# Patient Record
Sex: FEMALE | Race: BLACK OR AFRICAN AMERICAN | NOT HISPANIC OR LATINO | Employment: OTHER | URBAN - NONMETROPOLITAN AREA
[De-identification: names, ages, dates, MRNs, and addresses within clinical notes are randomized per-mention and may not be internally consistent; named-entity substitution may affect disease eponyms.]

---

## 2018-10-12 ENCOUNTER — HISTORICAL (OUTPATIENT)
Dept: ADMINISTRATIVE | Facility: HOSPITAL | Age: 53
End: 2018-10-12

## 2018-11-14 ENCOUNTER — HISTORICAL (OUTPATIENT)
Dept: ADMINISTRATIVE | Facility: HOSPITAL | Age: 53
End: 2018-11-14

## 2018-11-15 ENCOUNTER — ANESTHESIA EVENT (OUTPATIENT)
Dept: SURGERY | Facility: HOSPITAL | Age: 53
DRG: 219 | End: 2018-11-15
Payer: MEDICAID

## 2018-11-15 ENCOUNTER — ANESTHESIA EVENT (OUTPATIENT)
Dept: SURGERY | Facility: HOSPITAL | Age: 53
End: 2018-11-15

## 2018-11-15 ENCOUNTER — ANESTHESIA (OUTPATIENT)
Dept: SURGERY | Facility: HOSPITAL | Age: 53
DRG: 219 | End: 2018-11-15
Payer: MEDICAID

## 2018-11-15 ENCOUNTER — HOSPITAL ENCOUNTER (INPATIENT)
Facility: HOSPITAL | Age: 53
LOS: 12 days | Discharge: HOSPICE | DRG: 219 | End: 2018-11-27
Attending: EMERGENCY MEDICINE | Admitting: INTERNAL MEDICINE
Payer: MEDICAID

## 2018-11-15 DIAGNOSIS — I71.019 AORTIC DISSECTION DISTAL TO LEFT SUBCLAVIAN: ICD-10-CM

## 2018-11-15 DIAGNOSIS — T14.8XXA HEMATOMA: ICD-10-CM

## 2018-11-15 DIAGNOSIS — I71.019 AORTIC DISSECTION, THORACIC: ICD-10-CM

## 2018-11-15 DIAGNOSIS — D62 ACUTE BLOOD LOSS ANEMIA: ICD-10-CM

## 2018-11-15 DIAGNOSIS — R00.1 BRADYCARDIA: ICD-10-CM

## 2018-11-15 DIAGNOSIS — I71.00 DISSECTION OF AORTA, UNSPECIFIED PORTION OF AORTA: ICD-10-CM

## 2018-11-15 DIAGNOSIS — I71.10 THORACIC ANEURYSM, RUPTURED: ICD-10-CM

## 2018-11-15 DIAGNOSIS — I71.30 RUPTURED ABDOMINAL AORTIC ANEURYSM (AAA): Primary | ICD-10-CM

## 2018-11-15 DIAGNOSIS — D68.9 COAGULOPATHY: ICD-10-CM

## 2018-11-15 DIAGNOSIS — R07.9 CHEST PAIN: ICD-10-CM

## 2018-11-15 DIAGNOSIS — K74.60 CHRONIC HEPATITIS C WITH CIRRHOSIS: ICD-10-CM

## 2018-11-15 DIAGNOSIS — Z51.5 PALLIATIVE CARE ENCOUNTER: ICD-10-CM

## 2018-11-15 DIAGNOSIS — S30.1XXA GROIN HEMATOMA: ICD-10-CM

## 2018-11-15 DIAGNOSIS — B18.2 CHRONIC HEPATITIS C WITH CIRRHOSIS: ICD-10-CM

## 2018-11-15 DIAGNOSIS — R10.11 RUQ PAIN: ICD-10-CM

## 2018-11-15 DIAGNOSIS — I10 ESSENTIAL HYPERTENSION: ICD-10-CM

## 2018-11-15 DIAGNOSIS — Z71.89 GOALS OF CARE, COUNSELING/DISCUSSION: ICD-10-CM

## 2018-11-15 PROBLEM — R18.8 OTHER ASCITES: Status: ACTIVE | Noted: 2018-11-15

## 2018-11-15 PROBLEM — F14.10 COCAINE ABUSE: Status: ACTIVE | Noted: 2018-11-15

## 2018-11-15 PROBLEM — M54.6 ACUTE MIDLINE THORACIC BACK PAIN: Status: ACTIVE | Noted: 2018-11-15

## 2018-11-15 PROBLEM — F12.10 MARIJUANA ABUSE: Status: ACTIVE | Noted: 2018-11-15

## 2018-11-15 PROBLEM — D69.6 THROMBOCYTOPENIA: Status: ACTIVE | Noted: 2018-11-15

## 2018-11-15 PROBLEM — R18.8 OTHER ASCITES: Status: RESOLVED | Noted: 2018-11-15 | Resolved: 2018-11-15

## 2018-11-15 LAB
ABO + RH BLD: NORMAL
ALBUMIN SERPL BCP-MCNC: 2.4 G/DL
ALP SERPL-CCNC: 128 U/L
ALT SERPL W/O P-5'-P-CCNC: 29 U/L
ANION GAP SERPL CALC-SCNC: 8 MMOL/L
ANION GAP SERPL CALC-SCNC: 8 MMOL/L
ANION GAP SERPL CALC-SCNC: 9 MMOL/L
APTT BLDCRRT: 33.8 SEC
ASCENDING AORTA: 3.69 CM
AST SERPL-CCNC: 67 U/L
AV MEAN GRADIENT: 3.98 MMHG
AV PEAK GRADIENT: 6.45 MMHG
AV VALVE AREA: 2.4 CM2
BASOPHILS # BLD AUTO: 0 K/UL
BASOPHILS # BLD AUTO: 0.01 K/UL
BASOPHILS # BLD AUTO: 0.01 K/UL
BASOPHILS # BLD AUTO: 0.02 K/UL
BASOPHILS NFR BLD: 0 %
BASOPHILS NFR BLD: 0.1 %
BASOPHILS NFR BLD: 0.1 %
BASOPHILS NFR BLD: 0.2 %
BILIRUB SERPL-MCNC: 1.8 MG/DL
BLD GP AB SCN CELLS X3 SERPL QL: NORMAL
BNP SERPL-MCNC: 803 PG/ML
BSA FOR ECHO PROCEDURE: 1.59 M2
BUN SERPL-MCNC: 16 MG/DL
BUN SERPL-MCNC: 17 MG/DL
BUN SERPL-MCNC: 17 MG/DL
CALCIUM SERPL-MCNC: 7.5 MG/DL
CALCIUM SERPL-MCNC: 8.1 MG/DL
CALCIUM SERPL-MCNC: 8.4 MG/DL
CHLORIDE SERPL-SCNC: 108 MMOL/L
CHLORIDE SERPL-SCNC: 110 MMOL/L
CHLORIDE SERPL-SCNC: 110 MMOL/L
CO2 SERPL-SCNC: 17 MMOL/L
CO2 SERPL-SCNC: 19 MMOL/L
CO2 SERPL-SCNC: 21 MMOL/L
CREAT SERPL-MCNC: 1.1 MG/DL
CREAT SERPL-MCNC: 1.2 MG/DL
CREAT SERPL-MCNC: 1.2 MG/DL
CV ECHO LV RWT: 0.52 CM
DIFFERENTIAL METHOD: ABNORMAL
DOP CALC AO PEAK VEL: 1.27 M/S
DOP CALC AO VTI: 31.02 CM
DOP CALC LVOT AREA: 3.05 CM2
DOP CALC LVOT DIAMETER: 1.97 CM
DOP CALC LVOT STROKE VOLUME: 74.52 CM3
DOP CALCLVOT PEAK VEL VTI: 24.46 CM
E WAVE DECELERATION TIME: 261.89 MSEC
E/A RATIO: 0.74
E/E' RATIO: 7.08
ECHO LV POSTERIOR WALL: 1.06 CM (ref 0.6–1.1)
EOSINOPHIL # BLD AUTO: 0 K/UL
EOSINOPHIL NFR BLD: 0 %
ERYTHROCYTE [DISTWIDTH] IN BLOOD BY AUTOMATED COUNT: 16.2 %
ERYTHROCYTE [DISTWIDTH] IN BLOOD BY AUTOMATED COUNT: 16.2 %
ERYTHROCYTE [DISTWIDTH] IN BLOOD BY AUTOMATED COUNT: 16.3 %
ERYTHROCYTE [DISTWIDTH] IN BLOOD BY AUTOMATED COUNT: 16.5 %
EST. GFR  (AFRICAN AMERICAN): 59.6 ML/MIN/1.73 M^2
EST. GFR  (AFRICAN AMERICAN): 59.6 ML/MIN/1.73 M^2
EST. GFR  (AFRICAN AMERICAN): >60 ML/MIN/1.73 M^2
EST. GFR  (NON AFRICAN AMERICAN): 51.7 ML/MIN/1.73 M^2
EST. GFR  (NON AFRICAN AMERICAN): 51.7 ML/MIN/1.73 M^2
EST. GFR  (NON AFRICAN AMERICAN): 57.5 ML/MIN/1.73 M^2
FRACTIONAL SHORTENING: 32 % (ref 28–44)
GLUCOSE SERPL-MCNC: 117 MG/DL
GLUCOSE SERPL-MCNC: 128 MG/DL
GLUCOSE SERPL-MCNC: 148 MG/DL
HAV IGM SERPL QL IA: NEGATIVE
HBV CORE IGM SERPL QL IA: NEGATIVE
HBV SURFACE AG SERPL QL IA: NEGATIVE
HCT VFR BLD AUTO: 28.7 %
HCT VFR BLD AUTO: 30.7 %
HCT VFR BLD AUTO: 33 %
HCT VFR BLD AUTO: 34.8 %
HCV AB SERPL QL IA: POSITIVE
HGB BLD-MCNC: 10 G/DL
HGB BLD-MCNC: 10.6 G/DL
HGB BLD-MCNC: 11.9 G/DL
HGB BLD-MCNC: 9.6 G/DL
HIV1+2 IGG SERPL QL IA.RAPID: NEGATIVE
IMM GRANULOCYTES # BLD AUTO: 0.04 K/UL
IMM GRANULOCYTES # BLD AUTO: 0.05 K/UL
IMM GRANULOCYTES # BLD AUTO: 0.05 K/UL
IMM GRANULOCYTES # BLD AUTO: 0.06 K/UL
IMM GRANULOCYTES NFR BLD AUTO: 0.5 %
IMM GRANULOCYTES NFR BLD AUTO: 0.7 %
INR PPP: 1.7
INTERVENTRICULAR SEPTUM: 1.18 CM (ref 0.6–1.1)
LA MAJOR: 5.75 CM
LA MINOR: 4.68 CM
LA WIDTH: 3.38 CM
LACTATE SERPL-SCNC: 3 MMOL/L
LACTATE SERPL-SCNC: 3.1 MMOL/L
LACTATE SERPL-SCNC: 3.6 MMOL/L
LACTATE SERPL-SCNC: 3.9 MMOL/L
LEFT ATRIUM SIZE: 2.84 CM
LEFT ATRIUM VOLUME INDEX: 26.5 ML/M2
LEFT ATRIUM VOLUME: 42.1 CM3
LEFT INTERNAL DIMENSION IN SYSTOLE: 2.78 CM (ref 2.1–4)
LEFT VENTRICLE DIASTOLIC VOLUME INDEX: 46.36 ML/M2
LEFT VENTRICLE DIASTOLIC VOLUME: 73.72 ML
LEFT VENTRICLE MASS INDEX: 97.3 G/M2
LEFT VENTRICLE SYSTOLIC VOLUME INDEX: 18.3 ML/M2
LEFT VENTRICLE SYSTOLIC VOLUME: 29.09 ML
LEFT VENTRICULAR INTERNAL DIMENSION IN DIASTOLE: 4.09 CM (ref 3.5–6)
LEFT VENTRICULAR MASS: 154.7 G
LV LATERAL E/E' RATIO: 6.57
LV SEPTAL E/E' RATIO: 7.67
LYMPHOCYTES # BLD AUTO: 0.6 K/UL
LYMPHOCYTES # BLD AUTO: 0.7 K/UL
LYMPHOCYTES # BLD AUTO: 0.7 K/UL
LYMPHOCYTES # BLD AUTO: 0.8 K/UL
LYMPHOCYTES NFR BLD: 7.1 %
LYMPHOCYTES NFR BLD: 7.1 %
LYMPHOCYTES NFR BLD: 7.2 %
LYMPHOCYTES NFR BLD: 8.5 %
MCH RBC QN AUTO: 30.8 PG
MCH RBC QN AUTO: 31 PG
MCH RBC QN AUTO: 31.2 PG
MCH RBC QN AUTO: 31.3 PG
MCHC RBC AUTO-ENTMCNC: 32.1 G/DL
MCHC RBC AUTO-ENTMCNC: 32.6 G/DL
MCHC RBC AUTO-ENTMCNC: 33.4 G/DL
MCHC RBC AUTO-ENTMCNC: 34.2 G/DL
MCV RBC AUTO: 91 FL
MCV RBC AUTO: 94 FL
MCV RBC AUTO: 96 FL
MCV RBC AUTO: 96 FL
MONOCYTES # BLD AUTO: 0.6 K/UL
MONOCYTES # BLD AUTO: 0.7 K/UL
MONOCYTES # BLD AUTO: 0.8 K/UL
MONOCYTES # BLD AUTO: 1.4 K/UL
MONOCYTES NFR BLD: 15 %
MONOCYTES NFR BLD: 6.9 %
MONOCYTES NFR BLD: 7.4 %
MONOCYTES NFR BLD: 8.5 %
MV PEAK A VEL: 0.62 M/S
MV PEAK E VEL: 0.46 M/S
NEUTROPHILS # BLD AUTO: 7.2 K/UL
NEUTROPHILS # BLD AUTO: 7.4 K/UL
NEUTROPHILS # BLD AUTO: 7.4 K/UL
NEUTROPHILS # BLD AUTO: 8.4 K/UL
NEUTROPHILS NFR BLD: 77.3 %
NEUTROPHILS NFR BLD: 82.1 %
NEUTROPHILS NFR BLD: 85 %
NEUTROPHILS NFR BLD: 85.3 %
NRBC BLD-RTO: 0 /100 WBC
PISA TR MAX VEL: 2.33 M/S
PLATELET # BLD AUTO: 61 K/UL
PLATELET # BLD AUTO: 64 K/UL
PLATELET # BLD AUTO: 79 K/UL
PLATELET # BLD AUTO: 80 K/UL
PMV BLD AUTO: 11.7 FL
PMV BLD AUTO: 12.8 FL
PMV BLD AUTO: 13.1 FL
PMV BLD AUTO: ABNORMAL FL
POCT GLUCOSE: 109 MG/DL (ref 70–110)
POTASSIUM SERPL-SCNC: 4.3 MMOL/L
POTASSIUM SERPL-SCNC: 4.3 MMOL/L
POTASSIUM SERPL-SCNC: 5.6 MMOL/L
PROT SERPL-MCNC: 8.4 G/DL
PROTHROMBIN TIME: 16.9 SEC
RA MAJOR: 4.48 CM
RA PRESSURE: 3 MMHG
RA WIDTH: 2.97 CM
RBC # BLD AUTO: 3.07 M/UL
RBC # BLD AUTO: 3.21 M/UL
RBC # BLD AUTO: 3.44 M/UL
RBC # BLD AUTO: 3.84 M/UL
RIGHT VENTRICULAR END-DIASTOLIC DIMENSION: 3.14 CM
SINUS: 3.77 CM
SODIUM SERPL-SCNC: 134 MMOL/L
SODIUM SERPL-SCNC: 137 MMOL/L
SODIUM SERPL-SCNC: 139 MMOL/L
STJ: 3.63 CM
TDI LATERAL: 0.07
TDI SEPTAL: 0.06
TDI: 0.07
TR MAX PG: 21.72 MMHG
TRICUSPID ANNULAR PLANE SYSTOLIC EXCURSION: 2.27 CM
TROPONIN I SERPL DL<=0.01 NG/ML-MCNC: 0.07 NG/ML
TV REST PULMONARY ARTERY PRESSURE: 24.72 MMHG
WBC # BLD AUTO: 8.67 K/UL
WBC # BLD AUTO: 8.81 K/UL
WBC # BLD AUTO: 9.56 K/UL
WBC # BLD AUTO: 9.83 K/UL

## 2018-11-15 PROCEDURE — C2628 CATHETER, OCCLUSION: HCPCS | Performed by: SURGERY

## 2018-11-15 PROCEDURE — 75957 PR  ENDOVASC REPAIR THOR AORTA EXCL SUBCLAVIAN: CPT | Mod: 26,,, | Performed by: SURGERY

## 2018-11-15 PROCEDURE — 63600175 PHARM REV CODE 636 W HCPCS: Performed by: PSYCHIATRY & NEUROLOGY

## 2018-11-15 PROCEDURE — 99285 EMERGENCY DEPT VISIT HI MDM: CPT | Mod: 25

## 2018-11-15 PROCEDURE — C1874 STENT, COATED/COV W/DEL SYS: HCPCS | Performed by: SURGERY

## 2018-11-15 PROCEDURE — 36556 INSERT NON-TUNNEL CV CATH: CPT | Mod: 59,,, | Performed by: NURSE PRACTITIONER

## 2018-11-15 PROCEDURE — 25000003 PHARM REV CODE 250

## 2018-11-15 PROCEDURE — C1769 GUIDE WIRE: HCPCS | Performed by: SURGERY

## 2018-11-15 PROCEDURE — 25000003 PHARM REV CODE 250: Performed by: NURSE ANESTHETIST, CERTIFIED REGISTERED

## 2018-11-15 PROCEDURE — 34713 PERQ ACCESS & CLSR FEM ART: CPT | Mod: RT,,, | Performed by: SURGERY

## 2018-11-15 PROCEDURE — 96375 TX/PRO/DX INJ NEW DRUG ADDON: CPT

## 2018-11-15 PROCEDURE — 02VW3DZ RESTRICTION OF THORACIC AORTA, DESCENDING WITH INTRALUMINAL DEVICE, PERCUTANEOUS APPROACH: ICD-10-PCS | Performed by: SURGERY

## 2018-11-15 PROCEDURE — C1753 CATH, INTRAVAS ULTRASOUND: HCPCS | Performed by: SURGERY

## 2018-11-15 PROCEDURE — 80053 COMPREHEN METABOLIC PANEL: CPT

## 2018-11-15 PROCEDURE — B3101ZZ FLUOROSCOPY OF THORACIC AORTA USING LOW OSMOLAR CONTRAST: ICD-10-PCS | Performed by: SURGERY

## 2018-11-15 PROCEDURE — 80074 ACUTE HEPATITIS PANEL: CPT

## 2018-11-15 PROCEDURE — 63600175 PHARM REV CODE 636 W HCPCS

## 2018-11-15 PROCEDURE — 85610 PROTHROMBIN TIME: CPT

## 2018-11-15 PROCEDURE — 37000009 HC ANESTHESIA EA ADD 15 MINS: Performed by: SURGERY

## 2018-11-15 PROCEDURE — 93010 ELECTROCARDIOGRAM REPORT: CPT | Mod: ,,, | Performed by: INTERNAL MEDICINE

## 2018-11-15 PROCEDURE — 96367 TX/PROPH/DG ADDL SEQ IV INF: CPT

## 2018-11-15 PROCEDURE — 99285 EMERGENCY DEPT VISIT HI MDM: CPT | Mod: ,,, | Performed by: EMERGENCY MEDICINE

## 2018-11-15 PROCEDURE — 27100171 HC OXYGEN HIGH FLOW UP TO 24 HOURS

## 2018-11-15 PROCEDURE — 99900035 HC TECH TIME PER 15 MIN (STAT)

## 2018-11-15 PROCEDURE — 83880 ASSAY OF NATRIURETIC PEPTIDE: CPT

## 2018-11-15 PROCEDURE — 86850 RBC ANTIBODY SCREEN: CPT

## 2018-11-15 PROCEDURE — 96376 TX/PRO/DX INJ SAME DRUG ADON: CPT

## 2018-11-15 PROCEDURE — 02HV33Z INSERTION OF INFUSION DEVICE INTO SUPERIOR VENA CAVA, PERCUTANEOUS APPROACH: ICD-10-PCS | Performed by: INTERNAL MEDICINE

## 2018-11-15 PROCEDURE — 36620 INSERTION CATHETER ARTERY: CPT | Mod: ,,, | Performed by: NURSE PRACTITIONER

## 2018-11-15 PROCEDURE — 80048 BASIC METABOLIC PNL TOTAL CA: CPT | Mod: 91

## 2018-11-15 PROCEDURE — 33881 EVASC RPR TA NDGFT XCOV LSA: CPT | Mod: ,,, | Performed by: SURGERY

## 2018-11-15 PROCEDURE — 36200 PLACE CATHETER IN AORTA: CPT | Mod: 50,51,, | Performed by: SURGERY

## 2018-11-15 PROCEDURE — 63600175 PHARM REV CODE 636 W HCPCS: Performed by: NURSE PRACTITIONER

## 2018-11-15 PROCEDURE — 25500020 PHARM REV CODE 255: Performed by: SURGERY

## 2018-11-15 PROCEDURE — 99285 EMERGENCY DEPT VISIT HI MDM: CPT | Mod: 57,,, | Performed by: SURGERY

## 2018-11-15 PROCEDURE — 20000000 HC ICU ROOM

## 2018-11-15 PROCEDURE — 63600175 PHARM REV CODE 636 W HCPCS: Performed by: SURGERY

## 2018-11-15 PROCEDURE — C1887 CATHETER, GUIDING: HCPCS | Performed by: SURGERY

## 2018-11-15 PROCEDURE — D9220A PRA ANESTHESIA: Mod: CRNA,,, | Performed by: NURSE ANESTHETIST, CERTIFIED REGISTERED

## 2018-11-15 PROCEDURE — 63600175 PHARM REV CODE 636 W HCPCS: Performed by: EMERGENCY MEDICINE

## 2018-11-15 PROCEDURE — 37252 INTRVASC US NONCORONARY 1ST: CPT | Mod: ,,, | Performed by: SURGERY

## 2018-11-15 PROCEDURE — 009U30Z DRAINAGE OF SPINAL CANAL WITH DRAINAGE DEVICE, PERCUTANEOUS APPROACH: ICD-10-PCS | Performed by: PSYCHIATRY & NEUROLOGY

## 2018-11-15 PROCEDURE — 25500020 PHARM REV CODE 255: Performed by: EMERGENCY MEDICINE

## 2018-11-15 PROCEDURE — 25000003 PHARM REV CODE 250: Performed by: NURSE PRACTITIONER

## 2018-11-15 PROCEDURE — 86920 COMPATIBILITY TEST SPIN: CPT

## 2018-11-15 PROCEDURE — D9220A PRA ANESTHESIA: Mod: ANES,,, | Performed by: ANESTHESIOLOGY

## 2018-11-15 PROCEDURE — 36000713 HC OR TIME LEV V EA ADD 15 MIN: Performed by: SURGERY

## 2018-11-15 PROCEDURE — 94761 N-INVAS EAR/PLS OXIMETRY MLT: CPT

## 2018-11-15 PROCEDURE — 80048 BASIC METABOLIC PNL TOTAL CA: CPT

## 2018-11-15 PROCEDURE — 36000712 HC OR TIME LEV V 1ST 15 MIN: Performed by: SURGERY

## 2018-11-15 PROCEDURE — 27100092 HC HIGH FLOW DELIVERY CANNULA

## 2018-11-15 PROCEDURE — C1760 CLOSURE DEV, VASC: HCPCS | Performed by: SURGERY

## 2018-11-15 PROCEDURE — 99291 CRITICAL CARE FIRST HOUR: CPT | Mod: ,,, | Performed by: NURSE PRACTITIONER

## 2018-11-15 PROCEDURE — C1894 INTRO/SHEATH, NON-LASER: HCPCS | Performed by: SURGERY

## 2018-11-15 PROCEDURE — C1729 CATH, DRAINAGE: HCPCS | Performed by: SURGERY

## 2018-11-15 PROCEDURE — 63600175 PHARM REV CODE 636 W HCPCS: Performed by: NURSE ANESTHETIST, CERTIFIED REGISTERED

## 2018-11-15 PROCEDURE — 96365 THER/PROPH/DIAG IV INF INIT: CPT | Mod: 59

## 2018-11-15 PROCEDURE — B244ZZ3 ULTRASONOGRAPHY OF RIGHT HEART, INTRAVASCULAR: ICD-10-PCS | Performed by: SURGERY

## 2018-11-15 PROCEDURE — 27201423 OPTIME MED/SURG SUP & DEVICES STERILE SUPPLY: Performed by: SURGERY

## 2018-11-15 PROCEDURE — 83605 ASSAY OF LACTIC ACID: CPT

## 2018-11-15 PROCEDURE — 25000003 PHARM REV CODE 250: Performed by: PHYSICIAN ASSISTANT

## 2018-11-15 PROCEDURE — 93005 ELECTROCARDIOGRAM TRACING: CPT

## 2018-11-15 PROCEDURE — 27000221 HC OXYGEN, UP TO 24 HOURS

## 2018-11-15 PROCEDURE — 85730 THROMBOPLASTIN TIME PARTIAL: CPT

## 2018-11-15 PROCEDURE — 27100025 HC TUBING, SET FLUID WARMER: Performed by: NURSE ANESTHETIST, CERTIFIED REGISTERED

## 2018-11-15 PROCEDURE — 37000008 HC ANESTHESIA 1ST 15 MINUTES: Performed by: SURGERY

## 2018-11-15 PROCEDURE — 82962 GLUCOSE BLOOD TEST: CPT

## 2018-11-15 PROCEDURE — 83605 ASSAY OF LACTIC ACID: CPT | Mod: 91

## 2018-11-15 PROCEDURE — 86703 HIV-1/HIV-2 1 RESULT ANTBDY: CPT

## 2018-11-15 PROCEDURE — 85025 COMPLETE CBC W/AUTO DIFF WBC: CPT

## 2018-11-15 PROCEDURE — 84484 ASSAY OF TROPONIN QUANT: CPT

## 2018-11-15 PROCEDURE — 96366 THER/PROPH/DIAG IV INF ADDON: CPT

## 2018-11-15 RX ORDER — FENTANYL CITRATE 50 UG/ML
50 INJECTION, SOLUTION INTRAMUSCULAR; INTRAVENOUS
Status: DISCONTINUED | OUTPATIENT
Start: 2018-11-15 | End: 2018-11-15

## 2018-11-15 RX ORDER — PANTOPRAZOLE SODIUM 40 MG/1
40 TABLET, DELAYED RELEASE ORAL DAILY
Status: DISCONTINUED | OUTPATIENT
Start: 2018-11-15 | End: 2018-11-16

## 2018-11-15 RX ORDER — MIDAZOLAM HYDROCHLORIDE 1 MG/ML
INJECTION, SOLUTION INTRAMUSCULAR; INTRAVENOUS
Status: DISCONTINUED | OUTPATIENT
Start: 2018-11-15 | End: 2018-11-15

## 2018-11-15 RX ORDER — HEPARIN SODIUM 1000 [USP'U]/ML
INJECTION, SOLUTION INTRAVENOUS; SUBCUTANEOUS
Status: DISCONTINUED | OUTPATIENT
Start: 2018-11-15 | End: 2018-11-15 | Stop reason: HOSPADM

## 2018-11-15 RX ORDER — FUROSEMIDE 40 MG/1
40 TABLET ORAL DAILY
Status: DISCONTINUED | OUTPATIENT
Start: 2018-11-15 | End: 2018-11-15

## 2018-11-15 RX ORDER — PROTAMINE SULFATE 10 MG/ML
INJECTION, SOLUTION INTRAVENOUS
Status: DISCONTINUED | OUTPATIENT
Start: 2018-11-15 | End: 2018-11-15

## 2018-11-15 RX ORDER — FENTANYL CITRATE 50 UG/ML
INJECTION, SOLUTION INTRAMUSCULAR; INTRAVENOUS
Status: DISCONTINUED | OUTPATIENT
Start: 2018-11-15 | End: 2018-11-15

## 2018-11-15 RX ORDER — LIDOCAINE HYDROCHLORIDE 10 MG/ML
1 INJECTION INFILTRATION; PERINEURAL ONCE
Status: COMPLETED | OUTPATIENT
Start: 2018-11-15 | End: 2018-11-15

## 2018-11-15 RX ORDER — FENTANYL CITRATE 50 UG/ML
25 INJECTION, SOLUTION INTRAMUSCULAR; INTRAVENOUS
Status: DISCONTINUED | OUTPATIENT
Start: 2018-11-15 | End: 2018-11-16

## 2018-11-15 RX ORDER — FUROSEMIDE 10 MG/ML
100 INJECTION INTRAMUSCULAR; INTRAVENOUS ONCE
Status: DISCONTINUED | OUTPATIENT
Start: 2018-11-15 | End: 2018-11-15

## 2018-11-15 RX ORDER — NIFEDIPINE 30 MG/1
30 TABLET, EXTENDED RELEASE ORAL DAILY
Status: DISCONTINUED | OUTPATIENT
Start: 2018-11-15 | End: 2018-11-15

## 2018-11-15 RX ORDER — ESMOLOL HYDROCHLORIDE 20 MG/ML
INJECTION, SOLUTION INTRAVENOUS
Status: COMPLETED
Start: 2018-11-15 | End: 2018-11-15

## 2018-11-15 RX ORDER — PHENYLEPHRINE HYDROCHLORIDE 10 MG/ML
INJECTION INTRAVENOUS
Status: DISCONTINUED | OUTPATIENT
Start: 2018-11-15 | End: 2018-11-15

## 2018-11-15 RX ORDER — NICARDIPINE HYDROCHLORIDE 0.2 MG/ML
5 INJECTION INTRAVENOUS CONTINUOUS
Status: DISCONTINUED | OUTPATIENT
Start: 2018-11-15 | End: 2018-11-15

## 2018-11-15 RX ORDER — SPIRONOLACTONE 25 MG/1
25 TABLET ORAL DAILY
Status: ON HOLD | COMMUNITY
End: 2018-11-27 | Stop reason: HOSPADM

## 2018-11-15 RX ORDER — ONDANSETRON 8 MG/1
8 TABLET, ORALLY DISINTEGRATING ORAL EVERY 8 HOURS PRN
Status: DISCONTINUED | OUTPATIENT
Start: 2018-11-15 | End: 2018-11-27

## 2018-11-15 RX ORDER — FENTANYL CITRATE 50 UG/ML
50 INJECTION, SOLUTION INTRAMUSCULAR; INTRAVENOUS
Status: COMPLETED | OUTPATIENT
Start: 2018-11-15 | End: 2018-11-15

## 2018-11-15 RX ORDER — ROCURONIUM BROMIDE 10 MG/ML
INJECTION, SOLUTION INTRAVENOUS
Status: DISCONTINUED | OUTPATIENT
Start: 2018-11-15 | End: 2018-11-15

## 2018-11-15 RX ORDER — NICARDIPINE HYDROCHLORIDE 0.2 MG/ML
INJECTION INTRAVENOUS
Status: DISPENSED
Start: 2018-11-15 | End: 2018-11-15

## 2018-11-15 RX ORDER — HEPARIN SODIUM 1000 [USP'U]/ML
INJECTION, SOLUTION INTRAVENOUS; SUBCUTANEOUS
Status: DISCONTINUED | OUTPATIENT
Start: 2018-11-15 | End: 2018-11-15

## 2018-11-15 RX ORDER — SODIUM CHLORIDE 0.9 % (FLUSH) 0.9 %
3 SYRINGE (ML) INJECTION
Status: DISCONTINUED | OUTPATIENT
Start: 2018-11-15 | End: 2018-11-15

## 2018-11-15 RX ORDER — CLONIDINE HYDROCHLORIDE 0.2 MG/1
0.2 TABLET ORAL 2 TIMES DAILY
Status: ON HOLD | COMMUNITY
End: 2018-11-27 | Stop reason: HOSPADM

## 2018-11-15 RX ORDER — LEVOFLOXACIN 500 MG/1
500 TABLET, FILM COATED ORAL
Status: ON HOLD | COMMUNITY
End: 2018-11-27 | Stop reason: HOSPADM

## 2018-11-15 RX ORDER — PHENYLEPHRINE HYDROCHLORIDE 10 MG/ML
INJECTION INTRAVENOUS
Status: COMPLETED
Start: 2018-11-15 | End: 2018-11-15

## 2018-11-15 RX ORDER — GLYCOPYRROLATE 0.2 MG/ML
INJECTION INTRAMUSCULAR; INTRAVENOUS
Status: DISCONTINUED | OUTPATIENT
Start: 2018-11-15 | End: 2018-11-15

## 2018-11-15 RX ORDER — FENTANYL CITRATE 50 UG/ML
50 INJECTION, SOLUTION INTRAMUSCULAR; INTRAVENOUS ONCE
Status: COMPLETED | OUTPATIENT
Start: 2018-11-15 | End: 2018-11-15

## 2018-11-15 RX ORDER — HEPARIN SODIUM 5000 [USP'U]/ML
5000 INJECTION, SOLUTION INTRAVENOUS; SUBCUTANEOUS EVERY 12 HOURS
Status: DISCONTINUED | OUTPATIENT
Start: 2018-11-15 | End: 2018-11-16

## 2018-11-15 RX ORDER — HYDROCODONE BITARTRATE AND ACETAMINOPHEN 500; 5 MG/1; MG/1
TABLET ORAL
Status: DISCONTINUED | OUTPATIENT
Start: 2018-11-15 | End: 2018-11-15

## 2018-11-15 RX ORDER — HYDRALAZINE HYDROCHLORIDE 50 MG/1
50 TABLET, FILM COATED ORAL 3 TIMES DAILY
Status: ON HOLD | COMMUNITY
End: 2018-11-27 | Stop reason: HOSPADM

## 2018-11-15 RX ORDER — NICARDIPINE HYDROCHLORIDE 0.2 MG/ML
2.5 INJECTION INTRAVENOUS CONTINUOUS
Status: DISCONTINUED | OUTPATIENT
Start: 2018-11-15 | End: 2018-11-15

## 2018-11-15 RX ORDER — LIDOCAINE HYDROCHLORIDE 10 MG/ML
INJECTION, SOLUTION EPIDURAL; INFILTRATION; INTRACAUDAL; PERINEURAL
Status: COMPLETED
Start: 2018-11-15 | End: 2018-11-15

## 2018-11-15 RX ORDER — PANTOPRAZOLE SODIUM 40 MG/1
40 TABLET, DELAYED RELEASE ORAL DAILY
Status: ON HOLD | COMMUNITY
End: 2018-11-27 | Stop reason: HOSPADM

## 2018-11-15 RX ORDER — CARVEDILOL 6.25 MG/1
6.25 TABLET ORAL 2 TIMES DAILY
Status: DISCONTINUED | OUTPATIENT
Start: 2018-11-15 | End: 2018-11-15

## 2018-11-15 RX ORDER — NOREPINEPHRINE BITARTRATE/D5W 4MG/250ML
PLASTIC BAG, INJECTION (ML) INTRAVENOUS
Status: DISPENSED
Start: 2018-11-15 | End: 2018-11-16

## 2018-11-15 RX ORDER — CEFAZOLIN SODIUM 1 G/3ML
1 INJECTION, POWDER, FOR SOLUTION INTRAMUSCULAR; INTRAVENOUS
Status: DISCONTINUED | OUTPATIENT
Start: 2018-11-15 | End: 2018-11-16

## 2018-11-15 RX ORDER — PROPOFOL 10 MG/ML
VIAL (ML) INTRAVENOUS
Status: DISCONTINUED | OUTPATIENT
Start: 2018-11-15 | End: 2018-11-15

## 2018-11-15 RX ORDER — ONDANSETRON 2 MG/ML
INJECTION INTRAMUSCULAR; INTRAVENOUS
Status: DISCONTINUED | OUTPATIENT
Start: 2018-11-15 | End: 2018-11-15

## 2018-11-15 RX ORDER — LISINOPRIL 20 MG/1
20 TABLET ORAL DAILY
Status: ON HOLD | COMMUNITY
End: 2018-11-27 | Stop reason: HOSPADM

## 2018-11-15 RX ORDER — NIFEDIPINE 60 MG/1
30 TABLET, EXTENDED RELEASE ORAL DAILY
Status: ON HOLD | COMMUNITY
End: 2018-11-27 | Stop reason: HOSPADM

## 2018-11-15 RX ORDER — IODIXANOL 320 MG/ML
INJECTION, SOLUTION INTRAVASCULAR
Status: DISCONTINUED | OUTPATIENT
Start: 2018-11-15 | End: 2018-11-15 | Stop reason: HOSPADM

## 2018-11-15 RX ORDER — LIDOCAINE HCL/PF 100 MG/5ML
SYRINGE (ML) INTRAVENOUS
Status: DISCONTINUED | OUTPATIENT
Start: 2018-11-15 | End: 2018-11-15

## 2018-11-15 RX ORDER — NEOSTIGMINE METHYLSULFATE 1 MG/ML
INJECTION, SOLUTION INTRAVENOUS
Status: DISCONTINUED | OUTPATIENT
Start: 2018-11-15 | End: 2018-11-15

## 2018-11-15 RX ORDER — FUROSEMIDE 40 MG/1
40 TABLET ORAL DAILY
Status: ON HOLD | COMMUNITY
End: 2018-11-27 | Stop reason: HOSPADM

## 2018-11-15 RX ADMIN — FENTANYL CITRATE 25 MCG: 50 INJECTION INTRAMUSCULAR; INTRAVENOUS at 05:11

## 2018-11-15 RX ADMIN — PROTAMINE SULFATE 10 MG: 10 INJECTION, SOLUTION INTRAVENOUS at 04:11

## 2018-11-15 RX ADMIN — SODIUM CHLORIDE, SODIUM GLUCONATE, SODIUM ACETATE, POTASSIUM CHLORIDE, MAGNESIUM CHLORIDE, SODIUM PHOSPHATE, DIBASIC, AND POTASSIUM PHOSPHATE: .53; .5; .37; .037; .03; .012; .00082 INJECTION, SOLUTION INTRAVENOUS at 03:11

## 2018-11-15 RX ADMIN — FENTANYL CITRATE 25 MCG: 50 INJECTION INTRAMUSCULAR; INTRAVENOUS at 11:11

## 2018-11-15 RX ADMIN — FENTANYL CITRATE 50 MCG: 50 INJECTION, SOLUTION INTRAMUSCULAR; INTRAVENOUS at 01:11

## 2018-11-15 RX ADMIN — ESMOLOL HYDROCHLORIDE 200 MCG/KG/MIN: 20 INJECTION INTRAVENOUS at 11:11

## 2018-11-15 RX ADMIN — FENTANYL CITRATE 25 MCG: 50 INJECTION, SOLUTION INTRAMUSCULAR; INTRAVENOUS at 04:11

## 2018-11-15 RX ADMIN — FENTANYL CITRATE 50 MCG: 50 INJECTION, SOLUTION INTRAMUSCULAR; INTRAVENOUS at 02:11

## 2018-11-15 RX ADMIN — HEPARIN SODIUM 5000 UNITS: 5000 INJECTION, SOLUTION INTRAVENOUS; SUBCUTANEOUS at 09:11

## 2018-11-15 RX ADMIN — PHENYLEPHRINE HYDROCHLORIDE 100 MCG: 10 INJECTION INTRAVENOUS at 03:11

## 2018-11-15 RX ADMIN — PROPOFOL 125 MG: 10 INJECTION, EMULSION INTRAVENOUS at 02:11

## 2018-11-15 RX ADMIN — MIDAZOLAM HYDROCHLORIDE 1 MG: 1 INJECTION, SOLUTION INTRAMUSCULAR; INTRAVENOUS at 02:11

## 2018-11-15 RX ADMIN — FUROSEMIDE 40 MG: 40 TABLET ORAL at 10:11

## 2018-11-15 RX ADMIN — ESMOLOL HYDROCHLORIDE 50 MCG/KG/MIN: 20 INJECTION INTRAVENOUS at 04:11

## 2018-11-15 RX ADMIN — FENTANYL CITRATE 50 MCG: 50 INJECTION INTRAMUSCULAR; INTRAVENOUS at 12:11

## 2018-11-15 RX ADMIN — CARVEDILOL 6.25 MG: 3.12 TABLET, FILM COATED ORAL at 10:11

## 2018-11-15 RX ADMIN — ROCURONIUM BROMIDE 40 MG: 10 INJECTION, SOLUTION INTRAVENOUS at 02:11

## 2018-11-15 RX ADMIN — PROTAMINE SULFATE 10 MG: 10 INJECTION, SOLUTION INTRAVENOUS at 03:11

## 2018-11-15 RX ADMIN — DEXTROSE 2 G: 50 INJECTION, SOLUTION INTRAVENOUS at 03:11

## 2018-11-15 RX ADMIN — ESMOLOL HYDROCHLORIDE 200 MCG/KG/MIN: 20 INJECTION INTRAVENOUS at 08:11

## 2018-11-15 RX ADMIN — ONDANSETRON 4 MG: 2 INJECTION INTRAMUSCULAR; INTRAVENOUS at 04:11

## 2018-11-15 RX ADMIN — PHYTONADIONE 10 MG: 10 INJECTION, EMULSION INTRAMUSCULAR; INTRAVENOUS; SUBCUTANEOUS at 08:11

## 2018-11-15 RX ADMIN — CEFAZOLIN 1 G: 1 INJECTION, POWDER, FOR SOLUTION INTRAMUSCULAR; INTRAVENOUS; PARENTERAL at 05:11

## 2018-11-15 RX ADMIN — NEOSTIGMINE METHYLSULFATE 5 MG: 1 INJECTION INTRAVENOUS at 04:11

## 2018-11-15 RX ADMIN — LIDOCAINE HYDROCHLORIDE 1 ML: 10 INJECTION INFILTRATION; PERINEURAL at 12:11

## 2018-11-15 RX ADMIN — HEPARIN SODIUM 6000 UNITS: 1000 INJECTION, SOLUTION INTRAVENOUS; SUBCUTANEOUS at 03:11

## 2018-11-15 RX ADMIN — FENTANYL CITRATE 50 MCG: 50 INJECTION INTRAMUSCULAR; INTRAVENOUS at 04:11

## 2018-11-15 RX ADMIN — NIFEDIPINE 30 MG: 30 TABLET, FILM COATED, EXTENDED RELEASE ORAL at 10:11

## 2018-11-15 RX ADMIN — PHENYLEPHRINE HYDROCHLORIDE: 10 INJECTION INTRAVENOUS at 09:11

## 2018-11-15 RX ADMIN — NICARDIPINE HYDROCHLORIDE 2.5 MG/HR: 0.2 INJECTION, SOLUTION INTRAVENOUS at 09:11

## 2018-11-15 RX ADMIN — NICARDIPINE HYDROCHLORIDE 5 MG/HR: 0.2 INJECTION, SOLUTION INTRAVENOUS at 03:11

## 2018-11-15 RX ADMIN — ROCURONIUM BROMIDE 10 MG: 10 INJECTION, SOLUTION INTRAVENOUS at 03:11

## 2018-11-15 RX ADMIN — IOHEXOL 75 ML: 350 INJECTION, SOLUTION INTRAVENOUS at 04:11

## 2018-11-15 RX ADMIN — PHENYLEPHRINE HYDROCHLORIDE 0.5 MCG/KG/MIN: 10 INJECTION INTRAVENOUS at 05:11

## 2018-11-15 RX ADMIN — LIDOCAINE HYDROCHLORIDE 75 MG: 20 INJECTION, SOLUTION INTRAVENOUS at 02:11

## 2018-11-15 RX ADMIN — GLYCOPYRROLATE 0.6 MG: 0.2 INJECTION, SOLUTION INTRAMUSCULAR; INTRAVENOUS at 04:11

## 2018-11-15 RX ADMIN — SODIUM CHLORIDE, SODIUM GLUCONATE, SODIUM ACETATE, POTASSIUM CHLORIDE, MAGNESIUM CHLORIDE, SODIUM PHOSPHATE, DIBASIC, AND POTASSIUM PHOSPHATE: .53; .5; .37; .037; .03; .012; .00082 INJECTION, SOLUTION INTRAVENOUS at 02:11

## 2018-11-15 RX ADMIN — SODIUM CHLORIDE, SODIUM LACTATE, POTASSIUM CHLORIDE, AND CALCIUM CHLORIDE 500 ML: .6; .31; .03; .02 INJECTION, SOLUTION INTRAVENOUS at 10:11

## 2018-11-15 RX ADMIN — FENTANYL CITRATE 25 MCG: 50 INJECTION INTRAMUSCULAR; INTRAVENOUS at 09:11

## 2018-11-15 RX ADMIN — PANTOPRAZOLE SODIUM 40 MG: 40 TABLET, DELAYED RELEASE ORAL at 10:11

## 2018-11-15 RX ADMIN — LIDOCAINE HYDROCHLORIDE 1 ML: 10 INJECTION, SOLUTION EPIDURAL; INFILTRATION; INTRACAUDAL at 12:11

## 2018-11-15 NOTE — CONSULTS
Leah Cleaning  11/15/2018    Vascular Surgery Consult Note    CC: epigastric/back pain, acute type B dissection    HPI:  Patient is a 53 y.o. female with a h/o HTN, Hep C, cirrhosis (MELD 15),Hx of pancreatitis, GERD, and illicit substance abuse (crack cocaine/THC) who presented to [facility] around 0930 got labs and VA'ed home, represented to ed with worsening pain last night around 1830 with acute onset epigastric pain radiating to back, 5/10 at the time. CT abd/pelv was obtained and found to have aortic dissection so patient was immediately transferred to [facility] ED. Initially CT surgery was contacted prior to transfer, CTA chest/abd/pelv was obtained on arrival and Type B dissection was noted. Vascular surgery was consulted.     Patient's HR prior to transfer was in 60's, Patient was transferred with Cardene gtt. Patient endorses immediate improvement of pain with BP control. Currently pain free except when patient is moving around or takes a deep breath. [place] abdominal pain, leg pain, leg numbness/weakness. On arrival, patient transitioned to Esmolol and HR in 50-60s, -120's.     Patient states she last smoked crack cocaine on Sunday. Patient was positive for cocaine/THD on OSH urine tox screen.     Tobacco use: current smoker, 3-5 cigarettes/d    Past Medical History:   Diagnosis Date    Arthritis     Cirrhosis     GERD (gastroesophageal reflux disease)     Hepatitis C     Hypertension     Sciatica      Past Surgical History:   Procedure Laterality Date    CHOLECYSTECTOMY       History reviewed. No pertinent family history.  Social History     Socioeconomic History    Marital status: Not on file     Spouse name: Not on file    Number of children: Not on file    Years of education: Not on file    Highest education level: Not on file   Social Needs    Financial resource strain: Not on file    Food insecurity - worry: Not on file    Food insecurity - inability: Not on file     Transportation needs - medical: Not on file    Transportation needs - non-medical: Not on file   Occupational History    Not on file   Tobacco Use    Smoking status: Current Every Day Smoker    Smokeless tobacco: Never Used   Substance and Sexual Activity    Alcohol use: No     Frequency: Never    Drug use: Yes     Types: Cocaine, Marijuana    Sexual activity: Not on file   Other Topics Concern    Not on file   Social History Narrative    Not on file     Review of patient's allergies indicates:  No Known Allergies    Current Facility-Administered Medications:     0.9%  NaCl infusion (for blood administration), , Intravenous, Q24H PRN, Vahid Duvall MD    esmolol 2000 mg in sodium chloride 0.9% 100 mL (20 mg/mL), 50 mcg/kg/min, Intravenous, Continuous, Vahid Duvall MD, Last Rate: 16.3 mL/hr at 11/15/18 0525, 100 mcg/kg/min at 11/15/18 0525    Current Outpatient Medications:     cloNIDine (CATAPRES) 0.2 MG tablet, Take 0.2 mg by mouth 2 (two) times daily., Disp: , Rfl:     furosemide (LASIX) 40 MG tablet, Take 40 mg by mouth once daily., Disp: , Rfl:     hydrALAZINE (APRESOLINE) 50 MG tablet, Take 50 mg by mouth 3 (three) times daily., Disp: , Rfl:     levoFLOXacin (LEVAQUIN) 500 MG tablet, Take 500 mg by mouth every 24 hours., Disp: , Rfl:     lisinopril (PRINIVIL,ZESTRIL) 20 MG tablet, Take 20 mg by mouth once daily., Disp: , Rfl:     NIFEdipine (ADALAT CC) 60 MG TbSR, Take 30 mg by mouth once daily., Disp: , Rfl:     pantoprazole (PROTONIX) 40 MG tablet, Take 40 mg by mouth once daily., Disp: , Rfl:     spironolactone (ALDACTONE) 25 MG tablet, Take 25 mg by mouth once daily., Disp: , Rfl:     REVIEW OF SYSTEMS:  General: negative;   ENT: negative;   Allergy and Immunology: negative;   Hematological and Lymphatic: Negative;   Endocrine: negative;   Respiratory: no cough, shortness of breath, or wheezing;   Cardiovascular: no chest pain or dyspnea on exertion;   Gastrointestinal: no  abdominal pain/back, change in bowel habits, or bloody stools;   Genito-Urinary: no dysuria, trouble voiding, or hematuria;   Musculoskeletal: negative  Neurological: no TIA or stroke symptoms;   Psychiatric: no nervousness, anxiety or depression.    PHYSICAL EXAM:      Pulse: 61  Temp: 97.5 °F (36.4 °C)      General appearance:  Alert, well-appearing, and in no distress.  Oriented to person, place, and time   Neurological:  Normal speech, no focal findings noted; CN II - XII grossly intact           Musculoskeletal: Digits/nail without cyanosis/clubbing.  Normal muscle strength/tone.                 Neck: Supple, no significant adenopathy; thyroid is not enlarged                Chest:  Clear to auscultation, no wheezes, rales or rhonchi, symmetric air entry      No use of accessory muscles             Cardiac: Normal rate and regular rhythm, S1 and S2 normal; PMI non-displaced          Abdomen: Mild distended, soft, non-tender, +fluid wave      No rebound      Extremities:   2+ femoral pulses bilaterally      2+ DP pulses bilaterally    LAB RESULTS:  No results found for: K, CREATININE  Lab Results   Component Value Date    WBC 8.81 11/15/2018    HCT 34.8 (L) 11/15/2018    PLT 80 (L) 11/15/2018     No results found for: HGBA1C    IMAGING:  CTA chest/Abd/Pelv    Acute type B dissection, appears to be retrograde dissection, entry tear approx 25mm proximal to celiac origin.   Dissection extending up to proximal descending thoracic aorta.   Some reactive pleural effusion around left chest  Free fluid in abd likely ascites    IMP/PLAN:  53 y.o. female with h/o HTN, Hep C, cirrhosis (MELD 15),Hx of pancreatitis, GERD, and illicit substance abuse (crack cocaine/THC), with acute onset epigastric/back pain. Found to have acute retrograde type B dissection from just above the celiac. Currently HD stable and pain free.     - Acute Type B dissection, Cocain +, anti-impulse therapy with esmolol gtt and add cardene if necessary.  But recommend re-starting her home meds. Will monitor with non-operative therapy until she has one of the following: uncontrolled abdominal/back pain, uncontrolled BP with multiple BP agents, or evidence of end-organ malperfusion.   - NPO for now until Sx has been free for at least 24 hrs.   - Cirrohsis (chronic hep C) with ascites, MELD 15. Lactate elevated due to cirrhosis not a good marker to follow for end-organ malperfusion in this patient.  - MICU admission (discussed with MICU fellow) given multiple comorbidities.     Ankit Hemphill MD  Vascular/Endovascular Surgery Fellow

## 2018-11-15 NOTE — ED PROVIDER NOTES
Encounter Date: 11/15/2018       History     Chief Complaint   Patient presents with    Transfer     Dissecting AAA transfer from [facility] for HTN     53-year-old female transferred for surgical evaluation of AAA.   CT scan of the abdomen and pelvis performed at outside facility revealed a dissecting AAA.   Patient was started on nicardipine and transferred here.  Upon arrival patient is on nicardipine at 5 an hour .   Patient has mild abdominal pain.  She denies any fever chills nausea or vomiting. She is has normal mentation.           Review of patient's allergies indicates:  No Known Allergies  Past Medical History:   Diagnosis Date    Arthritis     Cirrhosis     GERD (gastroesophageal reflux disease)     Hepatitis C     Hypertension     Sciatica      Past Surgical History:   Procedure Laterality Date    CHOLECYSTECTOMY       History reviewed. No pertinent family history.  Social History     Tobacco Use    Smoking status: Current Every Day Smoker    Smokeless tobacco: Never Used   Substance Use Topics    Alcohol use: No     Frequency: Never    Drug use: Yes     Types: Cocaine, Marijuana     Review of Systems   Constitutional: Negative for fever.   HENT: Negative for sore throat.    Respiratory: Negative for shortness of breath.    Cardiovascular: Negative for chest pain.   Gastrointestinal: Positive for abdominal pain. Negative for nausea.   Genitourinary: Negative for dysuria.   Musculoskeletal: Negative for back pain.   Skin: Negative for rash.   Neurological: Negative for weakness.   Hematological: Does not bruise/bleed easily.       Physical Exam     Initial Vitals [11/15/18 0348]   BP Pulse Resp Temp SpO2   128/79 98 16 -- 96 %      MAP       --         Physical Exam    Constitutional: Vital signs are normal. She appears well-developed and well-nourished. She is not diaphoretic. No distress.   HENT:   Head: Normocephalic and atraumatic.   Right Ear: External ear normal.   Left  Ear: External ear normal.   Eyes: Conjunctivae are normal.   Cardiovascular: Normal rate and regular rhythm.   Pulmonary/Chest: She exhibits no tenderness.   Abdominal: Soft. Normal appearance and bowel sounds are normal. There is tenderness.   Epigastric pain radiating to the back   Musculoskeletal: Normal range of motion.   Neurological: She is alert and oriented to person, place, and time.   Skin: Skin is warm and intact.   Psychiatric: She has a normal mood and affect. Her speech is normal and behavior is normal. Cognition and memory are normal.         ED Course   Procedures  Labs Reviewed   APTT   CBC W/ AUTO DIFFERENTIAL   COMPREHENSIVE METABOLIC PANEL   PROTIME-INR   APTT   TYPE & SCREEN          Imaging Results          CTA Chest Abdomen Pelvis (In process)    Procedure changed from CTA Chest Non Coronary                  Medical Decision Making:   Differential Diagnosis:   AAA, dissection  ED Management:  53-year-old female with AAA.   CT scan of the chest reveals a type B dissection.   Initially call CT surgery who requested a CTA for further evaluation, after CTA revealed a type B dissection decision made to call vascular surgery    Reassessment at 5:30 a.m. patient resting comfortably and in no acute distress.   Blood typed and crossed, vascular surgery contacted, disposition pending their evaluation.         Attending Note:  Physician Attestation Statement: I have personally seen and examined this patient. As the supervising MD I agree with the above history, physical exam, treatment, course, plan, and disposition.     Vascular surgery down to see patient, they feel patient is stable for ICU rather than immediate surgical intervention.  Vascular surgery fellow discussed with medical ICU, medical on esmolol, vital stable or, patient mental status also remained stable.              Clinical Impression:   The primary encounter diagnosis was Ruptured abdominal aortic aneurysm (AAA). A diagnosis of  Dissection of aorta, unspecified portion of aorta was also pertinent to this visit.      Disposition:   Disposition: Admitted  Condition: Critical                        Vahid Duvall MD  11/15/18 0709

## 2018-11-15 NOTE — ANESTHESIA PREPROCEDURE EVALUATION
11/15/2018  Leah Cleaning is a 53 y.o., female with a pre-operative diagnosis of Dissection of aorta, unspecified portion of aorta [I71.00]  Aortic dissection, thoracic [I71.01] who is scheduled for Procedure(s) (LRB):  REPAIR, ANEURYSM, ENDOVASCULAR GRAFT, AORTA, THORACIC (N/A).     Requested anesthesia type: General  Surgeon: Hermila Storm MD  Allergies: Review of patient's allergies indicates:  No Known Allergies  Vital Sign Range: Temp:  [36.4 °C (97.5 °F)] 36.4 °C (97.5 °F)  Pulse:  [54-98] 59  Resp:  [16-20] 16  SpO2:  [94 %-100 %] 94 %  BP: (101-142)/(52-89) 110/52  Chronic Medications:   Medications Prior to Admission   Medication Sig Dispense Refill Last Dose    cloNIDine (CATAPRES) 0.2 MG tablet Take 0.2 mg by mouth 2 (two) times daily.   11/14/2018 at Unknown time    furosemide (LASIX) 40 MG tablet Take 40 mg by mouth once daily.   11/14/2018 at Unknown time    hydrALAZINE (APRESOLINE) 50 MG tablet Take 50 mg by mouth 3 (three) times daily.       levoFLOXacin (LEVAQUIN) 500 MG tablet Take 500 mg by mouth every 24 hours.   Past Week at Unknown time    lisinopril (PRINIVIL,ZESTRIL) 20 MG tablet Take 20 mg by mouth once daily.   11/14/2018 at Unknown time    NIFEdipine (ADALAT CC) 60 MG TbSR Take 30 mg by mouth once daily.       pantoprazole (PROTONIX) 40 MG tablet Take 40 mg by mouth once daily.   11/14/2018 at Unknown time    spironolactone (ALDACTONE) 25 MG tablet Take 25 mg by mouth once daily.        Current Medications:   Current Facility-Administered Medications   Medication Dose Route Frequency Provider Last Rate Last Dose    carvedilol tablet 6.25 mg  6.25 mg Oral BID Tracy B. Dauterive, NP   6.25 mg at 11/15/18 1006    esmolol 2000 mg in sodium chloride 0.9% 100 mL (20 mg/mL)  50 mcg/kg/min Intravenous Continuous Tracy B. Dauterive, NP   Stopped at 11/15/18 3094     furosemide tablet 40 mg  40 mg Oral Daily Tracy B. Dauterive, NP   40 mg at 11/15/18 1006    NIFEdipine 24 hr tablet 30 mg  30 mg Oral Daily Tracy B. Dauterive, NP   30 mg at 11/15/18 1006    ondansetron disintegrating tablet 8 mg  8 mg Oral Q8H PRN Tracy B. Dauterive, NP        pantoprazole EC tablet 40 mg  40 mg Oral Daily Tracy B. Dauterive, NP   40 mg at 11/15/18 1006     Medical History:   Past Medical History:   Diagnosis Date    Arthritis     Bell's palsy 2012    Cirrhosis     GERD (gastroesophageal reflux disease)     Hepatitis C     Hypertension     Sciatica      .    Anesthesia Evaluation    I have reviewed the Patient Summary Reports.    I have reviewed the Nursing Notes.   I have reviewed the Medications.     Review of Systems  Anesthesia Hx:  No problems with previous Anesthesia  History of prior surgery of interest to airway management or planning: (edward) Denies Family Hx of Anesthesia complications.   Denies Personal Hx of Anesthesia complications.   Social:  Smoker Cocaine / marijuana use   Cardiovascular:   Hypertension     Type B aortic dissection   Pulmonary:  Pulmonary Normal    Hepatic/GI:   GERD, well controlled Liver Disease, Hepatitis, C    Musculoskeletal:   Arthritis     Neurological:   Neuromuscular Disease,    Endocrine:  Endocrine Normal        Physical Exam  General:  Malnutrition    Airway/Jaw/Neck:  Airway Findings: Mouth Opening: Normal Tongue: Normal  General Airway Assessment: Adult, Good  Mallampati: II  Improves to II with phonation.  TM Distance: 4 - 6 cm  Jaw/Neck Findings:     Neck ROM: Normal ROM      Dental:  Dental Findings: Periodontal disease, Severe    Chest/Lungs:  Chest/Lungs Findings: Clear to auscultation, Normal Respiratory Rate, Tachypnea     Heart/Vascular:  Heart Findings: Rate: Normal  Rhythm: Regular Rhythm  Sounds: Normal  Heart Murmur  Systolic  Systolic Heart Murmur Description: L Upper Sternal Border, R Upper Sternal Border  Systolic  Heart Murmur Grade: Grade III  Other Heart Findings: Reports she has had a heart murmur for many years but does not affect her activities.     Abdomen:  Abdomen Findings:  Normal, Soft, Nontender            Anesthesia Plan  Type of Anesthesia, risks & benefits discussed:  Anesthesia Type:  general  Patient's Preference: as indicated  Intra-op Monitoring Plan: standard ASA monitors  Intra-op Monitoring Plan Comments:   Post Op Pain Control Plan:   Post Op Pain Control Plan Comments:   Induction:   IV  Beta Blocker:  Patient is on a Beta-Blocker and has received one dose within the past 24 hours (No further documentation required).       Informed Consent: Patient understands risks and agrees with Anesthesia plan.  Questions answered. Anesthesia consent signed with patient.  ASA Score: 4  emergent   Day of Surgery Review of History & Physical:  There are no significant changes.  H&P update referred to the provider.     Anesthesia Plan Notes: Reassurance given.        Ready For Surgery From Anesthesia Perspective.

## 2018-11-15 NOTE — ED NOTES
Tracy NP with critical care at bedside setting up for A-line and obtaining consent. Consent signed and placed at bedside.

## 2018-11-15 NOTE — H&P
[facility]  Critical Care Medicine  History & Physical    Patient Name: Leah Cleaning  MRN: [id number]  Admission Date: 11/15/2018  Hospital Length of Stay: 0 days  Code Status: Full Code  Attending Physician: Xander Mcgregor MD  Primary Care Provider: Mary Colorado MD   Principal Problem: Aortic dissection distal to left subclavian    Subjective:     HPI:  Mrs. Cleaning is a 52 yo female with a history significant for Hep C cirrhosis, HTN, cocaine and marijuana use who presented to [facility] on the early morning of 11/15 as a transfer from [facility] for Type B aortic dissection. She initially presented to [facility] on the evening of 11/14 for sharp epigastric pain that radiated to her back which started initially on the night of 11/13. CT at OSH noted aortic dissection from left subclavian to just proximal of celiac artery. She was transferred to [facility] for vascular surgery consult on a nicardipine gtt. She was transitioned from nicardipine gtt to esmolol gtt. CTA upon arrival confirmed dissection and she was admitted to MICU for medical management of the dissection with vascular surgery following.     Hospital/ICU Course:  Mrs. Cleaning was admitted to MICU for Type B aortic dissection on esmolol gtt to maintain HR of 60 and SBP <120. Vascular surgery following, no surgical intervention warranted at this time. CTA read concerning for possible aortic rupture in the descending aorta with high-density fluid noted in the posterior mediastinum; additionally, there was a suspected penetrating ulcer in the upper abdominal aorta adjacent to the true lumen.      Past Medical History:   Diagnosis Date    Arthritis     Bell's palsy 2012    Cirrhosis     GERD (gastroesophageal reflux disease)     Hepatitis C     Hypertension     Sciatica        Past Surgical History:   Procedure Laterality Date    CHOLECYSTECTOMY         Review of patient's allergies  indicates:  No Known Allergies    Family History     None        Tobacco Use    Smoking status: Current Every Day Smoker     Packs/day: 0.25     Years: 30.00     Pack years: 7.50    Smokeless tobacco: Never Used   Substance and Sexual Activity    Alcohol use: No     Frequency: Never    Drug use: Yes     Types: Cocaine, Marijuana     Comment: cocaine weekly; marijuana daily    Sexual activity: Not on file      Review of Systems   Constitutional: Negative for chills and fever.   HENT: Negative for sore throat and trouble swallowing.    Respiratory: Negative for cough, chest tightness, shortness of breath and wheezing.    Cardiovascular: Positive for chest pain (intermittent midsternal CP with movement). Negative for palpitations.   Gastrointestinal: Positive for abdominal pain (epigastric, radiating to back with movement ). Negative for abdominal distention, blood in stool, constipation, diarrhea, nausea and vomiting.   Genitourinary: Negative for dysuria, frequency, hematuria and urgency.   Musculoskeletal: Negative for neck pain and neck stiffness.   Neurological: Negative for dizziness, syncope, speech difficulty, weakness, light-headedness, numbness and headaches.     Objective:     Vital Signs (Most Recent):  Temp: 97.5 °F (36.4 °C) (11/15/18 0600)  Pulse: (!) 59 (11/15/18 0753)  Resp: 19 (11/15/18 0426)  BP: 115/70 (11/15/18 0732)  SpO2: 96 % (11/15/18 0753) Vital Signs (24h Range):  Temp:  [97.5 °F (36.4 °C)] 97.5 °F (36.4 °C)  Pulse:  [55-98] 59  Resp:  [16-20] 19  SpO2:  [94 %-100 %] 96 %  BP: (110-142)/(66-89) 115/70   Weight: 54.4 kg (120 lb)  Body mass index is 19.37 kg/m².    No intake or output data in the 24 hours ending 11/15/18 0808    Physical Exam   Constitutional: She is oriented to person, place, and time. She appears well-developed. She has a sickly appearance. She appears ill. No distress.   HENT:   Head: Normocephalic and atraumatic.   Eyes: Conjunctivae and EOM are normal. Pupils are  equal, round, and reactive to light. Right eye exhibits no discharge. Left eye exhibits no discharge. No scleral icterus.   Neck: Normal range of motion. Neck supple. JVD present. No tracheal deviation present. No thyromegaly present.   Cardiovascular: Normal rate, regular rhythm, S1 normal and S2 normal.   No murmur heard.  Pulses:       Radial pulses are 2+ on the right side, and 2+ on the left side.        Dorsalis pedis pulses are 2+ on the right side, and 2+ on the left side.   Warm extremities.    Pulmonary/Chest: No accessory muscle usage. No tachypnea. No respiratory distress. She has decreased breath sounds in the left lower field. She has no wheezes. She has no rhonchi. She has no rales.   Abdominal: Soft. Bowel sounds are normal. She exhibits no distension. There is tenderness (tender to palpation in bilateral upper ABD quadrants). There is no rebound and no guarding.   Lymphadenopathy:     She has no cervical adenopathy.   Neurological: She is alert and oriented to person, place, and time. GCS eye subscore is 4. GCS verbal subscore is 5. GCS motor subscore is 6.   Alert, oriented x 4, conversant. No focal neuro deficits noted. Moving all extremities equally bilaterally.    Skin: Skin is dry. She is not diaphoretic. There is pallor.       Vents:     Lines/Drains/Airways     Peripheral Intravenous Line                 Peripheral IV - Single Lumen 11/15/18 0351 Left Forearm less than 1 day         Peripheral IV - Single Lumen 11/15/18 0400 Right Forearm less than 1 day              Significant Labs:    CBC/Anemia Profile:  Recent Labs   Lab 11/15/18  0350   WBC 8.81   HGB 11.9*   HCT 34.8*   PLT 80*   MCV 91   RDW 16.2*        Chemistries:  Recent Labs   Lab 11/15/18  0620   *   K 5.6*      CO2 17*   BUN 16   CREATININE 1.2   CALCIUM 8.4*   ALBUMIN 2.4*   PROT 8.4   BILITOT 1.8*   ALKPHOS 128   ALT 29   AST 67*         Significant Imaging: I have reviewed all pertinent imaging results/findings  within the past 24 hours.    Assessment/Plan:     Psychiatric   Marijuana abuse    --see above     Cocaine abuse    --toxicology screen positive for cocaine, THC, opiates at OSH.      Cardiac/Vascular   * Aortic dissection distal to left subclavian    --appreciate vascular surgery assistance. CTA as detailed above  --continue medical management with esmolol gtt to maintain HR of 60, SBP <120. Will transition to PO agents   --per vascular surgery, no acute surgical intervention unless she experiences uncontrolled abdominal/back pain, uncontrolled BP with multiple BP agents, or evidence of end-organ malperfusion.          Essential hypertension    --reports being on multiple antihypertensive agents at home, but poor historian and cannot recall if she is taking them regularly   --plan as above.      Hematology   Thrombocytopenia    --2/2 cirrhosis. Trend.   --transfuse if plts <50,000 and bleeding      Coagulopathy    --suspect 2/2 cirrhosis. Give vitamin K x 1  --INR daily      GI   Chronic hepatitis C with cirrhosis    --send acute hepatitis panel.  --per patient, has known about cirrhosis for >10 years. Has never received treatment for Hep C  --continue supportive care for coagulopathy, thrombocytopenia in setting of Type B aortic dissection.      Orthopedic   Acute midline thoracic back pain    --2/2 dissection. Not currently reporting pain          Critical Care Time: 50 minutes  Critical secondary to Patient has a condition that poses threat to life and bodily function: acute aortic dissection     Critical care was time spent personally by me on the following activities: development of treatment plan with patient or surrogate and bedside caregivers, discussions with consultants, evaluation of patient's response to treatment, examination of patient, ordering and performing treatments and interventions, ordering and review of laboratory studies, ordering and review of radiographic studies, pulse oximetry,  re-evaluation of patient's condition. This critical care time did not overlap with that of any other provider or involve time for any procedures.     Tracy Coulter NP  Critical Care Medicine  [facility]

## 2018-11-15 NOTE — ED NOTES
Assumed care of patient. Plan of care being discussed with patient has no questions. Pt is in bed awake and alert. Pt is resting comfortably and is in no acute distress. Respirations are even and unlabored.    Pt has no complaints at this time. The bed is in low, locked position with side rails upx2. Call light is in reach, and patient is oriented to call light use. Pt states they will call nurse if they need assistance.

## 2018-11-15 NOTE — ASSESSMENT & PLAN NOTE
--reports being on multiple antihypertensive agents at home, but poor historian and cannot recall if she is taking them regularly   --plan as above.

## 2018-11-15 NOTE — CONSULTS
Vascular Surgery Consult Note    Please see earlier consult note for full details.     Ankit Hemphill MD  Vascular/Endovascular Surgery Fellow

## 2018-11-15 NOTE — NURSING TRANSFER
Nursing Transfer Note      11/15/2018     Transfer To: OR for TVAR    Transfer via bed    Transfer with nasal cannula to O2, cardiac monitoring/ lumbar drain in place    Transported by RN    Medicines sent: none        Notified: Anesthesia

## 2018-11-15 NOTE — ED PROVIDER NOTES
Encounter Date: 11/15/2018  SCRIBE #1 NOTE: Sanjay ESCUDERO, am scribing for, and in the presence of,  Balwinder Duvall MD.        History     Chief Complaint   Patient presents with    Transfer     Dissecting AAA transfer from [place]. On Cardene for HTN     HPI  Review of patient's allergies indicates:  No Known Allergies  Past Medical History:   Diagnosis Date    Arthritis     Cirrhosis     GERD (gastroesophageal reflux disease)     Hepatitis C     Hypertension     Sciatica      Past Surgical History:   Procedure Laterality Date    CHOLECYSTECTOMY       History reviewed. No pertinent family history.  Social History     Tobacco Use    Smoking status: Current Every Day Smoker    Smokeless tobacco: Never Used   Substance Use Topics    Alcohol use: No     Frequency: Never    Drug use: Yes     Types: Cocaine, Marijuana     Review of Systems    Physical Exam     Initial Vitals [11/15/18 0348]   BP Pulse Resp Temp SpO2   128/79 98 16 -- 96 %      MAP       --         Physical Exam    ED Course   Critical Care  Date/Time: 11/15/2018 4:23 AM  Performed by: Vahid Duvall MD  Authorized by: Vahid Duvall MD   Total critical care time (exclusive of procedural time) : 0 minutes  Critical care time was exclusive of separately billable procedures and treating other patients and teaching time.  Critical care was necessary to treat or prevent imminent or life-threatening deterioration of the following conditions: AAA.  Critical care was time spent personally by me on the following activities: evaluation of patient's response to treatment, ordering and performing treatments and interventions, development of treatment plan with patient or surrogate, examination of patient, ordering and review of laboratory studies, re-evaluation of patient's condition, ordering and review of radiographic studies, obtaining history from patient or surrogate and discussions with consultants.  Comments:   Repeat prelim CT read shows  type B AAA with free fluid in the abdomen        Labs Reviewed   APTT   CBC W/ AUTO DIFFERENTIAL   COMPREHENSIVE METABOLIC PANEL   PROTIME-INR   TYPE & SCREEN          Imaging Results          CTA Chest Abdomen Pelvis (In process)    Procedure changed from CTA Chest Non Coronary                                       Clinical Impression:   There were no encounter diagnoses.

## 2018-11-15 NOTE — TRANSFER OF CARE
"Anesthesia Transfer of Care Note    Patient: Leah Cleaning    Procedure(s) Performed: Procedure(s) (LRB):  REPAIR, ANEURYSM, ENDOVASCULAR GRAFT, AORTA, THORACIC (N/A)    Patient location: ICU    Anesthesia Type: general    Transport from OR: Transported from OR on 6-10 L/min O2 by face mask with adequate spontaneous ventilation. Continuous ECG monitoring in transport. Continuous SpO2 monitoring in transport. Continuos invasive BP monitoring in transport    Post pain: adequate analgesia    Post assessment: no apparent anesthetic complications    Post vital signs: stable    Level of consciousness: awake    Nausea/Vomiting: no nausea/vomiting    Complications: none    Transfer of care protocol was followed      Last vitals:   Visit Vitals  BP (!) 110/52   Pulse (!) 59   Temp 36.4 °C (97.5 °F) (Oral)   Resp 16   Ht 5' 6" (1.676 m)   Wt 54.4 kg (120 lb)   SpO2 (!) 94%   Breastfeeding? No   BMI 19.37 kg/m²     "

## 2018-11-15 NOTE — PROCEDURES
"Leah Cleaning is a 53 y.o. female patient.    Temp: 97.5 °F (36.4 °C) (11/15/18 1239)  Pulse: (!) 59 (11/15/18 1335)  Resp: 16 (11/15/18 1335)  BP: (!) 110/52 (11/15/18 1335)  SpO2: (!) 94 % (11/15/18 1335)  Weight: 54.4 kg (120 lb) (11/15/18 0348)  Height: 5' 6" (167.6 cm) (11/15/18 0348)       Arterial Line  Date/Time: 11/15/2018 12:30 PM  Location procedure was performed: [facility] EMERGENCY DEPARTMENT  Performed by: Tracy Coulter NP  Authorized by: Tracy Coulter NP   Pre-op Diagnosis: aortic dissection  Consent Done: Yes  Consent: Written consent obtained.  Risks and benefits: risks, benefits and alternatives were discussed  Consent given by: patient  Required items: required blood products, implants, devices, and special equipment available  Patient identity confirmed: MRN, name and   Time out: Immediately prior to procedure a "time out" was called to verify the correct patient, procedure, equipment, support staff and site/side marked as required.  Preparation: Patient was prepped and draped in the usual sterile fashion.  Indications: hemodynamic monitoring  Location: right radial  Anesthesia: local infiltration    Anesthesia:  Local Anesthetic: lidocaine 1% without epinephrine  Anesthetic total: 2 mL  Patient sedated: no  Needle gauge: 20  Seldinger technique: Seldinger technique used  Number of attempts: 1  Complications: No  Estimated blood loss (mL): 1  Post-procedure: line sutured and dressing applied  Patient tolerance: Patient tolerated the procedure well with no immediate complications          Tracy Coulter  11/15/2018  "

## 2018-11-15 NOTE — ED NOTES
Dr. Contreras at bedside consenting for IR procedure. Pt updated on POC- surgery in AM and IR procedure today. Pt complaining of abdominal pain that radiates to back, requesting something.

## 2018-11-15 NOTE — ED NOTES
Pt noted to be sleeping in bed, awakens to voiced. No acute distress noted. Respirations even and unlabored. On continuous cardiac monitor, Sinus chiara cardia noted, 59 bpm. Denies any current chest pain or SOB. No new complaints voiced. Updated on POC- waiting to go to IR procedure shortly. Side rails up x2. Call light within reach. Will continue to monitor.

## 2018-11-15 NOTE — CARE UPDATE
Patient's HR and blood pressure controlled with esmolol and low dose cardene throughout day. Conversations held with vascular surgery and planning for TEVAR today s/p lumbar drain. Patient complaining of mild epigastric pain, radiating to back. Otherwise, alert, oriented x4 with no focal deficits and bilateral radial and DP pulses 2+, unchanged.

## 2018-11-15 NOTE — ANESTHESIA PREPROCEDURE EVALUATION
[facility]  Anesthesia Pre-Operative Evaluation         Patient Name: Leah Cleaning  YOB: 1965  MRN: [id number]    SUBJECTIVE:     Pre-operative evaluation for Procedure(s) (LRB):  REPAIR, ANEURYSM, ENDOVASCULAR GRAFT, AORTA, THORACIC (N/A)     11/15/2018    Leah Cleaning is a 53 y.o. female w/ a significant PMHx of Hep C cirrhosis (MELD 15), hx of pancreatitis, HTN, GERD, cocaine and marijuana use who presented as a transfer for Type B aortic dissection. Repeat CT C/A/P here showed Ruptured type-B thoracic aortic dissection with significant volume mediastinal hemorrhage and left hemothorax.  Suspected penetrating ulcers in the mid descending thoracic aorta and suprarenal abdominal aorta just beyond the inferior aspect of the dissection flap.  Upper abdominal periaortic hematoma. Cirrhotic liver morphology and stigmata of portal hypertension including moderate volume ascites and gastroesophageal varices. Patient admitted to MICU, on esmolol and nicardipine gtt, now getting T11 lumbar drain placement with IR (11/15); plan for above procedure on 11/16.         LDA:        Peripheral IV - Single Lumen 11/15/18 0351 Left Forearm (Active)   Site Assessment Clean;Dry;Intact 11/15/2018  3:51 AM   Number of days: 0            Peripheral IV - Single Lumen 11/15/18 0400 Right Forearm (Active)   Site Assessment Clean;Dry;Intact 11/15/2018  4:00 AM   Dressing Intervention New dressing 11/15/2018  4:00 AM   Number of days: 0            Arterial Line 11/15/18 1230 Right Radial (Active)   Site Assessment Clean;Dry;Intact;No redness;No swelling 11/15/2018 12:31 PM   Line Status Pulsatile blood flow 11/15/2018 12:31 PM   Art Line Waveform Appropriate 11/15/2018 12:31 PM   Arterial Line Interventions Zeroed and calibrated 11/15/2018 12:31 PM   Color/Movement/Sensation Capillary refill less than 3 sec 11/15/2018 12:31 PM   Dressing Type Transparent 11/15/2018 12:31 PM   Number of days: 0        Prev airway: None documented.    Drips:    esmolol 200 mcg/kg/min (11/15/18 1138)    niCARdipine 7.5 mg/hr (11/15/18 1011)       Patient Active Problem List   Diagnosis    Aortic dissection distal to left subclavian    Essential hypertension    Cocaine abuse    Marijuana abuse    Chronic hepatitis C with cirrhosis    Acute midline thoracic back pain    Coagulopathy    Thrombocytopenia    Thoracic aneurysm, ruptured    Intramural aortic hematoma       Review of patient's allergies indicates:  No Known Allergies    Current Inpatient Medications:   carvedilol  6.25 mg Oral BID    [COMPLETED] fentaNYL  50 mcg Intravenous Once    furosemide  40 mg Oral Daily    NIFEdipine  30 mg Oral Daily    pantoprazole  40 mg Oral Daily       No current facility-administered medications on file prior to encounter.      Current Outpatient Medications on File Prior to Encounter   Medication Sig Dispense Refill    cloNIDine (CATAPRES) 0.2 MG tablet Take 0.2 mg by mouth 2 (two) times daily.      furosemide (LASIX) 40 MG tablet Take 40 mg by mouth once daily.      hydrALAZINE (APRESOLINE) 50 MG tablet Take 50 mg by mouth 3 (three) times daily.      levoFLOXacin (LEVAQUIN) 500 MG tablet Take 500 mg by mouth every 24 hours.      lisinopril (PRINIVIL,ZESTRIL) 20 MG tablet Take 20 mg by mouth once daily.      NIFEdipine (ADALAT CC) 60 MG TbSR Take 30 mg by mouth once daily.      pantoprazole (PROTONIX) 40 MG tablet Take 40 mg by mouth once daily.      spironolactone (ALDACTONE) 25 MG tablet Take 25 mg by mouth once daily.         Past Surgical History:   Procedure Laterality Date    CHOLECYSTECTOMY         Social History     Socioeconomic History    Marital status: Legally      Spouse name: Not on file    Number of children: Not on file    Years of education: Not on file    Highest education level: Not on file   Social Needs    Financial resource strain: Not on file    Food insecurity - worry: Not  on file    Food insecurity - inability: Not on file    Transportation needs - medical: Not on file    Transportation needs - non-medical: Not on file   Occupational History    Not on file   Tobacco Use    Smoking status: Current Every Day Smoker     Packs/day: 0.25     Years: 30.00     Pack years: 7.50    Smokeless tobacco: Never Used   Substance and Sexual Activity    Alcohol use: No     Frequency: Never    Drug use: Yes     Types: Cocaine, Marijuana     Comment: cocaine weekly; marijuana daily    Sexual activity: Not on file   Other Topics Concern    Not on file   Social History Narrative    Not on file       OBJECTIVE:     Vital Signs Range (Last 24H):  Temp:  [36.4 °C (97.5 °F)]   Pulse:  [54-98]   Resp:  [16-20]   BP: (101-142)/(58-89)   SpO2:  [94 %-100 %]       Significant Labs:  Lab Results   Component Value Date    WBC 9.56 11/15/2018    HGB 10.6 (L) 11/15/2018    HCT 33.0 (L) 11/15/2018    PLT 79 (L) 11/15/2018    ALT 29 11/15/2018    AST 67 (H) 11/15/2018     11/15/2018    K 4.3 11/15/2018     11/15/2018    CREATININE 1.2 11/15/2018    BUN 17 11/15/2018    CO2 19 (L) 11/15/2018    INR 1.7 (H) 11/15/2018       Diagnostic Studies: No relevant studies.  CTA Chest abdomen pelvis 11/15/2018  Narrative     EXAMINATION:  CTA chest abdomen and pelvis    CLINICAL HISTORY:  Dissecting aneurysm.    TECHNIQUE:  Pre contrast images were obtained of the chest.  Axial CT images were obtained through the chest, abdomen and pelvis after the IV administration of 75 cc of Omnipaque 350.  Delayed axial images through the abdomen and pelvis also obtained.  No oral contrast administered.    COMPARISON:  None available.    FINDINGS:  Neck base: Unremarkable.    Chest wall: Unremarkable.    Heart/pericardium/mediastinum: Heart is enlarged.  Small amount of pericardial fluid.  There is a significant volume of soft tissue density fluid in the mediastinum suggestive of hemorrhage.    Lungs/pleura:  Intermediate density small to moderate left pleural effusion with associated compressive atelectasis.  Edema in the dependent portion of the right lung base.  Subsegmental atelectasis in the right lung base.    Vasculature: Pulmonary arteries are unremarkable.  Mildly dilated descending aorta with contrast around it in a curvilinear fashion, after the origin of left subclavian artery tracking all the way down to the abdomen and ending before the origin of celiac artery.  There is associated Lakeisha-aortic high-density fluid in the posterior mediastinum suggestive of ruptured dissection/aneurysm.  The false lumen is centered to the right of true lumen and terminating before the origin of celiac artery.  There is a greater density contrast in the false lumen in the lower descending aorta.  The false lumen surrounds the right and posterior aspect of the true lumen with associated compression of the true lumen.  Suspected area of rupture in the descending thoracic aorta.  A penetrating ulcer is also noted in the mid descending aorta.  Additionally, there is a suspected penetrating ulcer in the upper abdominal aorta adjacent to the true lumen (axial series 8, image 890).  Periaortic hematoma is noted surrounding the upper abdominal aorta.  An additional small focus of contrast within the periaortic hematoma is noted just above the renal arteries (series 8, image 1018).  The renal arteries, superior mesenteric artery, and inferior mesenteric artery appear patent.  Celiac axis is patent.    Multiple perigastric and periesophageal varices, suggestive of portal hypertension.  Portal, splenic and superior mesenteric veins are patent.  IVC is patent.    Body wall: Generalized body wall anasarca.    Liver: Small with nodular contour, suggestive of cirrhosis.  No focal lesion.    Gallbladder/bile ducts: Status post cholecystectomy.  No intra or extrahepatic biliary ductal dilatation.    Spleen, pancreas and adrenal glands are  unremarkable.    Kidneys/ureters: Normal in size and location.  Contrast is noted in the collecting system from prior CT scan at an outside facility.  No hydronephrosis or renal dilatation.    Urinary bladder: Distended with contrast from prior CT scan.    Reproductive: Calcified fibroid in the uterus.    Bowel/mesentery: Wall thickening and enhancement involving the stomach, possibly related to a gastritis.  Wall thickening of the colon suggestive of portal colopathy.  No evidence of bowel obstruction.    Peritoneum/retroperitoneum: There is diffuse mesenteric edema and relatively low density moderate volume abdominal ascites.  No free air.    Bones: Degenerative changes.  Grade 1 anterolisthesis of L5 on S1.  No suspicious lytic or blastic lesion.      Impression       Ruptured type-B thoracic aortic dissection with significant volume mediastinal hemorrhage and left hemothorax.  Suspected penetrating ulcers in the mid descending thoracic aorta and suprarenal abdominal aorta just beyond the inferior aspect of the dissection flap.  Upper abdominal periaortic hematoma.  Emergent vascular surgical consultation is recommended.  Correlation with recent outside imaging could be helpful as well.    Cirrhotic liver morphology and stigmata of portal hypertension including moderate volume ascites and gastroesophageal varices.    Additional findings as above.    COMMUNICATION  This critical result was discovered/received at 04:15.  The critical information above was relayed directly by Dr. Wolfe by telephone to Dr. Duvall on 11/15/2018 at 04:15.    Electronically signed by resident: Tamica Wolfe  Date: 11/15/2018  Time: 04:29    Electronically signed by: Michele Bonilla MD  Date: 11/15/2018  Time: 05:42     CXR 11/15  Known aortic dissection.  Mild cardiomegaly and the prominent aorta.  The lungs are clear.  No pleural effusion.    EK/15/18 EKG pending    2D ECHO:  11/15/2018  · Normal left ventricular systolic  function. The estimated ejection fraction is 70%  · Concentric left ventricular remodeling.  · No wall motion abnormalities.  · Normal LV diastolic function.  · Normal right ventricular systolic function.  · Trace mitral regurgitation.  · Mild aortic regurgitation.  · Trace tricuspid regurgitation.  · Trace pulmonic regurgitation.  · Normal central venous pressure (3 mm Hg).  · The estimated PA systolic pressure is 24.72 mm Hg  · No pericardial effusion.    ASSESSMENT/PLAN:         Pre-op Assessment    I have reviewed the Patient Summary Reports.     I have reviewed the Nursing Notes.   I have reviewed the Medications.     Review of Systems  Anesthesia Hx:  History of prior surgery of interest to airway management or planning: ([place])   Social:  Smoker Cocaine / marijuana use   Cardiovascular:   Hypertension     Type B aortic dissection   Pulmonary:  Pulmonary Normal    Hepatic/GI:   GERD Liver Disease, Hepatitis, C    Neurological:   Neuromuscular Disease,    Endocrine:  Endocrine Normal           Anesthesia Plan  Type of Anesthesia, risks & benefits discussed:  Anesthesia Type:  general  Patient's Preference:   Intra-op Monitoring Plan: arterial line, central line, [place], cardiac output and standard ASA monitors  Intra-op Monitoring Plan Comments:   Post Op Pain Control Plan: multimodal analgesia, IV/PO Opioids PRN and per primary service following discharge from PACU  Post Op Pain Control Plan Comments:   Induction:   IV  Beta Blocker:  Patient is on a Beta-Blocker and has received one dose within the past 24 hours (No further documentation required).       Informed Consent:    ASA Score:      Day of Surgery Review of History & Physical:

## 2018-11-15 NOTE — ED NOTES
Dr. Mcgregor and critical care team at bedside evaluating patient- updated on POC- waiting for ICU bed and to remains NPO, pt verbalizes understanding.

## 2018-11-15 NOTE — ASSESSMENT & PLAN NOTE
--send acute hepatitis panel.  --per patient, has known about cirrhosis for >10 years. Has never received treatment for Hep C  --continue supportive care for coagulopathy, thrombocytopenia in setting of Type B aortic dissection.

## 2018-11-15 NOTE — PROGRESS NOTES
Pt arrived to  for lumbar drain.  Name verified using two identifiers.  Allergies verified. Consent obtained.  Will continue to monitor.

## 2018-11-15 NOTE — ED TRIAGE NOTES
PT transfer from [place] for dissecting AAA. Per pt, she was cooking last night and around 9 pm when she sat down she felt sudden sharp pain in her epigastric area.      LOC: The patient is awake, alert, aware of environment with an appropriate affect. Oriented x4, speaking appropriately  APPEARANCE: Pt resting comfortably, in no acute distress, pt is clean and well groomed, clothing properly fastened  SKIN:The skin is warm and dry, color consistent with ethnicity, patient has normal skin turgor and moist mucus membranes  RESPIRATORY:Airway is open and patent, respirations are spontaneous, patient has a normal effort and rate, no accessory muscle use noted.  CARDIAC: Normal rate and rhythm, no peripheral edema noted, capillary refill < 3 seconds, bilateral radial pulses 2+.  ABDOMEN:  tender, distended.   NEUROLOGIC: PERRLA, facial expression is symmetrical, patient moving all extremities spontaneously, normal sensation in all extremities when touched with a finger.  Follows all commands appropriately  MUSCULOSKELETAL: Patient moving all extremities spontaneously, no obvious swelling or deformities noted.

## 2018-11-15 NOTE — BRIEF OP NOTE
[facility]  Brief Operative Note    SUMMARY     Surgery Date: 11/15/2018     Surgeon(s) and Role:     * Hermila Storm MD - Primary    Assisting Surgeon: Ankit Hemphill MD    Pre-op Diagnosis:   Aortic dissection, thoracic [I71.01]     Ruptured Thoracic Aneurysm    Post-op Diagnosis:  Same    Procedure:  TEVAR without Left Subclavian coverage,  Alpha     Distal Extension,  Alpha     Aortogram     IVUS - Thoracic and Abdominal aorta     Bilateral Catheter in Aorta     Bilateral US CFA access     Large bore closure Right CFA (16Fr)    Anesthesia: General    Description of Procedure: Emergent TEVAR for contained rupture of LASHA with retrograde thoracic dissection. Coverage from LSA to Celiac. Pre-op spinal drain.    Description of the findings of the procedure: small 1b endoleak. Imporoved after PTA with 32 CODA. Palpable pedal pulses    Estimated Blood Loss: 25mL         Specimens:   Specimen (12h ago, onward)    None

## 2018-11-15 NOTE — OP NOTE
11/15/2018      Surgeon(s) and Role:     * Hermila Storm MD - Primary     Assisting Surgeon: Ankit Hemphill MD     Pre-op Diagnosis:     Aortic dissection, thoracic [I71.01]                                      Ruptured Thoracic Aneurysm     Post-op Diagnosis:  Same     Procedure:                 TEVAR without Left Subclavian coverage,  Alpha                                      Distal Extension,  Alpha                                      Aortogram                                      IVUS - Thoracic and Abdominal aorta                                      Bilateral Catheter in Aorta                                      Bilateral US CFA access                                      Large bore closure Right CFA (16Fr)     Anesthesia: General     Description of Procedure: Emergent TEVAR for contained rupture of LASHA with retrograde thoracic dissection. Coverage from LSA to Celiac. Pre-op spinal drain.     Description of the findings of the procedure: small 1b endoleak. Imporoved after PTA with 32 CODA. Palpable pedal pulses     Estimated Blood Loss: 25mL    Implants:   Implant Name Type Inv. Item Serial No.  Lot No. LRB No. Used   Zenith Alpha Thoracic Endovascular Graft     [id number] N/A 1   Zenith Alpha Thoracic Endovascular Graft    [employer] [id number] N/A 1     Complications:  None; patient tolerated the procedure well.           Disposition: ICU.           Condition: stable    Procedure Details:  The patient was seen in the ICU Room. The risks, benefits, complications, treatment options, and expected outcomes were discussed with the patient. The patient concurred with the proposed plan, giving informed consent.  The site of surgery properly noted/marked.   Patient was brought to the operating room and positioned supine on the table. General anesthesia was administered by anesthesia team via ETT. Right IJ CVL and right radial arterial line placed by the anesthesia team. Perry  catheter was placed. All pressure points were properly padded. Patient's lower abdomen and bilateral groins were was prepped and draped in usual sterile fashion using Ioban. A Time Out was held and the above information confirmed.  Under ultrasound guidance, right CFA was access using 21G micropuncture needle. Over the Mandril wire, 3/4 dilator/catheter was placed. Sheathogram was performed to confirm proper cannulation of CFA. Over J wire, 6F sheath was placed. Same steps were taken to cannulate the contralateral CFA but 5F sheath was placed. Patient was systemically heparinized and ACT was used to maintain adequate anticoagulation throughout the surgery.  Through the right access, angled glide wire was advanced into abdominal aorta. 6F sheath was removed and two Preclose device was deployed in 10 and 2 oclock position. Then over the wire, 10F sheath was placed. Through the right access, J wire and angled glide catheter was used to place the wire in the ascending aorta. J wire exchanged for Double J Lunderquist wire, then Bowden catheter was exchanged for a Volcano IVUS catheter. IVUS was used to evaluate the aorta at the level of the arch vessels and slowly down through the descending thoracic aorta, where aortic dissection and mural hematoma was noted, and eventually down to mesenteric segment. Given the retrograde dissection pathology, true lumen maintained oval shape and false lumen had cresent appearance with minimal dynamic flap movement.  Imaging was stored.  Then, for the right access, over the Lunderquist wire, 10F sheath was exchanged for [id number] Cook Alpha device and advanced into descending thoracic aorta.  Using a J wire, Omni flush catheter was advanced from the left access and into the aortic arch and connected to power injector. Arch angiogram was performed with Image Intensifier positioned in appropriate angle to eliminate the parallax and left subclavian artery origin marked.  [id number] Cook Alpha  endograft was positioned approximately 1.5cm distal to the left subclavian artery origin. Omni flushcatheter was pulled back into abdominal aorta over the wire. With constant forward pressure on the Double J lunderquist wire, Alpha device was precisely deployed approximately 1.5cm distal to the left subclavian artery. Excellent positioning noted. Proximal stent was released. Omni flush catheter was re-advanced into ascending aorta and archangiogram was performed, which revealed no endoleak.   Alpha deliver sheath was removed and [id number] Stunable alpha device was then delivered from right access into distal thoracic aorta. Lateral projection angiogram was performed to lian the celiac origin. Omni flush catheter pulled back to abdominal aorta. Second alpha device was deployed with adequate graft overlap and distal end approximately 1cm above the celiac origin.   Omni flush catheter placed in distal portion of the endograft and angiogram was performed. Intact visceral flow noted.     Omni flush catheter advanced to aortic arch and completion angiogram was performed. Intact antegrade flow of the arch vessels were noted. No proximal endoleak noted. Distally, intact antegrade flow to the visceral vessels noted. Mild retrograde flow noted within the false lumen. 32mm CODA balloon was used to gently plasty the distal portion of the endograft in attempts to seal the entry tear. Repeat angiogram did show slow flow into the false lumen. Decision was made to terminate the procedure at this time.      At this time, all catheters were removed, and right groin access two preclose sutures were tightened to achieve complete hemostasis upon removal of the sheath. left femoral access was successfully closed with 5F Mynx closure device. There was maldeployment of Mynx closure device and manual pressure was held for 45 mins given her cirrhosis and mild thrombocytopenia. No evidence of hematoma at completion  Patient tolerated procedure well.  Bilateral groins were soft. Post-procedure patient continued to have bilateral 2+ DP pulses.      MAP was maintained >90 post endograft deployment. Patient was successfully extubated and transferred to ICU for post operative monitoring.      Dr. Storm was scrubbed and present for entire procedure.    Ankit Hemphill MD  Vascular/Endovascular Surgery Fellow

## 2018-11-15 NOTE — ASSESSMENT & PLAN NOTE
--appreciate vascular surgery assistance. CTA as detailed above  --continue medical management with esmolol gtt to maintain HR of 60, SBP <120. Will transition to PO agents   --per vascular surgery, no acute surgical intervention unless she experiences uncontrolled abdominal/back pain, uncontrolled BP with multiple BP agents, or evidence of end-organ malperfusion.

## 2018-11-15 NOTE — SUBJECTIVE & OBJECTIVE
Past Medical History:   Diagnosis Date    Arthritis     Bell's palsy 2012    Cirrhosis     GERD (gastroesophageal reflux disease)     Hepatitis C     Hypertension     Sciatica        Past Surgical History:   Procedure Laterality Date    CHOLECYSTECTOMY         Review of patient's allergies indicates:  No Known Allergies    Family History     None        Tobacco Use    Smoking status: Current Every Day Smoker     Packs/day: 0.25     Years: 30.00     Pack years: 7.50    Smokeless tobacco: Never Used   Substance and Sexual Activity    Alcohol use: No     Frequency: Never    Drug use: Yes     Types: Cocaine, Marijuana     Comment: cocaine weekly; marijuana daily    Sexual activity: Not on file      Review of Systems   Constitutional: Negative for chills and fever.   HENT: Negative for sore throat and trouble swallowing.    Respiratory: Negative for cough, chest tightness, shortness of breath and wheezing.    Cardiovascular: Positive for chest pain (intermittent midsternal CP with movement). Negative for palpitations.   Gastrointestinal: Positive for abdominal pain (epigastric, radiating to back with movement ). Negative for abdominal distention, blood in stool, constipation, diarrhea, nausea and vomiting.   Genitourinary: Negative for dysuria, frequency, hematuria and urgency.   Musculoskeletal: Negative for neck pain and neck stiffness.   Neurological: Negative for dizziness, syncope, speech difficulty, weakness, light-headedness, numbness and headaches.     Objective:     Vital Signs (Most Recent):  Temp: 97.5 °F (36.4 °C) (11/15/18 0600)  Pulse: (!) 59 (11/15/18 0753)  Resp: 19 (11/15/18 0426)  BP: 115/70 (11/15/18 0732)  SpO2: 96 % (11/15/18 0753) Vital Signs (24h Range):  Temp:  [97.5 °F (36.4 °C)] 97.5 °F (36.4 °C)  Pulse:  [55-98] 59  Resp:  [16-20] 19  SpO2:  [94 %-100 %] 96 %  BP: (110-142)/(66-89) 115/70   Weight: 54.4 kg (120 lb)  Body mass index is 19.37 kg/m².    No intake or output data in the  24 hours ending 11/15/18 0808    Physical Exam   Constitutional: She is oriented to person, place, and time. She appears well-developed. She has a sickly appearance. She appears ill. No distress.   HENT:   Head: Normocephalic and atraumatic.   Eyes: Conjunctivae and EOM are normal. Pupils are equal, round, and reactive to light. Right eye exhibits no discharge. Left eye exhibits no discharge. No scleral icterus.   Neck: Normal range of motion. Neck supple. JVD present. No tracheal deviation present. No thyromegaly present.   Cardiovascular: Normal rate, regular rhythm, S1 normal and S2 normal.   No murmur heard.  Pulses:       Radial pulses are 2+ on the right side, and 2+ on the left side.        Dorsalis pedis pulses are 2+ on the right side, and 2+ on the left side.   Warm extremities.    Pulmonary/Chest: No accessory muscle usage. No tachypnea. No respiratory distress. She has decreased breath sounds in the left lower field. She has no wheezes. She has no rhonchi. She has no rales.   Abdominal: Soft. Bowel sounds are normal. She exhibits no distension. There is tenderness (tender to palpation in bilateral upper ABD quadrants). There is no rebound and no guarding.   Lymphadenopathy:     She has no cervical adenopathy.   Neurological: She is alert and oriented to person, place, and time. GCS eye subscore is 4. GCS verbal subscore is 5. GCS motor subscore is 6.   Alert, oriented x 4, conversant. No focal neuro deficits noted. Moving all extremities equally bilaterally.    Skin: Skin is dry. She is not diaphoretic. There is pallor.       Vents:     Lines/Drains/Airways     Peripheral Intravenous Line                 Peripheral IV - Single Lumen 11/15/18 0351 Left Forearm less than 1 day         Peripheral IV - Single Lumen 11/15/18 0400 Right Forearm less than 1 day              Significant Labs:    CBC/Anemia Profile:  Recent Labs   Lab 11/15/18  0350   WBC 8.81   HGB 11.9*   HCT 34.8*   PLT 80*   MCV 91   RDW  16.2*        Chemistries:  Recent Labs   Lab 11/15/18  0620   *   K 5.6*      CO2 17*   BUN 16   CREATININE 1.2   CALCIUM 8.4*   ALBUMIN 2.4*   PROT 8.4   BILITOT 1.8*   ALKPHOS 128   ALT 29   AST 67*         Significant Imaging: I have reviewed all pertinent imaging results/findings within the past 24 hours.

## 2018-11-15 NOTE — PROCEDURES
Radiology Post-Procedure Note    Pre Op Diagnosis: pre-op lumbar drain    Post Op Diagnosis: Same    Procedure: lumbar drain    Procedure performed by: Jayy Contreras MD    Written Informed Consent Obtained: Yes    Specimen Removed: 0 mL CSF    Estimated Blood Loss: Minimal    Findings: Following written informed consent and sterile prep and drape, a 14 gauge spinal needle was inserted at L2 - L3 intralaminar space under fluoroscopic surveillance.    Drain left at T11 level.      There were no complications.    Patient tolerated procedure well.    Jayy Contreras MD  Vascular and Interventional Neurology Staff  Director of [facility]  [facility]  [phone]

## 2018-11-15 NOTE — HPI
Mrs. Cleaning is a 52 yo female with a history significant for Hep C cirrhosis, HTN, cocaine and marijuana use who presented to [facility] on the early morning of 11/15 as a transfer from [facility] for Type B aortic dissection. She initially presented to [facility] on the evening of 11/14 for sharp epigastric pain that radiated to her back which started initially on the night of 11/13. CT at OSH noted aortic dissection from left subclavian to just proximal of celiac artery. She was transferred to [facility] for vascular surgery consult on a nicardipine gtt. She was transitioned from nicardipine gtt to esmolol gtt. CTA upon arrival confirmed dissection and she was admitted to MICU for medical management of the dissection with vascular surgery following.

## 2018-11-16 ENCOUNTER — ANESTHESIA (OUTPATIENT)
Dept: SURGERY | Facility: HOSPITAL | Age: 53
End: 2018-11-16

## 2018-11-16 PROBLEM — R57.8 HEMORRHAGIC SHOCK: Status: ACTIVE | Noted: 2018-11-16

## 2018-11-16 PROBLEM — D62 ACUTE BLOOD LOSS ANEMIA: Status: ACTIVE | Noted: 2018-11-16

## 2018-11-16 LAB
ALBUMIN SERPL BCP-MCNC: 2 G/DL
ALBUMIN SERPL BCP-MCNC: 2.3 G/DL
ALBUMIN SERPL BCP-MCNC: 2.3 G/DL
ALBUMIN SERPL BCP-MCNC: 2.4 G/DL
ALBUMIN SERPL BCP-MCNC: 2.4 G/DL
ALLENS TEST: ABNORMAL
ALP SERPL-CCNC: 66 U/L
ALP SERPL-CCNC: 95 U/L
ALT SERPL W/O P-5'-P-CCNC: 19 U/L
ALT SERPL W/O P-5'-P-CCNC: 78 U/L
AMMONIA PLAS-SCNC: 60 UMOL/L
AMORPH CRY UR QL COMP ASSIST: ABNORMAL
ANION GAP SERPL CALC-SCNC: 14 MMOL/L
ANION GAP SERPL CALC-SCNC: 15 MMOL/L
APTT BLDCRRT: 43.7 SEC
APTT BLDCRRT: 72.5 SEC
AST SERPL-CCNC: 236 U/L
AST SERPL-CCNC: 48 U/L
BASOPHILS # BLD AUTO: 0 K/UL
BASOPHILS # BLD AUTO: 0 K/UL
BASOPHILS # BLD AUTO: 0.01 K/UL
BASOPHILS NFR BLD: 0 %
BASOPHILS NFR BLD: 0 %
BASOPHILS NFR BLD: 0.1 %
BILIRUB SERPL-MCNC: 1.5 MG/DL
BILIRUB SERPL-MCNC: 1.9 MG/DL
BILIRUB UR QL STRIP: NEGATIVE
BLD PROD TYP BPU: NORMAL
BLOOD UNIT EXPIRATION DATE: NORMAL
BLOOD UNIT TYPE CODE: 5100
BLOOD UNIT TYPE CODE: 600
BLOOD UNIT TYPE CODE: 6200
BLOOD UNIT TYPE CODE: 9500
BLOOD UNIT TYPE: NORMAL
BUN SERPL-MCNC: 18 MG/DL
BUN SERPL-MCNC: 18 MG/DL
BUN SERPL-MCNC: 20 MG/DL
BUN SERPL-MCNC: 22 MG/DL
BUN SERPL-MCNC: 23 MG/DL
BUN SERPL-MCNC: 25 MG/DL
CA-I BLDV-SCNC: 0.96 MMOL/L
CA-I BLDV-SCNC: 1.07 MMOL/L
CALCIUM SERPL-MCNC: 7.3 MG/DL
CALCIUM SERPL-MCNC: 7.3 MG/DL
CALCIUM SERPL-MCNC: 8.4 MG/DL
CALCIUM SERPL-MCNC: 8.8 MG/DL
CHLORIDE SERPL-SCNC: 111 MMOL/L
CHLORIDE SERPL-SCNC: 112 MMOL/L
CHLORIDE SERPL-SCNC: 112 MMOL/L
CLARITY UR REFRACT.AUTO: ABNORMAL
CO2 SERPL-SCNC: 14 MMOL/L
CO2 SERPL-SCNC: 16 MMOL/L
CODING SYSTEM: NORMAL
COLOR UR AUTO: YELLOW
CREAT SERPL-MCNC: 1.3 MG/DL
CREAT SERPL-MCNC: 1.4 MG/DL
CREAT SERPL-MCNC: 1.4 MG/DL
CREAT SERPL-MCNC: 1.5 MG/DL
DELSYS: ABNORMAL
DIFFERENTIAL METHOD: ABNORMAL
DISPENSE STATUS: NORMAL
EOSINOPHIL # BLD AUTO: 0 K/UL
EOSINOPHIL NFR BLD: 0 %
EOSINOPHIL NFR BLD: 0.1 %
ERYTHROCYTE [DISTWIDTH] IN BLOOD BY AUTOMATED COUNT: 16.2 %
ERYTHROCYTE [DISTWIDTH] IN BLOOD BY AUTOMATED COUNT: 16.5 %
ERYTHROCYTE [DISTWIDTH] IN BLOOD BY AUTOMATED COUNT: 16.8 %
ERYTHROCYTE [DISTWIDTH] IN BLOOD BY AUTOMATED COUNT: 16.9 %
ERYTHROCYTE [DISTWIDTH] IN BLOOD BY AUTOMATED COUNT: 17 %
EST. GFR  (AFRICAN AMERICAN): 45.5 ML/MIN/1.73 M^2
EST. GFR  (AFRICAN AMERICAN): 49.5 ML/MIN/1.73 M^2
EST. GFR  (AFRICAN AMERICAN): 49.5 ML/MIN/1.73 M^2
EST. GFR  (AFRICAN AMERICAN): 54.1 ML/MIN/1.73 M^2
EST. GFR  (NON AFRICAN AMERICAN): 39.5 ML/MIN/1.73 M^2
EST. GFR  (NON AFRICAN AMERICAN): 42.9 ML/MIN/1.73 M^2
EST. GFR  (NON AFRICAN AMERICAN): 42.9 ML/MIN/1.73 M^2
EST. GFR  (NON AFRICAN AMERICAN): 47 ML/MIN/1.73 M^2
FACT VIII ACT/NOR PPP: 125 %
FIBRINOGEN PPP-MCNC: 102 MG/DL
FIBRINOGEN PPP-MCNC: 123 MG/DL
FIBRINOGEN PPP-MCNC: <70 MG/DL
FLOW: 8
FLOW: 8
GLUCOSE SERPL-MCNC: 100 MG/DL
GLUCOSE SERPL-MCNC: 113 MG/DL
GLUCOSE SERPL-MCNC: 92 MG/DL
GLUCOSE SERPL-MCNC: 98 MG/DL
GLUCOSE UR QL STRIP: NEGATIVE
HCO3 UR-SCNC: 13.9 MMOL/L (ref 24–28)
HCO3 UR-SCNC: 15.1 MMOL/L (ref 24–28)
HCO3 UR-SCNC: 15.1 MMOL/L (ref 24–28)
HCT VFR BLD AUTO: 20.6 %
HCT VFR BLD AUTO: 21 %
HCT VFR BLD AUTO: 21.8 %
HCT VFR BLD AUTO: 21.9 %
HCT VFR BLD AUTO: 22.7 %
HCT VFR BLD CALC: 24 %PCV (ref 36–54)
HGB BLD-MCNC: 6.8 G/DL
HGB BLD-MCNC: 6.9 G/DL
HGB BLD-MCNC: 7.2 G/DL
HGB BLD-MCNC: 7.3 G/DL
HGB BLD-MCNC: 7.5 G/DL
HGB UR QL STRIP: ABNORMAL
HYALINE CASTS UR QL AUTO: 30 /LPF
IMM GRANULOCYTES # BLD AUTO: 0.04 K/UL
IMM GRANULOCYTES # BLD AUTO: 0.05 K/UL
IMM GRANULOCYTES # BLD AUTO: 0.07 K/UL
IMM GRANULOCYTES # BLD AUTO: 0.07 K/UL
IMM GRANULOCYTES # BLD AUTO: 0.1 K/UL
IMM GRANULOCYTES NFR BLD AUTO: 0.5 %
IMM GRANULOCYTES NFR BLD AUTO: 0.7 %
IMM GRANULOCYTES NFR BLD AUTO: 0.7 %
IMM GRANULOCYTES NFR BLD AUTO: 0.8 %
IMM GRANULOCYTES NFR BLD AUTO: 1 %
INR PPP: 1.6
INR PPP: 1.8
INR PPP: 2.4
KETONES UR QL STRIP: NEGATIVE
LACTATE SERPL-SCNC: 10 MMOL/L
LACTATE SERPL-SCNC: 4.5 MMOL/L
LACTATE SERPL-SCNC: 7.1 MMOL/L
LACTATE SERPL-SCNC: 7.8 MMOL/L
LACTATE SERPL-SCNC: 8.6 MMOL/L
LEFT GROIN HEMATOMA AP: 0.9 CM
LEFT GROIN HEMATOMA TRANS: 0.9 CM
LEUKOCYTE ESTERASE UR QL STRIP: NEGATIVE
LYMPHOCYTES # BLD AUTO: 0.5 K/UL
LYMPHOCYTES # BLD AUTO: 0.7 K/UL
LYMPHOCYTES # BLD AUTO: 0.8 K/UL
LYMPHOCYTES NFR BLD: 12.2 %
LYMPHOCYTES NFR BLD: 5.9 %
LYMPHOCYTES NFR BLD: 7.3 %
LYMPHOCYTES NFR BLD: 7.4 %
LYMPHOCYTES NFR BLD: 8.4 %
MAGNESIUM SERPL-MCNC: 1.6 MG/DL
MCH RBC QN AUTO: 29.7 PG
MCH RBC QN AUTO: 30 PG
MCH RBC QN AUTO: 30.1 PG
MCH RBC QN AUTO: 30.5 PG
MCH RBC QN AUTO: 30.7 PG
MCHC RBC AUTO-ENTMCNC: 31.7 G/DL
MCHC RBC AUTO-ENTMCNC: 32.4 G/DL
MCHC RBC AUTO-ENTMCNC: 33.5 G/DL
MCHC RBC AUTO-ENTMCNC: 33.5 G/DL
MCHC RBC AUTO-ENTMCNC: 34.2 G/DL
MCV RBC AUTO: 89 FL
MCV RBC AUTO: 90 FL
MCV RBC AUTO: 92 FL
MCV RBC AUTO: 92 FL
MCV RBC AUTO: 95 FL
MICROSCOPIC COMMENT: ABNORMAL
MODE: ABNORMAL
MODE: ABNORMAL
MONOCYTES # BLD AUTO: 0.5 K/UL
MONOCYTES # BLD AUTO: 0.6 K/UL
MONOCYTES # BLD AUTO: 0.7 K/UL
MONOCYTES # BLD AUTO: 0.7 K/UL
MONOCYTES # BLD AUTO: 0.8 K/UL
MONOCYTES NFR BLD: 6.7 %
MONOCYTES NFR BLD: 7 %
MONOCYTES NFR BLD: 7.3 %
MONOCYTES NFR BLD: 7.7 %
MONOCYTES NFR BLD: 8.4 %
NEUTROPHILS # BLD AUTO: 5.5 K/UL
NEUTROPHILS # BLD AUTO: 7.2 K/UL
NEUTROPHILS # BLD AUTO: 7.3 K/UL
NEUTROPHILS # BLD AUTO: 8.2 K/UL
NEUTROPHILS # BLD AUTO: 8.2 K/UL
NEUTROPHILS NFR BLD: 80.1 %
NEUTROPHILS NFR BLD: 84.1 %
NEUTROPHILS NFR BLD: 84.3 %
NEUTROPHILS NFR BLD: 84.4 %
NEUTROPHILS NFR BLD: 84.7 %
NITRITE UR QL STRIP: NEGATIVE
NRBC BLD-RTO: 0 /100 WBC
NUM UNITS TRANS FFP: NORMAL
PCO2 BLDA: 23.2 MMHG (ref 35–45)
PCO2 BLDA: 24.6 MMHG (ref 35–45)
PCO2 BLDA: 26 MMHG (ref 35–45)
PH SMN: 7.37 [PH] (ref 7.35–7.45)
PH SMN: 7.38 [PH] (ref 7.35–7.45)
PH SMN: 7.4 [PH] (ref 7.35–7.45)
PH UR STRIP: 5 [PH] (ref 5–8)
PHOSPHATE SERPL-MCNC: 4.5 MG/DL
PHOSPHATE SERPL-MCNC: 4.9 MG/DL
PHOSPHATE SERPL-MCNC: 5.1 MG/DL
PHOSPHATE SERPL-MCNC: 5.1 MG/DL
PLATELET # BLD AUTO: 43 K/UL
PLATELET # BLD AUTO: 54 K/UL
PLATELET # BLD AUTO: 54 K/UL
PLATELET # BLD AUTO: 57 K/UL
PLATELET # BLD AUTO: 82 K/UL
PMV BLD AUTO: 10.7 FL
PMV BLD AUTO: 10.8 FL
PMV BLD AUTO: 11.2 FL
PMV BLD AUTO: 11.4 FL
PMV BLD AUTO: 12.9 FL
PO2 BLDA: 55 MMHG (ref 80–100)
PO2 BLDA: 64 MMHG (ref 80–100)
PO2 BLDA: 80 MMHG (ref 80–100)
POC ACTIVATED CLOTTING TIME K: 290 SEC (ref 74–137)
POC BE: -10 MMOL/L
POC BE: -10 MMOL/L
POC BE: -11 MMOL/L
POC IONIZED CALCIUM: 1.03 MMOL/L (ref 1.06–1.42)
POC SATURATED O2: 89 % (ref 95–100)
POC SATURATED O2: 92 % (ref 95–100)
POC SATURATED O2: 96 % (ref 95–100)
POC TCO2: 15 MMOL/L (ref 23–27)
POC TCO2: 16 MMOL/L (ref 23–27)
POC TCO2: 16 MMOL/L (ref 23–27)
POCT GLUCOSE: 102 MG/DL (ref 70–110)
POCT GLUCOSE: 111 MG/DL (ref 70–110)
POCT GLUCOSE: 141 MG/DL (ref 70–110)
POCT GLUCOSE: 87 MG/DL (ref 70–110)
POCT GLUCOSE: 92 MG/DL (ref 70–110)
POCT GLUCOSE: 98 MG/DL (ref 70–110)
POTASSIUM BLD-SCNC: 4.6 MMOL/L (ref 3.5–5.1)
POTASSIUM SERPL-SCNC: 4.2 MMOL/L
POTASSIUM SERPL-SCNC: 4.3 MMOL/L
POTASSIUM SERPL-SCNC: 4.3 MMOL/L
POTASSIUM SERPL-SCNC: 4.4 MMOL/L
POTASSIUM SERPL-SCNC: 4.7 MMOL/L
POTASSIUM SERPL-SCNC: 4.7 MMOL/L
PROT SERPL-MCNC: 4.9 G/DL
PROT SERPL-MCNC: 5.4 G/DL
PROT UR QL STRIP: NEGATIVE
PROTHROMBIN TIME: 16.1 SEC
PROTHROMBIN TIME: 17.6 SEC
PROTHROMBIN TIME: 22.9 SEC
RBC # BLD AUTO: 2.29 M/UL
RBC # BLD AUTO: 2.29 M/UL
RBC # BLD AUTO: 2.38 M/UL
RBC # BLD AUTO: 2.4 M/UL
RBC # BLD AUTO: 2.46 M/UL
RBC #/AREA URNS AUTO: 19 /HPF (ref 0–4)
RIGHT GROIN HEMATOMA AP: 0.5 CM
RIGHT GROIN HEMATOMA TRANS: 1.5 CM
SAMPLE: ABNORMAL
SITE: ABNORMAL
SODIUM BLD-SCNC: 144 MMOL/L (ref 136–145)
SODIUM SERPL-SCNC: 140 MMOL/L
SODIUM SERPL-SCNC: 141 MMOL/L
SP GR UR STRIP: 1.03 (ref 1–1.03)
SP02: 98
SP02: 98
TRANS ERYTHROCYTES VOL PATIENT: NORMAL ML
TRANS PLATPHERESIS VOL PATIENT: NORMAL ML
TRANS PLATPHERESIS VOL PATIENT: NORMAL ML
UNIT NUMBER: NORMAL
URN SPEC COLLECT METH UR: ABNORMAL
WBC # BLD AUTO: 6.88 K/UL
WBC # BLD AUTO: 8.57 K/UL
WBC # BLD AUTO: 8.58 K/UL
WBC # BLD AUTO: 9.68 K/UL
WBC # BLD AUTO: 9.76 K/UL
WBC #/AREA URNS AUTO: 1 /HPF (ref 0–5)

## 2018-11-16 PROCEDURE — 83735 ASSAY OF MAGNESIUM: CPT

## 2018-11-16 PROCEDURE — 82330 ASSAY OF CALCIUM: CPT

## 2018-11-16 PROCEDURE — 63600175 PHARM REV CODE 636 W HCPCS: Performed by: NURSE PRACTITIONER

## 2018-11-16 PROCEDURE — 87040 BLOOD CULTURE FOR BACTERIA: CPT | Mod: 59

## 2018-11-16 PROCEDURE — 63600175 PHARM REV CODE 636 W HCPCS: Performed by: INTERNAL MEDICINE

## 2018-11-16 PROCEDURE — 85730 THROMBOPLASTIN TIME PARTIAL: CPT | Mod: 91

## 2018-11-16 PROCEDURE — 99900035 HC TECH TIME PER 15 MIN (STAT)

## 2018-11-16 PROCEDURE — 85025 COMPLETE CBC W/AUTO DIFF WBC: CPT | Mod: 91

## 2018-11-16 PROCEDURE — 81001 URINALYSIS AUTO W/SCOPE: CPT

## 2018-11-16 PROCEDURE — P9017 PLASMA 1 DONOR FRZ W/IN 8 HR: HCPCS

## 2018-11-16 PROCEDURE — C9113 INJ PANTOPRAZOLE SODIUM, VIA: HCPCS | Performed by: NURSE PRACTITIONER

## 2018-11-16 PROCEDURE — 82803 BLOOD GASES ANY COMBINATION: CPT

## 2018-11-16 PROCEDURE — 25000003 PHARM REV CODE 250

## 2018-11-16 PROCEDURE — 99291 CRITICAL CARE FIRST HOUR: CPT | Mod: ,,, | Performed by: NURSE PRACTITIONER

## 2018-11-16 PROCEDURE — 25000003 PHARM REV CODE 250: Performed by: SURGERY

## 2018-11-16 PROCEDURE — P9012 CRYOPRECIPITATE EACH UNIT: HCPCS

## 2018-11-16 PROCEDURE — 85384 FIBRINOGEN ACTIVITY: CPT | Mod: 91

## 2018-11-16 PROCEDURE — 85610 PROTHROMBIN TIME: CPT | Mod: 91

## 2018-11-16 PROCEDURE — 25000003 PHARM REV CODE 250: Performed by: NURSE PRACTITIONER

## 2018-11-16 PROCEDURE — 82140 ASSAY OF AMMONIA: CPT

## 2018-11-16 PROCEDURE — 37799 UNLISTED PX VASCULAR SURGERY: CPT

## 2018-11-16 PROCEDURE — 85384 FIBRINOGEN ACTIVITY: CPT

## 2018-11-16 PROCEDURE — 84100 ASSAY OF PHOSPHORUS: CPT

## 2018-11-16 PROCEDURE — 85002 BLEEDING TIME TEST: CPT

## 2018-11-16 PROCEDURE — 94761 N-INVAS EAR/PLS OXIMETRY MLT: CPT

## 2018-11-16 PROCEDURE — 83605 ASSAY OF LACTIC ACID: CPT | Mod: 91

## 2018-11-16 PROCEDURE — P9035 PLATELET PHERES LEUKOREDUCED: HCPCS

## 2018-11-16 PROCEDURE — 82330 ASSAY OF CALCIUM: CPT | Mod: 91

## 2018-11-16 PROCEDURE — 83605 ASSAY OF LACTIC ACID: CPT

## 2018-11-16 PROCEDURE — 63600175 PHARM REV CODE 636 W HCPCS: Mod: JG

## 2018-11-16 PROCEDURE — 84295 ASSAY OF SERUM SODIUM: CPT

## 2018-11-16 PROCEDURE — 80069 RENAL FUNCTION PANEL: CPT | Mod: 91

## 2018-11-16 PROCEDURE — 25000003 PHARM REV CODE 250: Performed by: INTERNAL MEDICINE

## 2018-11-16 PROCEDURE — 80053 COMPREHEN METABOLIC PANEL: CPT

## 2018-11-16 PROCEDURE — P9021 RED BLOOD CELLS UNIT: HCPCS

## 2018-11-16 PROCEDURE — 84132 ASSAY OF SERUM POTASSIUM: CPT

## 2018-11-16 PROCEDURE — 85610 PROTHROMBIN TIME: CPT

## 2018-11-16 PROCEDURE — 27100092 HC HIGH FLOW DELIVERY CANNULA

## 2018-11-16 PROCEDURE — 80053 COMPREHEN METABOLIC PANEL: CPT | Mod: 91

## 2018-11-16 PROCEDURE — P9045 ALBUMIN (HUMAN), 5%, 250 ML: HCPCS | Mod: JG

## 2018-11-16 PROCEDURE — 85240 CLOT FACTOR VIII AHG 1 STAGE: CPT

## 2018-11-16 PROCEDURE — 63600175 PHARM REV CODE 636 W HCPCS: Performed by: SURGERY

## 2018-11-16 PROCEDURE — 85730 THROMBOPLASTIN TIME PARTIAL: CPT

## 2018-11-16 PROCEDURE — 85014 HEMATOCRIT: CPT

## 2018-11-16 PROCEDURE — 20000000 HC ICU ROOM

## 2018-11-16 PROCEDURE — 27000221 HC OXYGEN, UP TO 24 HOURS

## 2018-11-16 RX ORDER — HYDROCODONE BITARTRATE AND ACETAMINOPHEN 500; 5 MG/1; MG/1
TABLET ORAL
Status: DISCONTINUED | OUTPATIENT
Start: 2018-11-16 | End: 2018-11-16

## 2018-11-16 RX ORDER — MAGNESIUM SULFATE HEPTAHYDRATE 40 MG/ML
2 INJECTION, SOLUTION INTRAVENOUS ONCE
Status: COMPLETED | OUTPATIENT
Start: 2018-11-16 | End: 2018-11-16

## 2018-11-16 RX ORDER — MAGNESIUM SULFATE/D5W 2 G/50 ML
2 INTRAVENOUS SOLUTION, PIGGYBACK (ML) INTRAVENOUS ONCE
Status: DISCONTINUED | OUTPATIENT
Start: 2018-11-16 | End: 2018-11-16

## 2018-11-16 RX ORDER — NOREPINEPHRINE BITARTRATE/D5W 4MG/250ML
0.02 PLASTIC BAG, INJECTION (ML) INTRAVENOUS CONTINUOUS
Status: DISCONTINUED | OUTPATIENT
Start: 2018-11-16 | End: 2018-11-17

## 2018-11-16 RX ORDER — NOREPINEPHRINE BITARTRATE/D5W 4MG/250ML
PLASTIC BAG, INJECTION (ML) INTRAVENOUS
Status: DISPENSED
Start: 2018-11-16 | End: 2018-11-16

## 2018-11-16 RX ORDER — VASOPRESSIN 20 [USP'U]/ML
INJECTION, SOLUTION INTRAMUSCULAR; SUBCUTANEOUS
Status: COMPLETED
Start: 2018-11-16 | End: 2018-11-16

## 2018-11-16 RX ORDER — ALBUMIN HUMAN 50 G/1000ML
SOLUTION INTRAVENOUS
Status: COMPLETED
Start: 2018-11-16 | End: 2018-11-16

## 2018-11-16 RX ORDER — HYDROCODONE BITARTRATE AND ACETAMINOPHEN 500; 5 MG/1; MG/1
TABLET ORAL
Status: DISCONTINUED | OUTPATIENT
Start: 2018-11-16 | End: 2018-11-16 | Stop reason: ALTCHOICE

## 2018-11-16 RX ORDER — FENTANYL CITRATE 50 UG/ML
50 INJECTION, SOLUTION INTRAMUSCULAR; INTRAVENOUS
Status: DISCONTINUED | OUTPATIENT
Start: 2018-11-16 | End: 2018-11-17

## 2018-11-16 RX ORDER — CEFEPIME HYDROCHLORIDE 2 G/1
2 INJECTION, POWDER, FOR SOLUTION INTRAVENOUS
Status: DISCONTINUED | OUTPATIENT
Start: 2018-11-16 | End: 2018-11-17

## 2018-11-16 RX ORDER — PANTOPRAZOLE SODIUM 40 MG/10ML
40 INJECTION, POWDER, LYOPHILIZED, FOR SOLUTION INTRAVENOUS DAILY
Status: DISCONTINUED | OUTPATIENT
Start: 2018-11-16 | End: 2018-11-18

## 2018-11-16 RX ORDER — NOREPINEPHRINE BITARTRATE/D5W 4MG/250ML
0.02 PLASTIC BAG, INJECTION (ML) INTRAVENOUS CONTINUOUS
Status: DISCONTINUED | OUTPATIENT
Start: 2018-11-16 | End: 2018-11-16

## 2018-11-16 RX ADMIN — FENTANYL CITRATE 50 MCG: 50 INJECTION INTRAMUSCULAR; INTRAVENOUS at 01:11

## 2018-11-16 RX ADMIN — VANCOMYCIN HYDROCHLORIDE 1250 MG: 10 INJECTION, POWDER, LYOPHILIZED, FOR SOLUTION INTRAVENOUS at 10:11

## 2018-11-16 RX ADMIN — Medication 0.03 MCG/KG/MIN: at 03:11

## 2018-11-16 RX ADMIN — MAGNESIUM SULFATE IN WATER 2 G: 40 INJECTION, SOLUTION INTRAVENOUS at 09:11

## 2018-11-16 RX ADMIN — PANTOPRAZOLE SODIUM 40 MG: 40 TABLET, DELAYED RELEASE ORAL at 09:11

## 2018-11-16 RX ADMIN — PHYTONADIONE 10 MG: 10 INJECTION, EMULSION INTRAMUSCULAR; INTRAVENOUS; SUBCUTANEOUS at 09:11

## 2018-11-16 RX ADMIN — CEFEPIME 2 G: 2 INJECTION, POWDER, FOR SOLUTION INTRAVENOUS at 08:11

## 2018-11-16 RX ADMIN — VASOPRESSIN 0.04 UNITS/MIN: 20 INJECTION INTRAVENOUS at 06:11

## 2018-11-16 RX ADMIN — VASOPRESSIN 0.04 UNITS/MIN: 20 INJECTION INTRAVENOUS at 12:11

## 2018-11-16 RX ADMIN — VASOPRESSIN 20 UNITS: 20 INJECTION INTRAVENOUS at 12:11

## 2018-11-16 RX ADMIN — FENTANYL CITRATE 50 MCG: 50 INJECTION INTRAMUSCULAR; INTRAVENOUS at 02:11

## 2018-11-16 RX ADMIN — NOREPINEPHRINE BITARTRATE 0.02 MCG/KG/MIN: 1 INJECTION, SOLUTION, CONCENTRATE INTRAVENOUS at 04:11

## 2018-11-16 RX ADMIN — PHENYLEPHRINE HYDROCHLORIDE 2.5 MCG/KG/MIN: 10 INJECTION INTRAVENOUS at 01:11

## 2018-11-16 RX ADMIN — PANTOPRAZOLE SODIUM 40 MG: 40 INJECTION, POWDER, FOR SOLUTION INTRAVENOUS at 09:11

## 2018-11-16 RX ADMIN — CALCIUM GLUCONATE 1000 MG: 94 INJECTION, SOLUTION INTRAVENOUS at 04:11

## 2018-11-16 RX ADMIN — CEFAZOLIN 1 G: 1 INJECTION, POWDER, FOR SOLUTION INTRAMUSCULAR; INTRAVENOUS; PARENTERAL at 01:11

## 2018-11-16 RX ADMIN — CALCIUM GLUCONATE 1000 MG: 94 INJECTION, SOLUTION INTRAVENOUS at 09:11

## 2018-11-16 RX ADMIN — FENTANYL CITRATE 50 MCG: 50 INJECTION INTRAMUSCULAR; INTRAVENOUS at 08:11

## 2018-11-16 RX ADMIN — CEFEPIME 2 G: 2 INJECTION, POWDER, FOR SOLUTION INTRAVENOUS at 09:11

## 2018-11-16 RX ADMIN — ALBUMIN HUMAN: 0.05 INJECTION, SOLUTION INTRAVENOUS at 03:11

## 2018-11-16 RX ADMIN — CALCIUM GLUCONATE 1000 MG: 98 INJECTION, SOLUTION INTRAVENOUS at 05:11

## 2018-11-16 NOTE — NURSING
Dr. Hemphill at bedside. Assessment of groin sites and neuro exam completed. Will continue to monitor.

## 2018-11-16 NOTE — ASSESSMENT & PLAN NOTE
--required levo, gurwinder, and vaso overnight. Down to just levo this afternoon  --bleeding from bilateral groin sites with significant hematoma formation to left groin. Pressure held for an extended period  --received 4 U PRBC, 3 U FFP, 1 U platelets, 1 U cryo, and vitamin K\  --TEG ordered, factors replaced accordingly   --trend labs closely  --Right and left groin U/S per vascular surgery

## 2018-11-16 NOTE — SIGNIFICANT EVENT
Vascular Surgery Note      Patient s/p emergent TEVAR due to symptomatic aortic dissection.   Post op patient was initially doing well in MICU with Spinal drain protocol.   Transferred to SICU around midnight.   Patient began having increasing pressor requirement. Noted to have left thigh hematoma.   New labs with increasing coagulopathy and ABLA. Left groin access manual pressure held for 2 hours while 2 PRBC, 2 FFP, 1 SPP was being transfused. Still 1 more PRBC and FFP to be transfused.   Left groin with some oozing also. Holding manual pressure.     Patient still neuro intact, bilateral hip flexors intact, R 5/5, L 4/5  Intact DP bilaterally.     Ankit Hemphill MD  Vascular/Endovascular Surgery Fellow

## 2018-11-16 NOTE — PROGRESS NOTES
[facility]  Critical Care Medicine  Progress Note    Patient Name: Leah Cleaning  MRN: [id number]  Admission Date: 11/15/2018  Hospital Length of Stay: 1 days  Code Status: Full Code  Attending Provider: Xander Mcgregor*  Primary Care Provider: Mary Colorado MD   Principal Problem: Aortic dissection distal to left subclavian    Subjective:     HPI:  Mrs. Cleaning is a 52 yo female with a history significant for Hep C cirrhosis, HTN, cocaine and marijuana use who presented to [facility] on the early morning of 11/15 as a transfer from [facility] for Type B aortic dissection. She initially presented to [facility] on the evening of 11/14 for sharp epigastric pain that radiated to her back which started initially on the night of 11/13. CT at OSH noted aortic dissection from left subclavian to just proximal of celiac artery. She was transferred to [facility] for vascular surgery consult on a nicardipine gtt. She was transitioned from nicardipine gtt to esmolol gtt. CTA upon arrival confirmed dissection and she was admitted to MICU for medical management of the dissection with vascular surgery following.     Hospital/ICU Course:  Mrs. Cleaning was admitted to MICU for Type B aortic dissection on esmolol gtt to maintain HR of 60 and SBP <120. Vascular surgery following, no surgical intervention warranted initially. CTA read concerning for possible aortic rupture in the descending aorta with high-density fluid noted in the posterior mediastinum; additionally, there was a suspected penetrating ulcer in the upper abdominal aorta adjacent to the true lumen. Due to high risk/ possible rupture, Lumbar drain was placed by interventional radiology and TEVAR completed by vascular surgery. Early 11/16, pt noted to be in hemorragic shock complicated by liver disease as vasopressor requirements increased significantly, rising lactic acidosis, and patient was found to be  bleeding from bilateral groin sites with significant hematoma formation to left groin. Pressure was held for an extended period and patient received 4 U PRBC, 3 U FFP, 1 U platelets, 1 U cryo, and vitamin K.        Interval History/Significant Events: Early morning, pt noted to be in hemorragic shock complicated by liver disease as vasopressor requirements increased significantly, rising lactic acidosis, and patient was found to be bleeding from bilateral groin sites with significant hematoma formation to left groin. Pressure was held for an extended period and patient received 4 U PRBC, 3 U FFP, 1 U platelets, 1 U cryo, and vitamin K.          Review of Systems   Constitutional: Negative for chills and fever.   HENT: Negative for sore throat and trouble swallowing.    Respiratory: Negative for cough, chest tightness, shortness of breath and wheezing.    Cardiovascular: Negative for chest pain and palpitations.   Gastrointestinal: Positive for abdominal pain (suprapubic, radiating to back with movement). Negative for abdominal distention, blood in stool, nausea and vomiting.   Musculoskeletal: Positive for back pain. Negative for neck pain and neck stiffness.   Neurological: Negative for weakness, numbness and headaches.   Hematological: Bruises/bleeds easily.     Objective:     Vital Signs (Most Recent):  Temp: 97.5 °F (36.4 °C) (11/16/18 1145)  Pulse: 72 (11/16/18 1400)  Resp: 18 (11/16/18 1400)  BP: 135/60 (11/16/18 1400)  SpO2: 95 % (11/16/18 1400) Vital Signs (24h Range):  Temp:  [94 °F (34.4 °C)-97.8 °F (36.6 °C)] 97.5 °F (36.4 °C)  Pulse:  [56-89] 72  Resp:  [11-90] 18  SpO2:  [91 %-100 %] 95 %  BP: ()/(54-73) 135/60  Arterial Line BP: ()/(44-77) 131/54   Weight: 58.1 kg (128 lb 1.4 oz)  Body mass index is 20.67 kg/m².      Intake/Output Summary (Last 24 hours) at 11/16/2018 1418  Last data filed at 11/16/2018 1400  Gross per 24 hour   Intake 4560.49 ml   Output 974 ml   Net 3586.49 ml        Physical Exam   Constitutional: She is oriented to person, place, and time. She appears well-developed. She has a sickly appearance. She appears ill. No distress.   HENT:   Head: Normocephalic and atraumatic.   Eyes: Conjunctivae and EOM are normal. Pupils are equal, round, and reactive to light. No scleral icterus.   Neck: Normal range of motion. No JVD present. No tracheal deviation present.   Right TLC pressure dressing saturated with bloody drainage    Cardiovascular: Normal rate, regular rhythm, S1 normal, S2 normal and normal heart sounds.   No murmur heard.  Pulses:       Radial pulses are 2+ on the right side, and 2+ on the left side.        Dorsalis pedis pulses are 2+ on the right side, and 2+ on the left side.   Warm extremities.    Pulmonary/Chest: Effort normal and breath sounds normal. No accessory muscle usage. Tachypnea (mild) noted. No respiratory distress. She has no wheezes. She has no rhonchi. She has no rales.   Abdominal: Soft. Bowel sounds are normal. She exhibits no distension. There is tenderness (tender to suprapubic palpation). There is no rebound and no guarding.   Genitourinary:   Genitourinary Comments: Perry with concentrated output   Musculoskeletal:   BLE strength 5/5    Neurological: She is alert and oriented to person, place, and time. GCS eye subscore is 4. GCS verbal subscore is 5. GCS motor subscore is 6.   Alert, oriented x 4, conversant. No focal neuro deficits noted. Moving all extremities equally bilaterally.    Skin: Skin is dry. Capillary refill takes 2 to 3 seconds. Bruising noted. She is not diaphoretic. There is pallor.        Large marked hematoma to left groin without active bleeding. Oozing from right groin access site with sandbag in place.    Psychiatric: She has a normal mood and affect. Her behavior is normal. Judgment and thought content normal.       Vents:  Oxygen Concentration (%): 50 (11/15/18 1710)  Lines/Drains/Airways     Central Venous Catheter  Line                 Percutaneous Central Line Insertion/Assessment - triple lumen  11/15/18 right internal jugular 1 day          Drain                 Lumbar Drain 11/15/18 1500 less than 1 day         Urethral Catheter 11/15/18 1518 Non-latex;Straight-tip 16 Fr. less than 1 day          Arterial Line                 Arterial Line 11/15/18 1230 Right Radial 1 day          Peripheral Intravenous Line                 Peripheral IV - Single Lumen 11/15/18 0351 Left Forearm 1 day         Peripheral IV - Single Lumen 11/15/18 0400 Right Forearm 1 day              Significant Labs:    CBC/Anemia Profile:  Recent Labs   Lab 11/16/18  0305 11/16/18  0310 11/16/18  0747 11/16/18  1200   WBC 6.88  --  8.58 9.68   HGB 7.2*  --  6.8* 7.3*   HCT 22.7* 24* 21.0* 21.8*   PLT 54*  --  82* 57*   MCV 95  --  92 92   RDW 16.2*  --  16.5* 16.9*        Chemistries:  Recent Labs   Lab 11/15/18  0620  11/15/18  1726 11/16/18  0309 11/16/18  0800   *   < > 139 140  140 140   K 5.6*   < > 4.3 4.7  4.7 4.3      < > 110 112*  112* 111*   CO2 17*   < > 21* 14*  14* 14*   BUN 16   < > 17 18  18 20   CREATININE 1.2   < > 1.1 1.3  1.3 1.3   CALCIUM 8.4*   < > 7.5* 7.3*  7.3* 8.4*   ALBUMIN 2.4*  --   --  2.0* 2.4*   PROT 8.4  --   --  5.4* 4.9*   BILITOT 1.8*  --   --  1.5* 1.9*   ALKPHOS 128  --   --  95 66   ALT 29  --   --  19 78*   AST 67*  --   --  48* 236*   MG  --   --   --  1.6  --    PHOS  --   --   --  4.5  --     < > = values in this interval not displayed.       All pertinent labs within the past 24 hours have been reviewed.    Significant Imaging:  I have reviewed all pertinent imaging results/findings within the past 24 hours.    Assessment/Plan:     Psychiatric   Marijuana abuse    --see above     Cocaine abuse    --toxicology screen positive for cocaine, THC, opiates at OSH     Cardiac/Vascular   * Aortic dissection distal to left subclavian    --appreciate vascular surgery assistance. CTA as detailed  above  --s/p lumbar drain placement and TEVAR  --MAP goal >80  --lumbar drain with ~10ml/hr  --serial neuro/ neurovascular exams        Essential hypertension    --in shock, holding home antihypertensives     Hematology   Thrombocytopenia    --2/2 cirrhosis  --plts goal 50,000 - 100,000      Coagulopathy    --suspect 2/2 cirrhosis  --Give vitamin K for goal INR <2  --INR q12h     GI   Chronic hepatitis C with cirrhosis    --per patient, has known about cirrhosis for >10 years. Has never received treatment for Hep C  --continue supportive care for coagulopathy, thrombocytopenia in setting of Type B aortic dissection with hemorrhagic shock      Orthopedic   Acute midline thoracic back pain    --resolved      Other   Hemorrhagic shock    --required levo, gurwinder, and vaso overnight. Down to just levo this afternoon  --bleeding from bilateral groin sites with significant hematoma formation to left groin. Pressure held for an extended period  --received 4 U PRBC, 3 U FFP, 1 U platelets, 1 U cryo, and vitamin K\  --TEG ordered, factors replaced accordingly   --trend labs closely  --Right and left groin U/S per vascular surgery                Critical Care Daily Checklist:    A: Awake: RASS Goal/Actual Goal:    Actual: Vergara Agitation Sedation Scale (RASS): Alert and calm   B: Spontaneous Breathing Trial Performed?  N/A   C: SAT & SBT Coordinated?  N/A   D: Delirium: CAM-ICU Overall CAM-ICU: Negative   E: Early Mobility Performed? No   F: Feeding Goal:    Status:     Current Diet Order   Procedures    Diet NPO Except for: Medication     Order Specific Question:   Except for     Answer:   Medication      AS: Analgesia/Sedation Fentanyl PRN   T: Thromboembolic Prophylaxis N/A, actively bleeding    H: HOB > 300 N/A   U: Stress Ulcer Prophylaxis (if needed) Protonix   G: Glucose Control N/A   B: Bowel Function N/A   I: Indwelling Catheter (Lines & Perry) Necessity TLC, Perry needed   D: De-escalation of  Antimicrobials/Pharmacotherapies N/A    Plan for the day/ETD Monitor labs, replace factors    Code Status:  Family/Goals of Care: Full Code       Critical Care Time: 70 minutes  Critical secondary to Patient has a condition that poses threat to life and bodily function: Hemorrhagic shock, Aortic dissection      Critical care was time spent personally by me on the following activities: development of treatment plan with patient or surrogate and bedside caregivers, discussions with consultants, evaluation of patient's response to treatment, examination of patient, ordering and performing treatments and interventions, ordering and review of laboratory studies, ordering and review of radiographic studies, pulse oximetry, re-evaluation of patient's condition. This critical care time did not overlap with that of any other provider or involve time for any procedures.     Jerica Pereira NP  Critical Care Medicine  [facility]

## 2018-11-16 NOTE — PLAN OF CARE
Problem: Patient Care Overview  Goal: Plan of Care Review  Outcome: Ongoing (interventions implemented as appropriate)  Around 0245 patient began having increasing pressor requirement, sharp abdominal pain and tachypneic. Noted to have left thigh hematoma.   Manual pressure was held for 2 hours on L groin site while 3 PRBC, 1 PLT, and 3 FFP were transfused. Left groin also began to ooze, manual pressure held for 30 minutes and then pressure dressing was applied. No leakage noted thus far.     Pt remains A&Ox4.  VSS stable with norepinephrine and vasopressin infusing currently. Phenylephrine drip turned off.   Pulses +2 bilaterally.  High Flow nasal cannula @ 9L.  Temperature still at 94 degrees F despite jassi hugger being applied.   UOP remains low.  Central line dressing changed - still oozing blood.  L groin site now open to air.   MD aware of all findings above.

## 2018-11-16 NOTE — PLAN OF CARE
Mary Colorado MD  [address] [facility]*     No Pharmacies Listed     Payor: MEDICAID / Plan: [facility] COMMUNITY PLAN [facility] ([place] MEDICAID) / Product Type: Managed Medicaid /      No future appointments.    Extended Emergency Contact Information  Primary Emergency Contact: BryannaauroraNancy  Mobile Phone: [phone]  Relation: Sister  Preferred language: English   needed? No        11/16/18 1145   Discharge Assessment   Assessment Type Discharge Planning Assessment   Confirmed/corrected address and phone number on facesheet? Yes   Assessment information obtained from? Patient;Medical Record   Expected Length of Stay (days) 6   Communicated expected length of stay with patient/caregiver no   Prior to hospitilization cognitive status: Alert/Oriented   Prior to hospitalization functional status: Independent   Current cognitive status: Alert/Oriented   Current Functional Status: Partially Dependent;Needs Assistance   Facility Arrived From: [facility]   Lives With alone   Able to Return to Prior Arrangements unable to determine at this time (comments)   Is patient able to care for self after discharge? Unable to determine at this time (comments)   Who are your caregiver(s) and their phone number(s)? Nancy Burton (sister) [phone]   Patient's perception of discharge disposition home or selfcare   Readmission Within The Last 30 Days no previous admission in last 30 days   Patient currently being followed by outpatient case management? No   Patient currently receives any other outside agency services? No   Equipment Currently Used at Home none   Do you have any problems affording any of your prescribed medications? No   Is the patient taking medications as prescribed? yes   Does the patient have transportation home? Yes   Transportation Available family or friend will provide   Does the patient receive services at the [facility]? No    Discharge Plan A Rehab   Discharge Plan B Home with family   Patient/Family In Agreement With Plan yes   Ana Soto RN, BSN, CCM  Case Management  [facility]  Ext. [phone]

## 2018-11-16 NOTE — PROGRESS NOTES
Vascular Cardiology note:    Bilat LE PSA exam performed, full report to follow.    No evidence of RLE PSA, small hematoma noted (1.5x0.5cm) anterior to R CFA.    Large multiloculated L PSA anterior to L CFA with arterial to and fro flow within hematoma.    Findings d/w Dr. Hemphill (Vasc Surg Resident, [phone]) at 1120am 11/16/18.

## 2018-11-16 NOTE — NURSING
Ultrasound tech at bedside for arterial ultrasound of bilateral groin. States he will call Dr. Hemphill with results. Will continue to monitor.

## 2018-11-16 NOTE — ASSESSMENT & PLAN NOTE
--per patient, has known about cirrhosis for >10 years. Has never received treatment for Hep C  --continue supportive care for coagulopathy, thrombocytopenia in setting of Type B aortic dissection with hemorrhagic shock

## 2018-11-16 NOTE — SUBJECTIVE & OBJECTIVE
Interval History/Significant Events: Early morning, pt noted to be in hemorragic shock complicated by liver disease as vasopressor requirements increased significantly, rising lactic acidosis, and patient was found to be bleeding from bilateral groin sites with significant hematoma formation to left groin. Pressure was held for an extended period and patient received 4 U PRBC, 3 U FFP, 1 U platelets, 1 U cryo, and vitamin K.          Review of Systems   Constitutional: Negative for chills and fever.   HENT: Negative for sore throat and trouble swallowing.    Respiratory: Negative for cough, chest tightness, shortness of breath and wheezing.    Cardiovascular: Negative for chest pain and palpitations.   Gastrointestinal: Positive for abdominal pain (suprapubic, radiating to back with movement). Negative for abdominal distention, blood in stool, nausea and vomiting.   Musculoskeletal: Positive for back pain. Negative for neck pain and neck stiffness.   Neurological: Negative for weakness, numbness and headaches.   Hematological: Bruises/bleeds easily.     Objective:     Vital Signs (Most Recent):  Temp: 97.5 °F (36.4 °C) (11/16/18 1145)  Pulse: 72 (11/16/18 1400)  Resp: 18 (11/16/18 1400)  BP: 135/60 (11/16/18 1400)  SpO2: 95 % (11/16/18 1400) Vital Signs (24h Range):  Temp:  [94 °F (34.4 °C)-97.8 °F (36.6 °C)] 97.5 °F (36.4 °C)  Pulse:  [56-89] 72  Resp:  [11-90] 18  SpO2:  [91 %-100 %] 95 %  BP: ()/(54-73) 135/60  Arterial Line BP: ()/(44-77) 131/54   Weight: 58.1 kg (128 lb 1.4 oz)  Body mass index is 20.67 kg/m².      Intake/Output Summary (Last 24 hours) at 11/16/2018 1418  Last data filed at 11/16/2018 1400  Gross per 24 hour   Intake 4560.49 ml   Output 974 ml   Net 3586.49 ml       Physical Exam   Constitutional: She is oriented to person, place, and time. She appears well-developed. She has a sickly appearance. She appears ill. No distress.   HENT:   Head: Normocephalic and atraumatic.   Eyes:  Conjunctivae and EOM are normal. Pupils are equal, round, and reactive to light. No scleral icterus.   Neck: Normal range of motion. No JVD present. No tracheal deviation present.   Right TLC pressure dressing saturated with bloody drainage    Cardiovascular: Normal rate, regular rhythm, S1 normal, S2 normal and normal heart sounds.   No murmur heard.  Pulses:       Radial pulses are 2+ on the right side, and 2+ on the left side.        Dorsalis pedis pulses are 2+ on the right side, and 2+ on the left side.   Warm extremities.    Pulmonary/Chest: Effort normal and breath sounds normal. No accessory muscle usage. Tachypnea (mild) noted. No respiratory distress. She has no wheezes. She has no rhonchi. She has no rales.   Abdominal: Soft. Bowel sounds are normal. She exhibits no distension. There is tenderness (tender to suprapubic palpation). There is no rebound and no guarding.   Genitourinary:   Genitourinary Comments: Perry with concentrated output   Musculoskeletal:   BLE strength 5/5    Neurological: She is alert and oriented to person, place, and time. GCS eye subscore is 4. GCS verbal subscore is 5. GCS motor subscore is 6.   Alert, oriented x 4, conversant. No focal neuro deficits noted. Moving all extremities equally bilaterally.    Skin: Skin is dry. Capillary refill takes 2 to 3 seconds. Bruising noted. She is not diaphoretic. There is pallor.        Large marked hematoma to left groin without active bleeding. Oozing from right groin access site with sandbag in place.    Psychiatric: She has a normal mood and affect. Her behavior is normal. Judgment and thought content normal.       Vents:  Oxygen Concentration (%): 50 (11/15/18 1710)  Lines/Drains/Airways     Central Venous Catheter Line                 Percutaneous Central Line Insertion/Assessment - triple lumen  11/15/18 right internal jugular 1 day          Drain                 Lumbar Drain 11/15/18 1500 less than 1 day         Urethral Catheter  11/15/18 1518 Non-latex;Straight-tip 16 Fr. less than 1 day          Arterial Line                 Arterial Line 11/15/18 1230 Right Radial 1 day          Peripheral Intravenous Line                 Peripheral IV - Single Lumen 11/15/18 0351 Left Forearm 1 day         Peripheral IV - Single Lumen 11/15/18 0400 Right Forearm 1 day              Significant Labs:    CBC/Anemia Profile:  Recent Labs   Lab 11/16/18  0305 11/16/18  0310 11/16/18  0747 11/16/18  1200   WBC 6.88  --  8.58 9.68   HGB 7.2*  --  6.8* 7.3*   HCT 22.7* 24* 21.0* 21.8*   PLT 54*  --  82* 57*   MCV 95  --  92 92   RDW 16.2*  --  16.5* 16.9*        Chemistries:  Recent Labs   Lab 11/15/18  0620  11/15/18  1726 11/16/18  0309 11/16/18  0800   *   < > 139 140  140 140   K 5.6*   < > 4.3 4.7  4.7 4.3      < > 110 112*  112* 111*   CO2 17*   < > 21* 14*  14* 14*   BUN 16   < > 17 18  18 20   CREATININE 1.2   < > 1.1 1.3  1.3 1.3   CALCIUM 8.4*   < > 7.5* 7.3*  7.3* 8.4*   ALBUMIN 2.4*  --   --  2.0* 2.4*   PROT 8.4  --   --  5.4* 4.9*   BILITOT 1.8*  --   --  1.5* 1.9*   ALKPHOS 128  --   --  95 66   ALT 29  --   --  19 78*   AST 67*  --   --  48* 236*   MG  --   --   --  1.6  --    PHOS  --   --   --  4.5  --     < > = values in this interval not displayed.       All pertinent labs within the past 24 hours have been reviewed.    Significant Imaging:  I have reviewed all pertinent imaging results/findings within the past 24 hours.

## 2018-11-16 NOTE — NURSING
Dr. Zuluaga notified of Fibrinogen level. Orders received to transfuse cryo. Will continue to monitor.

## 2018-11-16 NOTE — PLAN OF CARE
Problem: Patient Care Overview  Goal: Plan of Care Review  Outcome: Ongoing (interventions implemented as appropriate)    No acute events throughout day. See vital signs and assessments in flowsheets. See below for updates on today's progress.     Pulmonary: Pt. Remains on High flow nasal cannula 15 L. SPO2 goal > 95%.      Cardiovascular: NSR. MAP goal >80. Oliver 2 mcg/kg/hr.      Neurological: AAOx4. Afebrile. Q.1hr. Neuro checks.     Gastrointestinal: No BM throughout shift.     Genitourinary: Perry catheter in place. UO 45 cc/hr.     Integumentary/Other: No skin breakdown noted. Bilateral groin dressing intact.     Infusions: Oliver 2 mcg/kg/hr.     Patient progressing towards goals as tolerated, plan of care communicated and reviewed with Leah Cleaning and family. All concerns addressed. Will continue to monitor.

## 2018-11-16 NOTE — ASSESSMENT & PLAN NOTE
--appreciate vascular surgery assistance. CTA as detailed above  --s/p lumbar drain placement and TEVAR  --MAP goal >80  --lumbar drain with ~10ml/hr  --serial neuro/ neurovascular exams

## 2018-11-16 NOTE — PROGRESS NOTES
MD Kanchan notified of the difference between arterial line MAP and BP cuff MAP, ordered to titrate vasopressors based on the BP cuff. Also notified of pt's increased abdominal pain and blood oozing from central line dressing. Fentanyl increased to 50 mcg Q2H. Will continue to monitor.

## 2018-11-16 NOTE — NURSING
Dr. Storm and Dr. Hemphill at bedside. Assessed pt and discussed plan of care with pt. Aware of lab results. Will continue to monitor.

## 2018-11-16 NOTE — NURSING
Pt given bed bath with linen changed. Assessed spinal drain dressing site. Serosanguinous drainage noted to gauze but is no leaking through transparent dressing. SONAL Kaur with CCS assessed site also. Dr. Hemphill notified of dressing characteristics and current lab values. Will continue to monitor.

## 2018-11-16 NOTE — PROCEDURES
"Leah Cleaning is a 53 y.o. female patient.    Temp: 96.2 °F (35.7 °C) (11/15/18 1700)  Pulse: 73 (11/15/18 1800)  Resp: (!) 25 (11/15/18 1800)  BP: (!) 110/52 (11/15/18 1335)  SpO2: 98 % (11/15/18 1800)  Weight: 54.4 kg (120 lb) (11/15/18 0348)  Height: 5' 6" (167.6 cm) (11/15/18 0348)       Central Line  Date/Time: 11/15/2018 6:00 PM  Location procedure was performed: [facility] ([facility])  Performed by: Tracy Coulter NP  Pre-operative Diagnosis: aortic dissection  Consent Done: Yes  Time out: Immediately prior to procedure a "time out" was called to verify the correct patient, procedure, equipment, support staff and site/side marked as required.  Indications: med administration and vascular access  Anesthesia: local infiltration    Anesthesia:  Local Anesthetic: lidocaine 1% without epinephrine  Anesthetic total: 4 mL  Preparation: skin prepped with ChloraPrep  Skin prep agent dried: skin prep agent completely dried prior to procedure  Sterile barriers: all five maximum sterile barriers used - cap, mask, sterile gown, sterile gloves, and large sterile sheet  Hand hygiene: hand hygiene performed prior to central venous catheter insertion  Location details: right internal jugular  Catheter type: triple lumen  Catheter size: 7 Fr  Catheter Length: 16cm    Ultrasound guidance: yes  Vessel Caliber: large, patent, compressibility normal  Vascular Doppler: not done  Needle advanced into vessel with real time Ultrasound guidance.  Guidewire confirmed in vessel.  Sterile sheath used.  Manometry: Yes  Number of attempts: 1  Estimated blood loss (mL): 5  Post-procedure: line sutured,  chlorhexidine patch,  sterile dressing applied and blood return through all ports  Comments: Awaiting post procedure CXR. Patient tolerated procedure well.           Tracy Coulter  11/15/2018  "

## 2018-11-16 NOTE — ANESTHESIA POSTPROCEDURE EVALUATION
"Anesthesia Post Evaluation    Patient: Leah Cleaning    Procedure(s) Performed: Procedure(s) (LRB):  REPAIR, ANEURYSM, ENDOVASCULAR GRAFT, AORTA, THORACIC (N/A)    Final Anesthesia Type: general  Patient location during evaluation: ICU  Patient participation: Yes- Able to Participate  Level of consciousness: awake and alert and oriented  Post-procedure vital signs: reviewed and stable  Pain management: adequate  Airway patency: patent  PONV status at discharge: No PONV  Anesthetic complications: no      Cardiovascular status: stable  Respiratory status: unassisted, spontaneous ventilation and room air  Hydration status: euvolemic  Follow-up not needed.        Visit Vitals  BP (!) 110/52   Pulse 62   Temp 36.3 °C (97.3 °F) (Oral)   Resp 17   Ht 5' 6" (1.676 m)   Wt 54.4 kg (120 lb)   SpO2 100%   Breastfeeding? No   BMI 19.37 kg/m²       Pain/Tigre Score: Pain Assessment Performed: Yes (11/15/2018  6:00 PM)  Presence of Pain: complains of pain/discomfort (11/15/2018  6:00 PM)  Pain Rating Prior to Med Admin: 5 (11/15/2018  5:45 PM)        "

## 2018-11-16 NOTE — NURSING
Dr. Hemphill at bedside. Assessment of groin site completed and neuro check done. Will continue to monitor.

## 2018-11-17 PROBLEM — I61.9 NONTRAUMATIC THALAMIC HEMORRHAGE: Status: ACTIVE | Noted: 2018-11-17

## 2018-11-17 PROBLEM — Z71.89 GOALS OF CARE, COUNSELING/DISCUSSION: Status: ACTIVE | Noted: 2018-11-17

## 2018-11-17 LAB
ALBUMIN SERPL BCP-MCNC: 2.4 G/DL
ALBUMIN SERPL BCP-MCNC: 2.4 G/DL
ALBUMIN SERPL BCP-MCNC: 2.5 G/DL
ALBUMIN SERPL BCP-MCNC: 2.5 G/DL
ALBUMIN SERPL BCP-MCNC: 2.7 G/DL
ALBUMIN SERPL BCP-MCNC: 2.8 G/DL
ALBUMIN SERPL BCP-MCNC: 2.9 G/DL
ALP SERPL-CCNC: 80 U/L
ALT SERPL W/O P-5'-P-CCNC: 194 U/L
AMMONIA PLAS-SCNC: 98 UMOL/L
ANION GAP SERPL CALC-SCNC: 10 MMOL/L
ANION GAP SERPL CALC-SCNC: 10 MMOL/L
ANION GAP SERPL CALC-SCNC: 12 MMOL/L
ANION GAP SERPL CALC-SCNC: 8 MMOL/L
ANION GAP SERPL CALC-SCNC: 9 MMOL/L
ANION GAP SERPL CALC-SCNC: 9 MMOL/L
ANISOCYTOSIS BLD QL SMEAR: SLIGHT
AST SERPL-CCNC: 978 U/L
BASOPHILS # BLD AUTO: 0 K/UL
BASOPHILS # BLD AUTO: 0 K/UL
BASOPHILS # BLD AUTO: 0.01 K/UL
BASOPHILS # BLD AUTO: 0.02 K/UL
BASOPHILS NFR BLD: 0 %
BASOPHILS NFR BLD: 0 %
BASOPHILS NFR BLD: 0.1 %
BASOPHILS NFR BLD: 0.1 %
BASOPHILS NFR BLD: 0.2 %
BASOPHILS NFR BLD: 0.3 %
BILIRUB DIRECT SERPL-MCNC: 2.3 MG/DL
BILIRUB SERPL-MCNC: 4.7 MG/DL
BLD PROD TYP BPU: NORMAL
BLOOD UNIT EXPIRATION DATE: NORMAL
BLOOD UNIT TYPE CODE: 5100
BLOOD UNIT TYPE CODE: 6200
BLOOD UNIT TYPE CODE: 9500
BLOOD UNIT TYPE: NORMAL
BUN SERPL-MCNC: 27 MG/DL
BUN SERPL-MCNC: 30 MG/DL
BUN SERPL-MCNC: 34 MG/DL
BUN SERPL-MCNC: 36 MG/DL
BUN SERPL-MCNC: 39 MG/DL
BUN SERPL-MCNC: 40 MG/DL
BURR CELLS BLD QL SMEAR: ABNORMAL
CA-I BLDV-SCNC: 1.11 MMOL/L
CALCIUM SERPL-MCNC: 8.2 MG/DL
CALCIUM SERPL-MCNC: 8.3 MG/DL
CALCIUM SERPL-MCNC: 8.4 MG/DL
CALCIUM SERPL-MCNC: 8.5 MG/DL
CALCIUM SERPL-MCNC: 8.6 MG/DL
CALCIUM SERPL-MCNC: 8.7 MG/DL
CHLORIDE SERPL-SCNC: 111 MMOL/L
CHLORIDE SERPL-SCNC: 112 MMOL/L
CO2 SERPL-SCNC: 18 MMOL/L
CO2 SERPL-SCNC: 20 MMOL/L
CO2 SERPL-SCNC: 21 MMOL/L
CO2 SERPL-SCNC: 22 MMOL/L
CO2 SERPL-SCNC: 22 MMOL/L
CO2 SERPL-SCNC: 23 MMOL/L
CODING SYSTEM: NORMAL
CREAT SERPL-MCNC: 1.5 MG/DL
CREAT SERPL-MCNC: 1.5 MG/DL
CREAT SERPL-MCNC: 1.6 MG/DL
DIFFERENTIAL METHOD: ABNORMAL
DISPENSE STATUS: NORMAL
EOSINOPHIL # BLD AUTO: 0 K/UL
EOSINOPHIL NFR BLD: 0 %
EOSINOPHIL NFR BLD: 0 %
EOSINOPHIL NFR BLD: 0.1 %
EOSINOPHIL NFR BLD: 0.1 %
EOSINOPHIL NFR BLD: 0.2 %
EOSINOPHIL NFR BLD: 0.2 %
ERYTHROCYTE [DISTWIDTH] IN BLOOD BY AUTOMATED COUNT: 16.5 %
ERYTHROCYTE [DISTWIDTH] IN BLOOD BY AUTOMATED COUNT: 16.9 %
EST. GFR  (AFRICAN AMERICAN): 42.1 ML/MIN/1.73 M^2
EST. GFR  (AFRICAN AMERICAN): 45.5 ML/MIN/1.73 M^2
EST. GFR  (AFRICAN AMERICAN): 45.5 ML/MIN/1.73 M^2
EST. GFR  (NON AFRICAN AMERICAN): 36.5 ML/MIN/1.73 M^2
EST. GFR  (NON AFRICAN AMERICAN): 39.5 ML/MIN/1.73 M^2
EST. GFR  (NON AFRICAN AMERICAN): 39.5 ML/MIN/1.73 M^2
FIBRINOGEN PPP-MCNC: 134 MG/DL
FIBRINOGEN PPP-MCNC: 169 MG/DL
FIBRINOGEN PPP-MCNC: 77 MG/DL
GLUCOSE SERPL-MCNC: 101 MG/DL
GLUCOSE SERPL-MCNC: 115 MG/DL
GLUCOSE SERPL-MCNC: 87 MG/DL
GLUCOSE SERPL-MCNC: 89 MG/DL
GLUCOSE SERPL-MCNC: 91 MG/DL
GLUCOSE SERPL-MCNC: 92 MG/DL
HCT VFR BLD AUTO: 22.5 %
HCT VFR BLD AUTO: 22.7 %
HCT VFR BLD AUTO: 23 %
HCT VFR BLD AUTO: 23.1 %
HCT VFR BLD AUTO: 23.2 %
HCT VFR BLD AUTO: 23.7 %
HGB BLD-MCNC: 7.6 G/DL
HGB BLD-MCNC: 7.7 G/DL
HGB BLD-MCNC: 7.7 G/DL
HGB BLD-MCNC: 7.9 G/DL
HGB BLD-MCNC: 8 G/DL
HGB BLD-MCNC: 8 G/DL
HYPOCHROMIA BLD QL SMEAR: ABNORMAL
IMM GRANULOCYTES # BLD AUTO: 0.05 K/UL
IMM GRANULOCYTES # BLD AUTO: 0.06 K/UL
IMM GRANULOCYTES # BLD AUTO: 0.06 K/UL
IMM GRANULOCYTES # BLD AUTO: 0.07 K/UL
IMM GRANULOCYTES # BLD AUTO: 0.09 K/UL
IMM GRANULOCYTES # BLD AUTO: 0.1 K/UL
IMM GRANULOCYTES NFR BLD AUTO: 0.6 %
IMM GRANULOCYTES NFR BLD AUTO: 0.7 %
IMM GRANULOCYTES NFR BLD AUTO: 0.8 %
IMM GRANULOCYTES NFR BLD AUTO: 1.1 %
IMM GRANULOCYTES NFR BLD AUTO: 1.2 %
IMM GRANULOCYTES NFR BLD AUTO: 1.4 %
INR PPP: 1.6
INR PPP: 1.9
LACTATE SERPL-SCNC: 2.1 MMOL/L
LACTATE SERPL-SCNC: 2.2 MMOL/L
LACTATE SERPL-SCNC: 2.3 MMOL/L
LACTATE SERPL-SCNC: 2.6 MMOL/L
LACTATE SERPL-SCNC: 3.8 MMOL/L
LACTATE SERPL-SCNC: 6.1 MMOL/L
LYMPHOCYTES # BLD AUTO: 0.8 K/UL
LYMPHOCYTES # BLD AUTO: 0.9 K/UL
LYMPHOCYTES # BLD AUTO: 1 K/UL
LYMPHOCYTES # BLD AUTO: 1 K/UL
LYMPHOCYTES NFR BLD: 10.8 %
LYMPHOCYTES NFR BLD: 11.8 %
LYMPHOCYTES NFR BLD: 11.9 %
LYMPHOCYTES NFR BLD: 11.9 %
LYMPHOCYTES NFR BLD: 12.3 %
LYMPHOCYTES NFR BLD: 13.2 %
MAGNESIUM SERPL-MCNC: 2 MG/DL
MCH RBC QN AUTO: 29.9 PG
MCH RBC QN AUTO: 30.2 PG
MCH RBC QN AUTO: 30.3 PG
MCH RBC QN AUTO: 30.3 PG
MCH RBC QN AUTO: 30.7 PG
MCH RBC QN AUTO: 31.1 PG
MCHC RBC AUTO-ENTMCNC: 33.5 G/DL
MCHC RBC AUTO-ENTMCNC: 33.5 G/DL
MCHC RBC AUTO-ENTMCNC: 33.8 G/DL
MCHC RBC AUTO-ENTMCNC: 34.2 G/DL
MCHC RBC AUTO-ENTMCNC: 34.2 G/DL
MCHC RBC AUTO-ENTMCNC: 34.5 G/DL
MCV RBC AUTO: 88 FL
MCV RBC AUTO: 89 FL
MCV RBC AUTO: 89 FL
MCV RBC AUTO: 90 FL
MCV RBC AUTO: 91 FL
MCV RBC AUTO: 91 FL
MONOCYTES # BLD AUTO: 0.5 K/UL
MONOCYTES # BLD AUTO: 0.5 K/UL
MONOCYTES # BLD AUTO: 0.6 K/UL
MONOCYTES # BLD AUTO: 0.7 K/UL
MONOCYTES NFR BLD: 10.2 %
MONOCYTES NFR BLD: 5.6 %
MONOCYTES NFR BLD: 6.5 %
MONOCYTES NFR BLD: 8.2 %
MONOCYTES NFR BLD: 8.8 %
MONOCYTES NFR BLD: 8.8 %
NEUTROPHILS # BLD AUTO: 4.9 K/UL
NEUTROPHILS # BLD AUTO: 5 K/UL
NEUTROPHILS # BLD AUTO: 5.6 K/UL
NEUTROPHILS # BLD AUTO: 5.7 K/UL
NEUTROPHILS # BLD AUTO: 6.8 K/UL
NEUTROPHILS # BLD AUTO: 7.8 K/UL
NEUTROPHILS NFR BLD: 75.4 %
NEUTROPHILS NFR BLD: 77.5 %
NEUTROPHILS NFR BLD: 77.7 %
NEUTROPHILS NFR BLD: 78.3 %
NEUTROPHILS NFR BLD: 81 %
NEUTROPHILS NFR BLD: 83 %
NRBC BLD-RTO: 0 /100 WBC
NRBC BLD-RTO: 0 /100 WBC
NRBC BLD-RTO: 1 /100 WBC
NUM UNITS TRANS FFP: NORMAL
OVALOCYTES BLD QL SMEAR: ABNORMAL
PHOSPHATE SERPL-MCNC: 2.6 MG/DL
PHOSPHATE SERPL-MCNC: 2.7 MG/DL
PHOSPHATE SERPL-MCNC: 3 MG/DL
PHOSPHATE SERPL-MCNC: 3.4 MG/DL
PHOSPHATE SERPL-MCNC: 3.9 MG/DL
PHOSPHATE SERPL-MCNC: 3.9 MG/DL
PHOSPHATE SERPL-MCNC: 4.9 MG/DL
PLATELET # BLD AUTO: 108 K/UL
PLATELET # BLD AUTO: 45 K/UL
PLATELET # BLD AUTO: 45 K/UL
PLATELET # BLD AUTO: 58 K/UL
PLATELET # BLD AUTO: 79 K/UL
PLATELET # BLD AUTO: 88 K/UL
PMV BLD AUTO: 10.7 FL
PMV BLD AUTO: 10.8 FL
PMV BLD AUTO: 10.9 FL
PMV BLD AUTO: 11 FL
PMV BLD AUTO: 11.2 FL
PMV BLD AUTO: 11.6 FL
POCT GLUCOSE: 122 MG/DL (ref 70–110)
POCT GLUCOSE: 88 MG/DL (ref 70–110)
POCT GLUCOSE: 89 MG/DL (ref 70–110)
POCT GLUCOSE: 97 MG/DL (ref 70–110)
POCT GLUCOSE: 99 MG/DL (ref 70–110)
POIKILOCYTOSIS BLD QL SMEAR: SLIGHT
POLYCHROMASIA BLD QL SMEAR: ABNORMAL
POTASSIUM SERPL-SCNC: 4 MMOL/L
POTASSIUM SERPL-SCNC: 4 MMOL/L
POTASSIUM SERPL-SCNC: 4.1 MMOL/L
POTASSIUM SERPL-SCNC: 4.1 MMOL/L
POTASSIUM SERPL-SCNC: 4.2 MMOL/L
POTASSIUM SERPL-SCNC: 4.3 MMOL/L
PROT SERPL-MCNC: 5.2 G/DL
PROTHROMBIN TIME: 16.3 SEC
PROTHROMBIN TIME: 18.2 SEC
RBC # BLD AUTO: 2.54 M/UL
RBC # BLD AUTO: 2.57 M/UL
RBC # BLD AUTO: 2.61 M/UL
RBC # BLD AUTO: 2.62 M/UL
SODIUM SERPL-SCNC: 141 MMOL/L
SODIUM SERPL-SCNC: 142 MMOL/L
SODIUM SERPL-SCNC: 143 MMOL/L
SODIUM SERPL-SCNC: 143 MMOL/L
TRANS PLATPHERESIS VOL PATIENT: NORMAL ML
UNIT NUMBER: NORMAL
WBC # BLD AUTO: 6.36 K/UL
WBC # BLD AUTO: 6.57 K/UL
WBC # BLD AUTO: 7.23 K/UL
WBC # BLD AUTO: 7.26 K/UL
WBC # BLD AUTO: 8.34 K/UL
WBC # BLD AUTO: 9.45 K/UL

## 2018-11-17 PROCEDURE — 94761 N-INVAS EAR/PLS OXIMETRY MLT: CPT

## 2018-11-17 PROCEDURE — 99291 CRITICAL CARE FIRST HOUR: CPT | Mod: ,,, | Performed by: NURSE PRACTITIONER

## 2018-11-17 PROCEDURE — P9012 CRYOPRECIPITATE EACH UNIT: HCPCS

## 2018-11-17 PROCEDURE — 25000003 PHARM REV CODE 250: Performed by: NURSE PRACTITIONER

## 2018-11-17 PROCEDURE — 85610 PROTHROMBIN TIME: CPT | Mod: 91

## 2018-11-17 PROCEDURE — 36430 TRANSFUSION BLD/BLD COMPNT: CPT

## 2018-11-17 PROCEDURE — 99292 CRITICAL CARE ADDL 30 MIN: CPT | Mod: ,,, | Performed by: INTERNAL MEDICINE

## 2018-11-17 PROCEDURE — 83605 ASSAY OF LACTIC ACID: CPT | Mod: 91

## 2018-11-17 PROCEDURE — C9113 INJ PANTOPRAZOLE SODIUM, VIA: HCPCS | Performed by: NURSE PRACTITIONER

## 2018-11-17 PROCEDURE — 83735 ASSAY OF MAGNESIUM: CPT

## 2018-11-17 PROCEDURE — 63600175 PHARM REV CODE 636 W HCPCS: Performed by: INTERNAL MEDICINE

## 2018-11-17 PROCEDURE — P9017 PLASMA 1 DONOR FRZ W/IN 8 HR: HCPCS

## 2018-11-17 PROCEDURE — P9035 PLATELET PHERES LEUKOREDUCED: HCPCS

## 2018-11-17 PROCEDURE — 27000221 HC OXYGEN, UP TO 24 HOURS

## 2018-11-17 PROCEDURE — 85610 PROTHROMBIN TIME: CPT

## 2018-11-17 PROCEDURE — 85025 COMPLETE CBC W/AUTO DIFF WBC: CPT | Mod: 91

## 2018-11-17 PROCEDURE — 82330 ASSAY OF CALCIUM: CPT

## 2018-11-17 PROCEDURE — 25500020 PHARM REV CODE 255: Performed by: INTERNAL MEDICINE

## 2018-11-17 PROCEDURE — 80069 RENAL FUNCTION PANEL: CPT

## 2018-11-17 PROCEDURE — 82140 ASSAY OF AMMONIA: CPT

## 2018-11-17 PROCEDURE — 85384 FIBRINOGEN ACTIVITY: CPT | Mod: 91

## 2018-11-17 PROCEDURE — 84075 ASSAY ALKALINE PHOSPHATASE: CPT

## 2018-11-17 PROCEDURE — 63600175 PHARM REV CODE 636 W HCPCS: Performed by: STUDENT IN AN ORGANIZED HEALTH CARE EDUCATION/TRAINING PROGRAM

## 2018-11-17 PROCEDURE — 80069 RENAL FUNCTION PANEL: CPT | Mod: 91

## 2018-11-17 PROCEDURE — 84155 ASSAY OF PROTEIN SERUM: CPT

## 2018-11-17 PROCEDURE — 20000000 HC ICU ROOM

## 2018-11-17 PROCEDURE — 99233 SBSQ HOSP IP/OBS HIGH 50: CPT | Mod: ,,, | Performed by: PSYCHIATRY & NEUROLOGY

## 2018-11-17 PROCEDURE — 63600175 PHARM REV CODE 636 W HCPCS: Performed by: NURSE PRACTITIONER

## 2018-11-17 RX ORDER — FENTANYL CITRATE 50 UG/ML
25 INJECTION, SOLUTION INTRAMUSCULAR; INTRAVENOUS ONCE
Status: COMPLETED | OUTPATIENT
Start: 2018-11-17 | End: 2018-11-17

## 2018-11-17 RX ORDER — MORPHINE SULFATE 4 MG/ML
2 INJECTION, SOLUTION INTRAMUSCULAR; INTRAVENOUS ONCE
Status: COMPLETED | OUTPATIENT
Start: 2018-11-17 | End: 2018-11-17

## 2018-11-17 RX ORDER — FENTANYL CITRATE/PF 250MCG/5ML
25 SYRINGE (ML) INTRAVENOUS ONCE
Status: DISCONTINUED | OUTPATIENT
Start: 2018-11-17 | End: 2018-11-17

## 2018-11-17 RX ORDER — HYDROCODONE BITARTRATE AND ACETAMINOPHEN 500; 5 MG/1; MG/1
TABLET ORAL
Status: DISCONTINUED | OUTPATIENT
Start: 2018-11-17 | End: 2018-11-18

## 2018-11-17 RX ADMIN — FENTANYL CITRATE 25 MCG: 50 INJECTION INTRAMUSCULAR; INTRAVENOUS at 02:11

## 2018-11-17 RX ADMIN — IOHEXOL 100 ML: 350 INJECTION, SOLUTION INTRAVENOUS at 08:11

## 2018-11-17 RX ADMIN — MORPHINE SULFATE 2 MG: 4 INJECTION, SOLUTION INTRAMUSCULAR; INTRAVENOUS at 06:11

## 2018-11-17 RX ADMIN — CEFEPIME 2 G: 2 INJECTION, POWDER, FOR SOLUTION INTRAVENOUS at 09:11

## 2018-11-17 RX ADMIN — PANTOPRAZOLE SODIUM 40 MG: 40 INJECTION, POWDER, FOR SOLUTION INTRAVENOUS at 09:11

## 2018-11-17 RX ADMIN — PHYTONADIONE 10 MG: 10 INJECTION, EMULSION INTRAMUSCULAR; INTRAVENOUS; SUBCUTANEOUS at 09:11

## 2018-11-17 NOTE — NURSING
Patient wished to have some chicken broth and crackers. As instructed by the Critical Care MDs, spinal drain was clamped off to patient, and patient was sat up using reverse trendelenburg while still remaining straight. When patient was finished, position was returned to flat with HOB at zero degrees. Spinal drain position confirmed and drain was reopened.

## 2018-11-17 NOTE — SUBJECTIVE & OBJECTIVE
Medications:  Continuous Infusions:   norepinephrine bitartrate-D5W Stopped (11/16/18 1600)     Scheduled Meds:   ceFEPime (MAXIPIME) IVPB  2 g Intravenous Q12H    pantoprazole  40 mg Intravenous Daily    phytonadione ((AQUA-MEPHYTON) IVPB  10 mg Intravenous Daily    vancomycin (VANCOCIN) IVPB  15 mg/kg Intravenous Q24H     PRN Meds:fentaNYL, ondansetron     Objective:     Vital Signs (Most Recent):  Temp: 99.1 °F (37.3 °C) (11/17/18 0700)  Pulse: 84 (11/17/18 0845)  Resp: (!) 21 (11/17/18 0845)  BP: (!) 148/77 (11/17/18 0845)  SpO2: 97 % (11/17/18 0845) Vital Signs (24h Range):  Temp:  [94.4 °F (34.7 °C)-99.1 °F (37.3 °C)] 99.1 °F (37.3 °C)  Pulse:  [] 84  Resp:  [16-90] 21  SpO2:  [91 %-99 %] 97 %  BP: (107-148)/(54-77) 148/77  Arterial Line BP: ()/(44-60) 147/59     Date 11/17/18 0700 - 11/18/18 0659   Shift 6604-1473 7616-7824 6936-4195 24 Hour Total   INTAKE   Shift Total(mL/kg)       OUTPUT   Urine(mL/kg/hr) 35   35   Drains 11   11   Shift Total(mL/kg) 46(0.8)   46(0.8)   Weight (kg) 58.1 58.1 58.1 58.1       Physical Exam   Constitutional: She appears well-developed and well-nourished.   HENT:   Head: Normocephalic and atraumatic.   Eyes: EOM are normal. Pupils are equal, round, and reactive to light.   Neck: Normal range of motion. Neck supple.   Cardiovascular: Normal rate and regular rhythm.   Pulmonary/Chest: Effort normal and breath sounds normal.   Abdominal: Soft. Bowel sounds are normal.   Mild abdominal distension   Genitourinary:   Genitourinary Comments: Perry in place   Musculoskeletal:   LUE 3/5, poor fine motor movement.   RUE 5/5, intact fine motor movement  LLE4/5, able to flex the knee (slightly weaker than Right)  RLE 5/5, able to flex the knee.    Neurological: She is alert.   LUE weakness   Vitals reviewed.      Significant Labs:  CBC:   Recent Labs   Lab 11/17/18  0400   WBC 8.34   RBC 2.54*   HGB 7.6*   HCT 22.7*   PLT 58*   MCV 89   MCH 29.9   MCHC 33.5     CMP:    Recent Labs   Lab 11/17/18  0400   GLU 87   CALCIUM 8.2*   ALBUMIN 2.4*  2.4*   PROT 5.2*      K 4.2   CO2 20*   *   BUN 30*   CREATININE 1.5*   ALKPHOS 80   *   *   BILITOT 4.7*     Coagulation:   Recent Labs   Lab 11/16/18  0800  11/17/18  0400   LABPROT 16.1*   < > 18.2*   INR 1.6*   < > 1.9*   APTT 43.7*  --   --     < > = values in this interval not displayed.       Significant Diagnostics:  CTA head multiphase stroke    Acute right thalamic hemorrhage [id number]

## 2018-11-17 NOTE — SUBJECTIVE & OBJECTIVE
Interval History/Significant Events: No acute events over night. Neuro intact this am. 1 unit FFP given for INR 1.8.       Review of Systems   Respiratory: Negative for cough and shortness of breath.    Cardiovascular: Negative for chest pain and palpitations.   Gastrointestinal: Positive for abdominal pain (RUQ ). Negative for blood in stool, nausea and vomiting.   Musculoskeletal: Negative for back pain, neck pain and neck stiffness.   Neurological: Negative for weakness, numbness and headaches.   Hematological: Bruises/bleeds easily.     Objective:     Vital Signs (Most Recent):  Temp: 97.7 °F (36.5 °C) (11/18/18 1138)  Pulse: 89 (11/18/18 1130)  Resp: (!) 26 (11/18/18 1130)  BP: 130/63 (11/18/18 1130)  SpO2: 95 % (11/18/18 1130) Vital Signs (24h Range):  Temp:  [96.5 °F (35.8 °C)-98.3 °F (36.8 °C)] 97.7 °F (36.5 °C)  Pulse:  [72-96] 89  Resp:  [15-46] 26  SpO2:  [91 %-99 %] 95 %  BP: (106-171)/(57-90) 130/63  Arterial Line BP: (121-176)/(51-80) 131/66   Weight: 58.1 kg (128 lb 1.4 oz)  Body mass index is 20.67 kg/m².      Intake/Output Summary (Last 24 hours) at 11/18/2018 1209  Last data filed at 11/18/2018 1100  Gross per 24 hour   Intake 479 ml   Output 1410 ml   Net -931 ml       Physical Exam   Constitutional: She is oriented to person, place, and time. She appears well-developed. She is cooperative. She does not have a sickly appearance. She does not appear ill. No distress.   HENT:   Head: Normocephalic and atraumatic.   Eyes: Conjunctivae and EOM are normal. Pupils are equal, round, and reactive to light. No scleral icterus.   Neck: Normal range of motion. No JVD present. No tracheal deviation present.   Cardiovascular: Normal rate, regular rhythm, S1 normal, S2 normal and normal heart sounds.   No murmur heard.  Pulses:       Radial pulses are 2+ on the right side, and 2+ on the left side.        Dorsalis pedis pulses are 2+ on the right side, and 2+ on the left side.   Warm extremities.     Pulmonary/Chest: Effort normal and breath sounds normal. No accessory muscle usage. No tachypnea (mild). No respiratory distress. She has no wheezes. She has no rhonchi. She has no rales.   Abdominal: Soft. Bowel sounds are normal. She exhibits distension (mild). There is tenderness (RUQ). There is no rebound and no guarding.   Genitourinary:   Genitourinary Comments: Perry with concentrated output   Neurological: She is alert and oriented to person, place, and time. GCS eye subscore is 4. GCS verbal subscore is 5. GCS motor subscore is 6.   AAO. Conversant. PERRL 4mm tara. Left facial droop.   Strength much improved- LUE -5 / 5 and LLE - 5 / 5 RLE - 5 / 5 and RUE - 5 / 5    Skin: Skin is dry. Capillary refill takes 2 to 3 seconds. Bruising noted. She is not diaphoretic. There is pallor.        Large marked hematoma to left groin without active bleeding. Decreasing in size   Psychiatric: She has a normal mood and affect. Her behavior is normal. Judgment and thought content normal.       Vents:  Oxygen Concentration (%): 40 (11/17/18 0339)  Lines/Drains/Airways     Central Venous Catheter Line                 Percutaneous Central Line Insertion/Assessment - triple lumen  11/15/18 right internal jugular 2 days          Drain                 Lumbar Drain 11/15/18 1500 1 day         Urethral Catheter 11/15/18 1518 Non-latex;Straight-tip 16 Fr. 1 day          Arterial Line                 Arterial Line 11/15/18 1230 Right Radial 2 days          Peripheral Intravenous Line                 Peripheral IV - Single Lumen 11/15/18 0351 Left Forearm 2 days         Peripheral IV - Single Lumen 11/15/18 0400 Right Forearm 2 days              Significant Labs:    CBC/Anemia Profile:  Recent Labs   Lab 11/17/18  2027 11/18/18  0054 11/18/18  0400   WBC 6.57 6.65 6.08   HGB 8.0* 8.2* 9.8*   HCT 23.7* 23.5* 29.0*   PLT 88* 75* 70*   MCV 91 90 91   RDW 16.9* 16.8* 16.9*        Chemistries:  Recent Labs   Lab 11/17/18  0400   11/17/18 2027 11/18/18  0054 11/18/18  0400      < > 143 142 142   K 4.2   < > 4.0 3.9 3.8   *   < > 111* 111* 111*   CO2 20*   < > 23 22* 21*   BUN 30*   < > 40* 44* 45*   CREATININE 1.5*   < > 1.6* 1.5* 1.5*   CALCIUM 8.2*   < > 8.7 8.9 8.8   ALBUMIN 2.4*  2.4*   < > 2.9* 2.9* 2.8*  2.8*   PROT 5.2*  --   --   --  6.5   BILITOT 4.7*  --   --   --  4.0*   ALKPHOS 80  --   --   --  94   *  --   --   --  165*   *  --   --   --  764*   MG 2.0  --   --   --  2.1   PHOS 3.9  3.9   < > 2.7 2.6* 2.5*  2.5*    < > = values in this interval not displayed.       All pertinent labs within the past 24 hours have been reviewed.    Significant Imaging:  I have reviewed all pertinent imaging results/findings within the past 24 hours.

## 2018-11-17 NOTE — ASSESSMENT & PLAN NOTE
-- on nicardipine gtt to maintian SBP <140, wean off with starting oral meds  -- restarting home nifedipine

## 2018-11-17 NOTE — ASSESSMENT & PLAN NOTE
Patient is a 53 y.o. female with a h/o HTN, Hep C, cirrhosis (MELD 15),Hx of pancreatitis, GERD, and illicit substance abuse (crack cocaine/THC) admitted for thoracic aortic dissection treated with TEVAR and Lumbar drain, had Acute right thalamic hemorrhage on  CTH.       Etiology - Hemorrhagic transformation of Right thalamic stroke v/s Primary right thalamic hemorrhagic stroke v/s End Stage live disease v/s 2/2 Lumbar drain with increased SFP / MAP gradient.     Recommend:  -Now that bleed is somewhat stable, would not be overly aggressive when correcting coagulopathy.  -Would use Vitamin k, rather than FFP, if necessary  -Would begin DVT ppx if not done so  -SBP goal <160  -Get MRI when lumbar csf drain removed    Antithrombotics for secondary stroke prevention:  Hold Antiplatelets    Statins for secondary stroke prevention and hyperlipidemia, if present: rec getting  LDL and start Lipitor if LDL > 130      Aggressive risk factor modification: HTN, Illicit drug use     Rehab efforts: PT/OT/SLP to evaluate and treat    Diagnostics ordered/pending: Repeat CT head showed no significant change in size or appearance of previously described right thalamic hemorrhage.    VTE prophylaxis: dvt ppx and scd's    BP parameters:  SBP <160

## 2018-11-17 NOTE — PROGRESS NOTES
[facility]  Critical Care Medicine  Progress Note    Patient Name: Leah Cleaning  MRN: [id number]  Admission Date: 11/15/2018  Hospital Length of Stay: 2 days  Code Status: Full Code  Attending Provider: Shadia Corcoran MD  Primary Care Provider: Mary Colorado MD   Principal Problem: Essential hypertension    Subjective:     HPI:  Mrs. Cleaning is a 54 yo female with a history significant for Hep C cirrhosis, HTN, cocaine and marijuana use who presented to [facility] on the early morning of 11/15 as a transfer from [facility] for Type B aortic dissection. She initially presented to [facility] on the evening of 11/14 for sharp epigastric pain that radiated to her back which started initially on the night of 11/13. CT at OSH noted aortic dissection from left subclavian to just proximal of celiac artery. She was transferred to [facility] for vascular surgery consult on a nicardipine gtt. She was transitioned from nicardipine gtt to esmolol gtt. CTA upon arrival confirmed dissection and she was admitted to MICU for medical management of the dissection with vascular surgery following.     Hospital/ICU Course:  Mrs. Cleaning was admitted to MICU for Type B aortic dissection on esmolol gtt to maintain HR of 60 and SBP <120. Vascular surgery following, no surgical intervention warranted initially. CTA read concerning for possible aortic rupture in the descending aorta with high-density fluid noted in the posterior mediastinum; additionally, there was a suspected penetrating ulcer in the upper abdominal aorta adjacent to the true lumen. Due to high risk/ possible rupture, Lumbar drain was placed by interventional radiology and TEVAR completed by vascular surgery. Early 11/16, pt noted to be in hemorragic shock complicated by liver disease as vasopressor requirements increased significantly, rising lactic acidosis, and patient was found to be bleeding from bilateral groin  sites with significant hematoma formation to left groin. Pressure was held for an extended period and patient received 4 U PRBC, 3 U FFP, 1 U platelets, 1 U cryo, and vitamin K. Stroke code called around 0800 this morning due to acute left sided weakness; thalamic bleed noted on CT. No intervention per vascular neurology.          Interval History/Significant Events: Stroke code called around 0800 this morning due to acute left sided weakness; thalamic bleed noted on CT. No intervention per vascular neurology.       Review of Systems   Respiratory: Negative for cough and shortness of breath.    Cardiovascular: Negative for chest pain and palpitations.   Gastrointestinal: Positive for abdominal pain (suprapubic). Negative for blood in stool, nausea and vomiting.   Musculoskeletal: Negative for back pain, neck pain and neck stiffness.   Neurological: Negative for weakness, numbness and headaches.   Hematological: Bruises/bleeds easily.     Objective:     Vital Signs (Most Recent):  Temp: 98.4 °F (36.9 °C) (11/17/18 1139)  Pulse: 73 (11/17/18 1415)  Resp: 15 (11/17/18 1415)  BP: 112/60 (11/17/18 1415)  SpO2: 96 % (11/17/18 1415) Vital Signs (24h Range):  Temp:  [94.4 °F (34.7 °C)-99.1 °F (37.3 °C)] 98.4 °F (36.9 °C)  Pulse:  [] 73  Resp:  [15-41] 15  SpO2:  [91 %-99 %] 96 %  BP: (106-148)/(56-78) 112/60  Arterial Line BP: ()/(44-66) 130/63   Weight: 58.1 kg (128 lb 1.4 oz)  Body mass index is 20.67 kg/m².      Intake/Output Summary (Last 24 hours) at 11/17/2018 1438  Last data filed at 11/17/2018 1200  Gross per 24 hour   Intake 2331 ml   Output 1330 ml   Net 1001 ml       Physical Exam   Constitutional: She is oriented to person, place, and time. She appears well-developed. She has a sickly appearance. She appears ill. No distress.   HENT:   Head: Normocephalic and atraumatic.   Eyes: Conjunctivae and EOM are normal. Pupils are equal, round, and reactive to light. No scleral icterus.   Neck: Normal range  of motion. No JVD present. No tracheal deviation present.   Cardiovascular: Normal rate, regular rhythm, S1 normal, S2 normal and normal heart sounds.   No murmur heard.  Pulses:       Radial pulses are 2+ on the right side, and 2+ on the left side.        Dorsalis pedis pulses are 2+ on the right side, and 2+ on the left side.   Warm extremities.    Pulmonary/Chest: Effort normal and breath sounds normal. No accessory muscle usage. No tachypnea (mild). No respiratory distress. She has no wheezes. She has no rhonchi. She has no rales.   Abdominal: Soft. Bowel sounds are normal. She exhibits no distension. There is tenderness (tender to suprapubic palpation). There is no rebound and no guarding.   Genitourinary:   Genitourinary Comments: Perry with concentrated output   Neurological: She is alert and oriented to person, place, and time. GCS eye subscore is 4. GCS verbal subscore is 5. GCS motor subscore is 6.   AAO. Dysarthria. Left facial droop.   Strength - LUE - 3 / 5 and LLE - 1/ 5 RLE - 3 / 5 and RUE - 5 / 5   Sensory - decreased Light touch on LUE,  Sensory ataxia on Left arm      Skin: Skin is dry. Capillary refill takes 2 to 3 seconds. Bruising noted. She is not diaphoretic. There is pallor.        Large marked hematoma to left groin without active bleeding. Decreasing in size   Psychiatric: She has a normal mood and affect. Her behavior is normal. Judgment and thought content normal.       Vents:  Oxygen Concentration (%): 40 (11/17/18 0339)  Lines/Drains/Airways     Central Venous Catheter Line                 Percutaneous Central Line Insertion/Assessment - triple lumen  11/15/18 right internal jugular 2 days          Drain                 Lumbar Drain 11/15/18 1500 1 day         Urethral Catheter 11/15/18 1518 Non-latex;Straight-tip 16 Fr. 1 day          Arterial Line                 Arterial Line 11/15/18 1230 Right Radial 2 days          Peripheral Intravenous Line                 Peripheral IV - Single  Lumen 11/15/18 0351 Left Forearm 2 days         Peripheral IV - Single Lumen 11/15/18 0400 Right Forearm 2 days              Significant Labs:    CBC/Anemia Profile:  Recent Labs   Lab 11/17/18  0400 11/17/18  0920 11/17/18  1300   WBC 8.34 7.23 7.26   HGB 7.6* 7.9* 7.7*   HCT 22.7* 23.1* 22.5*   PLT 58* 45* 45*   MCV 89 88 89   RDW 16.5* 16.9* 16.9*        Chemistries:  Recent Labs   Lab 11/16/18  0309 11/16/18  0800  11/17/18  0400 11/17/18  0920 11/17/18  1300     140 140   < > 141 142 141   K 4.7  4.7 4.3   < > 4.2 4.1 4.1   *  112* 111*   < > 112* 111* 111*   CO2 14*  14* 14*   < > 20* 21* 22*   BUN 18  18 20   < > 30* 34* 36*   CREATININE 1.3  1.3 1.3   < > 1.5* 1.6* 1.6*   CALCIUM 7.3*  7.3* 8.4*   < > 8.2* 8.3* 8.4*   ALBUMIN 2.0* 2.4*   < > 2.4*  2.4* 2.5* 2.7*   PROT 5.4* 4.9*  --  5.2*  --   --    BILITOT 1.5* 1.9*  --  4.7*  --   --    ALKPHOS 95 66  --  80  --   --    ALT 19 78*  --  194*  --   --    AST 48* 236*  --  978*  --   --    MG 1.6  --   --  2.0  --   --    PHOS 4.5  --    < > 3.9  3.9 3.4 3.0    < > = values in this interval not displayed.       All pertinent labs within the past 24 hours have been reviewed.    Significant Imaging:  I have reviewed all pertinent imaging results/findings within the past 24 hours.    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 CTA as detailed above  --s/p lumbar drain placement and TEVAR  --lumbar drain with ~8ml/hr  --serial neuro/ neurovascular exams        Hematology   Thrombocytopenia    --2/2 cirrhosis  --plts goal 50,000 - 100,000      Coagulopathy    --suspect 2/2 cirrhosis  --Give vitamin K for goal INR <1.5  --INR q8h     Oncology   Acute blood loss anemia    -- transfuse for Hgb <7     GI   Chronic hepatitis C with cirrhosis    --per patient, has known about cirrhosis for >10 years. Has never received treatment for Hep C  --continue supportive care for coagulopathy, thrombocytopenia in setting of Type B aortic dissection with hemorrhagic shock and new thalamic bleed      Orthopedic   Acute midline thoracic back pain    --resolved      Other   Goals of care, counseling/discussion    - Goals of care discussion planned for this afternoon with patient and her sister     Hemorrhagic shock    --required levo, gurwinder, and vaso on 11/16. Off pressors now  --bleed from bilateral groin sites with significant hematoma formation to left groin. Oliverio groin US, no intervention warrented per vasc sx  --received 4 U PRBC, 3 U FFP, 1 U platelets, 1 U cryo, and vitamin K on 11/16  --1 U PRBC, 1 U FFP, 1 U platelets, 1 U cryo, and vit K given 11/17  --trend labs closely                 Plan of care discussed with Dr. Corcoran.    Critical Care Time: 65 minutes  Critical secondary to Patient has a condition that poses threat to life and bodily function: Thalamic hemorrhage, Type B dissection, Hepatitis C cirrhosis        Critical care was time spent personally by me on the following activities: development of treatment plan with patient or surrogate and bedside caregivers, discussions with consultants, evaluation of patient's response to treatment, examination of patient, ordering and performing treatments and interventions, ordering and review of laboratory studies, ordering and review of radiographic studies, pulse oximetry, re-evaluation of  patient's condition. This critical care time did not overlap with that of any other provider or involve time for any procedures.     Jerica Pereira NP  Critical Care Medicine  [facility]

## 2018-11-17 NOTE — ASSESSMENT & PLAN NOTE
- acute neuro changes noted around 0800 11/17, last known normal at 0700   - Stroke code called; thalamic bleed noted on CT   - No intervention per vascular neurology  - Systolic BP goal <140, nicardipine gtt until oral BP meds   - repeat Head CT stable  - neuro exam much improved on 11/18

## 2018-11-17 NOTE — ASSESSMENT & PLAN NOTE
--required levo, gurwinder, and vaso on 11/16  --bleed from bilateral groin sites with significant hematoma formation to left groin. Oliverio groin US, no intervention warrented per [facility] sx  --received 4 U PRBC, 3 U FFP, 1 U platelets, 1 U cryo, and vitamin K on 11/16  --1 U PRBC, 1 U FFP, 1 U platelets, 1 U cryo, and vit K given 11/17  --Shock resolved

## 2018-11-17 NOTE — SIGNIFICANT EVENT
Time Stroke Code initiated: 0812  Stroke team Arrival time: 0815  Stroke Code activation triggers: left sided weakness   Vascular Neurologist you spoke with: Dr Fierro    Arrived at CT: 0818  CT completed: 0834    tPA decision: n/a  tPA bolus (mg and time):  tPA infusion (mg and time):  tPA end time:    IR decision: n/a  IR arrival:  IR end time:     Pt transferred to: back to room 66845  Report given to: GIGI Zavaleta was present for entire stroke code

## 2018-11-17 NOTE — CONSULTS
[facility]  Vascular Neurology  Comprehensive Stroke Center  Consult Note    Inpatient consult to Vascular (Stroke) Neurology  Consult performed by: Hector Fierro MD  Consult ordered by: Ankit Hemphill MD        Assessment/Plan:     Patient is a 53 y.o. year old female with:    * Essential hypertension    - rec SBP of ~ 140 and consider keeping MAPs at ~80 mmHg     Nontraumatic thalamic hemorrhage      Patient is a 53 y.o. female with a h/o HTN, Hep C, cirrhosis (MELD 15),Hx of pancreatitis, GERD, and illicit substance abuse (crack cocaine/THC) admitted for thoracic aortic dissection treated with TEVAR and Lumbar drain, had Acute right thalamic hemorrhage on  CTH.       Etiology - Hemorrhagic transformation of Right thalamic stroke v/s Primary right thalamic hemorrhagic stroke v/s End Stage live disease v/s 2/2 Lumbar drain with increased SFP / MAP gradient.     Mgmt - Agree with Vit K administration.               rec to replace FFP and Platelets considering End stage liver disease and platelets of 45.              rec repeating CTH at 6 h from previous scan    Antithrombotics for secondary stroke prevention:  Hold Antiplatelets    Statins for secondary stroke prevention and hyperlipidemia, if present: rec getting  LDL and start Lipitor if LDL > 130      Aggressive risk factor modification: HTN, Illicit drug use     Rehab efforts: PT/OT/SLP to evaluate and treat    Diagnostics ordered/pending: HgbA1C to assess blood glucose levels, Lipid Profile to assess cholesterol levels, MRI head without contrast to assess brain parenchyma, TSH to assess thyroid function    VTE prophylaxis: No anticoagulation - medically contraindicated.     BP parameters:   - 140.              STROKE DOCUMENTATION     Acute Stroke Times   Last Known Normal Date: 11/17/18  Last Known Normal Time: 0700  Symptom Onset Date: 11/17/18  Stroke Team Called Date: 11/17/18  Stroke Team Called Time: 0800  Stroke  Team Arrival Date: 11/17/18  Stroke Team Arrival Time: 0805  CT Interpretation Time: 0834    NIH Scale:  Interval: baseline (upon arrival/admit)  1a. Level Of Consciousness: 0-->Alert: keenly responsive  1b. LOC Questions: 0-->Answers both questions correctly  1c. LOC Commands: 0-->Performs both tasks correctly  2. Best Gaze: 0-->Normal  3. Visual: 0-->No visual loss  4. Facial Palsy: 1-->Minor paralysis (flattened nasolabial fold, asymmetry on smiling)  5a. Motor Arm, Left: 2-->Some effort against gravity: limb cannot get to or maintain (if cued) 90 (or 45) degrees, drifts down to bed, but has some effort against gravity  5b. Motor Arm, Right: 0-->No drift: limb holds 90 (or 45) degrees for full 10 secs  6a. Motor Leg, Left: 3-->No effort against gravity: leg falls to bed immediately  6b. Motor Leg, Right: 2-->Some effort against gravity: leg falls to bed by 5 secs, but has some effort against gravity  7. Limb Ataxia: 0-->Absent  8. Sensory: 1-->Mild-to-moderate sensory loss: patient feels pinprick is less sharp or is dull on the affected side: or there is a loss of superficial pain with pinprick, but patient is aware of being touched  9. Best Language: 0-->No aphasia: normal  10. Dysarthria: 1-->Mild-to-moderate dysarthria: patient slurs at least some words and, at worst, can be understood with some difficulty  11. Extinction and Inattention (formerly Neglect): 0-->No abnormality  Total (NIH Stroke Scale): 10    Modified Nitesh Score: 0  Romero Coma Scale:15   ABCD2 Score:    [id number] Score:   HAS -BLED Score:   ICH Score:   Hunt & Farnsworth Classification:       Thrombolysis Candidate? No, CT findings (ICH, SAH, etc)       Interventional Revascularization Candidate?   Is the patient eligible for mechanical endovascular reperfusion (REYES)?  No; No large vessel occlusion      Hemorrhagic change of an Ischemic Stroke: Does this patient have an ischemic stroke with hemorrhagic changes? No     Subjective:     History  of Present Illness:  Ms Cleaning is a 54 y/o AAF with PMHx of HTN, Hep C, cirrhosis (MELD 15),Hx of pancreatitis, GERD, and illicit substance abuse (crack cocaine/THC) who presented to [facility] around 0930 got labs and IA'ed home, represented to ed with worsening pain last night around 1830 with acute onset epigastric pain radiating to back, 5/10 at the time. CT abd/pelv was obtained and found to have aortic dissection so patient was immediately transferred to [facility] ED. Initially CT surgery was contacted prior to transfer, CTA chest/abd/pelv was obtained on arrival and Type B dissection was noted.     Pt got lumbar drain placed on 11/15 followed by TEVAR procedure. Rec post Lumbar drain was to have goal of CSF pressure < 10cm H2O, draining max of 15 ml / hr and MAP > 80 mmHg to have adequate spinal perfusion pressure post TEVAR . Pt was at baseline at 7 am, able to move all 4 extremities. Later at 8 am, pt was noted to not move her left arm and leg with hypoasthesia on the left side, thus Stroke code was activated. She also had left facial droop. Denies any chages in vision or bowel / bladder incontinence.               Past Medical History:   Diagnosis Date    Arthritis      Bell's palsy 2012    Cirrhosis      GERD (gastroesophageal reflux disease)      Hepatitis C      Hypertension      Sciatica              Past Surgical History:   Procedure Laterality Date    CHOLECYSTECTOMY        ENDOVASCULAR GRAFT REPAIR OF ANEURYSM OF THORACIC AORTA N/A 11/15/2018     Procedure: REPAIR, ANEURYSM, ENDOVASCULAR GRAFT, AORTA, THORACIC;  Surgeon: Hermila Storm MD;  Location: [facility] OR [address] [place] [address];  Service: Peripheral Vascular;  Laterality: N/A;  Contrast: 94  ml  mGy: 1169.79  flouro time: 9.7 minutes    REPAIR, ANEURYSM, ENDOVASCULAR GRAFT, AORTA, THORACIC N/A 11/15/2018     Performed by Hermila Storm MD at [facility] OR [address] [place] [address]      History reviewed. No pertinent family history.  Social History             Tobacco Use    Smoking status: Current Every Day Smoker       Packs/day: 0.25       Years: 30.00       Pack years: 7.50    Smokeless tobacco: Never Used   Substance Use Topics    Alcohol use: No       Frequency: Never    Drug use: Yes       Types: Cocaine, Marijuana       Comment: cocaine weekly; marijuana daily      Review of patient's allergies indicates:  No Known Allergies     Medications: I have reviewed the current medication administration record.             Medications Prior to Admission   Medication Sig Dispense Refill Last Dose    cloNIDine (CATAPRES) 0.2 MG tablet Take 0.2 mg by mouth 2 (two) times daily.     11/14/2018 at Unknown time    furosemide (LASIX) 40 MG tablet Take 40 mg by mouth once daily.     11/14/2018 at Unknown time    hydrALAZINE (APRESOLINE) 50 MG tablet Take 50 mg by mouth 3 (three) times daily.          levoFLOXacin (LEVAQUIN) 500 MG tablet Take 500 mg by mouth every 24 hours.     Past Week at Unknown time    lisinopril (PRINIVIL,ZESTRIL) 20 MG tablet Take 20 mg by mouth once daily.     11/14/2018 at Unknown time    NIFEdipine (ADALAT CC) 60 MG TbSR Take 30 mg by mouth once daily.          pantoprazole (PROTONIX) 40 MG tablet Take 40 mg by mouth once daily.     11/14/2018 at Unknown time    spironolactone (ALDACTONE) 25 MG tablet Take 25 mg by mouth once daily.               Review of Systems   Constitutional: Positive for activity change and fatigue.   HENT: Negative for ear discharge, ear pain and facial swelling.    Eyes: Negative for pain, discharge and itching.   Respiratory: Negative for choking and chest tightness.    Cardiovascular: Negative for leg swelling.   Genitourinary: Negative for dysuria.   Musculoskeletal: Positive for arthralgias and gait problem.      Objective:      Vital Signs (Most Recent):  Temp: 99.1 °F (37.3 °C) (11/17/18 0700)  Pulse: 78 (11/17/18 1045)  Resp: (!) 28 (11/17/18 1045)  BP: 127/62 (11/17/18 1045)  SpO2: (!) 92 % (11/17/18  1045)  ICP Mean (mmHg): 10 mmHg (11/17/18 1000)  CPP (mmHg calculated using NBP): 81 (11/17/18 1000)     Vital Signs Range (Last 24H):  Temp:  [94.4 °F (34.7 °C)-99.1 °F (37.3 °C)]   Pulse:  []   Resp:  [16-90]   BP: (107-148)/(54-78)   SpO2:  [91 %-99 %]   Arterial Line BP: ()/(44-66)      Physical Exam     Neurological Exam:      MSE - pt awake and alert with normal comprehension. Dysarthria   CN - EOMI, VF intact, no nystagmus, left facial droop, tongue central  Strength - LUEx - 3 / 5 and LLEx - 1/ 5 RLEx - 3 / 5 and RUEx - 5 / 5   Sensory - decreased Light touch on LUEx   Co - ordination - Sensory ataxia on Left arm   Gait - deferred         Laboratory:  CMP:        Recent Labs   Lab 11/17/18  0400 11/17/18  0920   CALCIUM 8.2* 8.3*   ALBUMIN 2.4*  2.4* 2.5*   PROT 5.2*  --     142   K 4.2 4.1   CO2 20* 21*   * 111*   BUN 30* 34*   CREATININE 1.5* 1.6*   ALKPHOS 80  --    *  --    *  --    BILITOT 4.7*  --       CBC:       Recent Labs   Lab 11/17/18  0920   WBC 7.23   RBC 2.62*   HGB 7.9*   HCT 23.1*   PLT 45*   MCV 88   MCH 30.2   MCHC 34.2      Lipid Panel: No results for input(s): CHOL, LDLCALC, HDL, TRIG in the last 168 hours.  Coagulation:         Recent Labs   Lab 11/16/18  0800   11/17/18  0400   INR 1.6*   < > 1.9*   APTT 43.7*  --   --     < > = values in this interval not displayed.      Hgb A1C: No results for input(s): HGBA1C in the last 168 hours.  TSH: No results for input(s): TSH in the last 168 hours.     Diagnostic Results:        Brain imaging:     CTH - Right Thalamic hemorrhage           Vessel Imaging:     CTA head and neck - unremarkable           Cardiac Evaluation:      None         Hector Fierro MD  Comprehensive Stroke Center  Department of Vascular Neurology   [facility]

## 2018-11-17 NOTE — PROGRESS NOTES
0755: Dr Hemphill notified of change in neuro exam. L sided weakness noted in upper extremity. Pt able to move arm, but unable to hold arm up. Also having depth perception difficulty, trouble grasping RN's hand when held in front of pt. Weakness also noted in lower L extremity. Dr. Hemphill @ bedside.    0810: Stroke code called, pt to be brought down to CT.

## 2018-11-17 NOTE — PROGRESS NOTES
[facility]  Critical Care Medicine  Progress Note    Patient Name: Leah Cleaning  MRN: [id number]  Admission Date: 11/15/2018  Hospital Length of Stay: 2 days  Code Status: Full Code  Attending Provider: Shadia Corcoran MD  Primary Care Provider: Mary Colorado MD   Principal Problem: Essential hypertension    Subjective:     HPI:  Mrs. Cleaning is a 52 yo female with a history significant for Hep C cirrhosis, HTN, cocaine and marijuana use who presented to [facility] on the early morning of 11/15 as a transfer from [facility] for Type B aortic dissection. She initially presented to [facility] on the evening of 11/14 for sharp epigastric pain that radiated to her back which started initially on the night of 11/13. CT at OSH noted aortic dissection from left subclavian to just proximal of celiac artery. She was transferred to [facility] for vascular surgery consult on a nicardipine gtt. She was transitioned from nicardipine gtt to esmolol gtt. CTA upon arrival confirmed dissection and she was admitted to MICU for medical management of the dissection with vascular surgery following.     Hospital/ICU Course:  Mrs. Cleaning was admitted to MICU for Type B aortic dissection on esmolol gtt to maintain HR of 60 and SBP <120. Vascular surgery following, no surgical intervention warranted initially. CTA read concerning for possible aortic rupture in the descending aorta with high-density fluid noted in the posterior mediastinum; additionally, there was a suspected penetrating ulcer in the upper abdominal aorta adjacent to the true lumen. Due to high risk/ possible rupture, Lumbar drain was placed by interventional radiology and TEVAR completed by vascular surgery. Early 11/16, pt noted to be in hemorragic shock complicated by liver disease as vasopressor requirements increased significantly, rising lactic acidosis, and patient was found to be bleeding from bilateral groin  sites with significant hematoma formation to left groin. Pressure was held for an extended period and patient received 4 U PRBC, 3 U FFP, 1 U platelets, 1 U cryo, and vitamin K. Stroke code called around 0800 this morning due to acute left sided weakness; thalamic bleed noted on CT. No intervention per vascular neurology.          Interval History/Significant Events: Stroke code called around 0800 this morning due to acute left sided weakness; thalamic bleed noted on CT. No intervention per vascular neurology.       Review of Systems   Respiratory: Negative for cough and shortness of breath.    Cardiovascular: Negative for chest pain and palpitations.   Gastrointestinal: Positive for abdominal pain (suprapubic). Negative for blood in stool, nausea and vomiting.   Musculoskeletal: Negative for back pain, neck pain and neck stiffness.   Neurological: Negative for weakness, numbness and headaches.   Hematological: Bruises/bleeds easily.     Objective:     Vital Signs (Most Recent):  Temp: 98.4 °F (36.9 °C) (11/17/18 1139)  Pulse: 73 (11/17/18 1415)  Resp: 15 (11/17/18 1415)  BP: 112/60 (11/17/18 1415)  SpO2: 96 % (11/17/18 1415) Vital Signs (24h Range):  Temp:  [94.4 °F (34.7 °C)-99.1 °F (37.3 °C)] 98.4 °F (36.9 °C)  Pulse:  [] 73  Resp:  [15-41] 15  SpO2:  [91 %-99 %] 96 %  BP: (106-148)/(56-78) 112/60  Arterial Line BP: ()/(44-66) 130/63   Weight: 58.1 kg (128 lb 1.4 oz)  Body mass index is 20.67 kg/m².      Intake/Output Summary (Last 24 hours) at 11/17/2018 1438  Last data filed at 11/17/2018 1200  Gross per 24 hour   Intake 2331 ml   Output 1330 ml   Net 1001 ml       Physical Exam   Constitutional: She is oriented to person, place, and time. She appears well-developed. She has a sickly appearance. She appears ill. No distress.   HENT:   Head: Normocephalic and atraumatic.   Eyes: Conjunctivae and EOM are normal. Pupils are equal, round, and reactive to light. No scleral icterus.   Neck: Normal range  of motion. No JVD present. No tracheal deviation present.   Cardiovascular: Normal rate, regular rhythm, S1 normal, S2 normal and normal heart sounds.   No murmur heard.  Pulses:       Radial pulses are 2+ on the right side, and 2+ on the left side.        Dorsalis pedis pulses are 2+ on the right side, and 2+ on the left side.   Warm extremities.    Pulmonary/Chest: Effort normal and breath sounds normal. No accessory muscle usage. No tachypnea (mild). No respiratory distress. She has no wheezes. She has no rhonchi. She has no rales.   Abdominal: Soft. Bowel sounds are normal. She exhibits no distension. There is tenderness (tender to suprapubic palpation). There is no rebound and no guarding.   Genitourinary:   Genitourinary Comments: Perry with concentrated output   Neurological: She is alert and oriented to person, place, and time. GCS eye subscore is 4. GCS verbal subscore is 5. GCS motor subscore is 6.   AAO. Dysarthria. Left facial droop.   Strength - LUE - 3 / 5 and LLE - 1/ 5 RLE - 3 / 5 and RUE - 5 / 5   Sensory - decreased Light touch on LUE,  Sensory ataxia on Left arm      Skin: Skin is dry. Capillary refill takes 2 to 3 seconds. Bruising noted. She is not diaphoretic. There is pallor.        Large marked hematoma to left groin without active bleeding. Decreasing in size   Psychiatric: She has a normal mood and affect. Her behavior is normal. Judgment and thought content normal.       Vents:  Oxygen Concentration (%): 40 (11/17/18 0339)  Lines/Drains/Airways     Central Venous Catheter Line                 Percutaneous Central Line Insertion/Assessment - triple lumen  11/15/18 right internal jugular 2 days          Drain                 Lumbar Drain 11/15/18 1500 1 day         Urethral Catheter 11/15/18 1518 Non-latex;Straight-tip 16 Fr. 1 day          Arterial Line                 Arterial Line 11/15/18 1230 Right Radial 2 days          Peripheral Intravenous Line                 Peripheral IV - Single  Lumen 11/15/18 0351 Left Forearm 2 days         Peripheral IV - Single Lumen 11/15/18 0400 Right Forearm 2 days              Significant Labs:    CBC/Anemia Profile:  Recent Labs   Lab 11/17/18  0400 11/17/18  0920 11/17/18  1300   WBC 8.34 7.23 7.26   HGB 7.6* 7.9* 7.7*   HCT 22.7* 23.1* 22.5*   PLT 58* 45* 45*   MCV 89 88 89   RDW 16.5* 16.9* 16.9*        Chemistries:  Recent Labs   Lab 11/16/18  0309 11/16/18  0800  11/17/18  0400 11/17/18  0920 11/17/18  1300     140 140   < > 141 142 141   K 4.7  4.7 4.3   < > 4.2 4.1 4.1   *  112* 111*   < > 112* 111* 111*   CO2 14*  14* 14*   < > 20* 21* 22*   BUN 18  18 20   < > 30* 34* 36*   CREATININE 1.3  1.3 1.3   < > 1.5* 1.6* 1.6*   CALCIUM 7.3*  7.3* 8.4*   < > 8.2* 8.3* 8.4*   ALBUMIN 2.0* 2.4*   < > 2.4*  2.4* 2.5* 2.7*   PROT 5.4* 4.9*  --  5.2*  --   --    BILITOT 1.5* 1.9*  --  4.7*  --   --    ALKPHOS 95 66  --  80  --   --    ALT 19 78*  --  194*  --   --    AST 48* 236*  --  978*  --   --    MG 1.6  --   --  2.0  --   --    PHOS 4.5  --    < > 3.9  3.9 3.4 3.0    < > = values in this interval not displayed.       All pertinent labs within the past 24 hours have been reviewed.    Significant Imaging:  I have reviewed all pertinent imaging results/findings within the past 24 hours.    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 CTA as detailed above  --s/p lumbar drain placement and TEVAR  --lumbar drain with ~8ml/hr  --serial neuro/ neurovascular exams        Hematology   Thrombocytopenia    --2/2 cirrhosis  --plts goal 50,000 - 100,000      Coagulopathy    --suspect 2/2 cirrhosis  --Give vitamin K for goal INR <1.5  --INR q8h     Oncology   Acute blood loss anemia    -- transfuse for Hgb <7     GI   Chronic hepatitis C with cirrhosis    --per patient, has known about cirrhosis for >10 years. Has never received treatment for Hep C  --continue supportive care for coagulopathy, thrombocytopenia in setting of Type B aortic dissection with hemorrhagic shock and new thalamic bleed      Orthopedic   Acute midline thoracic back pain    --resolved      Other   Hemorrhagic shock    --required levo, gurwinder, and vaso on 11/16. Off pressors now  --bleed from bilateral groin sites with significant hematoma formation to left groin. Oliverio groin US, no intervention warrented per vasc sx  --received 4 U PRBC, 3 U FFP, 1 U platelets, 1 U cryo, and vitamin K on 11/16  --1 U PRBC, 1 U FFP, 1 U platelets, 1 U cryo, and vit K given 11/17  --trend labs closely                 Plan of care discussed with Dr. Corcoran    Critical Care Time: 65 minutes  Critical secondary to Patient has a condition that poses threat to life and bodily function: Thalamic hemorrhage, Hepatitis C with cirrhosis, Type B dissection       Critical care was time spent personally by me on the following activities: development of treatment plan with patient or surrogate and bedside caregivers, discussions with consultants, evaluation of patient's response to treatment, examination of patient, ordering and performing treatments and interventions, ordering and review of laboratory studies, ordering and review of radiographic studies, pulse oximetry, re-evaluation of patient's condition. This critical care time did not overlap with that of any other provider or involve time for any  procedures.     Jerica Pereira NP  Critical Care Medicine  [facility]

## 2018-11-17 NOTE — NURSING
MD's and NP's at bedside for rounding. Patient evaluated and groin sites assessed. No signs of bleeding noted at this time. Plan of care reviewed for tonight. Patient verbalizes understanding of all and denies questions at this time.

## 2018-11-17 NOTE — ASSESSMENT & PLAN NOTE
Patient is a 53 y.o. female with a h/o HTN, Hep C, cirrhosis (MELD 15),Hx of pancreatitis, GERD, and illicit substance abuse (crack cocaine/THC) admitted for thoracic aortic dissection treated with TEVAR and Lumbar drain, had Acute right thalamic hemorrhage on  CTH.       Etiology - Hemorrhagic transformation of Right thalamic stroke v/s Primary right thalamic hemorrhagic stroke v/s End Stage live disease v/s 2/2 Lumbar drain with increased SFP / MAP gradient.     Mgmt - Agree with Vit K administration.               rec to replace FFP and Platelets considering End stage liver disease and platelets of 45.    Antithrombotics for secondary stroke prevention:  Hold Antiplatelets    Statins for secondary stroke prevention and hyperlipidemia, if present: rec getting  LDL and start Lipitor if LDL > 130      Aggressive risk factor modification: HTN, Illicit drug use     Rehab efforts: PT/OT/SLP to evaluate and treat    Diagnostics ordered/pending: HgbA1C to assess blood glucose levels, Lipid Profile to assess cholesterol levels, MRI head without contrast to assess brain parenchyma, TSH to assess thyroid function    VTE prophylaxis: No anticoagulation - medically contraindicated.     BP parameters:   - 140.

## 2018-11-17 NOTE — ASSESSMENT & PLAN NOTE
53 F with Hep C cirrhosis (MELD 15 on admission), HTN, crack cocaine abuse, who presented with acute tearing chest/back pain to OSH, Found to have retrograde type B dissection. Despite aggressive anti-impulse therapy, persistent pain requiring intervention. Preoperative CSF drain placement by Neuro IR. Successful TEVAR. POD 1 left groin hematoma and coagulopathy requiring manual pressure and product resuscitation. POD 2 new onset left upper arm weakness, found to have right thalamic hemorrhage.     Neuro: right thalamic bleed.  - con't q1h neuro checks  - Clamp CSF drain  - repeat CT head in 6 hours for evaluation of new right thalamic bleed.     CV:   - ease the spinal perfusion protocol. BP ~140, MAP >65    Resp:  - con't pulmonary toilet  - encourage IS  - O2 supp prn    Renal/Endocrine:  - con't monitoring UOP    GI: Hep C cirrhosis with MELD 15 on admission  - protonix    Heme/ID:  - ABLA stable.   - con't correct coagulopathy prn  - monitor thrombocytopenia  - On empiric Vanc/Zosyn per primary.     Dispo:  ICU care

## 2018-11-17 NOTE — SUBJECTIVE & OBJECTIVE
Past Medical History:   Diagnosis Date    Arthritis     Bell's palsy 2012    Cirrhosis     GERD (gastroesophageal reflux disease)     Hepatitis C     Hypertension     Sciatica      Past Surgical History:   Procedure Laterality Date    CHOLECYSTECTOMY      ENDOVASCULAR GRAFT REPAIR OF ANEURYSM OF THORACIC AORTA N/A 11/15/2018    Procedure: REPAIR, ANEURYSM, ENDOVASCULAR GRAFT, AORTA, THORACIC;  Surgeon: Hermila Storm MD;  Location: [facility] [address] [place] [address];  Service: Peripheral Vascular;  Laterality: N/A;  Contrast: 94  ml  mGy: 1169.79  flouro time: 9.7 minutes    REPAIR, ANEURYSM, ENDOVASCULAR GRAFT, AORTA, THORACIC N/A 11/15/2018    Performed by Hermila Storm MD at [facility] [address] [place] [address]     History reviewed. No pertinent family history.  Social History     Tobacco Use    Smoking status: Current Every Day Smoker     Packs/day: 0.25     Years: 30.00     Pack years: 7.50    Smokeless tobacco: Never Used   Substance Use Topics    Alcohol use: No     Frequency: Never    Drug use: Yes     Types: Cocaine, Marijuana     Comment: cocaine weekly; marijuana daily     Review of patient's allergies indicates:  No Known Allergies    Medications: I have reviewed the current medication administration record.    Medications Prior to Admission   Medication Sig Dispense Refill Last Dose    cloNIDine (CATAPRES) 0.2 MG tablet Take 0.2 mg by mouth 2 (two) times daily.   11/14/2018 at Unknown time    furosemide (LASIX) 40 MG tablet Take 40 mg by mouth once daily.   11/14/2018 at Unknown time    hydrALAZINE (APRESOLINE) 50 MG tablet Take 50 mg by mouth 3 (three) times daily.       levoFLOXacin (LEVAQUIN) 500 MG tablet Take 500 mg by mouth every 24 hours.   Past Week at Unknown time    lisinopril (PRINIVIL,ZESTRIL) 20 MG tablet Take 20 mg by mouth once daily.   11/14/2018 at Unknown time    NIFEdipine (ADALAT CC) 60 MG TbSR Take 30 mg by mouth once daily.       pantoprazole (PROTONIX) 40 MG tablet Take 40 mg by mouth once  daily.   11/14/2018 at Unknown time    spironolactone (ALDACTONE) 25 MG tablet Take 25 mg by mouth once daily.          Review of Systems   Constitutional: Positive for activity change and fatigue.   HENT: Negative for ear discharge, ear pain and facial swelling.    Eyes: Negative for pain, discharge and itching.   Respiratory: Negative for choking and chest tightness.    Cardiovascular: Negative for leg swelling.   Genitourinary: Negative for dysuria.   Musculoskeletal: Positive for arthralgias and gait problem.     Objective:     Vital Signs (Most Recent):  Temp: 99.1 °F (37.3 °C) (11/17/18 0700)  Pulse: 78 (11/17/18 1045)  Resp: (!) 28 (11/17/18 1045)  BP: 127/62 (11/17/18 1045)  SpO2: (!) 92 % (11/17/18 1045)  ICP Mean (mmHg): 10 mmHg (11/17/18 1000)  CPP (mmHg calculated using NBP): 81 (11/17/18 1000)    Vital Signs Range (Last 24H):  Temp:  [94.4 °F (34.7 °C)-99.1 °F (37.3 °C)]   Pulse:  []   Resp:  [16-90]   BP: (107-148)/(54-78)   SpO2:  [91 %-99 %]   Arterial Line BP: ()/(44-66)     Physical Exam    Neurological Exam:     MSE - pt awake and alert with normal comprehension. Dysarthria   CN - EOMI, VF intact, no nystagmus, left facial droop, tongue central  Strength - LUEx - 3 / 5 and LLEx - 1/ 5 RLEx - 3 / 5 and RUEx - 5 / 5   Sensory - decreased Light touch on LUEx   Co - ordination - Sensory ataxia on Left arm   Gait - deferred       Laboratory:  CMP:   Recent Labs   Lab 11/17/18  0400 11/17/18  0920   CALCIUM 8.2* 8.3*   ALBUMIN 2.4*  2.4* 2.5*   PROT 5.2*  --     142   K 4.2 4.1   CO2 20* 21*   * 111*   BUN 30* 34*   CREATININE 1.5* 1.6*   ALKPHOS 80  --    *  --    *  --    BILITOT 4.7*  --      CBC:   Recent Labs   Lab 11/17/18  0920   WBC 7.23   RBC 2.62*   HGB 7.9*   HCT 23.1*   PLT 45*   MCV 88   MCH 30.2   MCHC 34.2     Lipid Panel: No results for input(s): CHOL, LDLCALC, HDL, TRIG in the last 168 hours.  Coagulation:   Recent Labs   Lab 11/16/18  0800   11/17/18  0400   INR 1.6*   < > 1.9*   APTT 43.7*  --   --     < > = values in this interval not displayed.     Hgb A1C: No results for input(s): HGBA1C in the last 168 hours.  TSH: No results for input(s): TSH in the last 168 hours.    Diagnostic Results:      Brain imaging:    CTH - Right Thalamic hemorrhage         Vessel Imaging:    CTA head and neck - unremarkable         Cardiac Evaluation:     None

## 2018-11-17 NOTE — NURSING
SONAL Avina called to notify of new CBC results. She came to bedside to assess patient's bilateral groin sites which appeared unchanged. New orders for blood products received.

## 2018-11-17 NOTE — HPI
Ms Cleaning is a 54 y/o AAF with PMHx of HTN, Hep C, cirrhosis (MELD 15),Hx of pancreatitis, GERD, and illicit substance abuse (crack cocaine/THC) who presented to [facility] around 0930 got labs and WA'ed home, represented to ed with worsening pain last night around 1830 with acute onset epigastric pain radiating to back, 5/10 at the time. CT abd/pelv was obtained and found to have aortic dissection so patient was immediately [place] [facility]. Initially CT surgery was contacted prior to transfer, CTA chest/abd/pelv was obtained on arrival and Type B dissection was noted.     Pt got lumbar drain placed on 11/15 followed by TEVAR procedure. Rec post Lumbar drain was to have goal of CSF pressure < 10cm H2O, draining max of 15 ml / hr and MAP > 80 mmHg to have adequate spinal perfusion pressure post TEVAR . Pt was at baseline at 7 am, able to move all 4 extremities. Later at 8 am, pt was noted to not move her left arm and leg with hypoasthesia on the left side, thus Stroke code was activated. She also had left facial droop. Denies any chages in vision or bowel / bladder incontinence.

## 2018-11-17 NOTE — PROGRESS NOTES
Patient weaned to 5LNC after continuously removing cannula and maintaininng sats. Will continue to monitor.

## 2018-11-17 NOTE — PROGRESS NOTES
[facility]  Vascular Surgery  Progress Note    Patient Name: Leah Cleaning  MRN: [id number]  Admission Date: 11/15/2018  Primary Care Provider: Mary Colorado MD    Subjective:     Interval History: new onset left arm weakness, stroke code, new right thalamic bleed.    Post-Op Info:  Procedure(s) (LRB):  REPAIR, ANEURYSM, ENDOVASCULAR GRAFT, AORTA, THORACIC (N/A)   2 Days Post-Op       Medications:  Continuous Infusions:   norepinephrine bitartrate-D5W Stopped (11/16/18 1600)     Scheduled Meds:   ceFEPime (MAXIPIME) IVPB  2 g Intravenous Q12H    pantoprazole  40 mg Intravenous Daily    phytonadione ((AQUA-MEPHYTON) IVPB  10 mg Intravenous Daily    vancomycin (VANCOCIN) IVPB  15 mg/kg Intravenous Q24H     PRN Meds:fentaNYL, ondansetron     Objective:     Vital Signs (Most Recent):  Temp: 99.1 °F (37.3 °C) (11/17/18 0700)  Pulse: 84 (11/17/18 0845)  Resp: (!) 21 (11/17/18 0845)  BP: (!) 148/77 (11/17/18 0845)  SpO2: 97 % (11/17/18 0845) Vital Signs (24h Range):  Temp:  [94.4 °F (34.7 °C)-99.1 °F (37.3 °C)] 99.1 °F (37.3 °C)  Pulse:  [] 84  Resp:  [16-90] 21  SpO2:  [91 %-99 %] 97 %  BP: (107-148)/(54-77) 148/77  Arterial Line BP: ()/(44-60) 147/59     Date 11/17/18 0700 - 11/18/18 0659   Shift 9610-1875 4100-2856 7860-6488 24 Hour Total   INTAKE   Shift Total(mL/kg)       OUTPUT   Urine(mL/kg/hr) 35   35   Drains 11   11   Shift Total(mL/kg) 46(0.8)   46(0.8)   Weight (kg) 58.1 58.1 58.1 58.1       Physical Exam   Constitutional: She appears well-developed and well-nourished.   HENT:   Head: Normocephalic and atraumatic.   Eyes: EOM are normal. Pupils are equal, round, and reactive to light.   Neck: Normal range of motion. Neck supple.   Cardiovascular: Normal rate and regular rhythm.   Pulmonary/Chest: Effort normal and breath sounds normal.   Abdominal: Soft. Bowel sounds are normal.   Mild abdominal distension   Genitourinary:   Genitourinary Comments: Perry in place    Musculoskeletal:   LUE 3/5, poor fine motor movement.   RUE 5/5, intact fine motor movement  LLE4/5, able to flex the knee (slightly weaker than Right)  RLE 5/5, able to flex the knee.    Neurological: She is alert.   LUE weakness   Vitals reviewed.      Significant Labs:  CBC:   Recent Labs   Lab 11/17/18  0400   WBC 8.34   RBC 2.54*   HGB 7.6*   HCT 22.7*   PLT 58*   MCV 89   MCH 29.9   MCHC 33.5     CMP:   Recent Labs   Lab 11/17/18  0400   GLU 87   CALCIUM 8.2*   ALBUMIN 2.4*  2.4*   PROT 5.2*      K 4.2   CO2 20*   *   BUN 30*   CREATININE 1.5*   ALKPHOS 80   *   *   BILITOT 4.7*     Coagulation:   Recent Labs   Lab 11/16/18  0800  11/17/18  0400   LABPROT 16.1*   < > 18.2*   INR 1.6*   < > 1.9*   APTT 43.7*  --   --     < > = values in this interval not displayed.       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 Despite aggressive anti-impulse therapy, persistent pain requiring intervention. Preoperative CSF drain placement by Neuro IR. Successful TEVAR. POD 1 left groin hematoma and coagulopathy requiring manual pressure and product resuscitation. POD 2 new onset left upper arm weakness, found to have right thalamic hemorrhage.     Neuro: right thalamic bleed.  - con't q1h neuro checks  - Clamp CSF drain  - repeat CT head in 6 hours for evaluation of new right thalamic bleed.     CV:   - ease the spinal perfusion protocol. BP ~140, MAP >65    Resp:  - con't pulmonary toilet  - encourage IS  - O2 supp prn    Renal/Endocrine:  - con't monitoring UOP    GI: Hep C cirrhosis with MELD 15 on admission  - protonix    Heme/ID:  - ABLA stable.   - con't correct coagulopathy prn  - monitor  thrombocytopenia  - On empiric Vanc/Zosyn per primary.     Dispo:  ICU care          Ankit Hemphill MD  Vascular Surgery  [facility]

## 2018-11-17 NOTE — PROGRESS NOTES
Dr. Hemphill notified of blood tinged drainage in tubing of Lumbar drain. Ordered to clamp drain until further notice.

## 2018-11-17 NOTE — NURSING
Patient's sister, Nancy, called and, at patient's request, she was updated of patient's current status. Sister said she arrived in [place] this evening and would be at bedside tomorrow morning.

## 2018-11-17 NOTE — PLAN OF CARE
Problem: Patient Care Overview  Goal: Plan of Care Review  Outcome: Ongoing (interventions implemented as appropriate)  Pt oriented to self, time, and place, sometimes confused as to situation. Moves all extremities, but noticeable L sided weakness. Stroke code called this AM due to left sided weakness. CT+. Movement of left side has improved throughout the day. NSR, 70s-80s. SBP @ goal <160. 1u FFP, 1u Cryo, 1u Platelets given today.  Lumbar drain clamped since ~10AM per Dr. Hemphill due to increasing sanguinous residue in drain. 5L o2, sating >95%. No BM. UOP adequate. Neuro/neurovascular checks performed q 1. Bilateral groin sites CDI, Lumbar drain site dressing changed per MD and NP. Pt free from breakdown, pressure points protected. Weight shift assistance provided q 2. VSS at this time. Pt and family updated on plan of care. All questions answered/ concerns addressed. Will continue to monitor.

## 2018-11-18 LAB
ALBUMIN SERPL BCP-MCNC: 2.8 G/DL
ALBUMIN SERPL BCP-MCNC: 2.9 G/DL
ALBUMIN SERPL BCP-MCNC: 2.9 G/DL
ALP SERPL-CCNC: 94 U/L
ALT SERPL W/O P-5'-P-CCNC: 165 U/L
AMMONIA PLAS-SCNC: 42 UMOL/L
ANION GAP SERPL CALC-SCNC: 10 MMOL/L
ANION GAP SERPL CALC-SCNC: 11 MMOL/L
ANION GAP SERPL CALC-SCNC: 15 MMOL/L
ANION GAP SERPL CALC-SCNC: 9 MMOL/L
AST SERPL-CCNC: 764 U/L
BASOPHILS # BLD AUTO: 0.01 K/UL
BASOPHILS # BLD AUTO: 0.02 K/UL
BASOPHILS NFR BLD: 0.2 %
BASOPHILS NFR BLD: 0.3 %
BILIRUB DIRECT SERPL-MCNC: 2.5 MG/DL
BILIRUB SERPL-MCNC: 4 MG/DL
BLD PROD TYP BPU: NORMAL
BLD PROD TYP BPU: NORMAL
BLOOD UNIT EXPIRATION DATE: NORMAL
BLOOD UNIT EXPIRATION DATE: NORMAL
BLOOD UNIT TYPE CODE: 5100
BLOOD UNIT TYPE CODE: 9500
BLOOD UNIT TYPE: NORMAL
BLOOD UNIT TYPE: NORMAL
BUN SERPL-MCNC: 44 MG/DL
BUN SERPL-MCNC: 45 MG/DL
BUN SERPL-MCNC: 50 MG/DL
BUN SERPL-MCNC: 52 MG/DL
CALCIUM SERPL-MCNC: 8.8 MG/DL
CALCIUM SERPL-MCNC: 8.8 MG/DL
CALCIUM SERPL-MCNC: 8.9 MG/DL
CALCIUM SERPL-MCNC: 9 MG/DL
CHLORIDE SERPL-SCNC: 110 MMOL/L
CHLORIDE SERPL-SCNC: 111 MMOL/L
CO2 SERPL-SCNC: 18 MMOL/L
CO2 SERPL-SCNC: 21 MMOL/L
CO2 SERPL-SCNC: 21 MMOL/L
CO2 SERPL-SCNC: 22 MMOL/L
CODING SYSTEM: NORMAL
CODING SYSTEM: NORMAL
CREAT SERPL-MCNC: 1.5 MG/DL
CREAT SERPL-MCNC: 1.5 MG/DL
CREAT SERPL-MCNC: 1.6 MG/DL
CREAT SERPL-MCNC: 1.6 MG/DL
DIFFERENTIAL METHOD: ABNORMAL
DISPENSE STATUS: NORMAL
DISPENSE STATUS: NORMAL
EOSINOPHIL # BLD AUTO: 0 K/UL
EOSINOPHIL NFR BLD: 0 %
EOSINOPHIL NFR BLD: 0 %
EOSINOPHIL NFR BLD: 0.2 %
EOSINOPHIL NFR BLD: 0.3 %
ERYTHROCYTE [DISTWIDTH] IN BLOOD BY AUTOMATED COUNT: 16.8 %
ERYTHROCYTE [DISTWIDTH] IN BLOOD BY AUTOMATED COUNT: 16.9 %
ERYTHROCYTE [DISTWIDTH] IN BLOOD BY AUTOMATED COUNT: 17.2 %
ERYTHROCYTE [DISTWIDTH] IN BLOOD BY AUTOMATED COUNT: 17.3 %
EST. GFR  (AFRICAN AMERICAN): 42.1 ML/MIN/1.73 M^2
EST. GFR  (AFRICAN AMERICAN): 42.1 ML/MIN/1.73 M^2
EST. GFR  (AFRICAN AMERICAN): 45.5 ML/MIN/1.73 M^2
EST. GFR  (AFRICAN AMERICAN): 45.5 ML/MIN/1.73 M^2
EST. GFR  (NON AFRICAN AMERICAN): 36.5 ML/MIN/1.73 M^2
EST. GFR  (NON AFRICAN AMERICAN): 36.5 ML/MIN/1.73 M^2
EST. GFR  (NON AFRICAN AMERICAN): 39.5 ML/MIN/1.73 M^2
EST. GFR  (NON AFRICAN AMERICAN): 39.5 ML/MIN/1.73 M^2
FIBRINOGEN PPP-MCNC: 135 MG/DL
FIBRINOGEN PPP-MCNC: 90 MG/DL
FIBRINOGEN PPP-MCNC: 91 MG/DL
GLUCOSE SERPL-MCNC: 134 MG/DL
GLUCOSE SERPL-MCNC: 147 MG/DL
GLUCOSE SERPL-MCNC: 92 MG/DL
GLUCOSE SERPL-MCNC: 94 MG/DL
HCT VFR BLD AUTO: 23.5 %
HCT VFR BLD AUTO: 23.9 %
HCT VFR BLD AUTO: 24 %
HCT VFR BLD AUTO: 29 %
HGB BLD-MCNC: 8 G/DL
HGB BLD-MCNC: 8.1 G/DL
HGB BLD-MCNC: 8.2 G/DL
HGB BLD-MCNC: 9.8 G/DL
IMM GRANULOCYTES # BLD AUTO: 0.04 K/UL
IMM GRANULOCYTES # BLD AUTO: 0.05 K/UL
IMM GRANULOCYTES NFR BLD AUTO: 0.6 %
IMM GRANULOCYTES NFR BLD AUTO: 0.8 %
INR PPP: 1.8
INR PPP: 1.9
LACTATE SERPL-SCNC: 1.8 MMOL/L
LACTATE SERPL-SCNC: 1.9 MMOL/L
LYMPHOCYTES # BLD AUTO: 0.4 K/UL
LYMPHOCYTES # BLD AUTO: 0.5 K/UL
LYMPHOCYTES # BLD AUTO: 0.7 K/UL
LYMPHOCYTES # BLD AUTO: 0.7 K/UL
LYMPHOCYTES NFR BLD: 11 %
LYMPHOCYTES NFR BLD: 11 %
LYMPHOCYTES NFR BLD: 5.8 %
LYMPHOCYTES NFR BLD: 7.6 %
MAGNESIUM SERPL-MCNC: 2.1 MG/DL
MCH RBC QN AUTO: 30.6 PG
MCH RBC QN AUTO: 30.9 PG
MCH RBC QN AUTO: 31 PG
MCH RBC QN AUTO: 31.3 PG
MCHC RBC AUTO-ENTMCNC: 33.5 G/DL
MCHC RBC AUTO-ENTMCNC: 33.8 G/DL
MCHC RBC AUTO-ENTMCNC: 33.8 G/DL
MCHC RBC AUTO-ENTMCNC: 34.9 G/DL
MCV RBC AUTO: 90 FL
MCV RBC AUTO: 91 FL
MCV RBC AUTO: 92 FL
MCV RBC AUTO: 93 FL
MONOCYTES # BLD AUTO: 0.7 K/UL
MONOCYTES # BLD AUTO: 0.8 K/UL
MONOCYTES NFR BLD: 11.7 %
MONOCYTES NFR BLD: 11.7 %
MONOCYTES NFR BLD: 12 %
MONOCYTES NFR BLD: 12.7 %
NEUTROPHILS # BLD AUTO: 4.6 K/UL
NEUTROPHILS # BLD AUTO: 5 K/UL
NEUTROPHILS # BLD AUTO: 5.2 K/UL
NEUTROPHILS # BLD AUTO: 5.3 K/UL
NEUTROPHILS NFR BLD: 75.6 %
NEUTROPHILS NFR BLD: 76.1 %
NEUTROPHILS NFR BLD: 78.9 %
NEUTROPHILS NFR BLD: 81.5 %
NRBC BLD-RTO: 1 /100 WBC
NUM UNITS TRANS FFP: NORMAL
NUM UNITS TRANS FFP: NORMAL
PHOSPHATE SERPL-MCNC: 2.5 MG/DL
PHOSPHATE SERPL-MCNC: 2.5 MG/DL
PHOSPHATE SERPL-MCNC: 2.6 MG/DL
PHOSPHATE SERPL-MCNC: 2.7 MG/DL
PHOSPHATE SERPL-MCNC: 2.9 MG/DL
PLATELET # BLD AUTO: 65 K/UL
PLATELET # BLD AUTO: 67 K/UL
PLATELET # BLD AUTO: 70 K/UL
PLATELET # BLD AUTO: 75 K/UL
PMV BLD AUTO: 10.7 FL
PMV BLD AUTO: 11.1 FL
PMV BLD AUTO: 11.4 FL
PMV BLD AUTO: 11.4 FL
POCT GLUCOSE: 117 MG/DL (ref 70–110)
POCT GLUCOSE: 143 MG/DL (ref 70–110)
POCT GLUCOSE: 91 MG/DL (ref 70–110)
POCT GLUCOSE: 95 MG/DL (ref 70–110)
POCT GLUCOSE: 98 MG/DL (ref 70–110)
POTASSIUM SERPL-SCNC: 3.8 MMOL/L
POTASSIUM SERPL-SCNC: 3.9 MMOL/L
POTASSIUM SERPL-SCNC: 4.1 MMOL/L
POTASSIUM SERPL-SCNC: 4.2 MMOL/L
PROT SERPL-MCNC: 6.5 G/DL
PROTHROMBIN TIME: 17.9 SEC
PROTHROMBIN TIME: 18.9 SEC
RBC # BLD AUTO: 2.58 M/UL
RBC # BLD AUTO: 2.62 M/UL
RBC # BLD AUTO: 2.62 M/UL
RBC # BLD AUTO: 3.2 M/UL
SODIUM SERPL-SCNC: 142 MMOL/L
SODIUM SERPL-SCNC: 144 MMOL/L
WBC # BLD AUTO: 6.08 K/UL
WBC # BLD AUTO: 6.52 K/UL
WBC # BLD AUTO: 6.59 K/UL
WBC # BLD AUTO: 6.65 K/UL

## 2018-11-18 PROCEDURE — 85610 PROTHROMBIN TIME: CPT

## 2018-11-18 PROCEDURE — P9017 PLASMA 1 DONOR FRZ W/IN 8 HR: HCPCS

## 2018-11-18 PROCEDURE — 97535 SELF CARE MNGMENT TRAINING: CPT

## 2018-11-18 PROCEDURE — 85384 FIBRINOGEN ACTIVITY: CPT | Mod: 91

## 2018-11-18 PROCEDURE — 83735 ASSAY OF MAGNESIUM: CPT

## 2018-11-18 PROCEDURE — 99291 CRITICAL CARE FIRST HOUR: CPT | Mod: ,,, | Performed by: NURSE PRACTITIONER

## 2018-11-18 PROCEDURE — 82140 ASSAY OF AMMONIA: CPT

## 2018-11-18 PROCEDURE — 25000003 PHARM REV CODE 250: Performed by: NURSE PRACTITIONER

## 2018-11-18 PROCEDURE — 94761 N-INVAS EAR/PLS OXIMETRY MLT: CPT

## 2018-11-18 PROCEDURE — 83605 ASSAY OF LACTIC ACID: CPT | Mod: 91

## 2018-11-18 PROCEDURE — 85384 FIBRINOGEN ACTIVITY: CPT

## 2018-11-18 PROCEDURE — 20000000 HC ICU ROOM

## 2018-11-18 PROCEDURE — 80069 RENAL FUNCTION PANEL: CPT

## 2018-11-18 PROCEDURE — 85025 COMPLETE CBC W/AUTO DIFF WBC: CPT | Mod: 91

## 2018-11-18 PROCEDURE — 63600175 PHARM REV CODE 636 W HCPCS

## 2018-11-18 PROCEDURE — 80069 RENAL FUNCTION PANEL: CPT | Mod: 91

## 2018-11-18 PROCEDURE — 27100171 HC OXYGEN HIGH FLOW UP TO 24 HOURS

## 2018-11-18 PROCEDURE — 92610 EVALUATE SWALLOWING FUNCTION: CPT

## 2018-11-18 PROCEDURE — G8996 SWALLOW CURRENT STATUS: HCPCS | Mod: CM

## 2018-11-18 PROCEDURE — C9113 INJ PANTOPRAZOLE SODIUM, VIA: HCPCS | Performed by: NURSE PRACTITIONER

## 2018-11-18 PROCEDURE — 83605 ASSAY OF LACTIC ACID: CPT

## 2018-11-18 PROCEDURE — 63600175 PHARM REV CODE 636 W HCPCS: Performed by: NURSE PRACTITIONER

## 2018-11-18 PROCEDURE — 85610 PROTHROMBIN TIME: CPT | Mod: 91

## 2018-11-18 PROCEDURE — 63600175 PHARM REV CODE 636 W HCPCS: Performed by: STUDENT IN AN ORGANIZED HEALTH CARE EDUCATION/TRAINING PROGRAM

## 2018-11-18 PROCEDURE — G8997 SWALLOW GOAL STATUS: HCPCS | Mod: CJ

## 2018-11-18 PROCEDURE — 36430 TRANSFUSION BLD/BLD COMPNT: CPT

## 2018-11-18 PROCEDURE — 84155 ASSAY OF PROTEIN SERUM: CPT

## 2018-11-18 PROCEDURE — 84075 ASSAY ALKALINE PHOSPHATASE: CPT

## 2018-11-18 PROCEDURE — 99900035 HC TECH TIME PER 15 MIN (STAT)

## 2018-11-18 PROCEDURE — 82247 BILIRUBIN TOTAL: CPT

## 2018-11-18 PROCEDURE — 27100092 HC HIGH FLOW DELIVERY CANNULA

## 2018-11-18 RX ORDER — HYDRALAZINE HYDROCHLORIDE 20 MG/ML
10 INJECTION INTRAMUSCULAR; INTRAVENOUS EVERY 6 HOURS PRN
Status: DISCONTINUED | OUTPATIENT
Start: 2018-11-18 | End: 2018-11-19

## 2018-11-18 RX ORDER — NIFEDIPINE 30 MG/1
30 TABLET, EXTENDED RELEASE ORAL DAILY
Status: DISCONTINUED | OUTPATIENT
Start: 2018-11-18 | End: 2018-11-20

## 2018-11-18 RX ORDER — HYDRALAZINE HYDROCHLORIDE 20 MG/ML
10 INJECTION INTRAMUSCULAR; INTRAVENOUS ONCE
Status: COMPLETED | OUTPATIENT
Start: 2018-11-18 | End: 2018-11-18

## 2018-11-18 RX ORDER — PANTOPRAZOLE SODIUM 40 MG/1
40 TABLET, DELAYED RELEASE ORAL DAILY
Status: DISCONTINUED | OUTPATIENT
Start: 2018-11-19 | End: 2018-11-19

## 2018-11-18 RX ORDER — FENTANYL CITRATE 50 UG/ML
25 INJECTION, SOLUTION INTRAMUSCULAR; INTRAVENOUS EVERY 4 HOURS PRN
Status: DISCONTINUED | OUTPATIENT
Start: 2018-11-18 | End: 2018-11-19

## 2018-11-18 RX ORDER — HYDROCODONE BITARTRATE AND ACETAMINOPHEN 500; 5 MG/1; MG/1
TABLET ORAL
Status: DISCONTINUED | OUTPATIENT
Start: 2018-11-18 | End: 2018-11-18

## 2018-11-18 RX ORDER — LACTULOSE 10 G/15ML
20 SOLUTION ORAL DAILY
Status: DISCONTINUED | OUTPATIENT
Start: 2018-11-18 | End: 2018-11-19

## 2018-11-18 RX ORDER — HYDROCODONE BITARTRATE AND ACETAMINOPHEN 500; 5 MG/1; MG/1
TABLET ORAL
Status: DISCONTINUED | OUTPATIENT
Start: 2018-11-18 | End: 2018-11-19

## 2018-11-18 RX ORDER — NICARDIPINE HYDROCHLORIDE 0.2 MG/ML
2.5 INJECTION INTRAVENOUS CONTINUOUS PRN
Status: DISCONTINUED | OUTPATIENT
Start: 2018-11-18 | End: 2018-11-19

## 2018-11-18 RX ORDER — HYDRALAZINE HYDROCHLORIDE 20 MG/ML
INJECTION INTRAMUSCULAR; INTRAVENOUS
Status: COMPLETED
Start: 2018-11-18 | End: 2018-11-18

## 2018-11-18 RX ADMIN — FENTANYL CITRATE 25 MCG: 50 INJECTION INTRAMUSCULAR; INTRAVENOUS at 12:11

## 2018-11-18 RX ADMIN — NICARDIPINE HYDROCHLORIDE 5 MG/HR: 0.2 INJECTION, SOLUTION INTRAVENOUS at 08:11

## 2018-11-18 RX ADMIN — NICARDIPINE HYDROCHLORIDE 2.5 MG/HR: 0.2 INJECTION, SOLUTION INTRAVENOUS at 03:11

## 2018-11-18 RX ADMIN — RIFAXIMIN 550 MG: 550 TABLET ORAL at 09:11

## 2018-11-18 RX ADMIN — HYDRALAZINE HYDROCHLORIDE 10 MG: 20 INJECTION INTRAMUSCULAR; INTRAVENOUS at 03:11

## 2018-11-18 RX ADMIN — PANTOPRAZOLE SODIUM 40 MG: 40 INJECTION, POWDER, FOR SOLUTION INTRAVENOUS at 08:11

## 2018-11-18 RX ADMIN — RIFAXIMIN 550 MG: 550 TABLET ORAL at 01:11

## 2018-11-18 RX ADMIN — LACTULOSE 20 G: 20 SOLUTION ORAL at 12:11

## 2018-11-18 RX ADMIN — NIFEDIPINE 30 MG: 30 TABLET, FILM COATED, EXTENDED RELEASE ORAL at 12:11

## 2018-11-18 NOTE — NURSING
Critical Care resident notified that SBP >140 consistently. All other vital signs stable. New orders pending.

## 2018-11-18 NOTE — NURSING
Patient remained neurologically intact throughout the night. PERRLA, moved all extremities spontaneously with left sided strength increasing as the night progressed, clear speech and appropriate conversation, answered all orientation questions correctly, no complaints of headache. Some episodes of hypertension properly communicated to physician with PRN medications being ordered. New SBP goal <180 according to Dr. Guillermo. Patient remained on 5L via HFNC, adequate urine output via reese. Patient kept NPO until proper swallow evaluation can be completed, No BM this shift. Spinal drain remains clamped per Dr. Hemphill's order, no drainage from site onto surrounding dressing. Bilateral groin sites remained unchanged. Weight shift assistance given throughout the night while still remaining flat. No complaints of pain. Patient had some family at bedside last night including POA sister. All were thoroughly updated of plan of care moving forward and deny questions at this time. No other acute needs expressed.

## 2018-11-18 NOTE — PROGRESS NOTES
Pharmacist Intervention IV to PO Note    Leah Cleaning is a 53 y.o. female being treated with IV pantoprazole  Dietary Orders:  Diet Orders            Diet Dysphagia Pureed (IDDSI Level 4) [facility]; Cardiac (Low Na/Chol); Nectar Thick: Dysphagia 1 (Pureed) starting at 11/18 1406            Based on the following criteria, this patient qualifies for intravenous to oral conversion:  [x] The patients gastrointestinal tract is functioning (tolerating medications via oral or enteral route for 24 hours and tolerating food or enteral feeds for 24 hours).    Stop IV pantoprazole 40mg daily  Start PO Pantoprazole 40mg daily     Shiv Sifuentes, PharmD  Clinical Pharmacist  [facility] [email or web address]  [email or web address]   11/18/2018 4:13 PM

## 2018-11-18 NOTE — NURSING
Dr. Howard at bedside for evening rounding. All patient assessment and vital signs reviewed. Notified that patient will be kept NPO tonight following stroke code and due to the fact that patient is unable to be positioned sitting up at 90 degrees for swallow evaluation. Inquired about need for IV fluids tonight as patient is complaining of thirst and has somewhat dry mucous membranes. Dr Howard states he will order fluids if patient's urine output drops below 0.5ml/kg/hr but to provide oral swabs in the meantime. Patient and family updated of this thoroughly.

## 2018-11-18 NOTE — PROGRESS NOTES
[facility]  Critical Care Medicine  Progress Note    Patient Name: Leah Cleaning  MRN: [id number]  Admission Date: 11/15/2018  Hospital Length of Stay: 3 days  Code Status: DNR  Attending Provider: Shadia Corcoran MD  Primary Care Provider: Mary Colorado MD   Principal Problem: Essential hypertension    Subjective:     HPI:  Mrs. Cleaning is a 54 yo female with a history significant for Hep C cirrhosis, HTN, cocaine and marijuana use who presented to [facility] on the early morning of 11/15 as a transfer from [facility] for Type B aortic dissection. She initially presented to [facility] on the evening of 11/14 for sharp epigastric pain that radiated to her back which started initially on the night of 11/13. CT at OSH noted aortic dissection from left subclavian to just proximal of celiac artery. She was transferred to [facility] for vascular surgery consult on a nicardipine gtt. She was transitioned from nicardipine gtt to esmolol gtt. CTA upon arrival confirmed dissection and she was admitted to MICU for medical management of the dissection with vascular surgery following.     Hospital/ICU Course:  Mrs. Cleaning was admitted to MICU for Type B aortic dissection on esmolol gtt to maintain HR of 60 and SBP <120. Vascular surgery following, no surgical intervention warranted initially. CTA read concerning for possible aortic rupture in the descending aorta with high-density fluid noted in the posterior mediastinum; additionally, there was a suspected penetrating ulcer in the upper abdominal aorta adjacent to the true lumen. Due to high risk/ possible rupture, Lumbar drain was placed by interventional radiology and TEVAR completed by vascular surgery. Early 11/16, pt noted to be in hemorragic shock complicated by liver disease as vasopressor requirements increased significantly, rising lactic acidosis, and patient was found to be bleeding from bilateral groin sites  with significant hematoma formation to left groin. Pressure was held for an extended period and patient received 4 U PRBC, 3 U FFP, 1 U platelets, 1 U cryo, and vitamin K. Stroke code called around 0800 on 11/17 due to acute left sided weakness; thalamic bleed noted on CT. No intervention per vascular neurology. Repeat CT stable. Neuro exam much improved.          Interval History/Significant Events: No acute events over night. Neuro intact this am. 1 unit FFP given for INR 1.8.       Review of Systems   Respiratory: Negative for cough and shortness of breath.    Cardiovascular: Negative for chest pain and palpitations.   Gastrointestinal: Positive for abdominal pain (RUQ ). Negative for blood in stool, nausea and vomiting.   Musculoskeletal: Negative for back pain, neck pain and neck stiffness.   Neurological: Negative for weakness, numbness and headaches.   Hematological: Bruises/bleeds easily.     Objective:     Vital Signs (Most Recent):  Temp: 97.7 °F (36.5 °C) (11/18/18 1138)  Pulse: 89 (11/18/18 1130)  Resp: (!) 26 (11/18/18 1130)  BP: 130/63 (11/18/18 1130)  SpO2: 95 % (11/18/18 1130) Vital Signs (24h Range):  Temp:  [96.5 °F (35.8 °C)-98.3 °F (36.8 °C)] 97.7 °F (36.5 °C)  Pulse:  [72-96] 89  Resp:  [15-46] 26  SpO2:  [91 %-99 %] 95 %  BP: (106-171)/(57-90) 130/63  Arterial Line BP: (121-176)/(51-80) 131/66   Weight: 58.1 kg (128 lb 1.4 oz)  Body mass index is 20.67 kg/m².      Intake/Output Summary (Last 24 hours) at 11/18/2018 1209  Last data filed at 11/18/2018 1100  Gross per 24 hour   Intake 479 ml   Output 1410 ml   Net -931 ml       Physical Exam   Constitutional: She is oriented to person, place, and time. She appears well-developed. She is cooperative. She does not have a sickly appearance. She does not appear ill. No distress.   HENT:   Head: Normocephalic and atraumatic.   Eyes: Conjunctivae and EOM are normal. Pupils are equal, round, and reactive to light. No scleral icterus.   Neck: Normal  range of motion. No JVD present. No tracheal deviation present.   Cardiovascular: Normal rate, regular rhythm, S1 normal, S2 normal and normal heart sounds.   No murmur heard.  Pulses:       Radial pulses are 2+ on the right side, and 2+ on the left side.        Dorsalis pedis pulses are 2+ on the right side, and 2+ on the left side.   Warm extremities.    Pulmonary/Chest: Effort normal and breath sounds normal. No accessory muscle usage. No tachypnea (mild). No respiratory distress. She has no wheezes. She has no rhonchi. She has no rales.   Abdominal: Soft. Bowel sounds are normal. She exhibits distension (mild). There is tenderness (RUQ). There is no rebound and no guarding.   Genitourinary:   Genitourinary Comments: Perry with concentrated output   Neurological: She is alert and oriented to person, place, and time. GCS eye subscore is 4. GCS verbal subscore is 5. GCS motor subscore is 6.   AAO. Conversant. PERRL 4mm tara. Left facial droop.   Strength much improved- LUE -5 / 5 and LLE - 5 / 5 RLE - 5 / 5 and RUE - 5 / 5    Skin: Skin is dry. Capillary refill takes 2 to 3 seconds. Bruising noted. She is not diaphoretic. There is pallor.        Large marked hematoma to left groin without active bleeding. Decreasing in size   Psychiatric: She has a normal mood and affect. Her behavior is normal. Judgment and thought content normal.       Vents:  Oxygen Concentration (%): 40 (11/17/18 0339)  Lines/Drains/Airways     Central Venous Catheter Line                 Percutaneous Central Line Insertion/Assessment - triple lumen  11/15/18 right internal jugular 2 days          Drain                 Lumbar Drain 11/15/18 1500 1 day         Urethral Catheter 11/15/18 1518 Non-latex;Straight-tip 16 Fr. 1 day          Arterial Line                 Arterial Line 11/15/18 1230 Right Radial 2 days          Peripheral Intravenous Line                 Peripheral IV - Single Lumen 11/15/18 0351 Left Forearm 2 days         Peripheral  IV - Single Lumen 11/15/18 0400 Right Forearm 2 days              Significant Labs:    CBC/Anemia Profile:  Recent Labs   Lab 11/17/18 2027 11/18/18  0054 11/18/18  0400   WBC 6.57 6.65 6.08   HGB 8.0* 8.2* 9.8*   HCT 23.7* 23.5* 29.0*   PLT 88* 75* 70*   MCV 91 90 91   RDW 16.9* 16.8* 16.9*        Chemistries:  Recent Labs   Lab 11/17/18  0400  11/17/18 2027 11/18/18  0054 11/18/18  0400      < > 143 142 142   K 4.2   < > 4.0 3.9 3.8   *   < > 111* 111* 111*   CO2 20*   < > 23 22* 21*   BUN 30*   < > 40* 44* 45*   CREATININE 1.5*   < > 1.6* 1.5* 1.5*   CALCIUM 8.2*   < > 8.7 8.9 8.8   ALBUMIN 2.4*  2.4*   < > 2.9* 2.9* 2.8*  2.8*   PROT 5.2*  --   --   --  6.5   BILITOT 4.7*  --   --   --  4.0*   ALKPHOS 80  --   --   --  94   *  --   --   --  165*   *  --   --   --  764*   MG 2.0  --   --   --  2.1   PHOS 3.9  3.9   < > 2.7 2.6* 2.5*  2.5*    < > = values in this interval not displayed.       All pertinent labs within the past 24 hours have been reviewed.    Significant Imaging:  I have reviewed all pertinent imaging results/findings within the past 24 hours.    Assessment/Plan:     Neuro   Nontraumatic thalamic hemorrhage    - acute neuro changes noted around 0800 11/17, last known normal at 0700   - Stroke code called; thalamic bleed noted on CT   - No intervention per vascular neurology  - Systolic BP goal <140, nicardipine gtt until oral BP meds   - repeat Head CT stable  - neuro exam much improved on 11/18     Psychiatric   Marijuana abuse    --see above     Cocaine abuse    --toxicology screen positive for cocaine, THC, opiates at OSH     Cardiac/Vascular   * Essential hypertension    -- on nicardipine gtt to maintian SBP <140, wean off with starting oral meds  -- restarting home nifedipine     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 Has never received treatment for Hep C  --continue supportive care for coagulopathy, thrombocytopenia in setting of Type B aortic dissection with hemorrhagic shock and new thalamic bleed   --lactulose and rifaximin      Orthopedic   Acute midline thoracic back pain    --resolved      Other   Goals of care, counseling/discussion    - Goals of care discussion with Ms. Cleaning and her sister  - Code status changed to DRN     Hemorrhagic shock    --required levo, gurwinder, and vaso on 11/16  --bleed from bilateral groin sites with significant hematoma formation to left groin. Oliverio groin US, no intervention warrented per vasc sx  --received 4 U PRBC, 3 U FFP, 1 U platelets, 1 U cryo, and vitamin K on 11/16  --1 U PRBC, 1 U FFP, 1 U platelets, 1 U cryo, and vit K given 11/17  --Shock resolved                  Critical Care Time: 50 minutes  Critical secondary to Patient has a condition that poses threat to life and bodily function: Type B dissection, Hemorrhagic stroke, Coagulopathy      Critical care was time spent personally by me on the following activities: development of treatment plan with patient or surrogate and bedside caregivers, discussions with consultants, evaluation of patient's response to treatment, examination of patient, ordering and performing treatments and interventions, ordering and review of laboratory studies, ordering and review of radiographic studies, pulse oximetry, re-evaluation of patient's condition. This critical care time did not overlap with that of any other provider or involve time for any procedures.      Jerica Pereira NP  Critical Care Medicine  [facility]

## 2018-11-18 NOTE — ASSESSMENT & PLAN NOTE
53 F with Hep C cirrhosis (MELD 15 on admission), HTN, crack cocaine abuse, who presented with acute tearing chest/back pain to OSH, Found to have retrograde type B dissection. Despite aggressive anti-impulse therapy, persistent pain requiring intervention. Preoperative CSF drain placement by Neuro IR. Successful TEVAR. POD 1 left groin hematoma and coagulopathy requiring manual pressure and product resuscitation. POD 2 new onset left upper arm weakness, found to have right thalamic hemorrhage. F/u CT head stable findings. Significant reactive left pleural effusion with surrounding atelectasis noted also.     Neuro: right thalamic bleed - f/u scan stable  - con't q1h neuro checks, LUE motor function slowly improving  - CSF drain clamped, neuro exam stable. Ok to sit up while drain clamped.   - Discussed with Dr. Contreras re: removal of CSF drain on Monday.   - speech swallow eval prior to PO intake.     CV:   - ease the spinal perfusion protocol. BP ~140, MAP >65    Resp:  - encourage IS  - O2 supp prn  - Moderate Left pleural effusion and small right pleural effusion on CTA stoke multiphase noted. Left pleural effusion likely reactive following TEVAR. Ok for patient to sit up while CSF drain is clamped. con't pulmonary toilet.     Renal/Endocrine:  - con't monitoring UOP    GI: Hep C cirrhosis with MELD 15 on admission  - protonix  - PO diet once swallow eval cleared.    Heme/ID:  - ABLA stable.   - con't correct coagulopathy prn  - monitor thrombocytopenia  - On empiric Vanc/Zosyn per primary.     Dispo:  - ICU care

## 2018-11-18 NOTE — PLAN OF CARE
Problem: SLP Goal  Goal: SLP Goal  Speech Language Pathology Goals  Goals expected to be met by 11/25  1. Ongoing swallow assessment  2. SLP Speech/Language/Cognitive Evaluation      SLP Clinical Swallow Evaluation completed. See note for details.     Marissa Matamoros MS, CCC-SLP  Speech Language Pathologist  Pager: [phone]  11/18/2018

## 2018-11-18 NOTE — SUBJECTIVE & OBJECTIVE
Medications:  Continuous Infusions:   niCARdipine 10 mg/hr (11/18/18 0833)     Scheduled Meds:   pantoprazole  40 mg Intravenous Daily     PRN Meds:hydrALAZINE, niCARdipine, ondansetron     Objective:     Vital Signs (Most Recent):  Temp: 97.7 °F (36.5 °C) (11/18/18 0715)  Pulse: 88 (11/18/18 0800)  Resp: (!) 21 (11/18/18 0800)  BP: (!) 166/88 (11/18/18 0800)  SpO2: 98 % (11/18/18 0800) Vital Signs (24h Range):  Temp:  [96.5 °F (35.8 °C)-98.8 °F (37.1 °C)] 97.7 °F (36.5 °C)  Pulse:  [72-96] 88  Resp:  [15-46] 21  SpO2:  [91 %-99 %] 98 %  BP: (106-171)/(57-90) 166/88  Arterial Line BP: (121-176)/(51-80) 171/74          Physical Exam   Constitutional: She is oriented to person, place, and time. She appears well-developed and well-nourished.   HENT:   Head: Normocephalic and atraumatic.   Eyes: EOM are normal. Pupils are equal, round, and reactive to light.   Neck: Normal range of motion. Neck supple.   Right neck CVL site no longer bleeding   Cardiovascular: Normal rate and regular rhythm.   Pulmonary/Chest: Effort normal and breath sounds normal.   Abdominal: Soft. Bowel sounds are normal.   Mild abdominal distension   Genitourinary:   Genitourinary Comments: Perry in place   Musculoskeletal:   LUE 3/5, poor fine motor movement.   RUE 5/5, intact fine motor movement  LLE4/5, able to flex the knee (slightly weaker than Right)  RLE 5/5, able to flex the knee.   CSF drain site dressing c/d/i   Neurological: She is alert and oriented to person, place, and time.   LUE weakness improved   Skin: Skin is warm.   Nursing note and vitals reviewed.      Significant Labs:  CBC:   Recent Labs   Lab 11/18/18  0400   WBC 6.08   RBC 3.20*   HGB 9.8*   HCT 29.0*   PLT 70*   MCV 91   MCH 30.6   MCHC 33.8     CMP:   Recent Labs   Lab 11/18/18  0400   GLU 94   CALCIUM 8.8   ALBUMIN 2.8*  2.8*   PROT 6.5      K 3.8   CO2 21*   *   BUN 45*   CREATININE 1.5*   ALKPHOS 94   *   *   BILITOT 4.0*      Coagulation:   Recent Labs   Lab 11/18/18  0400   LABPROT 17.9*   INR 1.8*       Significant Diagnostics:  CTA head multiphase stroke  (11/17/18 08:39)  Acute right thalamic hemorrhage 23o15cu    CT head non-con (11/17/18 15:57)  Previously described right thalamic hemorrhage appears similar in size compared to previous exam measuring approximately 2.3 cm in long axis.  There is slight surrounding vasogenic edema but no significant midline shift or herniation.  No hydrocephalus.

## 2018-11-18 NOTE — NURSING
Dr. Guillermo notified that systolic blood pressures still elevated above goal 140. He orders IV hydralizine q6 hours PRN SBP>180. One time dose given and results followed up with MD.

## 2018-11-18 NOTE — PT/OT/SLP EVAL
Speech-Language Pathology Evaluation  Bedside Swallow    Patient Name:  Leah Cleaning   MRN:  [id number]  Admitting Diagnosis: Essential hypertension    Recommendations:                 General Recommendations:  ongoing swallow assessment & Speech language evaluation  Diet recommendations:  NPO, NPO   Sparingly for pleasure- sips of nectar thick liquid & bites of puree (2-3 per sitting) with NSG supervision with the following precautions  Aspiration Precautions: 1 bite/sip at a time, Assistance with meals and Assistance with thickening liquids, Avoid talking while eating, Eliminate distractions, Feed only when awake/alert, Frequent oral care, HOB to 90 degrees, Ice chips sparingly, Meds crushed in puree, Monitor for s/s of aspiration & discontinue if present, Small bites/sips and Strict aspiration precautions   General Precautions: Standard, aspiration, fall, NPO, respiratory    History:     Past Medical History:   Diagnosis Date    Arthritis     Bell's palsy 2012    Cirrhosis     GERD (gastroesophageal reflux disease)     Hepatitis C     Hypertension     Sciatica        Past Surgical History:   Procedure Laterality Date    CHOLECYSTECTOMY      ENDOVASCULAR GRAFT REPAIR OF ANEURYSM OF THORACIC AORTA N/A 11/15/2018    Procedure: REPAIR, ANEURYSM, ENDOVASCULAR GRAFT, AORTA, THORACIC;  Surgeon: Hermila Storm MD;  Location: [facility] OR [address] [place] [address];  Service: Peripheral Vascular;  Laterality: N/A;  Contrast: 94  ml  mGy: 1169.79  flouro time: 9.7 minutes    REPAIR, ANEURYSM, ENDOVASCULAR GRAFT, AORTA, THORACIC N/A 11/15/2018    Performed by Hermila Storm MD at [facility] OR [address] [place] [address]       Prior diet: following surgery, pt was tolerating liquid diet without difficulty but then had stroke yesterday & was made NPO    Subjective   Awake, mildly confused. Family at bedside. NSG reports pt has been tolerating swabs saturated in water today without difficulty but was coughing with swabs yesterday.     Pain/Comfort:  · Pain  Rating 1: 0/10  · Pain Rating Post-Intervention 2: 0/10    Objective:     Oral Musculature Evaluation  · Oral Musculature: WFL  · Dentition: scattered dentition  · Oral Labial Strength and Mobility: WFL  · Lingual Strength and Mobility: WFL  · Volitional Cough: adequate  · Volitional Swallow: not initiated despite encouragement  · Voice Prior to PO Intake: hoarse     Pt complained of SOB when HOB brought to 90 degrees. NSG notified. SpO2 remained >97%. Pt was given time & agreed she felt ok to proceed with eval.     Bedside Swallow Eval:   Consistencies Assessed:  · Thin liquids ice chip x2, via spoon x2, via small cup sip x2  · Nectar thick liquids via spoon x1, via cup sips x10  · Puree 1 tsp x3     Throughout assessment, pt complained of difficulty breathing. SpO2 remained >97%.     Oral Phase:   · Slow oral transit time    Pharyngeal Phase:   · Consistent multiple spontaneous swallows with thin  · Delayed swallow initiation with all trials assessed  · No overt s/s aspiration with nectar & puree    SLP educated pt & family on recs & precautions, swallow function, risks of aspiration, s/s aspiration, role of SLP, POC, etc. Family verbalized understanding. Pt will need reinforcement to follow.     Assessment:     Leah Cleaning is a 53 y.o. female with an SLP diagnosis of Dysphagia.  She presents with s/s aspiration & s/s pharyngeal dysphagia. Diet not recommended at this time 2/2 respiratory status & risk of aspiration.     Goals:   Multidisciplinary Problems     SLP Goals        Problem: SLP Goal    Goal Priority Disciplines Outcome   SLP Goal     SLP    Description:  Speech Language Pathology Goals  Goals expected to be met by 11/25  1. Ongoing swallow assessment  2. SLP Speech/Language/Cognitive Evaluation                        Plan:     · Patient to be seen:  5 x/week   · Plan of Care expires:  12/17/18  · Plan of Care reviewed with:  patient, family   · SLP Follow-Up:  Yes       Discharge  recommendations:  (tbd)     Time Tracking:     SLP Treatment Date:   11/18/18  Speech Start Time:  1045  Speech Stop Time:  1107     Speech Total Time (min):  22 min    Billable Minutes: Eval Swallow and Oral Function 14 and Seld Care/Home Management Training 8    Marissa Matamoros CCC-SLP  11/18/2018

## 2018-11-18 NOTE — PROGRESS NOTES
[facility]  Vascular Surgery  Progress Note    Patient Name: Leah Cleaning  MRN: [id number]  Admission Date: 11/15/2018  Primary Care Provider: Mary Colorado MD    Subjective:     Interval History: Right thalamic bleed, repeat CT stable, stroke team following. CSF drain clamped.     Post-Op Info:  Procedure(s) (LRB):  REPAIR, ANEURYSM, ENDOVASCULAR GRAFT, AORTA, THORACIC (N/A)   3 Days Post-Op       Medications:  Continuous Infusions:   niCARdipine 10 mg/hr (11/18/18 0833)     Scheduled Meds:   pantoprazole  40 mg Intravenous Daily     PRN Meds:hydrALAZINE, niCARdipine, ondansetron     Objective:     Vital Signs (Most Recent):  Temp: 97.7 °F (36.5 °C) (11/18/18 0715)  Pulse: 88 (11/18/18 0800)  Resp: (!) 21 (11/18/18 0800)  BP: (!) 166/88 (11/18/18 0800)  SpO2: 98 % (11/18/18 0800) Vital Signs (24h Range):  Temp:  [96.5 °F (35.8 °C)-98.8 °F (37.1 °C)] 97.7 °F (36.5 °C)  Pulse:  [72-96] 88  Resp:  [15-46] 21  SpO2:  [91 %-99 %] 98 %  BP: (106-171)/(57-90) 166/88  Arterial Line BP: (121-176)/(51-80) 171/74          Physical Exam   Constitutional: She is oriented to person, place, and time. She appears well-developed and well-nourished.   HENT:   Head: Normocephalic and atraumatic.   Eyes: EOM are normal. Pupils are equal, round, and reactive to light.   Neck: Normal range of motion. Neck supple.   Right neck CVL site no longer bleeding   Cardiovascular: Normal rate and regular rhythm.   Pulmonary/Chest: Effort normal and breath sounds normal.   Abdominal: Soft. Bowel sounds are normal.   Mild abdominal distension   Genitourinary:   Genitourinary Comments: Perry in place   Musculoskeletal:   LUE 3/5, poor fine motor movement.   RUE 5/5, intact fine motor movement  LLE4/5, able to flex the knee (slightly weaker than Right)  RLE 5/5, able to flex the knee.   CSF drain site dressing c/d/i   Neurological: She is alert and oriented to person, place, and time.   LUE weakness improved   Skin: Skin is  warm.   Nursing note and vitals reviewed.      Significant Labs:  CBC:   Recent Labs   Lab 11/18/18  0400   WBC 6.08   RBC 3.20*   HGB 9.8*   HCT 29.0*   PLT 70*   MCV 91   MCH 30.6   MCHC 33.8     CMP:   Recent Labs   Lab 11/18/18  0400   GLU 94   CALCIUM 8.8   ALBUMIN 2.8*  2.8*   PROT 6.5      K 3.8   CO2 21*   *   BUN 45*   CREATININE 1.5*   ALKPHOS 94   *   *   BILITOT 4.0*     Coagulation:   Recent Labs   Lab 11/18/18  0400   LABPROT 17.9*   INR 1.8*       Significant Diagnostics:  CTA head multiphase stroke  (11/17/18 08:39)  Acute right thalamic hemorrhage 17o45cg    CT head non-con (11/17/18 15:57)  Previously described right thalamic hemorrhage appears similar in size compared to previous exam measuring approximately 2.3 cm in long axis.  There is slight surrounding vasogenic edema but no significant midline shift or herniation.  No hydrocephalus.    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 Despite aggressive anti-impulse therapy, persistent pain requiring intervention. Preoperative CSF drain placement by Neuro IR. Successful TEVAR. POD 1 left groin hematoma and coagulopathy requiring manual pressure and product resuscitation. POD 2 new onset left upper arm weakness, found to have right thalamic hemorrhage. F/u CT head stable findings. Significant reactive left pleural effusion with surrounding atelectasis noted also.     Neuro: right thalamic bleed - f/u scan stable  - con't q1h neuro checks, LUE motor function slowly improving  - CSF drain clamped, neuro exam stable. Ok to sit up while drain clamped.   - Discussed with Dr. Contreras re: removal of CSF drain on Monday.   - speech swallow eval prior to PO intake.     CV:   - ease the spinal perfusion protocol. BP ~140, MAP  >65    Resp:  - encourage IS  - O2 supp prn  - Moderate Left pleural effusion and small right pleural effusion on CTA stoke multiphase noted. Left pleural effusion likely reactive following TEVAR. Ok for patient to sit up while CSF drain is clamped. con't pulmonary toilet.     Renal/Endocrine:  - con't monitoring UOP    GI: Hep C cirrhosis with MELD 15 on admission  - protonix  - PO diet once swallow eval cleared.    Heme/ID:  - ABLA stable.   - con't correct coagulopathy prn  - monitor thrombocytopenia  - On empiric Vanc/Zosyn per primary.     Dispo:  - ICU care          Ankit Hemphill MD  Vascular Surgery  [facility]

## 2018-11-19 PROBLEM — I81 PORTAL VEIN THROMBOSIS: Status: ACTIVE | Noted: 2018-11-19

## 2018-11-19 PROBLEM — N17.9 AKI (ACUTE KIDNEY INJURY): Status: ACTIVE | Noted: 2018-11-19

## 2018-11-19 LAB
ABO + RH BLD: NORMAL
ALBUMIN SERPL BCP-MCNC: 3 G/DL
ALBUMIN SERPL BCP-MCNC: 3.3 G/DL
ALLENS TEST: ABNORMAL
ALP SERPL-CCNC: 109 U/L
ALP SERPL-CCNC: 117 U/L
ALT SERPL W/O P-5'-P-CCNC: 106 U/L
ALT SERPL W/O P-5'-P-CCNC: 128 U/L
AMMONIA PLAS-SCNC: 35 UMOL/L
ANION GAP SERPL CALC-SCNC: 10 MMOL/L
ANION GAP SERPL CALC-SCNC: 11 MMOL/L
ANION GAP SERPL CALC-SCNC: 12 MMOL/L
AST SERPL-CCNC: 371 U/L
AST SERPL-CCNC: 506 U/L
BASOPHILS # BLD AUTO: 0 K/UL
BASOPHILS # BLD AUTO: 0.01 K/UL
BASOPHILS # BLD AUTO: 0.01 K/UL
BASOPHILS NFR BLD: 0 %
BASOPHILS NFR BLD: 0.1 %
BASOPHILS NFR BLD: 0.2 %
BILIRUB DIRECT SERPL-MCNC: 2.3 MG/DL
BILIRUB SERPL-MCNC: 4 MG/DL
BILIRUB SERPL-MCNC: 4.5 MG/DL
BLD GP AB SCN CELLS X3 SERPL QL: NORMAL
BLD PROD TYP BPU: NORMAL
BLOOD UNIT EXPIRATION DATE: NORMAL
BLOOD UNIT TYPE CODE: 5100
BLOOD UNIT TYPE: NORMAL
BNP SERPL-MCNC: 1787 PG/ML
BUN SERPL-MCNC: 57 MG/DL
BUN SERPL-MCNC: 64 MG/DL
BUN SERPL-MCNC: 66 MG/DL
CALCIUM SERPL-MCNC: 8.8 MG/DL
CALCIUM SERPL-MCNC: 8.9 MG/DL
CALCIUM SERPL-MCNC: 9.5 MG/DL
CHLORIDE SERPL-SCNC: 108 MMOL/L
CHLORIDE SERPL-SCNC: 109 MMOL/L
CHLORIDE SERPL-SCNC: 109 MMOL/L
CO2 SERPL-SCNC: 22 MMOL/L
CO2 SERPL-SCNC: 22 MMOL/L
CO2 SERPL-SCNC: 23 MMOL/L
CODING SYSTEM: NORMAL
CREAT SERPL-MCNC: 1.6 MG/DL
CREAT SERPL-MCNC: 1.8 MG/DL
CREAT SERPL-MCNC: 1.8 MG/DL
CREAT UR-MCNC: 150 MG/DL
DELSYS: ABNORMAL
DIFFERENTIAL METHOD: ABNORMAL
DISPENSE STATUS: NORMAL
EOSINOPHIL # BLD AUTO: 0 K/UL
EOSINOPHIL NFR BLD: 0 %
EOSINOPHIL NFR BLD: 0 %
EOSINOPHIL NFR BLD: 0.1 %
ERYTHROCYTE [DISTWIDTH] IN BLOOD BY AUTOMATED COUNT: 17.4 %
ERYTHROCYTE [DISTWIDTH] IN BLOOD BY AUTOMATED COUNT: 17.5 %
ERYTHROCYTE [DISTWIDTH] IN BLOOD BY AUTOMATED COUNT: 17.7 %
EST. GFR  (AFRICAN AMERICAN): 36.5 ML/MIN/1.73 M^2
EST. GFR  (AFRICAN AMERICAN): 36.5 ML/MIN/1.73 M^2
EST. GFR  (AFRICAN AMERICAN): 42.1 ML/MIN/1.73 M^2
EST. GFR  (NON AFRICAN AMERICAN): 31.7 ML/MIN/1.73 M^2
EST. GFR  (NON AFRICAN AMERICAN): 31.7 ML/MIN/1.73 M^2
EST. GFR  (NON AFRICAN AMERICAN): 36.5 ML/MIN/1.73 M^2
FIBRINOGEN PPP-MCNC: 91 MG/DL
FIBRINOGEN PPP-MCNC: 94 MG/DL
FIBRINOGEN PPP-MCNC: 94 MG/DL
FIBRINOGEN PPP-MCNC: <70 MG/DL
FLOW: 4
GLUCOSE SERPL-MCNC: 112 MG/DL
GLUCOSE SERPL-MCNC: 120 MG/DL
GLUCOSE SERPL-MCNC: 153 MG/DL
HAPTOGLOB SERPL-MCNC: <10 MG/DL
HCO3 UR-SCNC: 21.4 MMOL/L (ref 24–28)
HCT VFR BLD AUTO: 21.8 %
HCT VFR BLD AUTO: 22.4 %
HCT VFR BLD AUTO: 23.6 %
HGB BLD-MCNC: 7.1 G/DL
HGB BLD-MCNC: 7.4 G/DL
HGB BLD-MCNC: 8.2 G/DL
IMM GRANULOCYTES # BLD AUTO: 0.05 K/UL
IMM GRANULOCYTES # BLD AUTO: 0.06 K/UL
IMM GRANULOCYTES # BLD AUTO: 0.07 K/UL
IMM GRANULOCYTES NFR BLD AUTO: 0.9 %
IMM GRANULOCYTES NFR BLD AUTO: 0.9 %
IMM GRANULOCYTES NFR BLD AUTO: 1.1 %
INR PPP: 1.9
INR PPP: 2.1
LYMPHOCYTES # BLD AUTO: 0.5 K/UL
LYMPHOCYTES # BLD AUTO: 0.5 K/UL
LYMPHOCYTES # BLD AUTO: 0.8 K/UL
LYMPHOCYTES NFR BLD: 10.9 %
LYMPHOCYTES NFR BLD: 8.4 %
LYMPHOCYTES NFR BLD: 9.6 %
MAGNESIUM SERPL-MCNC: 2.1 MG/DL
MAGNESIUM SERPL-MCNC: 2.2 MG/DL
MCH RBC QN AUTO: 30 PG
MCH RBC QN AUTO: 30.5 PG
MCH RBC QN AUTO: 31.7 PG
MCHC RBC AUTO-ENTMCNC: 32.6 G/DL
MCHC RBC AUTO-ENTMCNC: 33 G/DL
MCHC RBC AUTO-ENTMCNC: 34.7 G/DL
MCV RBC AUTO: 91 FL
MCV RBC AUTO: 92 FL
MCV RBC AUTO: 92 FL
MODE: ABNORMAL
MONOCYTES # BLD AUTO: 0.8 K/UL
MONOCYTES # BLD AUTO: 1.2 K/UL
MONOCYTES # BLD AUTO: 1.2 K/UL
MONOCYTES NFR BLD: 13.4 %
MONOCYTES NFR BLD: 17 %
MONOCYTES NFR BLD: 18.6 %
NEUTROPHILS # BLD AUTO: 4.3 K/UL
NEUTROPHILS # BLD AUTO: 4.5 K/UL
NEUTROPHILS # BLD AUTO: 5 K/UL
NEUTROPHILS NFR BLD: 71 %
NEUTROPHILS NFR BLD: 71.7 %
NEUTROPHILS NFR BLD: 76.1 %
NRBC BLD-RTO: 1 /100 WBC
NUM UNITS TRANS FFP: NORMAL
NUM UNITS TRANS FFP: NORMAL
PCO2 BLDA: 26.4 MMHG (ref 35–45)
PH SMN: 7.52 [PH] (ref 7.35–7.45)
PHOSPHATE SERPL-MCNC: 2.6 MG/DL
PHOSPHATE SERPL-MCNC: 2.6 MG/DL
PHOSPHATE SERPL-MCNC: 2.8 MG/DL
PHOSPHATE SERPL-MCNC: 3.3 MG/DL
PLATELET # BLD AUTO: 46 K/UL
PLATELET # BLD AUTO: 52 K/UL
PLATELET # BLD AUTO: 70 K/UL
PMV BLD AUTO: 10.1 FL
PMV BLD AUTO: 10.8 FL
PMV BLD AUTO: 11.7 FL
PO2 BLDA: 56 MMHG (ref 80–100)
POC BE: -2 MMOL/L
POC SATURATED O2: 92 % (ref 95–100)
POC TCO2: 22 MMOL/L (ref 23–27)
POCT GLUCOSE: 123 MG/DL (ref 70–110)
POCT GLUCOSE: 126 MG/DL (ref 70–110)
POCT GLUCOSE: 131 MG/DL (ref 70–110)
POCT GLUCOSE: 139 MG/DL (ref 70–110)
POCT GLUCOSE: 151 MG/DL (ref 70–110)
POCT GLUCOSE: 88 MG/DL (ref 70–110)
POTASSIUM SERPL-SCNC: 3.7 MMOL/L
POTASSIUM SERPL-SCNC: 3.7 MMOL/L
POTASSIUM SERPL-SCNC: 4 MMOL/L
PROT SERPL-MCNC: 7.1 G/DL
PROT SERPL-MCNC: 7.5 G/DL
PROTHROMBIN TIME: 18.4 SEC
PROTHROMBIN TIME: 20.1 SEC
RBC # BLD AUTO: 2.37 M/UL
RBC # BLD AUTO: 2.43 M/UL
RBC # BLD AUTO: 2.59 M/UL
SAMPLE: ABNORMAL
SITE: ABNORMAL
SODIUM SERPL-SCNC: 142 MMOL/L
SODIUM UR-SCNC: <20 MMOL/L
TRANS ERYTHROCYTES VOL PATIENT: NORMAL ML
TRANS PLATPHERESIS VOL PATIENT: NORMAL ML
TROPONIN I SERPL DL<=0.01 NG/ML-MCNC: 0.05 NG/ML
UNIT NUMBER: NORMAL
UNIT NUMBER: NORMAL
WBC # BLD AUTO: 5.61 K/UL
WBC # BLD AUTO: 6.28 K/UL
WBC # BLD AUTO: 7 K/UL

## 2018-11-19 PROCEDURE — 85610 PROTHROMBIN TIME: CPT | Mod: 91

## 2018-11-19 PROCEDURE — 93005 ELECTROCARDIOGRAM TRACING: CPT

## 2018-11-19 PROCEDURE — 83880 ASSAY OF NATRIURETIC PEPTIDE: CPT

## 2018-11-19 PROCEDURE — 92526 ORAL FUNCTION THERAPY: CPT

## 2018-11-19 PROCEDURE — 99291 CRITICAL CARE FIRST HOUR: CPT | Mod: ,,, | Performed by: NURSE PRACTITIONER

## 2018-11-19 PROCEDURE — P9035 PLATELET PHERES LEUKOREDUCED: HCPCS

## 2018-11-19 PROCEDURE — 94761 N-INVAS EAR/PLS OXIMETRY MLT: CPT

## 2018-11-19 PROCEDURE — 63600175 PHARM REV CODE 636 W HCPCS: Performed by: NURSE PRACTITIONER

## 2018-11-19 PROCEDURE — P9012 CRYOPRECIPITATE EACH UNIT: HCPCS

## 2018-11-19 PROCEDURE — 36600 WITHDRAWAL OF ARTERIAL BLOOD: CPT

## 2018-11-19 PROCEDURE — 84300 ASSAY OF URINE SODIUM: CPT

## 2018-11-19 PROCEDURE — 82803 BLOOD GASES ANY COMBINATION: CPT

## 2018-11-19 PROCEDURE — 84075 ASSAY ALKALINE PHOSPHATASE: CPT

## 2018-11-19 PROCEDURE — 99233 SBSQ HOSP IP/OBS HIGH 50: CPT | Mod: ,,, | Performed by: PSYCHIATRY & NEUROLOGY

## 2018-11-19 PROCEDURE — 82140 ASSAY OF AMMONIA: CPT

## 2018-11-19 PROCEDURE — 63600175 PHARM REV CODE 636 W HCPCS: Performed by: STUDENT IN AN ORGANIZED HEALTH CARE EDUCATION/TRAINING PROGRAM

## 2018-11-19 PROCEDURE — 84484 ASSAY OF TROPONIN QUANT: CPT

## 2018-11-19 PROCEDURE — 85384 FIBRINOGEN ACTIVITY: CPT

## 2018-11-19 PROCEDURE — 84100 ASSAY OF PHOSPHORUS: CPT

## 2018-11-19 PROCEDURE — 85025 COMPLETE CBC W/AUTO DIFF WBC: CPT

## 2018-11-19 PROCEDURE — P9017 PLASMA 1 DONOR FRZ W/IN 8 HR: HCPCS

## 2018-11-19 PROCEDURE — 82247 BILIRUBIN TOTAL: CPT

## 2018-11-19 PROCEDURE — 25000003 PHARM REV CODE 250: Performed by: NURSE PRACTITIONER

## 2018-11-19 PROCEDURE — 82570 ASSAY OF URINE CREATININE: CPT

## 2018-11-19 PROCEDURE — 80069 RENAL FUNCTION PANEL: CPT

## 2018-11-19 PROCEDURE — 83735 ASSAY OF MAGNESIUM: CPT | Mod: 91

## 2018-11-19 PROCEDURE — 97535 SELF CARE MNGMENT TRAINING: CPT

## 2018-11-19 PROCEDURE — 83010 ASSAY OF HAPTOGLOBIN QUANT: CPT

## 2018-11-19 PROCEDURE — 25000003 PHARM REV CODE 250: Performed by: INTERNAL MEDICINE

## 2018-11-19 PROCEDURE — 27000221 HC OXYGEN, UP TO 24 HOURS

## 2018-11-19 PROCEDURE — 93010 ELECTROCARDIOGRAM REPORT: CPT | Mod: ,,, | Performed by: INTERNAL MEDICINE

## 2018-11-19 PROCEDURE — 80053 COMPREHEN METABOLIC PANEL: CPT

## 2018-11-19 PROCEDURE — 84155 ASSAY OF PROTEIN SERUM: CPT

## 2018-11-19 PROCEDURE — 25000003 PHARM REV CODE 250: Performed by: STUDENT IN AN ORGANIZED HEALTH CARE EDUCATION/TRAINING PROGRAM

## 2018-11-19 PROCEDURE — 86850 RBC ANTIBODY SCREEN: CPT

## 2018-11-19 PROCEDURE — 83735 ASSAY OF MAGNESIUM: CPT

## 2018-11-19 PROCEDURE — 20000000 HC ICU ROOM

## 2018-11-19 PROCEDURE — 86920 COMPATIBILITY TEST SPIN: CPT

## 2018-11-19 RX ORDER — FENTANYL CITRATE/PF 250MCG/5ML
25 SYRINGE (ML) INTRAVENOUS ONCE
Status: COMPLETED | OUTPATIENT
Start: 2018-11-19 | End: 2018-11-19

## 2018-11-19 RX ORDER — HYDROCODONE BITARTRATE AND ACETAMINOPHEN 500; 5 MG/1; MG/1
TABLET ORAL
Status: DISCONTINUED | OUTPATIENT
Start: 2018-11-19 | End: 2018-11-19

## 2018-11-19 RX ORDER — FUROSEMIDE 10 MG/ML
40 INJECTION INTRAMUSCULAR; INTRAVENOUS ONCE
Status: COMPLETED | OUTPATIENT
Start: 2018-11-19 | End: 2018-11-19

## 2018-11-19 RX ORDER — OXYCODONE HYDROCHLORIDE 5 MG/1
5 TABLET ORAL EVERY 6 HOURS PRN
Status: DISCONTINUED | OUTPATIENT
Start: 2018-11-19 | End: 2018-11-19

## 2018-11-19 RX ORDER — HYDROMORPHONE HYDROCHLORIDE 2 MG/1
2 TABLET ORAL EVERY 4 HOURS PRN
Status: DISCONTINUED | OUTPATIENT
Start: 2018-11-19 | End: 2018-11-19

## 2018-11-19 RX ORDER — HYDROCODONE BITARTRATE AND ACETAMINOPHEN 500; 5 MG/1; MG/1
TABLET ORAL
Status: DISCONTINUED | OUTPATIENT
Start: 2018-11-19 | End: 2018-11-20

## 2018-11-19 RX ORDER — LACTULOSE 10 G/15ML
20 SOLUTION ORAL 2 TIMES DAILY
Status: DISCONTINUED | OUTPATIENT
Start: 2018-11-19 | End: 2018-11-21

## 2018-11-19 RX ADMIN — HYDROMORPHONE HYDROCHLORIDE 2 MG: 2 TABLET ORAL at 07:11

## 2018-11-19 RX ADMIN — NICARDIPINE HYDROCHLORIDE 7.5 MG/HR: 0.2 INJECTION, SOLUTION INTRAVENOUS at 02:11

## 2018-11-19 RX ADMIN — RIFAXIMIN 550 MG: 550 TABLET ORAL at 09:11

## 2018-11-19 RX ADMIN — NIFEDIPINE 30 MG: 30 TABLET, FILM COATED, EXTENDED RELEASE ORAL at 08:11

## 2018-11-19 RX ADMIN — ONDANSETRON 8 MG: 8 TABLET, ORALLY DISINTEGRATING ORAL at 04:11

## 2018-11-19 RX ADMIN — Medication 25 MCG: at 10:11

## 2018-11-19 RX ADMIN — FUROSEMIDE 40 MG: 10 INJECTION, SOLUTION INTRAMUSCULAR; INTRAVENOUS at 05:11

## 2018-11-19 RX ADMIN — FENTANYL CITRATE 25 MCG: 50 INJECTION INTRAMUSCULAR; INTRAVENOUS at 02:11

## 2018-11-19 RX ADMIN — PHYTONADIONE 10 MG: 10 INJECTION, EMULSION INTRAMUSCULAR; INTRAVENOUS; SUBCUTANEOUS at 03:11

## 2018-11-19 RX ADMIN — NICARDIPINE HYDROCHLORIDE 2.5 MG/HR: 0.2 INJECTION, SOLUTION INTRAVENOUS at 09:11

## 2018-11-19 RX ADMIN — LACTULOSE 20 G: 20 SOLUTION ORAL at 09:11

## 2018-11-19 RX ADMIN — ONDANSETRON 8 MG: 8 TABLET, ORALLY DISINTEGRATING ORAL at 02:11

## 2018-11-19 RX ADMIN — RIFAXIMIN 550 MG: 550 TABLET ORAL at 08:11

## 2018-11-19 RX ADMIN — PANTOPRAZOLE SODIUM 40 MG: 40 TABLET, DELAYED RELEASE ORAL at 08:11

## 2018-11-19 RX ADMIN — OXYCODONE HYDROCHLORIDE 5 MG: 5 TABLET ORAL at 05:11

## 2018-11-19 RX ADMIN — FENTANYL CITRATE 25 MCG: 50 INJECTION INTRAMUSCULAR; INTRAVENOUS at 06:11

## 2018-11-19 RX ADMIN — LACTULOSE 20 G: 20 SOLUTION ORAL at 08:11

## 2018-11-19 NOTE — PLAN OF CARE
"Problem: Patient Care Overview  Goal: Plan of Care Review  Outcome: Ongoing (interventions implemented as appropriate)  Dx: s/p TEVAR and Stroke    Shift Events: 2 U FFP, swallow study performed. Family conference held with team, all questions answered/ concerns addressed. See flow sheet for full assessment and vitals.    Vital Signs: BP (!) 140/65   Pulse 90   Temp 98.1 °F (36.7 °C) (Oral)   Resp 16   Ht 5' 6" (1.676 m)   Wt 58.1 kg (128 lb 1.4 oz)   SpO2 96%   Breastfeeding? No   BMI 20.67 kg/m²     Neuro: Oriented to self, place, and situation, slow to orient to time. Moves all extremities spontaneously, answers/nods appropriately.   Cardiovascular: NSR 80s-90s. SBP maintained < 140.  O2: 4 L NC, wean to maintain sat> 92%.  Gtts: Cardene (maintain SBP <140 on art line)  Diet:  mechanical soft with nectar thick liquids.   Output:  cc/hr. Urine output decreased over duration of shift ~20cc/hr currently.  SANDHYA Pereira NP aware.   Pain Management:: PRN fentanyl.   Labs: CBC, fibrinogen, renal fx trended.   Skin: No breakdown noted. Bilateral groin incision dressings CDI. Lumbar drain site CDI. Weight shift assistance provided q 2 to prevent breakdown.                "

## 2018-11-19 NOTE — ASSESSMENT & PLAN NOTE
-- weaned off nicardipine on 11/19  -- restarted home nifedipine  -- maintian SBP <160 per vasc neurology recs

## 2018-11-19 NOTE — ASSESSMENT & PLAN NOTE
- acute neuro changes noted around 0800 11/17, last known normal at 0700   - Stroke code called; thalamic bleed noted on CT   - No intervention per vascular neurology  - repeat Head CT stable  - neuro exam much improved on 11/18  - Systolic BP goal <160

## 2018-11-19 NOTE — SUBJECTIVE & OBJECTIVE
Medications:  Continuous Infusions:   niCARdipine 7.5 mg/hr (11/19/18 0200)     Scheduled Meds:   lactulose  20 g Oral Daily    NIFEdipine  30 mg Oral Daily    pantoprazole  40 mg Oral Daily    rifAXImin  550 mg Oral BID     PRN Meds:sodium chloride, fentaNYL, hydrALAZINE, niCARdipine, ondansetron     Objective:     Vital Signs (Most Recent):  Temp: 98.4 °F (36.9 °C) (11/18/18 2300)  Pulse: 81 (11/19/18 0431)  Resp: (!) 33 (11/19/18 0431)  BP: 129/72 (11/19/18 0030)  SpO2: (!) 93 % (11/19/18 0431) Vital Signs (24h Range):  Temp:  [97.7 °F (36.5 °C)-98.6 °F (37 °C)] 98.4 °F (36.9 °C)  Pulse:  [] 81  Resp:  [15-43] 33  SpO2:  [90 %-99 %] 93 %  BP: (117-166)/(57-98) 129/72  Arterial Line BP: (114-172)/(55-77) 138/64          Physical Exam   Constitutional: She is oriented to person, place, and time. She appears well-developed and well-nourished.   HENT:   Head: Normocephalic and atraumatic.   Eyes: EOM are normal. Pupils are equal, round, and reactive to light.   Neck: Normal range of motion. Neck supple.   Cardiovascular: Normal rate and regular rhythm.   Pulmonary/Chest: Effort normal and breath sounds normal.   Abdominal: Soft. Bowel sounds are normal.   Genitourinary:   Genitourinary Comments: Perry in place   Musculoskeletal:   LUE 3/5, poor fine motor movement.   RUE 5/5, intact fine motor movement  LLE4/5, able to flex the knee (slightly weaker than Right)  RLE 5/5, able to flex the knee.   CSF drain site dressing c/d/i   Neurological: She is alert and oriented to person, place, and time.   LUE weakness improved   Skin: Skin is warm.   Nursing note and vitals reviewed.      Significant Labs:  CBC:   Recent Labs   Lab 11/19/18  0300   WBC 7.00   RBC 2.59*   HGB 8.2*   HCT 23.6*   PLT 52*   MCV 91   MCH 31.7*   MCHC 34.7     CMP:   Recent Labs   Lab 11/19/18  0300   *   CALCIUM 8.8   ALBUMIN 3.0*  3.0*   PROT 7.1      K 3.7   CO2 22*      BUN 57*   CREATININE 1.6*   ALKPHOS 117    *   *   BILITOT 4.0*     Coagulation:   Recent Labs   Lab 11/19/18  0300   LABPROT 18.4*   INR 1.9*

## 2018-11-19 NOTE — ASSESSMENT & PLAN NOTE
- worsening RUQ pain on 11/19  - U/S with probable right portal vein thrombosis  - unable to anticoagulate due to coagulopathy/ thrombocytopenia/ recent bleeding episodes   - repeat U/S recommended in 3-5 days

## 2018-11-19 NOTE — PT/OT/SLP PROGRESS
Speech Language Pathology Treatment    Patient Name:  Leah Cleaning   MRN:  [id number]  Admitting Diagnosis: Essential hypertension    Recommendations:                 General Recommendations:  Dysphagia therapy  Diet recommendations:  Puree, Liquid Diet Level: NPO   Aspiration Precautions: 1 bite/sip at a time, Alternating bites/sips, Assistance with meals, Eliminate distractions, Feed only when awake/alert, Frequent oral care, Ice chips sparingly, Meds crushed in puree, Monitor for s/s of aspiration, Remain upright 30 minutes post meal, Small bites/sips and Standard aspiration precautions   General Precautions: Standard, aspiration, fall, NPO, respiratory  Communication strategies:  none    Subjective     Awake; lethargic. Family member present.      Pain/Comfort:  · Pain Rating 1: 0/10    Objective:     Has the patient been evaluated by SLP for swallowing?   Yes  Keep patient NPO? Yes   Current Respiratory Status: nasal cannula      Patient found awake and alert, reclined in bed. Patient repositioned for PO trials. Patient with no overt signs of airway compromise over thin liquid trials this date - no throat clears, no change in voice quality, and no desat in O2.  Patient with generalized weak oral cavity movements, though tolerated puree trials with no clinical signs of airway compromise. Oral phase of swallow as unremarkable for puree trials. Solid trials deferred this date. Patient and family member were provided skilled education regarding diet recommendations, aspiration precautions, as well as ongoing ST plan of care. Patient's family member recalled aspiration precautions, demonstrated good understanding of education provided. Recommend reinforcement to patient.   Whiteboard updated.     Assessment:     Leah Cleaning is a 53 y.o. female with an SLP diagnosis of Dysphagia.     Goals:   Multidisciplinary Problems     SLP Goals        Problem: SLP Goal    Goal Priority Disciplines Outcome   SLP Goal      SLP    Description:  Speech Language Pathology Goals  Goals expected to be met by 11/26:  1. Ongoing swallow assessment.   2. Patient will tolerate a diet of puree consistencies and thin liquids with no overt signs of airway compromise.   Goals expected to be met by 11/25  1. Ongoing swallow assessment  2. SLP Speech/Language/Cognitive Evaluation                         Plan:     · Patient to be seen:  4 x/week   · Plan of Care expires:  12/17/18  · Plan of Care reviewed with:  patient, family   · SLP Follow-Up:  Yes       Discharge recommendations:  other (see comments)     Time Tracking:     SLP Treatment Date:   11/19/18  Speech Start Time:  0925  Speech Stop Time:  0942     Speech Total Time (min):  17 min    Billable Minutes: Treatment Swallowing Dysfunction 9 and Seld Care/Home Management Training 8    Emily P. Abadie M.S., CCC-SLP  Speech Language Pathologist  [phone]  11/19/2018

## 2018-11-19 NOTE — ASSESSMENT & PLAN NOTE
53 F with Hep C cirrhosis (MELD 15 on admission), HTN, crack cocaine abuse, who presented with acute tearing chest/back pain to OSH, Found to have retrograde type B dissection. Despite aggressive anti-impulse therapy, persistent pain requiring intervention. Preoperative CSF drain placement by Neuro IR. Successful TEVAR. POD 1 left groin hematoma and coagulopathy requiring manual pressure and product resuscitation. POD 2 new onset left upper arm weakness, found to have right thalamic hemorrhage. F/u CT head stable findings. Significant reactive left pleural effusion with surrounding atelectasis noted also.     Neuro: right thalamic bleed - f/u scan stable  - con't q1h neuro checks, LUE motor function slowly improving  - CSF drain clamped, neuro exam stable.  - Discussed with Dr. Contreras re: removal of CSF drain today   - speech swallow passed; advancing diet    CV:   - ease the spinal perfusion protocol. BP ~140, MAP >65    Resp:  - encourage IS  - O2 supp prn  - Moderate Left pleural effusion and small right pleural effusion on CTA stoke multiphase noted. Left pleural effusion likely reactive following TEVAR. Ok for patient to sit up while CSF drain is clamped. Con't pulmonary toilet.     Renal/Endocrine:  - con't monitoring UOP    GI: Hep C cirrhosis with MELD 15 on admission  - protonix  - PO diet    Heme/ID:  - ABLA stable.   - con't correct coagulopathy prn  - monitor thrombocytopenia  - On empiric Vanc/Zosyn per primary.     Dispo:  - ICU care

## 2018-11-19 NOTE — NURSING
Patient pulled arterial line from right radial artery. Significant bleeding found. Pressure held for 45 minutes. Critical Care and Vascular teams notified that Cardene will now be titrated by cuff pressures.

## 2018-11-19 NOTE — ASSESSMENT & PLAN NOTE
- BUN & Creat slowly rising  - decreased UOP over the past 24 hours  - urine lytes ordered  - continue to monitor

## 2018-11-19 NOTE — ASSESSMENT & PLAN NOTE
--required levo, gurwinder, and vaso on 11/16  --bleed from bilateral groin sites with significant hematoma formation to left groin. Oliverio groin US, no intervention warrented per [facility] sx  --received 4 U PRBC, 3 U FFP, 1 U platelets, 1 U cryo, and vitamin K on 11/16  --1 U PRBC, 1 U FFP, 1 U platelets, 1 U cryo, and vit K given 11/17  --Shock resolved

## 2018-11-19 NOTE — PLAN OF CARE
Problem: Spiritual Distress, Risk/Actual (Adult,Obstetrics,Pediatric)  Intervention: Facilitate Personal Exploration/Expression of Spirituality  Provided initial visit. Pt and pt's niece present. Introduced and offered pastoral support with reflective listening and prayer upon request. Pt expressed expressed reliance on her shannen and prayer for strength. Pt made aware of 's presence as needed for follow-up.

## 2018-11-19 NOTE — NURSING
Patient began complaining of localized right lower quadrant pain, worse with palpation. Unrelieved with positioning or pain medication. Bowel sounds remain active. Soft to palpation. Patient denies need for BM at this time. Dr. Mortensen notified, new orders for evaluation pending.

## 2018-11-19 NOTE — ASSESSMENT & PLAN NOTE
--appreciate vascular surgery assistance  --s/p lumbar drain placement and TEVAR  --lumbar drain clamped, plan for removal tomorrow   --serial neuro/ neurovascular exams

## 2018-11-19 NOTE — PLAN OF CARE
Problem: SLP Goal  Goal: SLP Goal  Speech Language Pathology Goals  Goals expected to be met by 11/26:  1. Ongoing swallow assessment.   2. Patient will tolerate a diet of puree consistencies and thin liquids with no overt signs of airway compromise.   Goals expected to be met by 11/25  1. Ongoing swallow assessment  2. SLP Speech/Language/Cognitive Evaluation       Goals updated. ST recommending puree diet with thin liquids.   Emily P. Abadie M.S., CCC-SLP  Speech Language Pathologist  [phone]  11/19/2018

## 2018-11-19 NOTE — PHYSICIAN QUERY
PT Name: Leah Cleaning  MR #: [id number]  Physician Query Form - Hematology Clarification     CDS/: Lisbeth Shell RN              Contact information: [phone]    This form is a permanent document in the medical record.     Query Date: November 19, 2018    By submitting this query, we are merely seeking further clarification of documentation. Please utilize your independent clinical judgment when addressing the question(s) below.    The medical record contains the following:    Indicators Supporting Clinical Findings Location in Medical Record   x Surgery or Procedure performed:   Estimated Blood Loss (EBL): TEVAR without Left Subclavian coverage,  Alpha    Op Note 11/15   x Bleeding documented Hemorrhagic shock  --required levo, gurwinder, and vaso overnight. Down to just levo this afternoon   --bleeding from bilateral groin sites with significant hematoma formation to left groin. Pressure held for an extended period    Progress Note 11/16   x Hemorrhage documented Hemorrhagic shock Progress Note 11/16       Critical Care Medicine     x Hematoma documented POD 1 left groin hematoma and coagulopathy requiring manual pressure and product resuscitation. Progress Note 11/17       Vascular Surgery       Evacuation of hematoma documented      Anticoagulants:      x Transfusions / Blood Products: --received 4 U PRBC, 3 U FFP, 1 U platelets, 1 U cryo, and vitamin K\    Progress Note 11/16       Critical Care Medicine   x Treatments: Early 11/16, pt noted to be in hemorragic shock complicated by liver disease as vasopressor requirements increased significantly,  rising lactic acidosis, and patient was found to be bleeding from bilateral groin sites with significant hematoma formation to left groin. Pressure was held for an extended period and patient received 4 U PRBC, 3 U FFP, 1 U platelets, 1 U cryo, and vitamin  K.       Progress Note 11/16       Critical Care Medicine    Other:       Please clarify  if the __bleeding from bilateral groin sites with significant hematoma formation to left groin_________________________ is                 [   ]   Inherent to the procedure               [   ]   Routinely expected                [   ]   Complication of procedure               [   ]   Due to medication (specify):_________________________________               [XX   ]   Other: ___due to thrombocytopenia, coagulopathy and liver disease_________________________               [   ]   Clinically insignificant                                          Please document in your progress notes daily for the duration of treatment, until resolved, and include in your discharge summary.

## 2018-11-19 NOTE — PROGRESS NOTES
[facility]  Vascular Surgery  Progress Note    Patient Name: Leah Cleaning  MRN: [id number]  Admission Date: 11/15/2018  Primary Care Provider: Mary Colorado MD    Subjective:     Interval History: NAEON. Continued thrombocytopenia and coagulopathy. Neuro exam stable.    Post-Op Info:  Procedure(s) (LRB):  REPAIR, ANEURYSM, ENDOVASCULAR GRAFT, AORTA, THORACIC (N/A)   4 Days Post-Op       Medications:  Continuous Infusions:   niCARdipine 7.5 mg/hr (11/19/18 0200)     Scheduled Meds:   lactulose  20 g Oral Daily    NIFEdipine  30 mg Oral Daily    pantoprazole  40 mg Oral Daily    rifAXImin  550 mg Oral BID     PRN Meds:sodium chloride, fentaNYL, hydrALAZINE, niCARdipine, ondansetron     Objective:     Vital Signs (Most Recent):  Temp: 98.4 °F (36.9 °C) (11/18/18 2300)  Pulse: 81 (11/19/18 0431)  Resp: (!) 33 (11/19/18 0431)  BP: 129/72 (11/19/18 0030)  SpO2: (!) 93 % (11/19/18 0431) Vital Signs (24h Range):  Temp:  [97.7 °F (36.5 °C)-98.6 °F (37 °C)] 98.4 °F (36.9 °C)  Pulse:  [] 81  Resp:  [15-43] 33  SpO2:  [90 %-99 %] 93 %  BP: (117-166)/(57-98) 129/72  Arterial Line BP: (114-172)/(55-77) 138/64          Physical Exam   Constitutional: She is oriented to person, place, and time. She appears well-developed and well-nourished.   HENT:   Head: Normocephalic and atraumatic.   Eyes: EOM are normal. Pupils are equal, round, and reactive to light.   Neck: Normal range of motion. Neck supple.   Cardiovascular: Normal rate and regular rhythm.   Pulmonary/Chest: Effort normal and breath sounds normal.   Abdominal: Soft. Bowel sounds are normal.   Genitourinary:   Genitourinary Comments: Perry in place   Musculoskeletal:   LUE 3/5, poor fine motor movement.   RUE 5/5, intact fine motor movement  LLE4/5, able to flex the knee (slightly weaker than Right)  RLE 5/5, able to flex the knee.   CSF drain site dressing c/d/i   Neurological: She is alert and oriented to person, place, and time.   MARIIA  weakness improved   Skin: Skin is warm.   Nursing note and vitals reviewed.      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 Despite aggressive anti-impulse therapy, persistent pain requiring intervention. Preoperative CSF drain placement by Neuro IR. Successful TEVAR. POD 1 left groin hematoma and coagulopathy requiring manual pressure and product resuscitation. POD 2 new onset left upper arm weakness, found to have right thalamic hemorrhage. F/u CT head stable findings. Significant reactive left pleural effusion with surrounding atelectasis noted also.     Neuro: right thalamic bleed - f/u scan stable  - con't q1h neuro checks, LUE motor function slowly improving  - CSF drain clamped, neuro exam stable.  - Discussed with Dr. Contreras re: removal of CSF drain today   - speech swallow passed; advancing diet    CV:   - ease the spinal perfusion protocol. BP ~140, MAP >65    Resp:  - encourage IS  - O2 supp prn  - Moderate Left pleural effusion and small right pleural effusion on CTA stoke multiphase noted. Left pleural effusion likely reactive following TEVAR. Ok for patient to sit up while CSF drain is clamped. Con't pulmonary toilet.     Renal/Endocrine:  - con't monitoring UOP    GI: Hep C cirrhosis with MELD 15 on admission  - protonix  - PO diet    Heme/ID:  - ABLA stable.   -  con't correct coagulopathy prn  - monitor thrombocytopenia  - On empiric Vanc/Zosyn per primary.     Dispo:  - ICU care          Perla Castro MD  Vascular Surgery  [facility]

## 2018-11-19 NOTE — SUBJECTIVE & OBJECTIVE
Interval History/Significant Events: Some mild confusion and pulling at lines over night. Complaint of worsening RUQ abdominal pain; US with probable right portal vein thrombosis. Off nicardipine drip.     Review of Systems   Respiratory: Negative for cough and shortness of breath.    Cardiovascular: Negative for chest pain and palpitations.   Gastrointestinal: Positive for abdominal pain (RUQ ). Negative for blood in stool, nausea and vomiting.   Musculoskeletal: Positive for neck pain (discomfort at RIJ site). Negative for back pain and neck stiffness.   Neurological: Negative for weakness, numbness and headaches.   Hematological: Bruises/bleeds easily.   Psychiatric/Behavioral: Positive for confusion.     Objective:     Vital Signs (Most Recent):  Temp: 96.4 °F (35.8 °C) (11/19/18 1430)  Pulse: 86 (11/19/18 1430)  Resp: (!) 25 (11/19/18 1430)  BP: 123/62 (11/19/18 1430)  SpO2: (!) 90 % (11/19/18 1430) Vital Signs (24h Range):  Temp:  [92.7 °F (33.7 °C)-98.6 °F (37 °C)] 96.4 °F (35.8 °C)  Pulse:  [] 86  Resp:  [13-43] 25  SpO2:  [90 %-99 %] 90 %  BP: ()/(50-98) 123/62  Arterial Line BP: (116-151)/(55-74) 116/60   Weight: 58.1 kg (128 lb 1.4 oz)  Body mass index is 20.67 kg/m².      Intake/Output Summary (Last 24 hours) at 11/19/2018 1515  Last data filed at 11/19/2018 1400  Gross per 24 hour   Intake 2113 ml   Output 445 ml   Net 1668 ml       Physical Exam   Constitutional: She is oriented to person, place, and time. She appears well-developed. She is cooperative. She does not have a sickly appearance. She does not appear ill. No distress.   HENT:   Head: Normocephalic and atraumatic.   Eyes: Conjunctivae and EOM are normal. Pupils are equal, round, and reactive to light. No scleral icterus.   Neck: Normal range of motion. No JVD present. No tracheal deviation present.   Cardiovascular: Normal rate, regular rhythm, S1 normal, S2 normal and normal heart sounds.   No murmur heard.  Pulses:       Radial  pulses are 2+ on the right side, and 2+ on the left side.        Dorsalis pedis pulses are 2+ on the right side, and 2+ on the left side.   Warm extremities.    Pulmonary/Chest: Effort normal and breath sounds normal. No accessory muscle usage. No tachypnea (mild). No respiratory distress. She has no wheezes. She has no rhonchi. She has no rales.   Abdominal: Soft. Bowel sounds are normal. She exhibits distension (increased from yesterday). There is tenderness (RUQ). There is no rigidity, no rebound and no guarding.   Genitourinary:   Genitourinary Comments: Perry with concentrated output   Neurological: She is alert and oriented to person, place, and time. GCS eye subscore is 4. GCS verbal subscore is 5. GCS motor subscore is 6.   AAO. Conversant. PERRL 5mm tara. Left facial droop. Moving all extremities, follows commands. Strength 5/5 in all 4 extremities.     Skin: Skin is dry. Capillary refill takes 2 to 3 seconds. Bruising noted. She is not diaphoretic. No pallor.        Large marked hematoma to left groin without active bleeding. Decreasing in size   Psychiatric: She has a normal mood and affect. Her behavior is normal. Judgment and thought content normal.       Vents:  Oxygen Concentration (%): 40 (11/17/18 0339)  Lines/Drains/Airways     Central Venous Catheter Line                 Percutaneous Central Line Insertion/Assessment - triple lumen  11/15/18 right internal jugular 4 days          Drain                 Lumbar Drain 11/15/18 1500 4 days         Urethral Catheter 11/15/18 1518 Non-latex;Straight-tip 16 Fr. 3 days          Peripheral Intravenous Line                 Peripheral IV - Single Lumen 11/19/18 1300 Anterior;Right Forearm less than 1 day         Peripheral IV - Single Lumen 11/19/18 1300 Anterior;Right Upper Arm less than 1 day              Significant Labs:    CBC/Anemia Profile:  Recent Labs   Lab 11/18/18  1600 11/19/18  0300 11/19/18  1403   WBC 6.59 7.00 5.61   HGB 8.1* 8.2* 7.4*   HCT  24.0* 23.6* 22.4*   PLT 65* 52* 46*   MCV 92 91 92   RDW 17.2* 17.4* 17.5*        Chemistries:  Recent Labs   Lab 11/18/18  0400  11/18/18  1600 11/19/18  0300 11/19/18  1403      < > 142 142 142   K 3.8   < > 4.1 3.7 4.0   *   < > 110 109 109   CO2 21*   < > 21* 22* 23   BUN 45*   < > 52* 57* 64*   CREATININE 1.5*   < > 1.6* 1.6* 1.8*   CALCIUM 8.8   < > 9.0 8.8 8.9   ALBUMIN 2.8*  2.8*   < > 2.9* 3.0*  3.0* 3.0*   PROT 6.5  --   --  7.1  --    BILITOT 4.0*  --   --  4.0*  --    ALKPHOS 94  --   --  117  --    *  --   --  128*  --    *  --   --  506*  --    MG 2.1  --   --  2.1  --    PHOS 2.5*  2.5*   < > 2.7 2.6*  2.6* 3.3    < > = values in this interval not displayed.       All pertinent labs within the past 24 hours have been reviewed.    Significant Imaging:  I have reviewed all pertinent imaging results/findings within the past 24 hours.

## 2018-11-19 NOTE — ASSESSMENT & PLAN NOTE
--per patient, has known about cirrhosis for >10 years. Has never received treatment for Hep C  --continue supportive care for coagulopathy, thrombocytopenia in setting of Type B aortic dissection with hemorrhagic shock and new thalamic bleed   --lactulose and rifaximin   --LFTs increasing

## 2018-11-19 NOTE — PROGRESS NOTES
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 Cleaning is a 54 yo female with a history significant for Hep C cirrhosis, HTN, cocaine and marijuana use who presented to [facility] on the early morning of 11/15 as a transfer from [facility] for Type B aortic dissection. She initially presented to [facility] on the evening of 11/14 for sharp epigastric pain that radiated to her back which started initially on the night of 11/13. CT at OSH noted aortic dissection from left subclavian to just proximal of celiac artery. She was transferred to [facility] for vascular surgery consult on a nicardipine gtt. She was transitioned from nicardipine gtt to esmolol gtt. CTA upon arrival confirmed dissection and she was admitted to MICU for medical management of the dissection with vascular surgery following.     Hospital/ICU Course:  Mrs. Cleaning was admitted to MICU for Type B aortic dissection on esmolol gtt to maintain HR of 60 and SBP <120. Vascular surgery following, no surgical intervention warranted initially. CTA read concerning for possible aortic rupture in the descending aorta with high-density fluid noted in the posterior mediastinum; additionally, there was a suspected penetrating ulcer in the upper abdominal aorta adjacent to the true lumen. Due to high risk/ possible rupture, Lumbar drain was placed by interventional radiology and TEVAR completed by vascular surgery. Early 11/16, pt noted to be in hemorragic shock complicated by liver disease as vasopressor requirements increased significantly, rising lactic acidosis, and patient was found to be bleeding from  bilateral groin sites with significant hematoma formation to left groin. Pressure was held for an extended period and patient received 4 U PRBC, 3 U FFP, 1 U platelets, 1 U cryo, and vitamin K. Stroke code called around 0800 on 11/17 due to acute left sided weakness; thalamic bleed noted on CT. No intervention per vascular neurology. Repeat CT stable. Neuro exam much improved. New RUQ abdominal pain reported, US with probable right portal vein thrombosis; anticoagulation contraindicated.           Interval History/Significant Events: Some mild confusion and pulling at lines over night. Complaint of worsening RUQ abdominal pain; US with probable right portal vein thrombosis. Off nicardipine drip.     Review of Systems   Respiratory: Negative for cough and shortness of breath.    Cardiovascular: Negative for chest pain and palpitations.   Gastrointestinal: Positive for abdominal pain (RUQ ). Negative for blood in stool, nausea and vomiting.   Musculoskeletal: Positive for neck pain (discomfort at RIJ site). Negative for back pain and neck stiffness.   Neurological: Negative for weakness, numbness and headaches.   Hematological: Bruises/bleeds easily.   Psychiatric/Behavioral: Positive for confusion.     Objective:     Vital Signs (Most Recent):  Temp: 96.4 °F (35.8 °C) (11/19/18 1430)  Pulse: 86 (11/19/18 1430)  Resp: (!) 25 (11/19/18 1430)  BP: 123/62 (11/19/18 1430)  SpO2: (!) 90 % (11/19/18 1430) Vital Signs (24h Range):  Temp:  [92.7 °F (33.7 °C)-98.6 °F (37 °C)] 96.4 °F (35.8 °C)  Pulse:  [] 86  Resp:  [13-43] 25  SpO2:  [90 %-99 %] 90 %  BP: ()/(50-98) 123/62  Arterial Line BP: (116-151)/(55-74) 116/60   Weight: 58.1 kg (128 lb 1.4 oz)  Body mass index is 20.67 kg/m².      Intake/Output Summary (Last 24 hours) at 11/19/2018 1515  Last data filed at 11/19/2018 1400  Gross per 24 hour   Intake 2113 ml   Output 445 ml   Net 1668 ml       Physical Exam   Constitutional: She is oriented to person, place,  and time. She appears well-developed. She is cooperative. She does not have a sickly appearance. She does not appear ill. No distress.   HENT:   Head: Normocephalic and atraumatic.   Eyes: Conjunctivae and EOM are normal. Pupils are equal, round, and reactive to light. No scleral icterus.   Neck: Normal range of motion. No JVD present. No tracheal deviation present.   Cardiovascular: Normal rate, regular rhythm, S1 normal, S2 normal and normal heart sounds.   No murmur heard.  Pulses:       Radial pulses are 2+ on the right side, and 2+ on the left side.        Dorsalis pedis pulses are 2+ on the right side, and 2+ on the left side.   Warm extremities.    Pulmonary/Chest: Effort normal and breath sounds normal. No accessory muscle usage. No tachypnea (mild). No respiratory distress. She has no wheezes. She has no rhonchi. She has no rales.   Abdominal: Soft. Bowel sounds are normal. She exhibits distension (increased from yesterday). There is tenderness (RUQ). There is no rigidity, no rebound and no guarding.   Genitourinary:   Genitourinary Comments: Perry with concentrated output   Neurological: She is alert and oriented to person, place, and time. GCS eye subscore is 4. GCS verbal subscore is 5. GCS motor subscore is 6.   AAO. Conversant. PERRL 5mm tara. Left facial droop. Moving all extremities, follows commands. Strength 5/5 in all 4 extremities.     Skin: Skin is dry. Capillary refill takes 2 to 3 seconds. Bruising noted. She is not diaphoretic. No pallor.        Large marked hematoma to left groin without active bleeding. Decreasing in size   Psychiatric: She has a normal mood and affect. Her behavior is normal. Judgment and thought content normal.       Vents:  Oxygen Concentration (%): 40 (11/17/18 0339)  Lines/Drains/Airways     Central Venous Catheter Line                 Percutaneous Central Line Insertion/Assessment - triple lumen  11/15/18 right internal jugular 4 days          Drain                  Lumbar Drain 11/15/18 1500 4 days         Urethral Catheter 11/15/18 1518 Non-latex;Straight-tip 16 Fr. 3 days          Peripheral Intravenous Line                 Peripheral IV - Single Lumen 11/19/18 1300 Anterior;Right Forearm less than 1 day         Peripheral IV - Single Lumen 11/19/18 1300 Anterior;Right Upper Arm less than 1 day              Significant Labs:    CBC/Anemia Profile:  Recent Labs   Lab 11/18/18  1600 11/19/18  0300 11/19/18  1403   WBC 6.59 7.00 5.61   HGB 8.1* 8.2* 7.4*   HCT 24.0* 23.6* 22.4*   PLT 65* 52* 46*   MCV 92 91 92   RDW 17.2* 17.4* 17.5*        Chemistries:  Recent Labs   Lab 11/18/18  0400  11/18/18  1600 11/19/18  0300 11/19/18  1403      < > 142 142 142   K 3.8   < > 4.1 3.7 4.0   *   < > 110 109 109   CO2 21*   < > 21* 22* 23   BUN 45*   < > 52* 57* 64*   CREATININE 1.5*   < > 1.6* 1.6* 1.8*   CALCIUM 8.8   < > 9.0 8.8 8.9   ALBUMIN 2.8*  2.8*   < > 2.9* 3.0*  3.0* 3.0*   PROT 6.5  --   --  7.1  --    BILITOT 4.0*  --   --  4.0*  --    ALKPHOS 94  --   --  117  --    *  --   --  128*  --    *  --   --  506*  --    MG 2.1  --   --  2.1  --    PHOS 2.5*  2.5*   < > 2.7 2.6*  2.6* 3.3    < > = values in this interval not displayed.       All pertinent labs within the past 24 hours have been reviewed.    Significant Imaging:  I have reviewed all pertinent imaging results/findings within the past 24 hours.    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    --appreciate vascular  surgery assistance  --s/p lumbar drain placement and TEVAR  --lumbar drain clamped, plan for removal tomorrow   --serial neuro/ neurovascular exams        Essential hypertension    -- weaned off nicardipine on 11/19  -- restarted home nifedipine  -- maintian SBP <160 per [facility] neurology recs     Renal/   KARL (acute kidney injury)    - BUN & Creat slowly rising  - decreased UOP over the past 24 hours  - urine lytes ordered  - continue to monitor      Hematology   Portal vein thrombosis    - worsening RUQ pain on 11/19  - U/S with probable right portal vein thrombosis  - unable to anticoagulate due to coagulopathy/ thrombocytopenia/ recent bleeding episodes   - repeat U/S recommended in 3-5 days      Thrombocytopenia    --2/2 cirrhosis  --plts goal >50,000, 1 unit platelets given today      Coagulopathy    --suspect 2/2 cirrhosis  --goal INR <1.5, 2 unit FFP given today & vit k    --INR q12h     Oncology   Acute blood loss anemia    -- transfuse for Hgb <7     GI   Chronic hepatitis C with cirrhosis    --per patient, has known about cirrhosis for >10 years. Has never received treatment for Hep C  --continue supportive care for coagulopathy, thrombocytopenia in setting of Type B aortic dissection with hemorrhagic shock and new thalamic bleed   --lactulose and rifaximin   --LFTs increasing      Orthopedic   Acute midline thoracic back pain    --resolved      Other   Goals of care, counseling/discussion    - Goals of care discussion with Ms. Cleaning and her sister  - Code status changed to DRN     Hemorrhagic shock    --required levo, gurwinder, and vaso on 11/16  --bleed from bilateral groin sites with significant hematoma formation to left groin. Oliverio groin US, no intervention warrented per [facility] sx  --received 4 U PRBC, 3 U FFP, 1 U platelets, 1 U cryo, and vitamin K on 11/16  --1 U PRBC, 1 U FFP, 1 U platelets, 1 U cryo, and vit K given 11/17  --Shock resolved                    Critical Care Time: 50 minutes  Critical  secondary to Patient has a condition that poses threat to life and bodily function:    Thalamic hemorrhage, decompensated hep C cirrhosis with coagulopathy and thrombocytopenia, Type B aortic dissection     Critical care was time spent personally by me on the following activities: development of treatment plan with patient or surrogate and bedside caregivers, discussions with consultants, evaluation of patient's response to treatment, examination of patient, ordering and performing treatments and interventions, ordering and review of laboratory studies, ordering and review of radiographic studies, pulse oximetry, re-evaluation of patient's condition. This critical care time did not overlap with that of any other provider or involve time for any procedures.     Jeriac Pereira NP  Critical Care Medicine  [facility]

## 2018-11-20 ENCOUNTER — CLINICAL SUPPORT (OUTPATIENT)
Dept: CARDIOLOGY | Facility: CLINIC | Age: 53
DRG: 219 | End: 2018-11-20
Attending: INTERNAL MEDICINE
Payer: MEDICAID

## 2018-11-20 PROBLEM — R74.01 TRANSAMINITIS: Status: ACTIVE | Noted: 2018-11-20

## 2018-11-20 PROBLEM — G93.41 ENCEPHALOPATHY, METABOLIC: Status: ACTIVE | Noted: 2018-11-20

## 2018-11-20 PROBLEM — I72.4 PSEUDOANEURYSM OF LEFT FEMORAL ARTERY: Status: ACTIVE | Noted: 2018-11-20

## 2018-11-20 LAB
ABDOMINAL IMA AP: 1.67 CM
ABDOMINAL IMA ED VEL: 0 CM/S
ABDOMINAL IMA PS VEL: 100 CM/S
ABDOMINAL IMA TRANS: 1.65 CM
ABDOMINAL LT COM ILIAC AP: 0.96 CM
ABDOMINAL LT COM ILIAC TRANS: 0.96 CM
ABDOMINAL RT COM ILIAC AP: 1 CM
ABDOMINAL RT COM ILIAC TRANS: 1 CM
ABDOMINAL RT COM ILIAC VEL: 90 CM/S
ABDOMINAL RT COM ILLIAC ED VEL: 90 CM/S
ABDOMINAL SUPRARENAL AORTA AP: 3.32 CM
ABDOMINAL SUPRARENAL AORTA ED VEL: 16 CM/S
ABDOMINAL SUPRARENAL AORTA PS VEL: 78 CM/S
ABDOMINAL SUPRARENAL AORTA TRANS: 2.22 CM
ALBUMIN SERPL BCP-MCNC: 3.1 G/DL
ALBUMIN SERPL BCP-MCNC: 3.1 G/DL
ALP SERPL-CCNC: 103 U/L
ALT SERPL W/O P-5'-P-CCNC: 94 U/L
ANION GAP SERPL CALC-SCNC: 13 MMOL/L
ASCENDING AORTA: 3.44 CM
AST SERPL-CCNC: 326 U/L
AV MEAN GRADIENT: 5.79 MMHG
AV PEAK GRADIENT: 9.24 MMHG
AV VALVE AREA: 2.27 CM2
BASOPHILS # BLD AUTO: 0 K/UL
BASOPHILS # BLD AUTO: 0.01 K/UL
BASOPHILS # BLD AUTO: 0.01 K/UL
BASOPHILS NFR BLD: 0 %
BASOPHILS NFR BLD: 0.1 %
BASOPHILS NFR BLD: 0.1 %
BILIRUB DIRECT SERPL-MCNC: 2.3 MG/DL
BILIRUB SERPL-MCNC: 4.4 MG/DL
BLD PROD TYP BPU: NORMAL
BLD PROD TYP BPU: NORMAL
BLOOD UNIT EXPIRATION DATE: NORMAL
BLOOD UNIT EXPIRATION DATE: NORMAL
BLOOD UNIT TYPE CODE: 5100
BLOOD UNIT TYPE CODE: 5100
BLOOD UNIT TYPE: NORMAL
BLOOD UNIT TYPE: NORMAL
BSA FOR ECHO PROCEDURE: 1.64 M2
BUN SERPL-MCNC: 69 MG/DL
CALCIUM SERPL-MCNC: 9.3 MG/DL
CHLORIDE SERPL-SCNC: 108 MMOL/L
CO2 SERPL-SCNC: 22 MMOL/L
CODING SYSTEM: NORMAL
CODING SYSTEM: NORMAL
CREAT SERPL-MCNC: 1.9 MG/DL
CV ECHO LV RWT: 0.44 CM
DIFFERENTIAL METHOD: ABNORMAL
DISPENSE STATUS: NORMAL
DISPENSE STATUS: NORMAL
DOP CALC AO PEAK VEL: 1.52 M/S
DOP CALC AO VTI: 32.44 CM
DOP CALC LVOT AREA: 3.17 CM2
DOP CALC LVOT DIAMETER: 2.01 CM
DOP CALC LVOT STROKE VOLUME: 73.8 CM3
DOP CALCLVOT PEAK VEL VTI: 23.27 CM
E WAVE DECELERATION TIME: 193.14 MSEC
E/A RATIO: 0.84
E/E' RATIO: 8.67
ECHO LV POSTERIOR WALL: 0.92 CM (ref 0.6–1.1)
EOSINOPHIL # BLD AUTO: 0 K/UL
EOSINOPHIL NFR BLD: 0.1 %
EOSINOPHIL NFR BLD: 0.4 %
EOSINOPHIL NFR BLD: 0.6 %
ERYTHROCYTE [DISTWIDTH] IN BLOOD BY AUTOMATED COUNT: 17.2 %
ERYTHROCYTE [DISTWIDTH] IN BLOOD BY AUTOMATED COUNT: 17.4 %
ERYTHROCYTE [DISTWIDTH] IN BLOOD BY AUTOMATED COUNT: 18 %
EST. GFR  (AFRICAN AMERICAN): 34.2 ML/MIN/1.73 M^2
EST. GFR  (NON AFRICAN AMERICAN): 29.7 ML/MIN/1.73 M^2
FIBRINOGEN PPP-MCNC: 100 MG/DL
FIBRINOGEN PPP-MCNC: 101 MG/DL
FRACTIONAL SHORTENING: 49 % (ref 28–44)
GLUCOSE SERPL-MCNC: 124 MG/DL
HCT VFR BLD AUTO: 21.2 %
HCT VFR BLD AUTO: 25.7 %
HCT VFR BLD AUTO: 25.9 %
HGB BLD-MCNC: 7 G/DL
HGB BLD-MCNC: 8.4 G/DL
HGB BLD-MCNC: 8.6 G/DL
IMM GRANULOCYTES # BLD AUTO: 0.09 K/UL
IMM GRANULOCYTES # BLD AUTO: 0.11 K/UL
IMM GRANULOCYTES # BLD AUTO: 0.11 K/UL
IMM GRANULOCYTES NFR BLD AUTO: 1.3 %
IMM GRANULOCYTES NFR BLD AUTO: 1.4 %
IMM GRANULOCYTES NFR BLD AUTO: 1.6 %
INR PPP: 1.7
INR PPP: 1.8
INTERVENTRICULAR SEPTUM: 1.05 CM (ref 0.6–1.1)
LA MAJOR: 6.25 CM
LA MINOR: 5.53 CM
LA WIDTH: 4.38 CM
LACTATE SERPL-SCNC: 1.8 MMOL/L
LEFT ATRIUM SIZE: 3.59 CM
LEFT ATRIUM VOLUME INDEX: 47.8 ML/M2
LEFT ATRIUM VOLUME: 78.43 CM3
LEFT INTERNAL DIMENSION IN SYSTOLE: 2.16 CM (ref 2.1–4)
LEFT VENTRICLE DIASTOLIC VOLUME INDEX: 48.21 ML/M2
LEFT VENTRICLE DIASTOLIC VOLUME: 79.07 ML
LEFT VENTRICLE MASS INDEX: 82.3 G/M2
LEFT VENTRICLE SYSTOLIC VOLUME INDEX: 9.4 ML/M2
LEFT VENTRICLE SYSTOLIC VOLUME: 15.44 ML
LEFT VENTRICULAR INTERNAL DIMENSION IN DIASTOLE: 4.21 CM (ref 3.5–6)
LEFT VENTRICULAR MASS: 134.89 G
LV LATERAL E/E' RATIO: 6.5
LV SEPTAL E/E' RATIO: 13
LYMPHOCYTES # BLD AUTO: 0.6 K/UL
LYMPHOCYTES NFR BLD: 7.6 %
LYMPHOCYTES NFR BLD: 8.5 %
LYMPHOCYTES NFR BLD: 9 %
MAGNESIUM SERPL-MCNC: 2.3 MG/DL
MCH RBC QN AUTO: 29.7 PG
MCH RBC QN AUTO: 30.7 PG
MCH RBC QN AUTO: 30.8 PG
MCHC RBC AUTO-ENTMCNC: 32.4 G/DL
MCHC RBC AUTO-ENTMCNC: 33 G/DL
MCHC RBC AUTO-ENTMCNC: 33.5 G/DL
MCV RBC AUTO: 92 FL
MCV RBC AUTO: 92 FL
MCV RBC AUTO: 93 FL
MONOCYTES # BLD AUTO: 1.3 K/UL
MONOCYTES # BLD AUTO: 1.3 K/UL
MONOCYTES # BLD AUTO: 1.4 K/UL
MONOCYTES NFR BLD: 16.9 %
MONOCYTES NFR BLD: 19.1 %
MONOCYTES NFR BLD: 19.4 %
MV PEAK A VEL: 0.93 M/S
MV PEAK E VEL: 0.78 M/S
NEUTROPHILS # BLD AUTO: 4.8 K/UL
NEUTROPHILS # BLD AUTO: 4.9 K/UL
NEUTROPHILS # BLD AUTO: 5.9 K/UL
NEUTROPHILS NFR BLD: 69.3 %
NEUTROPHILS NFR BLD: 71 %
NEUTROPHILS NFR BLD: 73.6 %
NRBC BLD-RTO: 1 /100 WBC
NUM UNITS TRANS FFP: NORMAL
PHOSPHATE SERPL-MCNC: 3.2 MG/DL
PHOSPHATE SERPL-MCNC: 3.2 MG/DL
PISA TR MAX VEL: 2.4 M/S
PLATELET # BLD AUTO: 52 K/UL
PLATELET # BLD AUTO: 56 K/UL
PLATELET # BLD AUTO: 61 K/UL
PMV BLD AUTO: 10.5 FL
PMV BLD AUTO: 11 FL
PMV BLD AUTO: 11.7 FL
POCT GLUCOSE: 107 MG/DL (ref 70–110)
POCT GLUCOSE: 108 MG/DL (ref 70–110)
POCT GLUCOSE: 113 MG/DL (ref 70–110)
POCT GLUCOSE: 116 MG/DL (ref 70–110)
POCT GLUCOSE: 124 MG/DL (ref 70–110)
POCT GLUCOSE: 126 MG/DL (ref 70–110)
POCT GLUCOSE: 129 MG/DL (ref 70–110)
POTASSIUM SERPL-SCNC: 3.9 MMOL/L
PROT SERPL-MCNC: 7.1 G/DL
PROTHROMBIN TIME: 16.7 SEC
PROTHROMBIN TIME: 17.5 SEC
RA MAJOR: 5.72 CM
RA PRESSURE: 8 MMHG
RA WIDTH: 4 CM
RBC # BLD AUTO: 2.28 M/UL
RBC # BLD AUTO: 2.79 M/UL
RBC # BLD AUTO: 2.83 M/UL
RIGHT VENTRICULAR END-DIASTOLIC DIMENSION: 3.82 CM
SINUS: 3.26 CM
SODIUM SERPL-SCNC: 143 MMOL/L
STJ: 3.59 CM
TDI LATERAL: 0.12
TDI SEPTAL: 0.06
TDI: 0.09
TR MAX PG: 23.04 MMHG
TRANS ERYTHROCYTES VOL PATIENT: NORMAL ML
TRICUSPID ANNULAR PLANE SYSTOLIC EXCURSION: 2.17 CM
TROPONIN I SERPL DL<=0.01 NG/ML-MCNC: 0.06 NG/ML
TROPONIN I SERPL DL<=0.01 NG/ML-MCNC: 0.07 NG/ML
TV REST PULMONARY ARTERY PRESSURE: 31.04 MMHG
WBC # BLD AUTO: 6.9 K/UL
WBC # BLD AUTO: 6.95 K/UL
WBC # BLD AUTO: 8 K/UL

## 2018-11-20 PROCEDURE — 92523 SPEECH SOUND LANG COMPREHEN: CPT

## 2018-11-20 PROCEDURE — P9021 RED BLOOD CELLS UNIT: HCPCS

## 2018-11-20 PROCEDURE — 85610 PROTHROMBIN TIME: CPT

## 2018-11-20 PROCEDURE — 85384 FIBRINOGEN ACTIVITY: CPT | Mod: 91

## 2018-11-20 PROCEDURE — 25000003 PHARM REV CODE 250: Performed by: STUDENT IN AN ORGANIZED HEALTH CARE EDUCATION/TRAINING PROGRAM

## 2018-11-20 PROCEDURE — P9017 PLASMA 1 DONOR FRZ W/IN 8 HR: HCPCS

## 2018-11-20 PROCEDURE — 93978 VASCULAR STUDY: CPT | Mod: 26,,, | Performed by: INTERNAL MEDICINE

## 2018-11-20 PROCEDURE — 84484 ASSAY OF TROPONIN QUANT: CPT

## 2018-11-20 PROCEDURE — 27000221 HC OXYGEN, UP TO 24 HOURS

## 2018-11-20 PROCEDURE — 80069 RENAL FUNCTION PANEL: CPT

## 2018-11-20 PROCEDURE — 85025 COMPLETE CBC W/AUTO DIFF WBC: CPT | Mod: 91

## 2018-11-20 PROCEDURE — 83735 ASSAY OF MAGNESIUM: CPT

## 2018-11-20 PROCEDURE — 94761 N-INVAS EAR/PLS OXIMETRY MLT: CPT

## 2018-11-20 PROCEDURE — 99291 CRITICAL CARE FIRST HOUR: CPT | Mod: ,,, | Performed by: NURSE PRACTITIONER

## 2018-11-20 PROCEDURE — 85384 FIBRINOGEN ACTIVITY: CPT

## 2018-11-20 PROCEDURE — 83605 ASSAY OF LACTIC ACID: CPT

## 2018-11-20 PROCEDURE — 84155 ASSAY OF PROTEIN SERUM: CPT

## 2018-11-20 PROCEDURE — 63600175 PHARM REV CODE 636 W HCPCS: Performed by: NURSE PRACTITIONER

## 2018-11-20 PROCEDURE — 99233 SBSQ HOSP IP/OBS HIGH 50: CPT | Mod: ,,, | Performed by: PSYCHIATRY & NEUROLOGY

## 2018-11-20 PROCEDURE — 20000000 HC ICU ROOM

## 2018-11-20 PROCEDURE — 82247 BILIRUBIN TOTAL: CPT

## 2018-11-20 PROCEDURE — 92526 ORAL FUNCTION THERAPY: CPT

## 2018-11-20 PROCEDURE — 93978 VASCULAR STUDY: CPT

## 2018-11-20 PROCEDURE — 25000003 PHARM REV CODE 250: Performed by: NURSE PRACTITIONER

## 2018-11-20 RX ORDER — HYDROCODONE BITARTRATE AND ACETAMINOPHEN 500; 5 MG/1; MG/1
TABLET ORAL
Status: DISCONTINUED | OUTPATIENT
Start: 2018-11-20 | End: 2018-11-20

## 2018-11-20 RX ORDER — NOREPINEPHRINE BITARTRATE/D5W 4MG/250ML
0.02 PLASTIC BAG, INJECTION (ML) INTRAVENOUS CONTINUOUS
Status: DISCONTINUED | OUTPATIENT
Start: 2018-11-20 | End: 2018-11-24

## 2018-11-20 RX ADMIN — RIFAXIMIN 550 MG: 550 TABLET ORAL at 08:11

## 2018-11-20 RX ADMIN — LACTULOSE 20 G: 20 SOLUTION ORAL at 09:11

## 2018-11-20 RX ADMIN — RIFAXIMIN 550 MG: 550 TABLET ORAL at 09:11

## 2018-11-20 RX ADMIN — PHYTONADIONE 10 MG: 10 INJECTION, EMULSION INTRAMUSCULAR; INTRAVENOUS; SUBCUTANEOUS at 07:11

## 2018-11-20 RX ADMIN — LACTULOSE 20 G: 20 SOLUTION ORAL at 08:11

## 2018-11-20 RX ADMIN — Medication 0.02 MCG/KG/MIN: at 07:11

## 2018-11-20 NOTE — ASSESSMENT & PLAN NOTE
53 F with Hep C cirrhosis (MELD 15 on admission), HTN, crack cocaine abuse, who presented with acute tearing chest/back pain to OSH, Found to have retrograde type B dissection. Despite aggressive anti-impulse therapy, persistent pain requiring intervention. Preoperative CSF drain placement by Neuro IR. Successful TEVAR. POD 1 left groin hematoma and coagulopathy requiring manual pressure and product resuscitation. POD 2 new onset left upper arm weakness, found to have right thalamic hemorrhage. F/u CT head stable findings. Significant reactive left pleural effusion with surrounding atelectasis noted also.     Neuro: Improvement in neuro exam with spinal drain open  - Spinal drain 15 cc per hr (output 5-10cc); goal map >100  - Discussed with stroke team, will keep SBP <160  - Hold off on spinal drain removal  - CT spine to rule out spinal hematoma  right thalamic bleed  - con't q1h neuro checks  - speech swallow passed; advancing diet     CV:   - ease the spinal perfusion protocol. BP <160, MAP >100    Resp:  - encourage IS  - O2 supp prn  - Moderate Left pleural effusion and small right pleural effusion on CTA stoke multiphase noted. Left pleural effusion likely reactive following TEVAR. Con't pulmonary toilet.     Renal/Endocrine:  - con't monitoring UOP    GI: Hep C cirrhosis with MELD 15 on admission  - protonix  - PO diet  - CT abd/pelvis pending    Heme/ID:  - ABLA stable.   - con't correct coagulopathy prn  - monitor thrombocytopenia     Dispo:  - ICU care

## 2018-11-20 NOTE — ASSESSMENT & PLAN NOTE
--per patient, has known about cirrhosis for >10 years. Has never received treatment for Hep C  --continue supportive care for coagulopathy, thrombocytopenia in setting of Type B aortic dissection with hemorrhagic shock and new thalamic bleed   --lactulose and rifaximin

## 2018-11-20 NOTE — PLAN OF CARE
Problem: Patient Care Overview  Goal: Plan of Care Review  Outcome: Ongoing (interventions implemented as appropriate)  Pt sleeping throughout shift. Pt taken to CT for lower extremity changes. Neuro checks performed Q1H. Awakens to voice, orientedx4, following commands with upper extremities and right lower. Left lower extremity not moving or withdrawing from pain. +2 pulses in all extremities. Lumbar drain opened and 30 mL drained @ beginning of shift, 15 mL drained Q1H per vascular. SBP maintained <160, MAP >100. [employer] warmer on patient throughout shift, temp increased to 96F. Pt on 4L NC, O2 sats >95%. 1 PRBC and 1 FFP administered, labs rechecked. UO 10-20 mL/hr. Pt and family updated on on plan of care throughout shift. See flow sheet for assessment and details.

## 2018-11-20 NOTE — ASSESSMENT & PLAN NOTE
Patient is a 53 y.o. female with a h/o HTN, Hep C, cirrhosis (MELD 15),Hx of pancreatitis, GERD, and illicit substance abuse (crack cocaine/THC) admitted for thoracic aortic dissection treated with TEVAR and Lumbar drain, had Acute right thalamic hemorrhage on CTH.       Etiology - Hemorrhagic transformation of Right thalamic stroke v/s Primary right thalamic hemorrhagic stroke v/s End Stage live disease v/s 2/2 Lumbar drain with increased SFP / MAP gradient.     11/20/18: Sudden LOF of both lower extremities with no response to painful stimuli. Repeat CT head w/o new pathology or worsening hemorrhage.      Recommend:  -CT head did not have worsening of hemorrhage. Since <24h from clinical change, CT would likely not have shown if new embolic stroke. Could get MRI (which will eventually be needed either way) to assess for new embolic event, however, an embolic pathology would be less likely to explain why both lower limbs would be affected. The more plausable explanation would involve spinal cord pathology. Therefore, would consider CT abdomen/pelvis to assess internal hematoma for spinal impingement. Also on differential would be spinal artery pathology, which would be difficult to determine. Considering that the removal of CSF from lumbar drain was prior to stat CT, it is possible she had increased spinal pressure (~ICP) causing observed impairnment, which clinically improved her with continued removal of fluid.     -When pt is able, MRI head w/o contrast to assess progression.   -Continue with removal of CSF 15 cc/hr, per vascular sx instructions  -SBP goal <160 with MAP goal of 100      Antithrombotics for secondary stroke prevention:  Hold Antiplatelets    Statins for secondary stroke prevention and hyperlipidemia, if present: rec getting  LDL and start Lipitor if LDL > 130      Aggressive risk factor modification: HTN, Illicit drug use     Rehab efforts: PT/OT/SLP to evaluate and treat    Diagnostics  ordered/pending: ?MRI head w/o, ?CT abdo/pelvis    VTE prophylaxis: dvt ppx and scd's    BP parameters:  SBP <160

## 2018-11-20 NOTE — PT/OT/SLP EVAL
Speech Language Pathology Evaluation  Cognitive Communication    Patient Name:  Leah Cleaning   MRN:  [id number]  Admitting Diagnosis: Aortic dissection distal to left subclavian    Recommendations:     Recommendations:                General Recommendations:  Dysphagia therapy and Speech/language therapy  Diet recommendations:  Puree, Thin   Aspiration Precautions: 1 bite/sip at a time, Alternating bites/sips, Assistance with meals and Meds crushed in puree   General Precautions: Standard, aspiration, fall, NPO, respiratory  Communication strategies:  none    History:     Past Medical History:   Diagnosis Date    Arthritis     Bell's palsy 2012    Cirrhosis     GERD (gastroesophageal reflux disease)     Hepatitis C     Hypertension     Sciatica        Past Surgical History:   Procedure Laterality Date    CHOLECYSTECTOMY      ENDOVASCULAR GRAFT REPAIR OF ANEURYSM OF THORACIC AORTA N/A 11/15/2018    Procedure: REPAIR, ANEURYSM, ENDOVASCULAR GRAFT, AORTA, THORACIC;  Surgeon: Hermila Storm MD;  Location: [facility] OR [address] [place] [address];  Service: Peripheral Vascular;  Laterality: N/A;  Contrast: 94  ml  mGy: 1169.79  flouro time: 9.7 minutes    REPAIR, ANEURYSM, ENDOVASCULAR GRAFT, AORTA, THORACIC N/A 11/15/2018    Performed by Hermila Storm MD at [facility] OR [address] [place] [address]     Please see initial assessment for complete background information     Per RN, Pt with significant change in status with new onset left sided weakness warranting additional CT work up with concern for new and/or extended stroke     Subjective     Pt with significant lethargy warranting max tactile cueing to participate in assessment     Pain/Comfort:  · Pain Rating 1: 0/10  · Pain Rating Post-Intervention 1: 0/10    Objective:   Cognitive Status:    Arousal/Alertness Delayed response to stimuli significantly lethargic    Oriented to self and location    Unable to assess additional areas ofcognition 2/2 decreased alertness      Receptive Language:    Comprehension:   Questions Simple yes/no 100%   Commands  One step 100%    Unable to assess additional areas of comprehension 2/2 decreased alertness     Pragmatics:    Lethargy impacting pragmatic skills at this time     Expressive Language:  Verbal:    Automatic Speech  Days of the week impacted by significant lethargy   Naming Confrontation WFL   Ongoing assessment warranted       Motor Speech:  Dysarthria      Voice:   WFL    Visual-Spatial:  not yet assessed     Reading:   Unable to assess 2/2 lethargy      Written Expression:   unable to assess 2/2 lethargy     Treatment: Pt offered puree and thin liquids. Pt remains safe for these textures however remains with left sided pocketing oral residue which is cleared with a liquid wash. Discussed with RN continuing to crush meds and offer in applesauce. Main limiting factor with PO intake at this time is decreased alertness level. Speech to continue to follow.     Assessment:   Leah Cleaning is a 53 y.o. female with an SLP diagnosis of Dysphagia and Cognitive-Linguistic Impairment.     Goals:   Multidisciplinary Problems     SLP Goals        Problem: SLP Goal    Goal Priority Disciplines Outcome   SLP Goal     SLP    Description:  Speech Language Pathology Goals  Goals expected to be met by 11/27:    1.Pt will tolerate diet of  thin liqiuds and purees without overt clinical signs of aspiration   2.Pt will participate in trials of soft solids within speech therapy sessions to help determine least restrictive diet   3. Pt will complete OMEs x10 w/ SLP model to enhance oral motor function   4. Pt will utilize compensatory strategies independently at the phrase level  to enhance speech intelligibility   5. Pt will demonstrate orientation to self, place, situation , family members, date w/ min cues   6. Pt will complete problem solving skills w/ 75 % acc to enhance safety awareness   7. Pt will generate 10 items per category to enhance organizations skills   8. Pt  will demonstrate sustained attention to task across 5 consecutive minutes w/ min cues to enhance attention skills  9. Pt will participate in ongoing assessment of speech language and cognitive linguistic skills to help rule out deficits and determine therapeutic plan of care                             Plan:   · Patient to be seen:  4 x/week   · Plan of Care expires:  12/17/18  · Plan of Care reviewed with:  patient   · SLP Follow-Up:  Yes       Discharge recommendations:  Discharge Facility/Level Of Care Needs: rehabilitation facility   Barriers to Discharge:  None    Time Tracking:   SLP Treatment Date:   11/20/18  Speech Start Time:  0838  Speech Stop Time:  0854     Speech Total Time (min):  16 min    Billable Minutes: Eval 8  and Treatment Swallowing Dysfunction 8    Ana Sanders CCC-SLP  11/20/2018

## 2018-11-20 NOTE — ASSESSMENT & PLAN NOTE
--appreciate vascular surgery assistance  --s/p lumbar drain placement and TEVAR on 11/5  --lumbar drain reopened given acute lower extremity weakness on AM of 11/20. Continue to drain 15 ml/hr per protocol given symptoms   --continue neuro/ neurovascular exams   --discussing utility of repeat CTA

## 2018-11-20 NOTE — PLAN OF CARE
Problem: SLP Goal  Goal: SLP Goal  Speech Language Pathology Goals  Goals expected to be met by 11/27:    1.Pt will tolerate diet of  thin liqiuds and purees without overt clinical signs of aspiration   2.Pt will participate in trials of soft solids within speech therapy sessions to help determine least restrictive diet   3. Pt will complete OMEs x10 w/ SLP model to enhance oral motor function   4. Pt will utilize compensatory strategies independently at the phrase level  to enhance speech intelligibility   5. Pt will demonstrate orientation to self, place, situation , family members, date w/ min cues   6. Pt will complete problem solving skills w/ 75 % acc to enhance safety awareness   7. Pt will generate 10 items per category to enhance organizations skills   8. Pt will demonstrate sustained attention to task across 5 consecutive minutes w/ min cues to enhance attention skills  9. Pt will participate in ongoing assessment of speech language and cognitive linguistic skills to help rule out deficits and determine therapeutic plan of care           Pt with slow progress towards goals     Ana Sanders MS, CCC-SLP  Speech Language Pathologist  Pager: [phone]  Date 11/20/2018

## 2018-11-20 NOTE — ASSESSMENT & PLAN NOTE
--goal MAP of 100, SBP <160 per vascular surgery and neurology recommendations  --continue levophed PRN to maintain this

## 2018-11-20 NOTE — SUBJECTIVE & OBJECTIVE
Neurologic Chief Complaint: dysarthria, poor attention span, ULE weakness  Right Thalamic hemorrhage     Subjective:     Interval History: Patient is seen for follow-up neurological assessment and treatment recommendations: Worsening clinical exam overnight/this am. Inability to respond to pain in lower extremities. Lumbar CSF drain removed 30 cc's then 15cc/hr thereafter. Patient underwent CT head without any worsening of previous hemorrhage or new pathology.     HPI, Past Medical, Family, and Social History remains the same as documented in the initial encounter.     Review of Systems   Constitutional: Negative for chills and fever.   Respiratory: Negative for cough and shortness of breath.    Cardiovascular: Negative for chest pain and palpitations.   Gastrointestinal: Positive for abdominal pain (RUQ ). Negative for blood in stool, nausea and vomiting.   Musculoskeletal: Positive for neck pain (discomfort at RIJ site). Negative for back pain and neck stiffness.   Neurological: Negative for tremors, seizures, syncope, weakness, numbness and headaches.   Hematological: Bruises/bleeds easily.   Psychiatric/Behavioral: Positive for confusion.     Scheduled Meds:   lactulose  20 g Oral BID    rifAXImin  550 mg Oral BID     Continuous Infusions:   norepinephrine bitartrate-D5W 0.02 mcg/kg/min (11/20/18 0744)     PRN Meds:sodium chloride, ondansetron    Objective:     Vital Signs (Most Recent):  Temp: 96.1 °F (35.6 °C) (11/20/18 1600)  Pulse: 67 (11/20/18 1600)  Resp: 16 (11/20/18 1100)  BP: 130/74 (11/20/18 1600)  SpO2: 96 % (11/20/18 1600)  BP Location: Right arm    Vital Signs Range (Last 24H):  Temp:  [92.1 °F (33.4 °C)-97.3 °F (36.3 °C)]   Pulse:  [66-85]   Resp:  [11-45]   BP: (110-141)/(57-79)   SpO2:  [83 %-100 %]   BP Location: Right arm    Physical Exam   Constitutional: She appears well-developed and well-nourished.   HENT:   Head: Normocephalic and atraumatic.   Eyes: EOM are normal. Pupils are equal,  round, and reactive to light.   Neck: Normal range of motion. Neck supple.   Cardiovascular: Normal rate and regular rhythm.   Pulmonary/Chest: Effort normal and breath sounds normal.   Abdominal: Soft. Bowel sounds are normal. There is no guarding.   Genitourinary:   Genitourinary Comments: Perry in place   Musculoskeletal: She exhibits no edema or deformity.   Neurological: She is alert.   LUE weakness   Skin: Skin is warm and dry.   Vitals reviewed.      Neurological Exam:   @0800:  LOC: requiring repetitive cues to stay awake  Attention Span: poor  Language: mild aphasia  Articulation: mild dysarthria  Orientation: Not oriented to time  Visual Fields: Full  EOM (CN III, IV, VI): Full/intact  Pupils (CN II, III): PERRL  Facial Sensation (CN V): Normal  Facial Movement (CN VII): mild L facial droop  Motor: Arm left  Normal 0/5  Leg left  Normal 0/5  Arm right  Normal 5/5  Leg right Normal 0/5  Sensation: impaired on LEs  Tone: decreased tone throughout        Neurological Exam:   @1600:  LOC: alert, mild cues to cooperate   Attention Span: poor  Language: mild aphasia  Articulation: mild dysarthria  Orientation: AOx3  Visual Fields: Full  EOM (CN III, IV, VI): Full/intact  Pupils (CN II, III): PERRL  Facial Sensation (CN V): Normal  Facial Movement (CN VII): mild L facial droop   Motor: Arm left  Normal 0/5  Leg left  Normal 0/5  Arm right  Normal 5/5  Leg right Normal 2/5  Sensation: Able to feel light touch on proximal legs and R lower leg.   Tone: Decreased tone throughout LEs and LUE.   Reflexes +2 throughout       Laboratory:    Recent Results (from the past 24 hour(s))   Troponin I    Collection Time: 11/19/18  7:13 PM   Result Value Ref Range    Troponin I 0.049 (H) 0.000 - 0.026 ng/mL   ISTAT PROCEDURE    Collection Time: 11/19/18  8:12 PM   Result Value Ref Range    POC PH 7.516 (H) 7.35 - 7.45    POC PCO2 26.4 (LL) 35 - 45 mmHg    POC PO2 56 (LL) 80 - 100 mmHg    POC HCO3 21.4 (L) 24 - 28 mmol/L    POC  BE -2 -2 to 2 mmol/L    POC SATURATED O2 92 (L) 95 - 100 %    POC TCO2 22 (L) 23 - 27 mmol/L    Sample ARTERIAL     Site RR     Allens Test Pass     DelSys Nasal Can     Mode SPONT     Flow 4    POCT glucose    Collection Time: 11/19/18  8:19 PM   Result Value Ref Range    POCT Glucose 139 (H) 70 - 110 mg/dL   CBC auto differential    Collection Time: 11/19/18  8:26 PM   Result Value Ref Range    WBC 6.28 3.90 - 12.70 K/uL    RBC 2.37 (L) 4.00 - 5.40 M/uL    Hemoglobin 7.1 (L) 12.0 - 16.0 g/dL    Hematocrit 21.8 (L) 37.0 - 48.5 %    MCV 92 82 - 98 fL    MCH 30.0 27.0 - 31.0 pg    MCHC 32.6 32.0 - 36.0 g/dL    RDW 17.7 (H) 11.5 - 14.5 %    Platelets 70 (L) 150 - 350 K/uL    MPV 10.8 9.2 - 12.9 fL    Immature Granulocytes 1.1 (H) 0.0 - 0.5 %    Gran # (ANC) 4.5 1.8 - 7.7 K/uL    Immature Grans (Abs) 0.07 (H) 0.00 - 0.04 K/uL    Lymph # 0.5 (L) 1.0 - 4.8 K/uL    Mono # 1.2 (H) 0.3 - 1.0 K/uL    Eos # 0.0 0.0 - 0.5 K/uL    Baso # 0.01 0.00 - 0.20 K/uL    nRBC 1 (A) 0 /100 WBC    Gran% 71.7 38.0 - 73.0 %    Lymph% 8.4 (L) 18.0 - 48.0 %    Mono% 18.6 (H) 4.0 - 15.0 %    Eosinophil% 0.0 0.0 - 8.0 %    Basophil% 0.2 0.0 - 1.9 %    Differential Method Automated    Protime-INR    Collection Time: 11/19/18  8:26 PM   Result Value Ref Range    Prothrombin Time 18.4 (H) 9.0 - 12.5 sec    INR 1.9 (H) 0.8 - 1.2   Fibrinogen    Collection Time: 11/19/18  8:26 PM   Result Value Ref Range    Fibrinogen 94 (LL) 182 - 366 mg/dL   Fibrinogen    Collection Time: 11/19/18  8:26 PM   Result Value Ref Range    Fibrinogen 94 (LL) 182 - 366 mg/dL   Protime-INR    Collection Time: 11/19/18  8:26 PM   Result Value Ref Range    Prothrombin Time 18.4 (H) 9.0 - 12.5 sec    INR 1.9 (H) 0.8 - 1.2   Haptoglobin    Collection Time: 11/19/18  8:26 PM   Result Value Ref Range    Haptoglobin <10 (L) 30 - 250 mg/dL   Comprehensive metabolic panel    Collection Time: 11/19/18  8:28 PM   Result Value Ref Range    Sodium 142 136 - 145 mmol/L    Potassium 3.7  3.5 - 5.1 mmol/L    Chloride 108 95 - 110 mmol/L    CO2 22 (L) 23 - 29 mmol/L    Glucose 112 (H) 70 - 110 mg/dL    BUN, Bld 66 (H) 6 - 20 mg/dL    Creatinine 1.8 (H) 0.5 - 1.4 mg/dL    Calcium 9.5 8.7 - 10.5 mg/dL    Total Protein 7.5 6.0 - 8.4 g/dL    Albumin 3.3 (L) 3.5 - 5.2 g/dL    Total Bilirubin 4.5 (H) 0.1 - 1.0 mg/dL    Alkaline Phosphatase 109 55 - 135 U/L     (H) 10 - 40 U/L     (H) 10 - 44 U/L    Anion Gap 12 8 - 16 mmol/L    eGFR if African American 36.5 (A) >60 mL/min/1.73 m^2    eGFR if non  31.7 (A) >60 mL/min/1.73 m^2   Magnesium    Collection Time: 11/19/18  8:28 PM   Result Value Ref Range    Magnesium 2.2 1.6 - 2.6 mg/dL   Phosphorus    Collection Time: 11/19/18  8:28 PM   Result Value Ref Range    Phosphorus 2.8 2.7 - 4.5 mg/dL   Troponin I    Collection Time: 11/19/18 11:13 PM   Result Value Ref Range    Troponin I 0.060 (H) 0.000 - 0.026 ng/mL   POCT glucose    Collection Time: 11/19/18 11:52 PM   Result Value Ref Range    POCT Glucose 131 (H) 70 - 110 mg/dL   Protime-INR    Collection Time: 11/20/18  3:00 AM   Result Value Ref Range    Prothrombin Time 17.5 (H) 9.0 - 12.5 sec    INR 1.8 (H) 0.8 - 1.2   Hepatic function panel    Collection Time: 11/20/18  3:00 AM   Result Value Ref Range    Total Protein 7.1 6.0 - 8.4 g/dL    Albumin 3.1 (L) 3.5 - 5.2 g/dL    Total Bilirubin 4.4 (H) 0.1 - 1.0 mg/dL    Bilirubin, Direct 2.3 (H) 0.1 - 0.3 mg/dL     (H) 10 - 40 U/L    ALT 94 (H) 10 - 44 U/L    Alkaline Phosphatase 103 55 - 135 U/L   Magnesium    Collection Time: 11/20/18  3:00 AM   Result Value Ref Range    Magnesium 2.3 1.6 - 2.6 mg/dL   Phosphorus    Collection Time: 11/20/18  3:00 AM   Result Value Ref Range    Phosphorus 3.2 2.7 - 4.5 mg/dL   CBC auto differential    Collection Time: 11/20/18  3:00 AM   Result Value Ref Range    WBC 6.95 3.90 - 12.70 K/uL    RBC 2.28 (L) 4.00 - 5.40 M/uL    Hemoglobin 7.0 (L) 12.0 - 16.0 g/dL    Hematocrit 21.2 (L) 37.0 -  48.5 %    MCV 93 82 - 98 fL    MCH 30.7 27.0 - 31.0 pg    MCHC 33.0 32.0 - 36.0 g/dL    RDW 18.0 (H) 11.5 - 14.5 %    Platelets 61 (L) 150 - 350 K/uL    MPV 11.7 9.2 - 12.9 fL    Immature Granulocytes 1.3 (H) 0.0 - 0.5 %    Gran # (ANC) 4.9 1.8 - 7.7 K/uL    Immature Grans (Abs) 0.09 (H) 0.00 - 0.04 K/uL    Lymph # 0.6 (L) 1.0 - 4.8 K/uL    Mono # 1.3 (H) 0.3 - 1.0 K/uL    Eos # 0.0 0.0 - 0.5 K/uL    Baso # 0.00 0.00 - 0.20 K/uL    nRBC 1 (A) 0 /100 WBC    Gran% 71.0 38.0 - 73.0 %    Lymph% 8.5 (L) 18.0 - 48.0 %    Mono% 19.1 (H) 4.0 - 15.0 %    Eosinophil% 0.1 0.0 - 8.0 %    Basophil% 0.0 0.0 - 1.9 %    Differential Method Automated    Fibrinogen    Collection Time: 11/20/18  3:00 AM   Result Value Ref Range    Fibrinogen 101 (L) 182 - 366 mg/dL   Renal function panel    Collection Time: 11/20/18  3:00 AM   Result Value Ref Range    Glucose 124 (H) 70 - 110 mg/dL    Sodium 143 136 - 145 mmol/L    Potassium 3.9 3.5 - 5.1 mmol/L    Chloride 108 95 - 110 mmol/L    CO2 22 (L) 23 - 29 mmol/L    BUN, Bld 69 (H) 6 - 20 mg/dL    Calcium 9.3 8.7 - 10.5 mg/dL    Creatinine 1.9 (H) 0.5 - 1.4 mg/dL    Albumin 3.1 (L) 3.5 - 5.2 g/dL    Phosphorus 3.2 2.7 - 4.5 mg/dL    eGFR if  34.2 (A) >60 mL/min/1.73 m^2    eGFR if non  29.7 (A) >60 mL/min/1.73 m^2    Anion Gap 13 8 - 16 mmol/L   Troponin I    Collection Time: 11/20/18  3:00 AM   Result Value Ref Range    Troponin I 0.074 (H) 0.000 - 0.026 ng/mL   POCT glucose    Collection Time: 11/20/18  3:53 AM   Result Value Ref Range    POCT Glucose 129 (H) 70 - 110 mg/dL   POCT glucose    Collection Time: 11/20/18  8:38 AM   Result Value Ref Range    POCT Glucose 108 70 - 110 mg/dL   Lactic acid, plasma    Collection Time: 11/20/18  9:13 AM   Result Value Ref Range    Lactate (Lactic Acid) 1.8 0.5 - 2.2 mmol/L   POCT glucose    Collection Time: 11/20/18  9:42 AM   Result Value Ref Range    POCT Glucose 126 (H) 70 - 110 mg/dL   Abdominal Aorta Evaluation  (Cupid Only)    Collection Time: 11/20/18  9:53 AM   Result Value Ref Range    Abdominal rt com iliac AP 1 cm    Abdominal rt com iliac trans 1 cm    Abdominal lt com iliac AP 0.96 cm    Abdominal lt com iliac trans 0.96 cm    ABDOMINAL RT COM ILLIAC ED RUTH 90 cm/s    ABDOMINAL SUPRARENAL AORTA PS RUTH 78 CM/S    ABDOMINAL SUPRARENAL AORTA ED RUTH 16 cm/s    ABDOMINAL SUPRARENAL AORTA AP 3.32 cm    ABDOMINAL SUPRARENAL AORTA TRANS 2.22 cm    ABDOMINAL AMY PS RUTH 100 cm/s    ABDOMINAL AMY ED RUTH 0 cm/s    ABDOMINAL AMY AP 1.67 cm    ABDOMINAL AMY TRANS 1.65 cm    Abdominal rt com iliac ruth 90 cm/s   CBC auto differential    Collection Time: 11/20/18 11:17 AM   Result Value Ref Range    WBC 8.00 3.90 - 12.70 K/uL    RBC 2.83 (L) 4.00 - 5.40 M/uL    Hemoglobin 8.4 (L) 12.0 - 16.0 g/dL    Hematocrit 25.9 (L) 37.0 - 48.5 %    MCV 92 82 - 98 fL    MCH 29.7 27.0 - 31.0 pg    MCHC 32.4 32.0 - 36.0 g/dL    RDW 17.2 (H) 11.5 - 14.5 %    Platelets 56 (L) 150 - 350 K/uL    MPV 11.0 9.2 - 12.9 fL    Immature Granulocytes 1.4 (H) 0.0 - 0.5 %    Gran # (ANC) 5.9 1.8 - 7.7 K/uL    Immature Grans (Abs) 0.11 (H) 0.00 - 0.04 K/uL    Lymph # 0.6 (L) 1.0 - 4.8 K/uL    Mono # 1.4 (H) 0.3 - 1.0 K/uL    Eos # 0.0 0.0 - 0.5 K/uL    Baso # 0.01 0.00 - 0.20 K/uL    nRBC 1 (A) 0 /100 WBC    Gran% 73.6 (H) 38.0 - 73.0 %    Lymph% 7.6 (L) 18.0 - 48.0 %    Mono% 16.9 (H) 4.0 - 15.0 %    Eosinophil% 0.4 0.0 - 8.0 %    Basophil% 0.1 0.0 - 1.9 %    Differential Method Automated    POCT glucose    Collection Time: 11/20/18 11:21 AM   Result Value Ref Range    POCT Glucose 124 (H) 70 - 110 mg/dL   Transthoracic echo (TTE) limited (Cupid Only)    Collection Time: 11/20/18  2:32 PM   Result Value Ref Range    Ascending aorta 3.44 cm    STJ 3.59 cm    AV mean gradient 5.79 mmHg    Ao peak ruth 1.52 m/s    Ao VTI 32.44 cm    IVS 1.05 0.6 - 1.1 cm    LA size 3.59 cm    Left Atrium Major Axis 6.25 cm    Left Atrium Minor Axis 5.53 cm    LVIDD 4.21 3.5 - 6.0 cm     LVIDS 2.16 2.1 - 4.0 cm    LVOT diameter 2.01 cm    LVOT peak VTI 23.27 cm    PW 0.92 0.6 - 1.1 cm    MV Peak A Vazquez 0.93 m/s    E wave decelartion time 193.14 msec    MV Peak E Vazquez 0.78 m/s    Right Atrium Major Axis 5.72 cm    Right Atrium Width 4.00 cm    RVDD 3.82 cm    Sinus 3.26 cm    Tricuspid annular plane systolic excursion 2.17 cm    TR Max Vazquez 2.40 m/s    TDI LATERAL 0.12     TDI SEPTAL 0.06     LA WIDTH 4.38 cm    LV Diastolic Volume 79.07 mL    LV Systolic Volume 15.44 mL    LV LATERAL E/E' RATIO 6.50     LV SEPTAL E/E' RATIO 13.00     FS 49 %    LA volume 78.43 cm3    LV mass 134.89 g    Left Ventricle Relative Wall Thickness 0.44 cm    AV valve area 2.27 cm2    E/A ratio 0.84     TDI 0.09     LVOT area 3.17 cm2    LVOT stroke volume 73.80 cm3    AV peak gradient 9.24 mmHg    E/E' ratio 8.67     Triscuspid Valve Regurgitation Peak Gradient 23.04 mmHg    BSA 1.64 m2    LV Systolic Volume Index 9.4 mL/m2    LV Diastolic Volume Index 48.21 mL/m2    LA Volume Index 47.8 mL/m2    LV Mass Index 82.3 g/m2    Right Atrial Pressure (from IVC) 8 mmHg    TV rest pulmonary artery pressure 31.04 mmHg   Protime-INR    Collection Time: 11/20/18  3:06 PM   Result Value Ref Range    Prothrombin Time 16.7 (H) 9.0 - 12.5 sec    INR 1.7 (H) 0.8 - 1.2   Fibrinogen    Collection Time: 11/20/18  3:06 PM   Result Value Ref Range    Fibrinogen 100 (L) 182 - 366 mg/dL   CBC auto differential    Collection Time: 11/20/18  3:06 PM   Result Value Ref Range    WBC 6.90 3.90 - 12.70 K/uL    RBC 2.79 (L) 4.00 - 5.40 M/uL    Hemoglobin 8.6 (L) 12.0 - 16.0 g/dL    Hematocrit 25.7 (L) 37.0 - 48.5 %    MCV 92 82 - 98 fL    MCH 30.8 27.0 - 31.0 pg    MCHC 33.5 32.0 - 36.0 g/dL    RDW 17.4 (H) 11.5 - 14.5 %    Platelets 52 (L) 150 - 350 K/uL    MPV 10.5 9.2 - 12.9 fL    Immature Granulocytes 1.6 (H) 0.0 - 0.5 %    Gran # (ANC) 4.8 1.8 - 7.7 K/uL    Immature Grans (Abs) 0.11 (H) 0.00 - 0.04 K/uL    Lymph # 0.6 (L) 1.0 - 4.8 K/uL    Mono #  1.3 (H) 0.3 - 1.0 K/uL    Eos # 0.0 0.0 - 0.5 K/uL    Baso # 0.01 0.00 - 0.20 K/uL    nRBC 1 (A) 0 /100 WBC    Gran% 69.3 38.0 - 73.0 %    Lymph% 9.0 (L) 18.0 - 48.0 %    Mono% 19.4 (H) 4.0 - 15.0 %    Eosinophil% 0.6 0.0 - 8.0 %    Basophil% 0.1 0.0 - 1.9 %    Differential Method Automated    POCT glucose    Collection Time: 11/20/18  3:13 PM   Result Value Ref Range    POCT Glucose 113 (H) 70 - 110 mg/dL         Diagnostic Results     Brain imaging:     [facility] 11/20/18  Stable right thalamic hemorrhage.  No new hemorrhage identified.      [facility] - Right Thalamic hemorrhage           Vessel Imaging:     CTA head and neck - unremarkable           Cardiac Evaluation:   [facility] 11/15/18  · Normal left ventricular systolic function. The estimated ejection fraction is 70%  · Concentric left ventricular remodeling.  · No wall motion abnormalities.  · Normal LV diastolic function.  · Normal right ventricular systolic function.  · Trace mitral regurgitation.  · Mild aortic regurgitation.  · Trace tricuspid regurgitation.  · Trace pulmonic regurgitation.  · Normal central venous pressure (3 mm Hg).  · The estimated PA systolic pressure is 24.72 mm Hg  · No pericardial effusion.

## 2018-11-20 NOTE — PROGRESS NOTES
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 Cleaning is a 52 yo female with a history significant for Hep C cirrhosis, HTN, cocaine and marijuana use who presented to [facility] on the early morning of 11/15 as a transfer from [facility] for Type B aortic dissection. She initially presented to [facility] on the evening of 11/14 for sharp epigastric pain that radiated to her back which started initially on the night of 11/13. CT at OSH noted aortic dissection from left subclavian to just proximal of celiac artery. She was transferred to [facility] for vascular surgery consult on a nicardipine gtt. She was transitioned from nicardipine gtt to esmolol gtt. CTA upon arrival confirmed dissection and she was admitted to MICU for medical management of the dissection with vascular surgery following.     Hospital/ICU Course:  Mrs. Cleaning was admitted to MICU for Type B aortic dissection on esmolol gtt to maintain HR of 60 and SBP <120. Vascular surgery following, no surgical intervention warranted initially. CTA read concerning for possible aortic rupture in the descending aorta with high-density fluid noted in the posterior mediastinum; additionally, there was a suspected penetrating ulcer in the upper abdominal aorta adjacent to the true lumen. Due to high risk/ possible rupture, Lumbar drain was placed by interventional radiology and TEVAR completed by vascular surgery. Early 11/16, pt noted to be in hemorragic shock complicated by liver disease as vasopressor requirements increased significantly, rising lactic acidosis, and patient was found to be bleeding from  bilateral groin sites with significant hematoma formation to left groin. Pressure was held for an extended period and patient received 4 U PRBC, 3 U FFP, 1 U platelets, 1 U cryo, and vitamin K. With improvement in coagulopathy and volume resuscitation, she was able to be weaned from vasopressors. However, on 11/17, a stroke code was called around 0800 due to acute left sided weakness; right sided thalamic bleed noted on CT. No intervention per vascular neurology, however have had to give multiple units of platelets, FFP, and cyro in order to keep platelets > 50,000, INR at 1.5, and fibrinogen >100. Repeat CT showed that hemorrhage had not changed. Neuro exam much improved with lessening left sided hemiparesis. New RUQ abdominal pain reported on 11/19 and, US with doppler noted probable right portal vein thrombosis; anticoagulation contraindicated.      On the early AM of 11/20, it was noted that Mrs. Cleaning was no longer able to move bilateral lower extremities and left upper extremity (this is a change as she was having improved strength in left upper/lower extremity previously and able to move RLE). Additionally, she was more somnolent with expressive aphasia noted. Discussions with vascular surgery, vascular neurology and [facility] team held with plans for stat CT head. Spinal drain opened up to drain 30 ml's in first hour and then 15 ml/hr subsequent to that. Additionally, levophed started to maintain MAP of 100 mm Hg but aim to keep SBP <160 given concern for spinal ischemia, but with subsequent hemorrhagic CVA.     Interval History/Significant Events: expressive aphasia with no movement of bilateral LE's and left upper extremity this AM. Spinal drain opened, CT head ordered.     Review of Systems   Unable to perform ROS: Mental status change     Objective:     Vital Signs (Most Recent):  Temp: 96.1 °F (35.6 °C) (11/20/18 0758)  Pulse: 70 (11/20/18 0758)  Resp: 12 (11/20/18 0758)  BP: 121/69 (11/20/18 0715)  SpO2: 95  % (11/20/18 0758) Vital Signs (24h Range):  Temp:  [92.1 °F (33.4 °C)-97.3 °F (36.3 °C)] 96.1 °F (35.6 °C)  Pulse:  [68-86] 70  Resp:  [11-45] 12  SpO2:  [87 %-100 %] 95 %  BP: ()/(50-82) 121/69   Weight: 62 kg (136 lb 11 oz)  Body mass index is 22.06 kg/m².      Intake/Output Summary (Last 24 hours) at 11/20/2018 0825  Last data filed at 11/20/2018 0700  Gross per 24 hour   Intake 1866 ml   Output 340 ml   Net 1526 ml       Physical Exam   Constitutional: She appears well-developed. She has a sickly appearance. She appears ill.   HENT:   Head: Normocephalic and atraumatic.   Eyes: EOM are normal. Pupils are equal, round, and reactive to light. Right eye exhibits no discharge. Left eye exhibits no discharge. Scleral icterus is present.   Neck: Neck supple. No JVD present. No tracheal deviation present. No thyromegaly present.   Cardiovascular: Normal rate, regular rhythm, S1 normal and S2 normal.   No murmur heard.  Pulses:       Radial pulses are 2+ on the right side, and 2+ on the left side.   Warm extremities   Pulmonary/Chest: No accessory muscle usage. No tachypnea. No respiratory distress. She has decreased breath sounds in the left middle field and the left lower field. She has no wheezes. She has no rhonchi. She has no rales.   Abdominal: Soft. Bowel sounds are normal. She exhibits no distension. There is tenderness (mild bilateral upper quadrants). There is no guarding.   Lymphadenopathy:     She has no cervical adenopathy.   Neurological:   Alert, making eye contact. Expressive aphasia/dysarthria noted. Slight left sided facial droop. Will not withdraw LUE or LLE to pain and extremities flaccid. RLE will not withdraw to pain, but sensation in tact. Spontaneous movement of RUE. Pupils 4 mm round, reactive bilaterally. EOM's normal.    Skin: Skin is dry. She is not diaphoretic. There is pallor.       Vents:  Oxygen Concentration (%): 40 (11/17/18 0339)  Lines/Drains/Airways     Central Venous Catheter  Line                 Percutaneous Central Line Insertion/Assessment - triple lumen  11/15/18 right internal jugular 5 days          Drain                 Lumbar Drain 11/15/18 1500 4 days         Urethral Catheter 11/15/18 1518 Non-latex;Straight-tip 16 Fr. 4 days          Peripheral Intravenous Line                 Peripheral IV - Single Lumen 11/19/18 1300 Anterior;Right Forearm less than 1 day         Peripheral IV - Single Lumen 11/19/18 1300 Anterior;Right Upper Arm less than 1 day              Significant Labs:    CBC/Anemia Profile:  Recent Labs   Lab 11/19/18  1403 11/19/18 2026 11/20/18  0300   WBC 5.61 6.28 6.95   HGB 7.4* 7.1* 7.0*   HCT 22.4* 21.8* 21.2*   PLT 46* 70* 61*   MCV 92 92 93   RDW 17.5* 17.7* 18.0*        Chemistries:  Recent Labs   Lab 11/19/18  0300 11/19/18  1403 11/19/18 2028 11/20/18  0300    142 142 143   K 3.7 4.0 3.7 3.9    109 108 108   CO2 22* 23 22* 22*   BUN 57* 64* 66* 69*   CREATININE 1.6* 1.8* 1.8* 1.9*   CALCIUM 8.8 8.9 9.5 9.3   ALBUMIN 3.0*  3.0* 3.0* 3.3* 3.1*  3.1*   PROT 7.1  --  7.5 7.1   BILITOT 4.0*  --  4.5* 4.4*   ALKPHOS 117  --  109 103   *  --  106* 94*   *  --  371* 326*   MG 2.1  --  2.2 2.3   PHOS 2.6*  2.6* 3.3 2.8 3.2  3.2     Significant Imaging:  I have reviewed all pertinent imaging results/findings within the past 24 hours.    Assessment/Plan:     Neuro   Encephalopathy, metabolic    --multifactorial: thalamic CVA, hepatic encephalopathy, delirium   --see above. Continue lactulose, rifaxamin   --minimize deliriogenic medications.      Nontraumatic thalamic hemorrhage    --noted on 11/17 with left hemiparesis, dysarthria, left sided facial droop. These symptoms improved through 11/19 with no intervention per vascular neurology other than maintaining SBP <140 and correcting coagulopathy for goal INR <1.5, platelets >50,000 and fibrinogen >100  --worsening of neurologic status again on 11/20 with worsening dysarthria, LUE,  LLE, RLE paralysis.   --stat CT head pending  --spinal drain draining per vascular surgery recommendations.   --in discussions with vascular neurology, vascular surgery, plan to leave spinal drain open to drain 15 ml/hr, maintain MAP of 100 and SBP <160. Continue correcting coagulopathy  --hourly neuro checks   --will discuss repeating CTA chest, abdomen, pelvis to evaluate worsening of dissection as well.         Psychiatric   Marijuana abuse    --see above     Cocaine abuse    --toxicology screen positive for cocaine, THC, opiates at OSH       Cardiac/Vascular   * Aortic dissection distal to left subclavian    --appreciate vascular surgery assistance  --s/p lumbar drain placement and TEVAR on 11/5  --lumbar drain reopened given acute lower extremity weakness on AM of 11/20. Continue to drain 15 ml/hr per protocol given symptoms   --continue neuro/ neurovascular exams   --discussing utility of repeat CTA        Pseudoaneurysm of left femoral artery    --noted 11/16. Hematoma appears unchanged in size   --continue to monitor      Essential hypertension    --goal MAP of 100, SBP <160 per vascular surgery and neurology recommendations  --continue levophed PRN to maintain this      Renal/   KARL (acute kidney injury)    --urine lytes indicative of pre-renal state. Likely related to hemorraghic shock but could be at risk for KENDALL too  --continue to trend for now. Urine output marginal, no need for RRT at this time.   --avoid nephrotoxic agents if possible      Hematology   Thrombocytopenia    --2/2 cirrhosis  --plts goal >50,000     Coagulopathy    --2/2 cirrhosis  --goal INR <1.5 given hemorrhagic CVA. Continue vitamin K and FFP as needed        Portal vein thrombosis    --noted on US liver with doppler on 11/19. Holding anticoagulation given above bleeding/coagulopathy  --consider repeating imaging in next few days     Oncology   Acute blood loss anemia    --transfuse to maintain Hgb 8-9 for now given concern for  spinal ischemia.   --trend      GI   Transaminitis    --suspect shock liver  --improving      Chronic hepatitis C with cirrhosis    --per patient, has known about cirrhosis for >10 years. Has never received treatment for Hep C  --continue supportive care for coagulopathy, thrombocytopenia in setting of Type B aortic dissection with hemorrhagic shock and new thalamic bleed   --lactulose and rifaximin      Orthopedic   Acute midline thoracic back pain    --resolved      Other   Goals of care, counseling/discussion    --Goals of care discussion with Ms. Cleaning and her sister  --Code status changed to DNR     Hemorrhagic shock    --required levo, gurwinder, and vaso on 11/16 with bleeding from bilateral groin sites s/p TEVAR   --shock resolved with volume resuscitation, improving coagulopathy  --trend CBC's and target Hgb 8-9                   Critical Care Time: 60 minutes  Critical secondary to Patient has a condition that poses threat to life and bodily function: shock, arotic dissection, hemorrhagic CVA       Critical care was time spent personally by me on the following activities: development of treatment plan with patient or surrogate and bedside caregivers, discussions with consultants, evaluation of patient's response to treatment, examination of patient, ordering and performing treatments and interventions, ordering and review of laboratory studies, ordering and review of radiographic studies, pulse oximetry, re-evaluation of patient's condition. This critical care time did not overlap with that of any other provider or involve time for any procedures.     Tracy Coulter NP  Critical Care Medicine  [facility]

## 2018-11-20 NOTE — PROGRESS NOTES
Order received from Dr. Pierce to drain 30 mL from lumbar drain, start levo gtt maintaining MAP >100 and STAT head CT. Will continue to monitor pt.

## 2018-11-20 NOTE — ASSESSMENT & PLAN NOTE
--urine lytes indicative of pre-renal state. Likely related to hemorraghic shock but could be at risk for KENDALL too  --continue to trend for now. Urine output marginal, no need for RRT at this time.   --avoid nephrotoxic agents if possible

## 2018-11-20 NOTE — PROGRESS NOTES
Dr. Mondragon, Dr. Wilkinson, and Dr. Claros at bedside to assess patient. Will continue to monitor closely.

## 2018-11-20 NOTE — PROGRESS NOTES
[facility]  Vascular Neurology  Comprehensive Stroke Center  Progress Note    Assessment/Plan:     Nontraumatic thalamic hemorrhage      Patient is a 53 y.o. female with a h/o HTN, Hep C, cirrhosis (MELD 15),Hx of pancreatitis, GERD, and illicit substance abuse (crack cocaine/THC) admitted for thoracic aortic dissection treated with TEVAR and Lumbar drain, had Acute right thalamic hemorrhage on  CTH.       Etiology - Hemorrhagic transformation of Right thalamic stroke v/s Primary right thalamic hemorrhagic stroke v/s End Stage live disease v/s 2/2 Lumbar drain with increased SFP / MAP gradient.     Recommend:  -Now that bleed is somewhat stable, would not be overly aggressive when correcting coagulopathy.  -Would use Vitamin k, rather than FFP, if necessary  -Would begin DVT ppx if not done so  -SBP goal <160  -Get MRI when lumbar csf drain removed    Antithrombotics for secondary stroke prevention:  Hold Antiplatelets    Statins for secondary stroke prevention and hyperlipidemia, if present: rec getting  LDL and start Lipitor if LDL > 130      Aggressive risk factor modification: HTN, Illicit drug use     Rehab efforts: PT/OT/SLP to evaluate and treat    Diagnostics ordered/pending: Repeat CT head showed no significant change in size or appearance of previously described right thalamic hemorrhage.    VTE prophylaxis: dvt ppx and scd's    BP parameters:  SBP <160         Essential hypertension    - rec SBP of ~ 160 and consider keeping MAPs at ~80 mmHg          No notes on file    STROKE DOCUMENTATION   Acute Stroke Times   Last Known Normal Date: 11/17/18  Last Known Normal Time: 0700  Symptom Onset Date: 11/17/18  Stroke Team Called Date: 11/17/18  Stroke Team Called Time: 0800  Stroke Team Arrival Date: 11/17/18  Stroke Team Arrival Time: 0805  CT Interpretation Time: 0834    NIH Scale:  1a. Level Of Consciousness: 1-->Not alert: but arousable by minor stimulation to obey, answer, or  respond  1b. LOC Questions: 1-->Answers one question correctly  1c. LOC Commands: 0-->Performs both tasks correctly  2. Best Gaze: 0-->Normal  3. Visual: 0-->No visual loss  4. Facial Palsy: 1-->Minor paralysis (flattened nasolabial fold, asymmetry on smiling)  5a. Motor Arm, Left: 2-->Some effort against gravity: limb cannot get to or maintain (if cued) 90 (or 45) degrees, drifts down to bed, but has some effort against gravity  5b. Motor Arm, Right: 0-->No drift: limb holds 90 (or 45) degrees for full 10 secs  6a. Motor Leg, Left: 3-->No effort against gravity: leg falls to bed immediately  6b. Motor Leg, Right: 2-->Some effort against gravity: leg falls to bed by 5 secs, but has some effort against gravity  7. Limb Ataxia: 1-->Present in one limb  8. Sensory: 0-->Normal: no sensory loss  9. Best Language: 2-->Severe aphasia: all communication is through fragmentary expression: great need for inference, questioning, and guessing by the listener. Range of information that can be exchanged is limited: listener carries burden of. . . (see row details)  10. Dysarthria: 1-->Mild-to-moderate dysarthria: patient slurs at least some words and, at worst, can be understood with some difficulty  11. Extinction and Inattention (formerly Neglect): 0-->No abnormality  Total (NIH Stroke Scale): 14       Modified Walland Score: 0  Independence Coma Scale:    ABCD2 Score:    [id number] Score:   HAS -BLED Score:   ICH Score:   Hunt & Farnsworth Classification:        Neurologic Chief Complaint: dysarthria, poor attention span, ULE weakness  Right Thalamic hemorrhage     Subjective:     Interval History: Patient is seen for follow-up neurological assessment and treatment recommendations: SANTOSHEON. Patient is not showing marked improvement. Less alert than on previous exam.     HPI, Past Medical, Family, and Social History remains the same as documented in the initial encounter.     Review of Systems   Constitutional: Negative for chills and  fever.   Respiratory: Negative for cough and shortness of breath.    Cardiovascular: Negative for chest pain and palpitations.   Gastrointestinal: Positive for abdominal pain (RUQ ). Negative for blood in stool, nausea and vomiting.   Musculoskeletal: Positive for neck pain (discomfort at RIJ site). Negative for back pain and neck stiffness.   Neurological: Negative for tremors, seizures, syncope, weakness, numbness and headaches.   Hematological: Bruises/bleeds easily.   Psychiatric/Behavioral: Positive for confusion.     Scheduled Meds:   lactulose  20 g Oral BID    NIFEdipine  30 mg Oral Daily    rifAXImin  550 mg Oral BID     Continuous Infusions:  PRN Meds:sodium chloride, ondansetron, oxyCODONE    Objective:     Vital Signs (Most Recent):  Temp: (!) 92.1 °F (33.4 °C) (11/19/18 1815)  Pulse: 77 (11/19/18 1815)  Resp: (!) 38 (11/19/18 1815)  BP: (!) 117/57 (11/19/18 1815)  SpO2: (!) 91 % (11/19/18 1815)  BP Location: Left arm    Vital Signs Range (Last 24H):  Temp:  [92.1 °F (33.4 °C)-98.6 °F (37 °C)]   Pulse:  []   Resp:  [13-45]   BP: ()/(50-98)   SpO2:  [87 %-99 %]   Arterial Line BP: (116-151)/(55-74)   BP Location: Left arm    Physical Exam   Constitutional: She appears well-developed and well-nourished.   HENT:   Head: Normocephalic and atraumatic.   Eyes: EOM are normal. Pupils are equal, round, and reactive to light.   Neck: Normal range of motion. Neck supple.   Cardiovascular: Normal rate and regular rhythm.   Pulmonary/Chest: Effort normal and breath sounds normal.   Abdominal: Soft. Bowel sounds are normal. There is no guarding.   Genitourinary:   Genitourinary Comments: Perry in place   Musculoskeletal: She exhibits no edema or deformity.   Neurological: She is alert.   LUE weakness   Skin: Skin is warm and dry.   Vitals reviewed.      Neurological Exam:   LOC: alert  Attention Span: poor  Language: mild aphasia  Articulation: mild dysarthria  Orientation: Not oriented to time  Visual  Fields: Full  EOM (CN III, IV, VI): Full/intact  Pupils (CN II, III): PERRL  Facial Sensation (CN V): Normal  Facial Movement (CN VII): Symmetric facial expression    Motor: Arm left  Normal  3/5  Leg left  Normal  3/5  Arm right  Normal  5/5  Leg right Normal  4/5  Sensation: Normal thorughout  Tone: Normal tone throughout  Reflexes +2 throughout             Laboratory:    Recent Results (from the past 24 hour(s))   POCT glucose    Collection Time: 11/19/18 12:53 AM   Result Value Ref Range    POCT Glucose 88 70 - 110 mg/dL   Hepatic function panel    Collection Time: 11/19/18  3:00 AM   Result Value Ref Range    Total Protein 7.1 6.0 - 8.4 g/dL    Albumin 3.0 (L) 3.5 - 5.2 g/dL    Total Bilirubin 4.0 (H) 0.1 - 1.0 mg/dL    Bilirubin, Direct 2.3 (H) 0.1 - 0.3 mg/dL     (H) 10 - 40 U/L     (H) 10 - 44 U/L    Alkaline Phosphatase 117 55 - 135 U/L   Magnesium    Collection Time: 11/19/18  3:00 AM   Result Value Ref Range    Magnesium 2.1 1.6 - 2.6 mg/dL   Phosphorus    Collection Time: 11/19/18  3:00 AM   Result Value Ref Range    Phosphorus 2.6 (L) 2.7 - 4.5 mg/dL   Ammonia    Collection Time: 11/19/18  3:00 AM   Result Value Ref Range    Ammonia 35 10 - 50 umol/L   CBC auto differential    Collection Time: 11/19/18  3:00 AM   Result Value Ref Range    WBC 7.00 3.90 - 12.70 K/uL    RBC 2.59 (L) 4.00 - 5.40 M/uL    Hemoglobin 8.2 (L) 12.0 - 16.0 g/dL    Hematocrit 23.6 (L) 37.0 - 48.5 %    MCV 91 82 - 98 fL    MCH 31.7 (H) 27.0 - 31.0 pg    MCHC 34.7 32.0 - 36.0 g/dL    RDW 17.4 (H) 11.5 - 14.5 %    Platelets 52 (L) 150 - 350 K/uL    MPV 11.7 9.2 - 12.9 fL    Immature Granulocytes 0.9 (H) 0.0 - 0.5 %    Gran # (ANC) 5.0 1.8 - 7.7 K/uL    Immature Grans (Abs) 0.06 (H) 0.00 - 0.04 K/uL    Lymph # 0.8 (L) 1.0 - 4.8 K/uL    Mono # 1.2 (H) 0.3 - 1.0 K/uL    Eos # 0.0 0.0 - 0.5 K/uL    Baso # 0.01 0.00 - 0.20 K/uL    nRBC 1 (A) 0 /100 WBC    Gran% 71.0 38.0 - 73.0 %    Lymph% 10.9 (L) 18.0 - 48.0 %    Mono%  17.0 (H) 4.0 - 15.0 %    Eosinophil% 0.1 0.0 - 8.0 %    Basophil% 0.1 0.0 - 1.9 %    Differential Method Automated    Fibrinogen    Collection Time: 11/19/18  3:00 AM   Result Value Ref Range    Fibrinogen 91 (LL) 182 - 366 mg/dL   Protime-INR    Collection Time: 11/19/18  3:00 AM   Result Value Ref Range    Prothrombin Time 18.4 (H) 9.0 - 12.5 sec    INR 1.9 (H) 0.8 - 1.2   Renal function panel    Collection Time: 11/19/18  3:00 AM   Result Value Ref Range    Glucose 120 (H) 70 - 110 mg/dL    Sodium 142 136 - 145 mmol/L    Potassium 3.7 3.5 - 5.1 mmol/L    Chloride 109 95 - 110 mmol/L    CO2 22 (L) 23 - 29 mmol/L    BUN, Bld 57 (H) 6 - 20 mg/dL    Calcium 8.8 8.7 - 10.5 mg/dL    Creatinine 1.6 (H) 0.5 - 1.4 mg/dL    Albumin 3.0 (L) 3.5 - 5.2 g/dL    Phosphorus 2.6 (L) 2.7 - 4.5 mg/dL    eGFR if  42.1 (A) >60 mL/min/1.73 m^2    eGFR if non  36.5 (A) >60 mL/min/1.73 m^2    Anion Gap 11 8 - 16 mmol/L   POCT glucose    Collection Time: 11/19/18 10:37 AM   Result Value Ref Range    POCT Glucose 123 (H) 70 - 110 mg/dL   Prepare Fresh Frozen Plasma 1 Unit    Collection Time: 11/19/18 11:05 AM   Result Value Ref Range    UNIT NUMBER [id number]     PRODUCT CODE E6272S90     DISPENSE STATUS ISSUED     CODING SYSTEM WOVZ332     Unit Blood Type Code 5100     Unit Blood Type O POS     Unit Expiration [id number]    Type & Screen    Collection Time: 11/19/18 11:21 AM   Result Value Ref Range    Group & Rh O NEG     Indirect Karen NEG    Prepare Platelets 1 Dose    Collection Time: 11/19/18 11:21 AM   Result Value Ref Range    UNIT NUMBER [id number]     PRODUCT CODE K6525Y02     DISPENSE STATUS ISSUED     CODING SYSTEM [id number]     Unit Blood Type Code 5100     Unit Blood Type O POS     Unit Expiration [id number]    Prepare Fresh Frozen Plasma 1 Unit    Collection Time: 11/19/18 11:21 AM   Result Value Ref Range    UNIT NUMBER [id number]     PRODUCT CODE F3469X43     DISPENSE STATUS  ISSUED     CODING SYSTEM KEPV140     Unit Blood Type Code 5100     Unit Blood Type O POS     Unit Expiration [id number]    Prepare Cryoprecipitate 1 Dose    Collection Time: 11/19/18 11:21 AM   Result Value Ref Range    UNIT NUMBER [id number]     PRODUCT CODE Q9804W61     DISPENSE STATUS ISSUED     CODING SYSTEM LLAI984     Unit Blood Type Code 5100     Unit Blood Type O POS     Unit Expiration [id number]    POCT glucose    Collection Time: 11/19/18 11:28 AM   Result Value Ref Range    POCT Glucose 126 (H) 70 - 110 mg/dL   Sodium, urine, random    Collection Time: 11/19/18  1:20 PM   Result Value Ref Range    Sodium Urine Random <20 (A) 20 - 250 mmol/L   Creatinine, urine, random    Collection Time: 11/19/18  1:20 PM   Result Value Ref Range    Creatinine, Random Ur 150.0 15.0 - 325.0 mg/dL   CBC auto differential    Collection Time: 11/19/18  2:03 PM   Result Value Ref Range    WBC 5.61 3.90 - 12.70 K/uL    RBC 2.43 (L) 4.00 - 5.40 M/uL    Hemoglobin 7.4 (L) 12.0 - 16.0 g/dL    Hematocrit 22.4 (L) 37.0 - 48.5 %    MCV 92 82 - 98 fL    MCH 30.5 27.0 - 31.0 pg    MCHC 33.0 32.0 - 36.0 g/dL    RDW 17.5 (H) 11.5 - 14.5 %    Platelets 46 (L) 150 - 350 K/uL    MPV 10.1 9.2 - 12.9 fL    Immature Granulocytes 0.9 (H) 0.0 - 0.5 %    Gran # (ANC) 4.3 1.8 - 7.7 K/uL    Immature Grans (Abs) 0.05 (H) 0.00 - 0.04 K/uL    Lymph # 0.5 (L) 1.0 - 4.8 K/uL    Mono # 0.8 0.3 - 1.0 K/uL    Eos # 0.0 0.0 - 0.5 K/uL    Baso # 0.00 0.00 - 0.20 K/uL    nRBC 1 (A) 0 /100 WBC    Gran% 76.1 (H) 38.0 - 73.0 %    Lymph% 9.6 (L) 18.0 - 48.0 %    Mono% 13.4 4.0 - 15.0 %    Eosinophil% 0.0 0.0 - 8.0 %    Basophil% 0.0 0.0 - 1.9 %    Differential Method Automated    Fibrinogen    Collection Time: 11/19/18  2:03 PM   Result Value Ref Range    Fibrinogen <70 (AA) 182 - 366 mg/dL   Protime-INR    Collection Time: 11/19/18  2:03 PM   Result Value Ref Range    Prothrombin Time 20.1 (H) 9.0 - 12.5 sec    INR 2.1 (H) 0.8 - 1.2   Renal function  panel    Collection Time: 11/19/18  2:03 PM   Result Value Ref Range    Glucose 153 (H) 70 - 110 mg/dL    Sodium 142 136 - 145 mmol/L    Potassium 4.0 3.5 - 5.1 mmol/L    Chloride 109 95 - 110 mmol/L    CO2 23 23 - 29 mmol/L    BUN, Bld 64 (H) 6 - 20 mg/dL    Calcium 8.9 8.7 - 10.5 mg/dL    Creatinine 1.8 (H) 0.5 - 1.4 mg/dL    Albumin 3.0 (L) 3.5 - 5.2 g/dL    Phosphorus 3.3 2.7 - 4.5 mg/dL    eGFR if  36.5 (A) >60 mL/min/1.73 m^2    eGFR if non  31.7 (A) >60 mL/min/1.73 m^2    Anion Gap 10 8 - 16 mmol/L   Brain natriuretic peptide    Collection Time: 11/19/18  2:03 PM   Result Value Ref Range    BNP 1,787 (H) 0 - 99 pg/mL   POCT glucose    Collection Time: 11/19/18  4:08 PM   Result Value Ref Range    POCT Glucose 151 (H) 70 - 110 mg/dL         Diagnostic Results     Brain imaging:     CTH - Right Thalamic hemorrhage           Vessel Imaging:     CTA head and neck - unremarkable           CTA 11/17/18:  Intraparenchymal hemorrhage centered at the right thalamus with small degree of mass effect and slight leftward shift of the 3rd ventricle.  No evidence of hydrocephalus.    No high-grade stenosis or major vessel occlusion.        Cardiac Evaluation:   JORDAN 11/15/18  · Normal left ventricular systolic function. The estimated ejection fraction is 70%  · Concentric left ventricular remodeling.  · No wall motion abnormalities.  · Normal LV diastolic function.  · Normal right ventricular systolic function.  · Trace mitral regurgitation.  · Mild aortic regurgitation.  · Trace tricuspid regurgitation.  · Trace pulmonic regurgitation.  · Normal central venous pressure (3 mm Hg).  · The estimated PA systolic pressure is 24.72 mm Hg  · No pericardial effusion.          Jose Pan MD  Comprehensive Stroke Center  Department of Vascular Neurology   [facility]

## 2018-11-20 NOTE — ASSESSMENT & PLAN NOTE
--noted on 11/17 with left hemiparesis, dysarthria, left sided facial droop. These symptoms improved through 11/19 with no intervention per vascular neurology other than maintaining SBP <140 and correcting coagulopathy for goal INR <1.5, platelets >50,000 and fibrinogen >100  --worsening of neurologic status again on 11/20 with worsening dysarthria, LUE, LLE, RLE paralysis.   --stat CT head pending  --spinal drain draining per vascular surgery recommendations.   --in discussions with vascular neurology, vascular surgery, plan to leave spinal drain open to drain 15 ml/hr, maintain MAP of 100 and SBP <160. Continue correcting coagulopathy  --hourly neuro checks   --will discuss repeating CTA chest, abdomen, pelvis to evaluate worsening of dissection as well.

## 2018-11-20 NOTE — PLAN OF CARE
Patient with change in neuro assessment.  CSF drain remains intact and orders for CT scan.  Vascular Neurology following.  Patient remains inappropriate for disposition planning.  CM will continue to follow.     11/20/18 1109   Right Care Assessment   Can the patient answer the patient profile reliably? Yes, cognitively intact   How often would a person be available to care for the patient? Often   Describe the patient's ability to walk at the present time. Does not walk or unable to take any steps at all   How does the patient rate their overall health at the present time? Good   Number of comorbid conditions (as recorded on the chart) Two   During the past month, has the patient often been bothered by feeling down, depressed or hopeless? No   During the past month, has the patient often been bothered by little interest or pleasure in doing things? No   Ana Soto RN, BSN, [facility]  Case Management  [facility]  Ext. [phone]

## 2018-11-20 NOTE — ASSESSMENT & PLAN NOTE
--required levo, gurwinder, and vaso on 11/16 with bleeding from bilateral groin sites s/p TEVAR   --shock resolved with volume resuscitation, improving coagulopathy  --trend CBC's and target Hgb 8-9

## 2018-11-20 NOTE — PROGRESS NOTES
Pt transported to head CT by ICU RN, monitored throughout via portable monitor, on 4L NC. No issues. Will continue to monitor.

## 2018-11-20 NOTE — SIGNIFICANT EVENT
Significant decrease in neurological exam this morning. Patient not moving or withdrawing from pain in either lower extremity and no movement from L arm. She is only oriented to self and is lethargic.     Spoke with vascular surgery, agree with increase MAP to 100, while attempting to keep SBP < 160. Patient had 30 cc removed from lumbar CSF drain this am, clamped for transport to CT. 15 cc/hr removal thereafter. Will f/u CT and make further recs.         Jose Pan MD  [phone]

## 2018-11-20 NOTE — ASSESSMENT & PLAN NOTE
53 F with Hep C cirrhosis (MELD 15 on admission), HTN, crack cocaine abuse, who presented with acute tearing chest/back pain to OSH, Found to have retrograde type B dissection. Despite aggressive anti-impulse therapy, persistent pain requiring intervention. Preoperative CSF drain placement by Neuro IR. Successful TEVAR. POD 1 left groin hematoma and coagulopathy requiring manual pressure and product resuscitation. POD 2 new onset left upper arm weakness, found to have right thalamic hemorrhage. F/u CT head stable findings. Significant reactive left pleural effusion with surrounding atelectasis noted also.     Neuro: acute change in neuro exam overnight. No longer withdrawing to pain in R or L leg  - stat CT head, open spinal drain (30 cc), levo gtt to increase MAP >100   - discussed with stroke team, will keep SBP <160  - may hold off on spinal drain removal, pending results  right thalamic bleed  - con't q1h neuro checks  - speech swallow passed; advancing diet     CV:   - ease the spinal perfusion protocol. BP ~140, MAP >65    Resp:  - encourage IS  - O2 supp prn  - Moderate Left pleural effusion and small right pleural effusion on CTA stoke multiphase noted. Left pleural effusion likely reactive following TEVAR. Ok for patient to sit up while CSF drain is clamped. Con't pulmonary toilet.     Renal/Endocrine:  - con't monitoring UOP    GI: Hep C cirrhosis with MELD 15 on admission  - protonix  - PO diet  - Paracentesis today with IR    Heme/ID:  - ABLA stable.   - con't correct coagulopathy prn  - monitor thrombocytopenia  - On empiric Vanc/Zosyn per primary.     Dispo:  - ICU care

## 2018-11-20 NOTE — ASSESSMENT & PLAN NOTE
--multifactorial: thalamic CVA, hepatic encephalopathy, delirium   --see above. Continue lactulose, rifaxamin   --minimize deliriogenic medications.

## 2018-11-20 NOTE — PROGRESS NOTES
Upon arrival to shift pt. Complains of chest pain. Pt. AAOX4 follows commands with upper extremities but does not withdraw from lower extremities. Pulses present on all extremities. Critical care Dr. Burrell, Dr. Mondragon, and Dr. Mortensen notified and at bedside to assess patient. New orders received and implemented. Will continue to monitor closely.

## 2018-11-20 NOTE — PROGRESS NOTES
[facility]  Vascular Surgery  Progress Note    Patient Name: Leah Cleaning  MRN: [id number]  Admission Date: 11/15/2018  Primary Care Provider: Mary Colorado MD    Subjective:     Interval History: Not moving either lower extremity this morning. Acute change from overnight, when she was withdrawing to stimuli. Stat CT head, opened spinal drain, levo to keep MAP >100. Stroke team notified    Post-Op Info:  Procedure(s) (LRB):  REPAIR, ANEURYSM, ENDOVASCULAR GRAFT, AORTA, THORACIC (N/A)   5 Days Post-Op       Medications:  Continuous Infusions:  Scheduled Meds:   lactulose  20 g Oral BID    NIFEdipine  30 mg Oral Daily    rifAXImin  550 mg Oral BID     PRN Meds:sodium chloride, sodium chloride, ondansetron     Objective:     Vital Signs (Most Recent):  Temp: 96.6 °F (35.9 °C) (11/20/18 0615)  Pulse: 72 (11/20/18 0615)  Resp: 15 (11/20/18 0615)  BP: 126/70 (11/20/18 0615)  SpO2: 96 % (11/20/18 0615) Vital Signs (24h Range):  Temp:  [92.1 °F (33.4 °C)-97.3 °F (36.3 °C)] 96.6 °F (35.9 °C)  Pulse:  [68-86] 72  Resp:  [11-45] 15  SpO2:  [87 %-100 %] 96 %  BP: ()/(50-82) 126/70          Physical Exam   Constitutional: She is oriented to person, place, and time. She appears well-developed and well-nourished.   HENT:   Head: Normocephalic and atraumatic.   Eyes: EOM are normal. Pupils are equal, round, and reactive to light.   Neck: Normal range of motion. Neck supple.   Cardiovascular: Normal rate and regular rhythm.   Pulmonary/Chest: Effort normal and breath sounds normal.   Abdominal: Soft. Bowel sounds are normal.   Genitourinary:   Genitourinary Comments: Perry in place   Musculoskeletal:   LUE 3/5, poor fine motor movement.   RUE 5/5, intact fine motor movement  LLE/RLE no movement, does not withdrawal to pain  CSF drain site dressing c/d/i   Neurological: She is alert and oriented to person, place, and time.   Skin: Skin is warm.   Nursing note and vitals reviewed.      Significant  Labs:  CBC:   Recent Labs   Lab 11/20/18 0300   WBC 6.95   RBC 2.28*   HGB 7.0*   HCT 21.2*   PLT 61*   MCV 93   MCH 30.7   MCHC 33.0     CMP:   Recent Labs   Lab 11/20/18 0300   *   CALCIUM 9.3   ALBUMIN 3.1*  3.1*   PROT 7.1      K 3.9   CO2 22*      BUN 69*   CREATININE 1.9*   ALKPHOS 103   ALT 94*   *   BILITOT 4.4*     Coagulation:   Recent Labs   Lab 11/20/18 0300   LABPROT 17.5*   INR 1.8*     Assessment/Plan:     * Aortic dissection distal to left subclavian    53 F with Hep C cirrhosis (MELD 15 on admission), HTN, crack cocaine abuse, who presented with acute tearing chest/back pain to OSH, Found to have retrograde type B dissection. Despite aggressive anti-impulse therapy, persistent pain requiring intervention. Preoperative CSF drain placement by Neuro IR. Successful TEVAR. POD 1 left groin hematoma and coagulopathy requiring manual pressure and product resuscitation. POD 2 new onset left upper arm weakness, found to have right thalamic hemorrhage. F/u CT head stable findings. Significant reactive left pleural effusion with surrounding atelectasis noted also.     Neuro: acute change in neuro exam overnight. No longer withdrawing to pain in R or L leg  - stat CT head, open spinal drain (30 cc) then drain 15 cc per hr, levo gtt to increase MAP >100   - discussed with stroke team, will keep SBP <160  - may hold off on spinal drain removal, pending results  right thalamic bleed  - con't q1h neuro checks  - speech swallow passed; advancing diet     CV:   - ease the spinal perfusion protocol. BP ~140, MAP >65    Resp:  - encourage IS  - O2 supp prn  - Moderate Left pleural effusion and small right pleural effusion on CTA stoke multiphase noted. Left pleural effusion likely reactive following TEVAR. Ok for patient to sit up while CSF drain is clamped. Con't pulmonary toilet.     Renal/Endocrine:  - con't monitoring UOP    GI: Hep C cirrhosis with MELD 15 on admission  - protonix  -  PO diet  - Paracentesis today with IR    Heme/ID:  - ABLA stable.   - con't correct coagulopathy prn  - monitor thrombocytopenia  - On empiric Vanc/Zosyn per primary.     Dispo:  - ICU care          Iman Pierce MD  Vascular Surgery  [facility]

## 2018-11-20 NOTE — PLAN OF CARE
Problem: Patient Care Overview  Goal: Plan of Care Review  Outcome: Ongoing (interventions implemented as appropriate)  Pt confused throughout shift, intermittently lethargic. Pt hypothermic, rectal probe inserted for continuous monitoring, warming blanket applied. Pt 92-96F throughout shift. Cardene turned of this AM, SBP <140. Pt on RA throughout most of shift, maintaining sats >90%. Pt became restless around 17:00, complaining of pain, rolling in bed. NP Randall notified, PRN oxycodone administered, 4L NC applied. 2 FFP, 1 cryo, 1 platelets administered this shift., labs to be rechecked @ 21:00. UO 5-20 mL/hr, CC team aware, urine sodium and creatinine sent, 40 mg lasix administered. Spinal drain to be pulled tomorrow. Pt and family updated on plan of care throughout shift. See flow sheet for assessment and details.

## 2018-11-20 NOTE — SUBJECTIVE & OBJECTIVE
Interval History/Significant Events: expressive aphasia with no movement of bilateral LE's and left upper extremity this AM. Spinal drain opened, CT head ordered.     Review of Systems   Unable to perform ROS: Mental status change     Objective:     Vital Signs (Most Recent):  Temp: 96.1 °F (35.6 °C) (11/20/18 0758)  Pulse: 70 (11/20/18 0758)  Resp: 12 (11/20/18 0758)  BP: 121/69 (11/20/18 0715)  SpO2: 95 % (11/20/18 0758) Vital Signs (24h Range):  Temp:  [92.1 °F (33.4 °C)-97.3 °F (36.3 °C)] 96.1 °F (35.6 °C)  Pulse:  [68-86] 70  Resp:  [11-45] 12  SpO2:  [87 %-100 %] 95 %  BP: ()/(50-82) 121/69   Weight: 62 kg (136 lb 11 oz)  Body mass index is 22.06 kg/m².      Intake/Output Summary (Last 24 hours) at 11/20/2018 0825  Last data filed at 11/20/2018 0700  Gross per 24 hour   Intake 1866 ml   Output 340 ml   Net 1526 ml       Physical Exam   Constitutional: She appears well-developed. She has a sickly appearance. She appears ill.   HENT:   Head: Normocephalic and atraumatic.   Eyes: EOM are normal. Pupils are equal, round, and reactive to light. Right eye exhibits no discharge. Left eye exhibits no discharge. Scleral icterus is present.   Neck: Neck supple. No JVD present. No tracheal deviation present. No thyromegaly present.   Cardiovascular: Normal rate, regular rhythm, S1 normal and S2 normal.   No murmur heard.  Pulses:       Radial pulses are 2+ on the right side, and 2+ on the left side.   Warm extremities   Pulmonary/Chest: No accessory muscle usage. No tachypnea. No respiratory distress. She has decreased breath sounds in the left middle field and the left lower field. She has no wheezes. She has no rhonchi. She has no rales.   Abdominal: Soft. Bowel sounds are normal. She exhibits no distension. There is tenderness (mild bilateral upper quadrants). There is no guarding.   Lymphadenopathy:     She has no cervical adenopathy.   Neurological:   Alert, making eye contact. Expressive aphasia/dysarthria  noted. Slight left sided facial droop. Will not withdraw LUE or LLE to pain and extremities flaccid. RLE will not withdraw to pain, but sensation in tact. Spontaneous movement of RUE. Pupils 4 mm round, reactive bilaterally. EOM's normal.    Skin: Skin is dry. She is not diaphoretic. There is pallor.       Vents:  Oxygen Concentration (%): 40 (11/17/18 0339)  Lines/Drains/Airways     Central Venous Catheter Line                 Percutaneous Central Line Insertion/Assessment - triple lumen  11/15/18 right internal jugular 5 days          Drain                 Lumbar Drain 11/15/18 1500 4 days         Urethral Catheter 11/15/18 1518 Non-latex;Straight-tip 16 Fr. 4 days          Peripheral Intravenous Line                 Peripheral IV - Single Lumen 11/19/18 1300 Anterior;Right Forearm less than 1 day         Peripheral IV - Single Lumen 11/19/18 1300 Anterior;Right Upper Arm less than 1 day              Significant Labs:    CBC/Anemia Profile:  Recent Labs   Lab 11/19/18  1403 11/19/18 2026 11/20/18  0300   WBC 5.61 6.28 6.95   HGB 7.4* 7.1* 7.0*   HCT 22.4* 21.8* 21.2*   PLT 46* 70* 61*   MCV 92 92 93   RDW 17.5* 17.7* 18.0*        Chemistries:  Recent Labs   Lab 11/19/18  0300 11/19/18  1403 11/19/18 2028 11/20/18  0300    142 142 143   K 3.7 4.0 3.7 3.9    109 108 108   CO2 22* 23 22* 22*   BUN 57* 64* 66* 69*   CREATININE 1.6* 1.8* 1.8* 1.9*   CALCIUM 8.8 8.9 9.5 9.3   ALBUMIN 3.0*  3.0* 3.0* 3.3* 3.1*  3.1*   PROT 7.1  --  7.5 7.1   BILITOT 4.0*  --  4.5* 4.4*   ALKPHOS 117  --  109 103   *  --  106* 94*   *  --  371* 326*   MG 2.1  --  2.2 2.3   PHOS 2.6*  2.6* 3.3 2.8 3.2  3.2     Significant Imaging:  I have reviewed all pertinent imaging results/findings within the past 24 hours.

## 2018-11-20 NOTE — SUBJECTIVE & OBJECTIVE
Medications:  Continuous Infusions:  Scheduled Meds:   lactulose  20 g Oral BID    NIFEdipine  30 mg Oral Daily    rifAXImin  550 mg Oral BID     PRN Meds:sodium chloride, sodium chloride, ondansetron     Objective:     Vital Signs (Most Recent):  Temp: 96.6 °F (35.9 °C) (11/20/18 0615)  Pulse: 72 (11/20/18 0615)  Resp: 15 (11/20/18 0615)  BP: 126/70 (11/20/18 0615)  SpO2: 96 % (11/20/18 0615) Vital Signs (24h Range):  Temp:  [92.1 °F (33.4 °C)-97.3 °F (36.3 °C)] 96.6 °F (35.9 °C)  Pulse:  [68-86] 72  Resp:  [11-45] 15  SpO2:  [87 %-100 %] 96 %  BP: ()/(50-82) 126/70          Physical Exam   Constitutional: She is oriented to person, place, and time. She appears well-developed and well-nourished.   HENT:   Head: Normocephalic and atraumatic.   Eyes: EOM are normal. Pupils are equal, round, and reactive to light.   Neck: Normal range of motion. Neck supple.   Cardiovascular: Normal rate and regular rhythm.   Pulmonary/Chest: Effort normal and breath sounds normal.   Abdominal: Soft. Bowel sounds are normal.   Genitourinary:   Genitourinary Comments: Perry in place   Musculoskeletal:   LUE 3/5, poor fine motor movement.   RUE 5/5, intact fine motor movement  LLE/RLE no movement, does not withdrawal to pain  CSF drain site dressing c/d/i   Neurological: She is alert and oriented to person, place, and time.   Skin: Skin is warm.   Nursing note and vitals reviewed.      Significant Labs:  CBC:   Recent Labs   Lab 11/20/18  0300   WBC 6.95   RBC 2.28*   HGB 7.0*   HCT 21.2*   PLT 61*   MCV 93   MCH 30.7   MCHC 33.0     CMP:   Recent Labs   Lab 11/20/18  0300   *   CALCIUM 9.3   ALBUMIN 3.1*  3.1*   PROT 7.1      K 3.9   CO2 22*      BUN 69*   CREATININE 1.9*   ALKPHOS 103   ALT 94*   *   BILITOT 4.4*     Coagulation:   Recent Labs   Lab 11/20/18  0300   LABPROT 17.5*   INR 1.8*

## 2018-11-20 NOTE — ASSESSMENT & PLAN NOTE
--noted on US liver with doppler on 11/19. Holding anticoagulation given above bleeding/coagulopathy  --consider repeating imaging in next few days

## 2018-11-20 NOTE — PROGRESS NOTES
[facility]  Vascular Neurology  Comprehensive Stroke Center  Progress Note    Assessment/Plan:     Nontraumatic thalamic hemorrhage      Patient is a 53 y.o. female with a h/o HTN, Hep C, cirrhosis (MELD 15),Hx of pancreatitis, GERD, and illicit substance abuse (crack cocaine/THC) admitted for thoracic aortic dissection treated with TEVAR and Lumbar drain, had Acute right thalamic hemorrhage on CTH.       Etiology - Hemorrhagic transformation of Right thalamic stroke v/s Primary right thalamic hemorrhagic stroke v/s End Stage live disease v/s 2/2 Lumbar drain with increased SFP / MAP gradient.           11/20/18: Sudden LOF of both lower extremities with no response to painful stimuli. Repeat CT head w/o new pathology or worsening hemorrhage.    Recommend:  -CT head did not have worsening of hemorrhage. We do suspect that this was originally an embolic stroke with hemorrhagic conversion, and since imaging was <24h from clinical change, CT would likely not have shown if new embolic stroke. Could get MRI (which will eventually be needed either way) to assess for new embolic event, however, an embolic brain pathology would be less likely to explain why both lower limbs would be suddenly affected. The more plausable explanation would involve spinal cord pathology. Therefore, would consider CT abdomen/pelvis to assess internal hematoma for spinal impingement. Also on differential would be spinal artery pathology, which would be difficult to determine. Considering that the removal of CSF from lumbar drain was prior to stat CT, it is possible she had increased spinal pressure (~ICP) causing observed impairnment, which clinically improved with continued removal of fluid.     -When pt is able, MRI head w/o contrast to assess progression.   -Continue with removal of CSF 15 cc/hr, per vascular sx instructions  -SBP goal <160 with MAP goal of 100                      Antithrombotics for secondary stroke  prevention:  Hold Antiplatelets    Statins for secondary stroke prevention and hyperlipidemia, if present: rec getting  LDL and start Lipitor if LDL > 130      Aggressive risk factor modification: HTN, Illicit drug use     Rehab efforts: PT/OT/SLP to evaluate and treat    Diagnostics ordered/pending: ?MRI head w/o, ?CT abdo/pelvis    VTE prophylaxis: dvt ppx and scd's    BP parameters:  SBP <160         Essential hypertension    - rec SBP of ~ 160 and consider keeping MAPs at ~100 mmHg          No notes on file    STROKE DOCUMENTATION   Acute Stroke Times   Last Known Normal Date: 11/17/18  Last Known Normal Time: 0700  Symptom Onset Date: 11/17/18  Stroke Team Called Date: 11/17/18  Stroke Team Called Time: 0800  Stroke Team Arrival Date: 11/17/18  Stroke Team Arrival Time: 0805  CT Interpretation Time: 0834    NIH Scale:  1a. Level Of Consciousness: 1-->Not alert: but arousable by minor stimulation to obey, answer, or respond  1b. LOC Questions: 0-->Answers both questions correctly  1c. LOC Commands: 0-->Performs both tasks correctly  2. Best Gaze: 0-->Normal  3. Visual: 0-->No visual loss  4. Facial Palsy: 1-->Minor paralysis (flattened nasolabial fold, asymmetry on smiling)  5a. Motor Arm, Left: 4-->No movement  5b. Motor Arm, Right: 0-->No drift: limb holds 90 (or 45) degrees for full 10 secs  6a. Motor Leg, Left: 4-->No movement  6b. Motor Leg, Right: 3-->No effort against gravity: leg falls to bed immediately  7. Limb Ataxia: 0-->Absent  8. Sensory: 0-->Normal: no sensory loss  9. Best Language: 1-->Mild-to-moderate aphasia: some obvious loss of fluency or facility of comprehension, without significant limitation on ideas expressed or form of expression. Reduction of speech and/or comprehension, however, makes conversation. . . (see row details)  10. Dysarthria: 1-->Mild-to-moderate dysarthria: patient slurs at least some words and, at worst, can be understood with some difficulty  11. Extinction and  Inattention (formerly Neglect): 0-->No abnormality  Total (NIH Stroke Scale): 15       Modified Vanderburgh Score: 0  [place] Coma Scale:    ABCD2 Score:    [id number] Score:   HAS -BLED Score:   ICH Score:   Hunt & Farnsworth Classification:      Hemorrhagic change of an Ischemic Stroke: Does this patient have an ischemic stroke with hemorrhagic changes? Yes    Neurologic Chief Complaint: dysarthria, poor attention span, ULE weakness  Right Thalamic hemorrhage     Subjective:     Interval History: Patient is seen for follow-up neurological assessment and treatment recommendations: Worsening clinical exam overnight/this am. Inability to respond to pain in lower extremities. Lumbar CSF drain removed 30 cc's then 15cc/hr thereafter. Patient underwent CT head without any worsening of previous hemorrhage or new pathology.     HPI, Past Medical, Family, and Social History remains the same as documented in the initial encounter.     Review of Systems   Constitutional: Negative for chills and fever.   Respiratory: Negative for cough and shortness of breath.    Cardiovascular: Negative for chest pain and palpitations.   Gastrointestinal: Positive for abdominal pain (RUQ ). Negative for blood in stool, nausea and vomiting.   Musculoskeletal: Positive for neck pain (discomfort at RIJ site). Negative for back pain and neck stiffness.   Neurological: Negative for tremors, seizures, syncope, weakness, numbness and headaches.   Hematological: Bruises/bleeds easily.   Psychiatric/Behavioral: Positive for confusion.     Scheduled Meds:   lactulose  20 g Oral BID    rifAXImin  550 mg Oral BID     Continuous Infusions:   norepinephrine bitartrate-D5W 0.02 mcg/kg/min (11/20/18 0744)     PRN Meds:sodium chloride, ondansetron    Objective:     Vital Signs (Most Recent):  Temp: 96.1 °F (35.6 °C) (11/20/18 1600)  Pulse: 67 (11/20/18 1600)  Resp: 16 (11/20/18 1100)  BP: 130/74 (11/20/18 1600)  SpO2: 96 % (11/20/18 1600)  BP Location: Right  arm    Vital Signs Range (Last 24H):  Temp:  [92.1 °F (33.4 °C)-97.3 °F (36.3 °C)]   Pulse:  [66-85]   Resp:  [11-45]   BP: (110-141)/(57-79)   SpO2:  [83 %-100 %]   BP Location: Right arm    Physical Exam   Constitutional: She appears well-developed and well-nourished.   HENT:   Head: Normocephalic and atraumatic.   Eyes: EOM are normal. Pupils are equal, round, and reactive to light.   Neck: Normal range of motion. Neck supple.   Cardiovascular: Normal rate and regular rhythm.   Pulmonary/Chest: Effort normal and breath sounds normal.   Abdominal: Soft. Bowel sounds are normal. There is no guarding.   Genitourinary:   Genitourinary Comments: Perry in place   Musculoskeletal: She exhibits no edema or deformity.   Neurological: She is alert.   LUE weakness   Skin: Skin is warm and dry.   Vitals reviewed.      Neurological Exam:   @0800:  LOC: requiring repetitive cues to stay awake  Attention Span: poor  Language: mild aphasia  Articulation: mild dysarthria  Orientation: Not oriented to time  Visual Fields: Full  EOM (CN III, IV, VI): Full/intact  Pupils (CN II, III): PERRL  Facial Sensation (CN V): Normal  Facial Movement (CN VII): mild L facial droop  Motor: Arm left  Normal  0/5  Leg left  Normal  0/5  Arm right  Normal 5/5  Leg right Normal  0/5  Sensation: impaired on LEs  Tone: decreased tone throughout        Neurological Exam:   @1600:  LOC: alert, mild cues to cooperate   Attention Span: poor  Language: mild aphasia  Articulation: mild dysarthria  Orientation: AOx3  Visual Fields: Full  EOM (CN III, IV, VI): Full/intact  Pupils (CN II, III): PERRL  Facial Sensation (CN V): Normal  Facial Movement (CN VII): mild L facial droop   Motor: Arm left  Normal  0/5  Leg left  Normal  0/5  Arm right  Normal 5/5  Leg right Normal  2/5  Sensation: Able to feel light touch on proximal legs and R lower leg.   Tone: Decreased tone throughout LEs and LUE.   Reflexes +2 throughout       Laboratory:    Recent Results (from  the past 24 hour(s))   Troponin I    Collection Time: 11/19/18  7:13 PM   Result Value Ref Range    Troponin I 0.049 (H) 0.000 - 0.026 ng/mL   ISTAT PROCEDURE    Collection Time: 11/19/18  8:12 PM   Result Value Ref Range    POC PH 7.516 (H) 7.35 - 7.45    POC PCO2 26.4 (LL) 35 - 45 mmHg    POC PO2 56 (LL) 80 - 100 mmHg    POC HCO3 21.4 (L) 24 - 28 mmol/L    POC BE -2 -2 to 2 mmol/L    POC SATURATED O2 92 (L) 95 - 100 %    POC TCO2 22 (L) 23 - 27 mmol/L    Sample ARTERIAL     Site RR     Allens Test Pass     DelSys Nasal Can     Mode SPONT     Flow 4    POCT glucose    Collection Time: 11/19/18  8:19 PM   Result Value Ref Range    POCT Glucose 139 (H) 70 - 110 mg/dL   CBC auto differential    Collection Time: 11/19/18  8:26 PM   Result Value Ref Range    WBC 6.28 3.90 - 12.70 K/uL    RBC 2.37 (L) 4.00 - 5.40 M/uL    Hemoglobin 7.1 (L) 12.0 - 16.0 g/dL    Hematocrit 21.8 (L) 37.0 - 48.5 %    MCV 92 82 - 98 fL    MCH 30.0 27.0 - 31.0 pg    MCHC 32.6 32.0 - 36.0 g/dL    RDW 17.7 (H) 11.5 - 14.5 %    Platelets 70 (L) 150 - 350 K/uL    MPV 10.8 9.2 - 12.9 fL    Immature Granulocytes 1.1 (H) 0.0 - 0.5 %    Gran # (ANC) 4.5 1.8 - 7.7 K/uL    Immature Grans (Abs) 0.07 (H) 0.00 - 0.04 K/uL    Lymph # 0.5 (L) 1.0 - 4.8 K/uL    Mono # 1.2 (H) 0.3 - 1.0 K/uL    Eos # 0.0 0.0 - 0.5 K/uL    Baso # 0.01 0.00 - 0.20 K/uL    nRBC 1 (A) 0 /100 WBC    Gran% 71.7 38.0 - 73.0 %    Lymph% 8.4 (L) 18.0 - 48.0 %    Mono% 18.6 (H) 4.0 - 15.0 %    Eosinophil% 0.0 0.0 - 8.0 %    Basophil% 0.2 0.0 - 1.9 %    Differential Method Automated    Protime-INR    Collection Time: 11/19/18  8:26 PM   Result Value Ref Range    Prothrombin Time 18.4 (H) 9.0 - 12.5 sec    INR 1.9 (H) 0.8 - 1.2   Fibrinogen    Collection Time: 11/19/18  8:26 PM   Result Value Ref Range    Fibrinogen 94 (LL) 182 - 366 mg/dL   Fibrinogen    Collection Time: 11/19/18  8:26 PM   Result Value Ref Range    Fibrinogen 94 (LL) 182 - 366 mg/dL   Protime-INR    Collection Time:  11/19/18  8:26 PM   Result Value Ref Range    Prothrombin Time 18.4 (H) 9.0 - 12.5 sec    INR 1.9 (H) 0.8 - 1.2   Haptoglobin    Collection Time: 11/19/18  8:26 PM   Result Value Ref Range    Haptoglobin <10 (L) 30 - 250 mg/dL   Comprehensive metabolic panel    Collection Time: 11/19/18  8:28 PM   Result Value Ref Range    Sodium 142 136 - 145 mmol/L    Potassium 3.7 3.5 - 5.1 mmol/L    Chloride 108 95 - 110 mmol/L    CO2 22 (L) 23 - 29 mmol/L    Glucose 112 (H) 70 - 110 mg/dL    BUN, Bld 66 (H) 6 - 20 mg/dL    Creatinine 1.8 (H) 0.5 - 1.4 mg/dL    Calcium 9.5 8.7 - 10.5 mg/dL    Total Protein 7.5 6.0 - 8.4 g/dL    Albumin 3.3 (L) 3.5 - 5.2 g/dL    Total Bilirubin 4.5 (H) 0.1 - 1.0 mg/dL    Alkaline Phosphatase 109 55 - 135 U/L     (H) 10 - 40 U/L     (H) 10 - 44 U/L    Anion Gap 12 8 - 16 mmol/L    eGFR if African American 36.5 (A) >60 mL/min/1.73 m^2    eGFR if non  31.7 (A) >60 mL/min/1.73 m^2   Magnesium    Collection Time: 11/19/18  8:28 PM   Result Value Ref Range    Magnesium 2.2 1.6 - 2.6 mg/dL   Phosphorus    Collection Time: 11/19/18  8:28 PM   Result Value Ref Range    Phosphorus 2.8 2.7 - 4.5 mg/dL   Troponin I    Collection Time: 11/19/18 11:13 PM   Result Value Ref Range    Troponin I 0.060 (H) 0.000 - 0.026 ng/mL   POCT glucose    Collection Time: 11/19/18 11:52 PM   Result Value Ref Range    POCT Glucose 131 (H) 70 - 110 mg/dL   Protime-INR    Collection Time: 11/20/18  3:00 AM   Result Value Ref Range    Prothrombin Time 17.5 (H) 9.0 - 12.5 sec    INR 1.8 (H) 0.8 - 1.2   Hepatic function panel    Collection Time: 11/20/18  3:00 AM   Result Value Ref Range    Total Protein 7.1 6.0 - 8.4 g/dL    Albumin 3.1 (L) 3.5 - 5.2 g/dL    Total Bilirubin 4.4 (H) 0.1 - 1.0 mg/dL    Bilirubin, Direct 2.3 (H) 0.1 - 0.3 mg/dL     (H) 10 - 40 U/L    ALT 94 (H) 10 - 44 U/L    Alkaline Phosphatase 103 55 - 135 U/L   Magnesium    Collection Time: 11/20/18  3:00 AM   Result Value Ref  Range    Magnesium 2.3 1.6 - 2.6 mg/dL   Phosphorus    Collection Time: 11/20/18  3:00 AM   Result Value Ref Range    Phosphorus 3.2 2.7 - 4.5 mg/dL   CBC auto differential    Collection Time: 11/20/18  3:00 AM   Result Value Ref Range    WBC 6.95 3.90 - 12.70 K/uL    RBC 2.28 (L) 4.00 - 5.40 M/uL    Hemoglobin 7.0 (L) 12.0 - 16.0 g/dL    Hematocrit 21.2 (L) 37.0 - 48.5 %    MCV 93 82 - 98 fL    MCH 30.7 27.0 - 31.0 pg    MCHC 33.0 32.0 - 36.0 g/dL    RDW 18.0 (H) 11.5 - 14.5 %    Platelets 61 (L) 150 - 350 K/uL    MPV 11.7 9.2 - 12.9 fL    Immature Granulocytes 1.3 (H) 0.0 - 0.5 %    Gran # (ANC) 4.9 1.8 - 7.7 K/uL    Immature Grans (Abs) 0.09 (H) 0.00 - 0.04 K/uL    Lymph # 0.6 (L) 1.0 - 4.8 K/uL    Mono # 1.3 (H) 0.3 - 1.0 K/uL    Eos # 0.0 0.0 - 0.5 K/uL    Baso # 0.00 0.00 - 0.20 K/uL    nRBC 1 (A) 0 /100 WBC    Gran% 71.0 38.0 - 73.0 %    Lymph% 8.5 (L) 18.0 - 48.0 %    Mono% 19.1 (H) 4.0 - 15.0 %    Eosinophil% 0.1 0.0 - 8.0 %    Basophil% 0.0 0.0 - 1.9 %    Differential Method Automated    Fibrinogen    Collection Time: 11/20/18  3:00 AM   Result Value Ref Range    Fibrinogen 101 (L) 182 - 366 mg/dL   Renal function panel    Collection Time: 11/20/18  3:00 AM   Result Value Ref Range    Glucose 124 (H) 70 - 110 mg/dL    Sodium 143 136 - 145 mmol/L    Potassium 3.9 3.5 - 5.1 mmol/L    Chloride 108 95 - 110 mmol/L    CO2 22 (L) 23 - 29 mmol/L    BUN, Bld 69 (H) 6 - 20 mg/dL    Calcium 9.3 8.7 - 10.5 mg/dL    Creatinine 1.9 (H) 0.5 - 1.4 mg/dL    Albumin 3.1 (L) 3.5 - 5.2 g/dL    Phosphorus 3.2 2.7 - 4.5 mg/dL    eGFR if  34.2 (A) >60 mL/min/1.73 m^2    eGFR if non  29.7 (A) >60 mL/min/1.73 m^2    Anion Gap 13 8 - 16 mmol/L   Troponin I    Collection Time: 11/20/18  3:00 AM   Result Value Ref Range    Troponin I 0.074 (H) 0.000 - 0.026 ng/mL   POCT glucose    Collection Time: 11/20/18  3:53 AM   Result Value Ref Range    POCT Glucose 129 (H) 70 - 110 mg/dL   POCT glucose     Collection Time: 11/20/18  8:38 AM   Result Value Ref Range    POCT Glucose 108 70 - 110 mg/dL   Lactic acid, plasma    Collection Time: 11/20/18  9:13 AM   Result Value Ref Range    Lactate (Lactic Acid) 1.8 0.5 - 2.2 mmol/L   POCT glucose    Collection Time: 11/20/18  9:42 AM   Result Value Ref Range    POCT Glucose 126 (H) 70 - 110 mg/dL   Abdominal Aorta Evaluation (Cupid Only)    Collection Time: 11/20/18  9:53 AM   Result Value Ref Range    Abdominal rt com iliac AP 1 cm    Abdominal rt com iliac trans 1 cm    Abdominal lt com iliac AP 0.96 cm    Abdominal lt com iliac trans 0.96 cm    ABDOMINAL RT COM ILLIAC ED RUTH 90 cm/s    ABDOMINAL SUPRARENAL AORTA PS RUTH 78 CM/S    ABDOMINAL SUPRARENAL AORTA ED RUTH 16 cm/s    ABDOMINAL SUPRARENAL AORTA AP 3.32 cm    ABDOMINAL SUPRARENAL AORTA TRANS 2.22 cm    ABDOMINAL AMY PS RTUH 100 cm/s    ABDOMINAL AMY ED RUTH 0 cm/s    ABDOMINAL AMY AP 1.67 cm    ABDOMINAL AMY TRANS 1.65 cm    Abdominal rt com iliac ruth 90 cm/s   CBC auto differential    Collection Time: 11/20/18 11:17 AM   Result Value Ref Range    WBC 8.00 3.90 - 12.70 K/uL    RBC 2.83 (L) 4.00 - 5.40 M/uL    Hemoglobin 8.4 (L) 12.0 - 16.0 g/dL    Hematocrit 25.9 (L) 37.0 - 48.5 %    MCV 92 82 - 98 fL    MCH 29.7 27.0 - 31.0 pg    MCHC 32.4 32.0 - 36.0 g/dL    RDW 17.2 (H) 11.5 - 14.5 %    Platelets 56 (L) 150 - 350 K/uL    MPV 11.0 9.2 - 12.9 fL    Immature Granulocytes 1.4 (H) 0.0 - 0.5 %    Gran # (ANC) 5.9 1.8 - 7.7 K/uL    Immature Grans (Abs) 0.11 (H) 0.00 - 0.04 K/uL    Lymph # 0.6 (L) 1.0 - 4.8 K/uL    Mono # 1.4 (H) 0.3 - 1.0 K/uL    Eos # 0.0 0.0 - 0.5 K/uL    Baso # 0.01 0.00 - 0.20 K/uL    nRBC 1 (A) 0 /100 WBC    Gran% 73.6 (H) 38.0 - 73.0 %    Lymph% 7.6 (L) 18.0 - 48.0 %    Mono% 16.9 (H) 4.0 - 15.0 %    Eosinophil% 0.4 0.0 - 8.0 %    Basophil% 0.1 0.0 - 1.9 %    Differential Method Automated    POCT glucose    Collection Time: 11/20/18 11:21 AM   Result Value Ref Range    POCT Glucose 124 (H) 70 -  110 mg/dL   Transthoracic echo (TTE) limited (Cupid Only)    Collection Time: 11/20/18  2:32 PM   Result Value Ref Range    Ascending aorta 3.44 cm    STJ 3.59 cm    AV mean gradient 5.79 mmHg    Ao peak vazquez 1.52 m/s    Ao VTI 32.44 cm    IVS 1.05 0.6 - 1.1 cm    LA size 3.59 cm    Left Atrium Major Axis 6.25 cm    Left Atrium Minor Axis 5.53 cm    LVIDD 4.21 3.5 - 6.0 cm    LVIDS 2.16 2.1 - 4.0 cm    LVOT diameter 2.01 cm    LVOT peak VTI 23.27 cm    PW 0.92 0.6 - 1.1 cm    MV Peak A Vazquez 0.93 m/s    E wave decelartion time 193.14 msec    MV Peak E Vazquez 0.78 m/s    Right Atrium Major Axis 5.72 cm    Right Atrium Width 4.00 cm    RVDD 3.82 cm    Sinus 3.26 cm    Tricuspid annular plane systolic excursion 2.17 cm    TR Max Vazquez 2.40 m/s    TDI LATERAL 0.12     TDI SEPTAL 0.06     LA WIDTH 4.38 cm    LV Diastolic Volume 79.07 mL    LV Systolic Volume 15.44 mL    LV LATERAL E/E' RATIO 6.50     LV SEPTAL E/E' RATIO 13.00     FS 49 %    LA volume 78.43 cm3    LV mass 134.89 g    Left Ventricle Relative Wall Thickness 0.44 cm    AV valve area 2.27 cm2    E/A ratio 0.84     TDI 0.09     LVOT area 3.17 cm2    LVOT stroke volume 73.80 cm3    AV peak gradient 9.24 mmHg    E/E' ratio 8.67     Triscuspid Valve Regurgitation Peak Gradient 23.04 mmHg    BSA 1.64 m2    LV Systolic Volume Index 9.4 mL/m2    LV Diastolic Volume Index 48.21 mL/m2    LA Volume Index 47.8 mL/m2    LV Mass Index 82.3 g/m2    Right Atrial Pressure (from IVC) 8 mmHg    TV rest pulmonary artery pressure 31.04 mmHg   Protime-INR    Collection Time: 11/20/18  3:06 PM   Result Value Ref Range    Prothrombin Time 16.7 (H) 9.0 - 12.5 sec    INR 1.7 (H) 0.8 - 1.2   Fibrinogen    Collection Time: 11/20/18  3:06 PM   Result Value Ref Range    Fibrinogen 100 (L) 182 - 366 mg/dL   CBC auto differential    Collection Time: 11/20/18  3:06 PM   Result Value Ref Range    WBC 6.90 3.90 - 12.70 K/uL    RBC 2.79 (L) 4.00 - 5.40 M/uL    Hemoglobin 8.6 (L) 12.0 - 16.0 g/dL     Hematocrit 25.7 (L) 37.0 - 48.5 %    MCV 92 82 - 98 fL    MCH 30.8 27.0 - 31.0 pg    MCHC 33.5 32.0 - 36.0 g/dL    RDW 17.4 (H) 11.5 - 14.5 %    Platelets 52 (L) 150 - 350 K/uL    MPV 10.5 9.2 - 12.9 fL    Immature Granulocytes 1.6 (H) 0.0 - 0.5 %    Gran # (ANC) 4.8 1.8 - 7.7 K/uL    Immature Grans (Abs) 0.11 (H) 0.00 - 0.04 K/uL    Lymph # 0.6 (L) 1.0 - 4.8 K/uL    Mono # 1.3 (H) 0.3 - 1.0 K/uL    Eos # 0.0 0.0 - 0.5 K/uL    Baso # 0.01 0.00 - 0.20 K/uL    nRBC 1 (A) 0 /100 WBC    Gran% 69.3 38.0 - 73.0 %    Lymph% 9.0 (L) 18.0 - 48.0 %    Mono% 19.4 (H) 4.0 - 15.0 %    Eosinophil% 0.6 0.0 - 8.0 %    Basophil% 0.1 0.0 - 1.9 %    Differential Method Automated    POCT glucose    Collection Time: 11/20/18  3:13 PM   Result Value Ref Range    POCT Glucose 113 (H) 70 - 110 mg/dL         Diagnostic Results     Brain imaging:     CTH 11/20/18  Stable right thalamic hemorrhage.  No new hemorrhage identified.      CT[facility] - Right Thalamic hemorrhage           Vessel Imaging:     CTA head and neck - unremarkable           Cardiac Evaluation:   JORDAN 11/15/18  · Normal left ventricular systolic function. The estimated ejection fraction is 70%  · Concentric left ventricular remodeling.  · No wall motion abnormalities.  · Normal LV diastolic function.  · Normal right ventricular systolic function.  · Trace mitral regurgitation.  · Mild aortic regurgitation.  · Trace tricuspid regurgitation.  · Trace pulmonic regurgitation.  · Normal central venous pressure (3 mm Hg).  · The estimated PA systolic pressure is 24.72 mm Hg  · No pericardial effusion.          Jose Pan MD  [facility]  Department of Vascular Neurology   [facility]

## 2018-11-20 NOTE — PROGRESS NOTES
"Dr. Cantu notified of increased restlessness, pt complaining of "sharp & stabbing" chest pain. EKG, ABG, fibrinogen, chest xray and echo ordered.  "

## 2018-11-20 NOTE — SUBJECTIVE & OBJECTIVE
Neurologic Chief Complaint: dysarthria, poor attention span, ULE weakness  Right Thalamic hemorrhage     Subjective:     Interval History: Patient is seen for follow-up neurological assessment and treatment recommendations: SANTOSHEON. Patient is not showing marked improvement. Less alert than on previous exam.     HPI, Past Medical, Family, and Social History remains the same as documented in the initial encounter.     Review of Systems   Constitutional: Negative for chills and fever.   Respiratory: Negative for cough and shortness of breath.    Cardiovascular: Negative for chest pain and palpitations.   Gastrointestinal: Positive for abdominal pain (RUQ ). Negative for blood in stool, nausea and vomiting.   Musculoskeletal: Positive for neck pain (discomfort at RIJ site). Negative for back pain and neck stiffness.   Neurological: Negative for tremors, seizures, syncope, weakness, numbness and headaches.   Hematological: Bruises/bleeds easily.   Psychiatric/Behavioral: Positive for confusion.     Scheduled Meds:   lactulose  20 g Oral BID    NIFEdipine  30 mg Oral Daily    rifAXImin  550 mg Oral BID     Continuous Infusions:  PRN Meds:sodium chloride, ondansetron, oxyCODONE    Objective:     Vital Signs (Most Recent):  Temp: (!) 92.1 °F (33.4 °C) (11/19/18 1815)  Pulse: 77 (11/19/18 1815)  Resp: (!) 38 (11/19/18 1815)  BP: (!) 117/57 (11/19/18 1815)  SpO2: (!) 91 % (11/19/18 1815)  BP Location: Left arm    Vital Signs Range (Last 24H):  Temp:  [92.1 °F (33.4 °C)-98.6 °F (37 °C)]   Pulse:  []   Resp:  [13-45]   BP: ()/(50-98)   SpO2:  [87 %-99 %]   Arterial Line BP: (116-151)/(55-74)   BP Location: Left arm    Physical Exam   Constitutional: She appears well-developed and well-nourished.   HENT:   Head: Normocephalic and atraumatic.   Eyes: EOM are normal. Pupils are equal, round, and reactive to light.   Neck: Normal range of motion. Neck supple.   Cardiovascular: Normal rate and regular rhythm.    Pulmonary/Chest: Effort normal and breath sounds normal.   Abdominal: Soft. Bowel sounds are normal. There is no guarding.   Genitourinary:   Genitourinary Comments: Perry in place   Musculoskeletal: She exhibits no edema or deformity.   Neurological: She is alert.   LUE weakness   Skin: Skin is warm and dry.   Vitals reviewed.      Neurological Exam:   LOC: alert  Attention Span: poor  Language: mild aphasia  Articulation: mild dysarthria  Orientation: Not oriented to time  Visual Fields: Full  EOM (CN III, IV, VI): Full/intact  Pupils (CN II, III): PERRL  Facial Sensation (CN V): Normal  Facial Movement (CN VII): Symmetric facial expression    Motor: Arm left  Normal 3/5  Leg left  Normal 3/5  Arm right  Normal 5/5  Leg right Normal 4/5  Sensation: Normal thorughout  Tone: Normal tone throughout  Reflexes +2 throughout             Laboratory:    Recent Results (from the past 24 hour(s))   POCT glucose    Collection Time: 11/19/18 12:53 AM   Result Value Ref Range    POCT Glucose 88 70 - 110 mg/dL   Hepatic function panel    Collection Time: 11/19/18  3:00 AM   Result Value Ref Range    Total Protein 7.1 6.0 - 8.4 g/dL    Albumin 3.0 (L) 3.5 - 5.2 g/dL    Total Bilirubin 4.0 (H) 0.1 - 1.0 mg/dL    Bilirubin, Direct 2.3 (H) 0.1 - 0.3 mg/dL     (H) 10 - 40 U/L     (H) 10 - 44 U/L    Alkaline Phosphatase 117 55 - 135 U/L   Magnesium    Collection Time: 11/19/18  3:00 AM   Result Value Ref Range    Magnesium 2.1 1.6 - 2.6 mg/dL   Phosphorus    Collection Time: 11/19/18  3:00 AM   Result Value Ref Range    Phosphorus 2.6 (L) 2.7 - 4.5 mg/dL   Ammonia    Collection Time: 11/19/18  3:00 AM   Result Value Ref Range    Ammonia 35 10 - 50 umol/L   CBC auto differential    Collection Time: 11/19/18  3:00 AM   Result Value Ref Range    WBC 7.00 3.90 - 12.70 K/uL    RBC 2.59 (L) 4.00 - 5.40 M/uL    Hemoglobin 8.2 (L) 12.0 - 16.0 g/dL    Hematocrit 23.6 (L) 37.0 - 48.5 %    MCV 91 82 - 98 fL    MCH 31.7 (H) 27.0  - 31.0 pg    MCHC 34.7 32.0 - 36.0 g/dL    RDW 17.4 (H) 11.5 - 14.5 %    Platelets 52 (L) 150 - 350 K/uL    MPV 11.7 9.2 - 12.9 fL    Immature Granulocytes 0.9 (H) 0.0 - 0.5 %    Gran # (ANC) 5.0 1.8 - 7.7 K/uL    Immature Grans (Abs) 0.06 (H) 0.00 - 0.04 K/uL    Lymph # 0.8 (L) 1.0 - 4.8 K/uL    Mono # 1.2 (H) 0.3 - 1.0 K/uL    Eos # 0.0 0.0 - 0.5 K/uL    Baso # 0.01 0.00 - 0.20 K/uL    nRBC 1 (A) 0 /100 WBC    Gran% 71.0 38.0 - 73.0 %    Lymph% 10.9 (L) 18.0 - 48.0 %    Mono% 17.0 (H) 4.0 - 15.0 %    Eosinophil% 0.1 0.0 - 8.0 %    Basophil% 0.1 0.0 - 1.9 %    Differential Method Automated    Fibrinogen    Collection Time: 11/19/18  3:00 AM   Result Value Ref Range    Fibrinogen 91 (LL) 182 - 366 mg/dL   Protime-INR    Collection Time: 11/19/18  3:00 AM   Result Value Ref Range    Prothrombin Time 18.4 (H) 9.0 - 12.5 sec    INR 1.9 (H) 0.8 - 1.2   Renal function panel    Collection Time: 11/19/18  3:00 AM   Result Value Ref Range    Glucose 120 (H) 70 - 110 mg/dL    Sodium 142 136 - 145 mmol/L    Potassium 3.7 3.5 - 5.1 mmol/L    Chloride 109 95 - 110 mmol/L    CO2 22 (L) 23 - 29 mmol/L    BUN, Bld 57 (H) 6 - 20 mg/dL    Calcium 8.8 8.7 - 10.5 mg/dL    Creatinine 1.6 (H) 0.5 - 1.4 mg/dL    Albumin 3.0 (L) 3.5 - 5.2 g/dL    Phosphorus 2.6 (L) 2.7 - 4.5 mg/dL    eGFR if  42.1 (A) >60 mL/min/1.73 m^2    eGFR if non  36.5 (A) >60 mL/min/1.73 m^2    Anion Gap 11 8 - 16 mmol/L   POCT glucose    Collection Time: 11/19/18 10:37 AM   Result Value Ref Range    POCT Glucose 123 (H) 70 - 110 mg/dL   Prepare Fresh Frozen Plasma 1 Unit    Collection Time: 11/19/18 11:05 AM   Result Value Ref Range    UNIT NUMBER [id number]     PRODUCT CODE Q6558C85     DISPENSE STATUS ISSUED     CODING SYSTEM VFML901     Unit Blood Type Code 5100     Unit Blood Type O POS     Unit Expiration [id number]    Type & Screen    Collection Time: 11/19/18 11:21 AM   Result Value Ref Range    Group & Rh O NEG      Indirect Karen NEG    Prepare Platelets 1 Dose    Collection Time: 11/19/18 11:21 AM   Result Value Ref Range    UNIT NUMBER [id number]     PRODUCT CODE Q6447T65     DISPENSE STATUS ISSUED     CODING SYSTEM DXII572     Unit Blood Type Code 5100     Unit Blood Type O POS     Unit Expiration [id number]    Prepare Fresh Frozen Plasma 1 Unit    Collection Time: 11/19/18 11:21 AM   Result Value Ref Range    UNIT NUMBER [id number]     PRODUCT CODE L5924M85     DISPENSE STATUS ISSUED     CODING SYSTEM TYWD000     Unit Blood Type Code 5100     Unit Blood Type O POS     Unit Expiration [id number]    Prepare Cryoprecipitate 1 Dose    Collection Time: 11/19/18 11:21 AM   Result Value Ref Range    UNIT NUMBER [id number]     PRODUCT CODE G3137D36     DISPENSE STATUS ISSUED     CODING SYSTEM VOUO914     Unit Blood Type Code 5100     Unit Blood Type O POS     Unit Expiration [id number]    POCT glucose    Collection Time: 11/19/18 11:28 AM   Result Value Ref Range    POCT Glucose 126 (H) 70 - 110 mg/dL   Sodium, urine, random    Collection Time: 11/19/18  1:20 PM   Result Value Ref Range    Sodium Urine Random <20 (A) 20 - 250 mmol/L   Creatinine, urine, random    Collection Time: 11/19/18  1:20 PM   Result Value Ref Range    Creatinine, Random Ur 150.0 15.0 - 325.0 mg/dL   CBC auto differential    Collection Time: 11/19/18  2:03 PM   Result Value Ref Range    WBC 5.61 3.90 - 12.70 K/uL    RBC 2.43 (L) 4.00 - 5.40 M/uL    Hemoglobin 7.4 (L) 12.0 - 16.0 g/dL    Hematocrit 22.4 (L) 37.0 - 48.5 %    MCV 92 82 - 98 fL    MCH 30.5 27.0 - 31.0 pg    MCHC 33.0 32.0 - 36.0 g/dL    RDW 17.5 (H) 11.5 - 14.5 %    Platelets 46 (L) 150 - 350 K/uL    MPV 10.1 9.2 - 12.9 fL    Immature Granulocytes 0.9 (H) 0.0 - 0.5 %    Gran # (ANC) 4.3 1.8 - 7.7 K/uL    Immature Grans (Abs) 0.05 (H) 0.00 - 0.04 K/uL    Lymph # 0.5 (L) 1.0 - 4.8 K/uL    Mono # 0.8 0.3 - 1.0 K/uL    Eos # 0.0 0.0 - 0.5 K/uL    Baso # 0.00 0.00 - 0.20 K/uL    nRBC  1 (A) 0 /100 WBC    Gran% 76.1 (H) 38.0 - 73.0 %    Lymph% 9.6 (L) 18.0 - 48.0 %    Mono% 13.4 4.0 - 15.0 %    Eosinophil% 0.0 0.0 - 8.0 %    Basophil% 0.0 0.0 - 1.9 %    Differential Method Automated    Fibrinogen    Collection Time: 11/19/18  2:03 PM   Result Value Ref Range    Fibrinogen <70 (AA) 182 - 366 mg/dL   Protime-INR    Collection Time: 11/19/18  2:03 PM   Result Value Ref Range    Prothrombin Time 20.1 (H) 9.0 - 12.5 sec    INR 2.1 (H) 0.8 - 1.2   Renal function panel    Collection Time: 11/19/18  2:03 PM   Result Value Ref Range    Glucose 153 (H) 70 - 110 mg/dL    Sodium 142 136 - 145 mmol/L    Potassium 4.0 3.5 - 5.1 mmol/L    Chloride 109 95 - 110 mmol/L    CO2 23 23 - 29 mmol/L    BUN, Bld 64 (H) 6 - 20 mg/dL    Calcium 8.9 8.7 - 10.5 mg/dL    Creatinine 1.8 (H) 0.5 - 1.4 mg/dL    Albumin 3.0 (L) 3.5 - 5.2 g/dL    Phosphorus 3.3 2.7 - 4.5 mg/dL    eGFR if  36.5 (A) >60 mL/min/1.73 m^2    eGFR if non  31.7 (A) >60 mL/min/1.73 m^2    Anion Gap 10 8 - 16 mmol/L   Brain natriuretic peptide    Collection Time: 11/19/18  2:03 PM   Result Value Ref Range    BNP 1,787 (H) 0 - 99 pg/mL   POCT glucose    Collection Time: 11/19/18  4:08 PM   Result Value Ref Range    POCT Glucose 151 (H) 70 - 110 mg/dL         Diagnostic Results     Brain imaging:     CTH - Right Thalamic hemorrhage           Vessel Imaging:     CTA head and neck - unremarkable           Cardiac Evaluation:   JORDAN 11/15/18  · Normal left ventricular systolic function. The estimated ejection fraction is 70%  · Concentric left ventricular remodeling.  · No wall motion abnormalities.  · Normal LV diastolic function.  · Normal right ventricular systolic function.  · Trace mitral regurgitation.  · Mild aortic regurgitation.  · Trace tricuspid regurgitation.  · Trace pulmonic regurgitation.  · Normal central venous pressure (3 mm Hg).  · The estimated PA systolic pressure is 24.72 mm Hg  · No pericardial  effusion.

## 2018-11-20 NOTE — PLAN OF CARE
"Problem: Patient Care Overview  Goal: Plan of Care Review  Outcome: Ongoing (interventions implemented as appropriate)  Shift Events: Start of shift pt. Has complaints of chest pain. Orders received. Pt. Receives PO dilaudid. Troponins trended. Last troponin is 0.074. Fibrogen 94. 1 cryo received. All labs called to Dr. Mortensen. Pt. UO is minimal ~10cc/hr. Critical care is aware. Temp 94-97. Bear hugger applied and monitored. Left arm and left side swelling. Dr. Wilkinson and bedside to assess pt. Vascular at bedside to also assess. Ultrasound ordered.     Neuro: Pt. Is intermittently oriented X4 or X3 (disoriented to place). Pt. Moves both upper extremities and follows commands. Pt. Does not withdraw from pain and no effort against gravity with the lower extremities. Critical care team aware and at bedside to assess patient neuro status. PERMARIETTALA. Pt. Is drowsy majority of shift.     Vital Signs: /65   Pulse 72   Temp 97.3 °F (36.3 °C)   Resp 12   Ht 5' 6" (1.676 m)   Wt 62 kg (136 lb 11 oz)   SpO2 100%   Breastfeeding? No   BMI 22.06 kg/m²     Intake/Gtts/Diet: Mechanical soft diet. Pt. receives 1 cryo.                "

## 2018-11-21 LAB
ALBUMIN SERPL BCP-MCNC: 3.2 G/DL
ALBUMIN SERPL BCP-MCNC: 3.2 G/DL
ALP SERPL-CCNC: 119 U/L
ALT SERPL W/O P-5'-P-CCNC: 83 U/L
ANION GAP SERPL CALC-SCNC: 12 MMOL/L
AST SERPL-CCNC: 253 U/L
BACTERIA BLD CULT: NORMAL
BACTERIA BLD CULT: NORMAL
BASOPHILS # BLD AUTO: 0.01 K/UL
BASOPHILS NFR BLD: 0.2 %
BILIRUB DIRECT SERPL-MCNC: 3 MG/DL
BILIRUB SERPL-MCNC: 5.4 MG/DL
BLD PROD TYP BPU: NORMAL
BLD PROD TYP BPU: NORMAL
BLOOD UNIT EXPIRATION DATE: NORMAL
BLOOD UNIT EXPIRATION DATE: NORMAL
BLOOD UNIT TYPE CODE: 5100
BLOOD UNIT TYPE CODE: 5100
BLOOD UNIT TYPE: NORMAL
BLOOD UNIT TYPE: NORMAL
BUN SERPL-MCNC: 81 MG/DL
CALCIUM SERPL-MCNC: 9.3 MG/DL
CHLORIDE SERPL-SCNC: 109 MMOL/L
CO2 SERPL-SCNC: 23 MMOL/L
CODING SYSTEM: NORMAL
CODING SYSTEM: NORMAL
CREAT SERPL-MCNC: 1.9 MG/DL
DIFFERENTIAL METHOD: ABNORMAL
DISPENSE STATUS: NORMAL
DISPENSE STATUS: NORMAL
EOSINOPHIL # BLD AUTO: 0 K/UL
EOSINOPHIL NFR BLD: 0 %
EOSINOPHIL NFR BLD: 0.2 %
EOSINOPHIL NFR BLD: 0.2 %
ERYTHROCYTE [DISTWIDTH] IN BLOOD BY AUTOMATED COUNT: 17.6 %
ERYTHROCYTE [DISTWIDTH] IN BLOOD BY AUTOMATED COUNT: 17.6 %
ERYTHROCYTE [DISTWIDTH] IN BLOOD BY AUTOMATED COUNT: 18.5 %
EST. GFR  (AFRICAN AMERICAN): 34.2 ML/MIN/1.73 M^2
EST. GFR  (NON AFRICAN AMERICAN): 29.7 ML/MIN/1.73 M^2
FIBRINOGEN PPP-MCNC: 109 MG/DL
FIBRINOGEN PPP-MCNC: 87 MG/DL
GLUCOSE SERPL-MCNC: 108 MG/DL
HCT VFR BLD AUTO: 26.1 %
HCT VFR BLD AUTO: 26.1 %
HCT VFR BLD AUTO: 26.5 %
HGB BLD-MCNC: 8.8 G/DL
HGB BLD-MCNC: 8.8 G/DL
HGB BLD-MCNC: 8.9 G/DL
IMM GRANULOCYTES # BLD AUTO: 0.07 K/UL
IMM GRANULOCYTES # BLD AUTO: 0.07 K/UL
IMM GRANULOCYTES # BLD AUTO: 0.14 K/UL
IMM GRANULOCYTES NFR BLD AUTO: 1.2 %
IMM GRANULOCYTES NFR BLD AUTO: 1.2 %
IMM GRANULOCYTES NFR BLD AUTO: 2.1 %
INR PPP: 1.8
INR PPP: 1.8
LYMPHOCYTES # BLD AUTO: 0.5 K/UL
LYMPHOCYTES NFR BLD: 8 %
LYMPHOCYTES NFR BLD: 8.5 %
LYMPHOCYTES NFR BLD: 8.5 %
MAGNESIUM SERPL-MCNC: 2.4 MG/DL
MCH RBC QN AUTO: 30.9 PG
MCH RBC QN AUTO: 30.9 PG
MCH RBC QN AUTO: 31.3 PG
MCHC RBC AUTO-ENTMCNC: 33.6 G/DL
MCHC RBC AUTO-ENTMCNC: 33.7 G/DL
MCHC RBC AUTO-ENTMCNC: 33.7 G/DL
MCV RBC AUTO: 92 FL
MCV RBC AUTO: 92 FL
MCV RBC AUTO: 93 FL
MONOCYTES # BLD AUTO: 1.3 K/UL
MONOCYTES NFR BLD: 19.9 %
MONOCYTES NFR BLD: 20.9 %
MONOCYTES NFR BLD: 20.9 %
NEUTROPHILS # BLD AUTO: 4.1 K/UL
NEUTROPHILS # BLD AUTO: 4.1 K/UL
NEUTROPHILS # BLD AUTO: 4.6 K/UL
NEUTROPHILS NFR BLD: 69 %
NEUTROPHILS NFR BLD: 69 %
NEUTROPHILS NFR BLD: 69.8 %
NRBC BLD-RTO: 1 /100 WBC
NRBC BLD-RTO: 2 /100 WBC
NRBC BLD-RTO: 2 /100 WBC
PHOSPHATE SERPL-MCNC: 3.4 MG/DL
PHOSPHATE SERPL-MCNC: 3.4 MG/DL
PLATELET # BLD AUTO: 48 K/UL
PLATELET # BLD AUTO: 48 K/UL
PLATELET # BLD AUTO: 72 K/UL
PMV BLD AUTO: 11.7 FL
PMV BLD AUTO: 13 FL
PMV BLD AUTO: 13 FL
POCT GLUCOSE: 103 MG/DL (ref 70–110)
POCT GLUCOSE: 113 MG/DL (ref 70–110)
POCT GLUCOSE: 114 MG/DL (ref 70–110)
POCT GLUCOSE: 121 MG/DL (ref 70–110)
POCT GLUCOSE: 132 MG/DL (ref 70–110)
POTASSIUM SERPL-SCNC: 3.8 MMOL/L
PROT SERPL-MCNC: 7.6 G/DL
PROTHROMBIN TIME: 17.2 SEC
PROTHROMBIN TIME: 17.2 SEC
RBC # BLD AUTO: 2.84 M/UL
RBC # BLD AUTO: 2.85 M/UL
RBC # BLD AUTO: 2.85 M/UL
SODIUM SERPL-SCNC: 144 MMOL/L
TRANS PLATPHERESIS VOL PATIENT: NORMAL ML
UNIT NUMBER: NORMAL
WBC # BLD AUTO: 5.97 K/UL
WBC # BLD AUTO: 5.97 K/UL
WBC # BLD AUTO: 6.62 K/UL

## 2018-11-21 PROCEDURE — 92526 ORAL FUNCTION THERAPY: CPT

## 2018-11-21 PROCEDURE — 83735 ASSAY OF MAGNESIUM: CPT

## 2018-11-21 PROCEDURE — 99233 SBSQ HOSP IP/OBS HIGH 50: CPT | Mod: ,,, | Performed by: PSYCHIATRY & NEUROLOGY

## 2018-11-21 PROCEDURE — P9012 CRYOPRECIPITATE EACH UNIT: HCPCS

## 2018-11-21 PROCEDURE — 25000003 PHARM REV CODE 250: Performed by: PHYSICIAN ASSISTANT

## 2018-11-21 PROCEDURE — 63600175 PHARM REV CODE 636 W HCPCS: Performed by: NURSE PRACTITIONER

## 2018-11-21 PROCEDURE — 27000221 HC OXYGEN, UP TO 24 HOURS

## 2018-11-21 PROCEDURE — P9035 PLATELET PHERES LEUKOREDUCED: HCPCS

## 2018-11-21 PROCEDURE — 85610 PROTHROMBIN TIME: CPT

## 2018-11-21 PROCEDURE — 20000000 HC ICU ROOM

## 2018-11-21 PROCEDURE — 80076 HEPATIC FUNCTION PANEL: CPT

## 2018-11-21 PROCEDURE — 36430 TRANSFUSION BLD/BLD COMPNT: CPT

## 2018-11-21 PROCEDURE — 80069 RENAL FUNCTION PANEL: CPT

## 2018-11-21 PROCEDURE — 25000003 PHARM REV CODE 250: Performed by: NURSE PRACTITIONER

## 2018-11-21 PROCEDURE — 99291 CRITICAL CARE FIRST HOUR: CPT | Mod: ,,, | Performed by: PHYSICIAN ASSISTANT

## 2018-11-21 PROCEDURE — 85384 FIBRINOGEN ACTIVITY: CPT

## 2018-11-21 PROCEDURE — 94761 N-INVAS EAR/PLS OXIMETRY MLT: CPT

## 2018-11-21 PROCEDURE — 85384 FIBRINOGEN ACTIVITY: CPT | Mod: 91

## 2018-11-21 PROCEDURE — 85025 COMPLETE CBC W/AUTO DIFF WBC: CPT | Mod: 91

## 2018-11-21 RX ORDER — LACTULOSE 10 G/15ML
200 SOLUTION ORAL; RECTAL 3 TIMES DAILY
Status: DISCONTINUED | OUTPATIENT
Start: 2018-11-21 | End: 2018-11-22

## 2018-11-21 RX ORDER — PANTOPRAZOLE SODIUM 40 MG/1
40 TABLET, DELAYED RELEASE ORAL DAILY
Status: DISCONTINUED | OUTPATIENT
Start: 2018-11-21 | End: 2018-11-21

## 2018-11-21 RX ORDER — HYDROCODONE BITARTRATE AND ACETAMINOPHEN 500; 5 MG/1; MG/1
TABLET ORAL
Status: DISCONTINUED | OUTPATIENT
Start: 2018-11-21 | End: 2018-11-21

## 2018-11-21 RX ORDER — MIDAZOLAM HYDROCHLORIDE 1 MG/ML
2 INJECTION INTRAMUSCULAR; INTRAVENOUS ONCE AS NEEDED
Status: COMPLETED | OUTPATIENT
Start: 2018-11-21 | End: 2018-11-21

## 2018-11-21 RX ORDER — FAMOTIDINE 10 MG/ML
20 INJECTION INTRAVENOUS DAILY
Status: DISCONTINUED | OUTPATIENT
Start: 2018-11-22 | End: 2018-11-24

## 2018-11-21 RX ORDER — LACTULOSE 10 G/15ML
30 SOLUTION ORAL 2 TIMES DAILY
Status: DISCONTINUED | OUTPATIENT
Start: 2018-11-21 | End: 2018-11-21

## 2018-11-21 RX ADMIN — LACTULOSE 30 G: 20 SOLUTION ORAL at 09:11

## 2018-11-21 RX ADMIN — PANTOPRAZOLE SODIUM 40 MG: 40 TABLET, DELAYED RELEASE ORAL at 09:11

## 2018-11-21 RX ADMIN — RIFAXIMIN 550 MG: 550 TABLET ORAL at 09:11

## 2018-11-21 RX ADMIN — MIDAZOLAM HYDROCHLORIDE 2 MG: 1 INJECTION, SOLUTION INTRAMUSCULAR; INTRAVENOUS at 05:11

## 2018-11-21 NOTE — PROGRESS NOTES
[facility]  Vascular Surgery  Progress Note    Patient Name: Leah Cleaning  MRN: [id number]  Admission Date: 11/15/2018  Primary Care Provider: Mary Colorado MD    Subjective:     Interval History: NAEON. Improvement in lower extremity neuro exam with spinal drain open. Continue spinal protocol. Plan for CT spine, abd/pelvis today.    Post-Op Info:  Procedure(s) (LRB):  REPAIR, ANEURYSM, ENDOVASCULAR GRAFT, AORTA, THORACIC (N/A)   6 Days Post-Op       Medications:  Continuous Infusions:   norepinephrine bitartrate-D5W 0.02 mcg/kg/min (11/20/18 0744)     Scheduled Meds:   lactulose  20 g Oral BID    rifAXImin  550 mg Oral BID     PRN Meds:sodium chloride, ondansetron     Objective:     Vital Signs (Most Recent):  Temp: 96.3 °F (35.7 °C) (11/21/18 0727)  Pulse: 69 (11/21/18 0727)  Resp: (!) 23 (11/21/18 0727)  BP: (!) 142/76 (11/21/18 0705)  SpO2: 97 % (11/21/18 0727) Vital Signs (24h Range):  Temp:  [94.6 °F (34.8 °C)-96.6 °F (35.9 °C)] 96.3 °F (35.7 °C)  Pulse:  [66-72] 69  Resp:  [12-40] 23  SpO2:  [83 %-100 %] 97 %  BP: (126-142)/(69-81) 142/76     Date 11/21/18 0700 - 11/22/18 0659   Shift 1751-6360 7923-1041 2192-8036 24 Hour Total   INTAKE   I.V.(mL/kg) 5(0.1)   5(0.1)   Shift Total(mL/kg) 5(0.1)   5(0.1)   OUTPUT   Urine(mL/kg/hr) 35   35   Drains 10   10   Shift Total(mL/kg) 45(0.7)   45(0.7)   Weight (kg) 68 68 68 68       Physical Exam   Constitutional: She is oriented to person, place, and time. She appears well-developed and well-nourished.   HENT:   Head: Normocephalic and atraumatic.   Eyes: EOM are normal. Pupils are equal, round, and reactive to light.   Neck: Normal range of motion. Neck supple.   Cardiovascular: Normal rate and regular rhythm.   Pulmonary/Chest: Effort normal and breath sounds normal.   Abdominal: Soft. Bowel sounds are normal.   Genitourinary:   Genitourinary Comments: Perry in place   Musculoskeletal:   LUE 2/5, poor fine motor movement.   RUE 5/5, intact  fine motor movement  LLE does not withdrawal to pain  RLE knee flexion, and moves toes  CSF drain site dressing c/d/i   Neurological: She is alert and oriented to person, place, and time.   Skin: Skin is warm.   Nursing note and vitals reviewed.      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 Despite aggressive anti-impulse therapy, persistent pain requiring intervention. Preoperative CSF drain placement by Neuro IR. Successful TEVAR. POD 1 left groin hematoma and coagulopathy requiring manual pressure and product resuscitation. POD 2 new onset left upper arm weakness, found to have right thalamic hemorrhage. F/u CT head stable findings. Significant reactive left pleural effusion with surrounding atelectasis noted also.     Neuro: Improvement in neuro exam with spinal drain open  - Spinal drain 15 cc per hr (output 5-10cc); goal map >100  - Discussed with stroke team, will keep SBP <160  - Hold off on spinal drain removal  - CT spine to rule out spinal hematoma  right thalamic bleed  - con't q1h neuro checks  - speech swallow passed; advancing diet     CV:   - ease the spinal perfusion protocol. BP <160, MAP >100    Resp:  - encourage IS  - O2 supp prn  - Moderate Left pleural effusion and small right pleural  effusion on CTA stoke multiphase noted. Left pleural effusion likely reactive following TEVAR. Con't pulmonary toilet.     Renal/Endocrine:  - con't monitoring UOP    GI: Hep C cirrhosis with MELD 15 on admission  - protonix  - PO diet  - CT abd/pelvis pending    Heme/ID:  - ABLA stable.   - con't correct coagulopathy prn  - monitor thrombocytopenia     Dispo:  - ICU care          Perla Castro MD  Vascular Surgery  [facility]

## 2018-11-21 NOTE — PROGRESS NOTES
Pt transported to CT on continuous monitoring and 4L NC. Lumbar drain clamped for transport. VSS. Pt transported back to room 59555 at 0850. VSS. Lumbar drain zeroed and leveled. Will continue to monitor.

## 2018-11-21 NOTE — PLAN OF CARE
"Problem: Patient Care Overview  Goal: Plan of Care Review  Outcome: Ongoing (interventions implemented as appropriate)  Shift Events: Pt. Asleep throughout shift. Q1H neuro exams. Pt. Follows commands with both upper extremities and RLE. Pt. Does not have effort against gravity from LLE, but notes sensation to noxious stimuli / brisk movements. +2 pulses in all extremities. Pt. AAOX4. ICP monitoring and CSF drainage in place per lumbar drain. ~6-10cc of CSF drained each hour. ICP ~9-10. Pt. On 4L NC to maintain sats >95. Pt. On bear hugger throughout shift. Temps ~ 96F. MAP maintained >goal of 100 and SBP <160. UO 15-35cc/hr.    Vital Signs: /78 (BP Location: Right arm, Patient Position: Lying)   Pulse 71   Temp 96.4 °F (35.8 °C) (Core Rectal)   Resp 19   Ht 5' 6" (1.676 m)   Wt 62 kg (136 lb 11 oz)   SpO2 100%   Breastfeeding? No   BMI 22.06 kg/m²      Pt. Updated on POC. All questions answered. Emotional support provided.       "

## 2018-11-21 NOTE — ASSESSMENT & PLAN NOTE
--appreciate vascular surgery assistance  --s/p lumbar drain placement and TEVAR on 11/5  --lumbar drain reopened given acute lower extremity weakness on AM of 11/20. Continue to drain 15 ml/hr per protocol given symptoms   --continue neuro/ neurovascular exams

## 2018-11-21 NOTE — SUBJECTIVE & OBJECTIVE
Interval History/Significant Events: LP drain continues to drain. Received 1 unit of cryo overnight. No complaints this AM. On 4L nasal canula.     Review of Systems   Unable to perform ROS: Mental status change     Objective:     Vital Signs (Most Recent):  Temp: (!) 95.9 °F (35.5 °C) (11/21/18 1300)  Pulse: 68 (11/21/18 1330)  Resp: 19 (11/21/18 1330)  BP: (!) 144/77 (11/21/18 1330)  SpO2: 96 % (11/21/18 1330) Vital Signs (24h Range):  Temp:  [95.5 °F (35.3 °C)-96.6 °F (35.9 °C)] 95.9 °F (35.5 °C)  Pulse:  [67-72] 68  Resp:  [12-40] 19  SpO2:  [88 %-100 %] 96 %  BP: (126-145)/(63-81) 144/77   Weight: 68 kg (149 lb 14.6 oz)  Body mass index is 24.2 kg/m².      Intake/Output Summary (Last 24 hours) at 11/21/2018 1451  Last data filed at 11/21/2018 1300  Gross per 24 hour   Intake 572.83 ml   Output 899 ml   Net -326.17 ml       Physical Exam   Constitutional: She appears well-developed. She has a sickly appearance. Nasal cannula in place.   HENT:   Head: Normocephalic and atraumatic.   Eyes: EOM are normal. Scleral icterus is present.   Neck: Neck supple. No JVD present. No tracheal deviation present. No thyromegaly present.   Cardiovascular: Normal rate, regular rhythm, S1 normal and S2 normal.   No murmur heard.  Pulses:       Radial pulses are 2+ on the right side, and 2+ on the left side.        Dorsalis pedis pulses are 2+ on the right side, and 2+ on the left side.   Warm extremities   Pulmonary/Chest: No accessory muscle usage. No tachypnea. No respiratory distress. She has decreased breath sounds in the left middle field and the left lower field. She has no wheezes. She has no rhonchi. She has no rales.   Abdominal: Soft. Bowel sounds are normal. She exhibits distension. There is tenderness (mild bilateral upper quadrants). There is no guarding.   Lymphadenopathy:     She has no cervical adenopathy.   Neurological: She displays atrophy (Weaker left upper and lower extremity). No sensory deficit. Coordination  normal.   Alert, making eye contact. Expressive aphasia/dysarthria noted. Spontaneous movement of RUE and RLE. Weakness of RUE. Unable to move LLE however positive sensation.    Skin: Skin is dry. She is not diaphoretic. There is pallor.       Vents:  Oxygen Concentration (%): 40 (11/17/18 0339)  Lines/Drains/Airways     Central Venous Catheter Line                 Percutaneous Central Line Insertion/Assessment - triple lumen  11/15/18 right internal jugular 6 days          Drain                 Lumbar Drain 11/15/18 1500 5 days         Urethral Catheter 11/15/18 1518 Non-latex;Straight-tip 16 Fr. 5 days          Peripheral Intravenous Line                 Peripheral IV - Single Lumen 11/19/18 1300 Anterior;Right Upper Arm 2 days              Significant Labs:    CBC/Anemia Profile:  Recent Labs   Lab 11/20/18  1506 11/21/18  0326 11/21/18  1357   WBC 6.90 5.97  5.97 6.62   HGB 8.6* 8.8*  8.8* 8.9*   HCT 25.7* 26.1*  26.1* 26.5*   PLT 52* 48*  48* 72*   MCV 92 92  92 93   RDW 17.4* 17.6*  17.6* 18.5*        Chemistries:  Recent Labs   Lab 11/19/18  2028 11/20/18  0300 11/21/18  0326    143 144   K 3.7 3.9 3.8    108 109   CO2 22* 22* 23   BUN 66* 69* 81*   CREATININE 1.8* 1.9* 1.9*   CALCIUM 9.5 9.3 9.3   ALBUMIN 3.3* 3.1*  3.1* 3.2*  3.2*   PROT 7.5 7.1 7.6   BILITOT 4.5* 4.4* 5.4*   ALKPHOS 109 103 119   * 94* 83*   * 326* 253*   MG 2.2 2.3 2.4   PHOS 2.8 3.2  3.2 3.4  3.4       All pertinent labs within the past 24 hours have been reviewed.    Significant Imaging:  I have reviewed and interpreted all pertinent imaging results/findings within the past 24 hours.

## 2018-11-21 NOTE — SUBJECTIVE & OBJECTIVE
Medications:  Continuous Infusions:   norepinephrine bitartrate-D5W 0.02 mcg/kg/min (11/20/18 0744)     Scheduled Meds:   lactulose  20 g Oral BID    rifAXImin  550 mg Oral BID     PRN Meds:sodium chloride, ondansetron     Objective:     Vital Signs (Most Recent):  Temp: 96.3 °F (35.7 °C) (11/21/18 0727)  Pulse: 69 (11/21/18 0727)  Resp: (!) 23 (11/21/18 0727)  BP: (!) 142/76 (11/21/18 0705)  SpO2: 97 % (11/21/18 0727) Vital Signs (24h Range):  Temp:  [94.6 °F (34.8 °C)-96.6 °F (35.9 °C)] 96.3 °F (35.7 °C)  Pulse:  [66-72] 69  Resp:  [12-40] 23  SpO2:  [83 %-100 %] 97 %  BP: (126-142)/(69-81) 142/76     Date 11/21/18 0700 - 11/22/18 0659   Shift 8650-6265 6239-1866 8951-9896 24 Hour Total   INTAKE   I.V.(mL/kg) 5(0.1)   5(0.1)   Shift Total(mL/kg) 5(0.1)   5(0.1)   OUTPUT   Urine(mL/kg/hr) 35   35   Drains 10   10   Shift Total(mL/kg) 45(0.7)   45(0.7)   Weight (kg) 68 68 68 68       Physical Exam   Constitutional: She is oriented to person, place, and time. She appears well-developed and well-nourished.   HENT:   Head: Normocephalic and atraumatic.   Eyes: EOM are normal. Pupils are equal, round, and reactive to light.   Neck: Normal range of motion. Neck supple.   Cardiovascular: Normal rate and regular rhythm.   Pulmonary/Chest: Effort normal and breath sounds normal.   Abdominal: Soft. Bowel sounds are normal.   Genitourinary:   Genitourinary Comments: Perry in place   Musculoskeletal:   LUE 2/5, poor fine motor movement.   RUE 5/5, intact fine motor movement  LLE does not withdrawal to pain  RLE knee flexion, and moves toes  CSF drain site dressing c/d/i   Neurological: She is alert and oriented to person, place, and time.   Skin: Skin is warm.   Nursing note and vitals reviewed.      Significant Labs:  CBC:   Recent Labs   Lab 11/21/18  0326   WBC 5.97  5.97   RBC 2.85*  2.85*   HGB 8.8*  8.8*   HCT 26.1*  26.1*   PLT 48*  48*   MCV 92  92   MCH 30.9  30.9   MCHC 33.7  33.7     CMP:   Recent Labs    Lab 11/21/18 0326      CALCIUM 9.3   ALBUMIN 3.2*  3.2*   PROT 7.6      K 3.8   CO2 23      BUN 81*   CREATININE 1.9*   ALKPHOS 119   ALT 83*   *   BILITOT 5.4*     Coagulation:   Recent Labs   Lab 11/21/18 0326   LABPROT 17.2*  17.2*   INR 1.8*  1.8*

## 2018-11-21 NOTE — ASSESSMENT & PLAN NOTE
--per patient, has known about cirrhosis for >10 years. Has never received treatment for Hep C  --continue supportive care for coagulopathy, thrombocytopenia in setting of Type B aortic dissection with hemorrhagic shock and new thalamic bleed   --lactulose and rifaximin   --CT abd/pelvis 11/21 with small ileus  --Will consider NG tube If abdominal symptoms worsen or no increase in bowel movements

## 2018-11-21 NOTE — PROGRESS NOTES
[facility]  Vascular Neurology  Comprehensive Stroke Center  Progress Note    Assessment/Plan:     Nontraumatic thalamic hemorrhage      Patient is a 53 y.o. female with a h/o HTN, Hep C, cirrhosis (MELD 15),Hx of pancreatitis, GERD, and illicit substance abuse (crack cocaine/THC) admitted for thoracic aortic dissection treated with TEVAR and Lumbar drain, had Acute right thalamic hemorrhage on CTH.       Etiology - Hemorrhagic transformation of Right thalamic stroke v/s Primary right thalamic hemorrhagic stroke v/s End Stage live disease v/s 2/2 Lumbar drain with increased SFP / MAP gradient.     11/20/18: Sudden LOF of both lower extremities with no response to painful stimuli. Repeat CT head w/o new pathology or worsening hemorrhage.      Recommend:  -MRI DWI of brain and total spine pending, will f/u when available  -Continue with removal of CSF, per vascular sx instructions  -SBP goal <160 with MAP goal of 100      Antithrombotics for secondary stroke prevention:  Hold Antiplatelets    Statins for secondary stroke prevention and hyperlipidemia, if present: rec getting  LDL and start Lipitor if LDL > 130      Aggressive risk factor modification: HTN, Illicit drug use     Rehab efforts: PT/OT/SLP to evaluate and treat    Diagnostics ordered/pending: MRI head and spine c/t/l    VTE prophylaxis: dvt ppx and scd's    BP parameters:  SBP <160         Essential hypertension    - rec SBP of ~ 160 and consider keeping MAPs at ~100 mmHg          No notes on file    STROKE DOCUMENTATION   Acute Stroke Times   Last Known Normal Date: 11/17/18  Last Known Normal Time: 0700  Symptom Onset Date: 11/17/18  Stroke Team Called Date: 11/17/18  Stroke Team Called Time: 0800  Stroke Team Arrival Date: 11/17/18  Stroke Team Arrival Time: 0805  CT Interpretation Time: 0834    NIH Scale:  1a. Level Of Consciousness: 0-->Alert: keenly responsive  1b. LOC Questions: 0-->Answers both questions correctly  1c. LOC  Commands: 0-->Performs both tasks correctly  2. Best Gaze: 0-->Normal  3. Visual: 0-->No visual loss  4. Facial Palsy: 1-->Minor paralysis (flattened nasolabial fold, asymmetry on smiling)  5a. Motor Arm, Left: 4-->No movement  5b. Motor Arm, Right: 0-->No drift: limb holds 90 (or 45) degrees for full 10 secs  6a. Motor Leg, Left: 4-->No movement  6b. Motor Leg, Right: 3-->No effort against gravity: leg falls to bed immediately  7. Limb Ataxia: 0-->Absent  8. Sensory: 1-->Mild-to-moderate sensory loss: patient feels pinprick is less sharp or is dull on the affected side: or there is a loss of superficial pain with pinprick, but patient is aware of being touched  9. Best Language: 1-->Mild-to-moderate aphasia: some obvious loss of fluency or facility of comprehension, without significant limitation on ideas expressed or form of expression. Reduction of speech and/or comprehension, however, makes conversation. . . (see row details)  10. Dysarthria: 1-->Mild-to-moderate dysarthria: patient slurs at least some words and, at worst, can be understood with some difficulty  11. Extinction and Inattention (formerly Neglect): 1-->Visual, tactile, auditory, spatial, or personal inattention or extinction to bilateral simultaneous stimulation in one of the sensory modalities  Total (NIH Stroke Scale): 16       Modified Nitesh Score: 0  Romero Coma Scale:    ABCD2 Score:    [id number] Score:   HAS -BLED Score:   ICH Score:   Hunt & Farnsworth Classification:          Neurologic Chief Complaint: dysarthria, poor attention span, ULE weakness  Right Thalamic hemorrhage     Subjective:     Interval History: Patient is seen for follow-up neurological assessment and treatment recommendations: Slight improvement in clinical exam after CSF removal, will f/u MRI studies.     HPI, Past Medical, Family, and Social History remains the same as documented in the initial encounter.     Review of Systems   Constitutional: Negative for chills and  fever.   Respiratory: Negative for cough and shortness of breath.    Cardiovascular: Negative for chest pain and palpitations.   Gastrointestinal: Positive for abdominal pain (RUQ ). Negative for blood in stool, nausea and vomiting.   Musculoskeletal: Positive for neck pain (discomfort at RIJ site). Negative for back pain and neck stiffness.   Neurological: Negative for tremors, seizures, syncope, weakness, numbness and headaches.   Hematological: Bruises/bleeds easily.   Psychiatric/Behavioral: Positive for confusion.     Scheduled Meds:   lactulose  30 g Oral BID    pantoprazole  40 mg Oral Daily    rifAXImin  550 mg Oral BID     Continuous Infusions:   norepinephrine bitartrate-D5W 0.02 mcg/kg/min (11/20/18 0744)     PRN Meds:sodium chloride, sodium chloride, midazolam, ondansetron    Objective:     Vital Signs (Most Recent):  Temp: (!) 95.9 °F (35.5 °C) (11/21/18 1300)  Pulse: 68 (11/21/18 1630)  Resp: (!) 21 (11/21/18 1630)  BP: (!) 147/75 (11/21/18 1630)  SpO2: 95 % (11/21/18 1630)  BP Location: Left arm    Vital Signs Range (Last 24H):  Temp:  [95.5 °F (35.3 °C)-96.6 °F (35.9 °C)]   Pulse:  [67-72]   Resp:  [12-40]   BP: (128-149)/(63-81)   SpO2:  [88 %-100 %]   BP Location: Left arm    Physical Exam   Constitutional: She appears well-developed and well-nourished.   HENT:   Head: Normocephalic and atraumatic.   Eyes: EOM are normal. Pupils are equal, round, and reactive to light.   Neck: Normal range of motion. Neck supple.   Cardiovascular: Normal rate and regular rhythm.   Pulmonary/Chest: Effort normal and breath sounds normal.   Abdominal: Soft. Bowel sounds are normal. There is no guarding.   Genitourinary:   Genitourinary Comments: Perry in place   Musculoskeletal: She exhibits no edema or deformity.   Neurological: She is alert.   LUE weakness   Skin: Skin is warm and dry.   Vitals reviewed.      Neurological Exam:   @1600:  LOC: alert, mild cues to cooperate   Attention Span: fair  Language: mild  aphasia  Articulation: mild dysarthria  Orientation: AOx3  Visual Fields: Full  EOM (CN III, IV, VI): Full/intact  Pupils (CN II, III): PERRL  Facial Sensation (CN V): Normal  Facial Movement (CN VII): mild L facial droop   Motor: Arm left  Normal 0/5  Leg left  Normal 0/5  Arm right  Normal 5/5  Leg right Normal 2/5  Sensation: Able to feel light touch on proximal legs and R lower leg.   Tone: Decreased tone throughout LEs and LUE.   Reflexes +2 throughout       Laboratory:    Recent Results (from the past 24 hour(s))   POCT glucose    Collection Time: 11/20/18  8:04 PM   Result Value Ref Range    POCT Glucose 107 70 - 110 mg/dL   POCT glucose    Collection Time: 11/20/18 11:54 PM   Result Value Ref Range    POCT Glucose 116 (H) 70 - 110 mg/dL   Hepatic function panel    Collection Time: 11/21/18  3:26 AM   Result Value Ref Range    Total Protein 7.6 6.0 - 8.4 g/dL    Albumin 3.2 (L) 3.5 - 5.2 g/dL    Total Bilirubin 5.4 (H) 0.1 - 1.0 mg/dL    Bilirubin, Direct 3.0 (H) 0.1 - 0.3 mg/dL     (H) 10 - 40 U/L    ALT 83 (H) 10 - 44 U/L    Alkaline Phosphatase 119 55 - 135 U/L   Magnesium    Collection Time: 11/21/18  3:26 AM   Result Value Ref Range    Magnesium 2.4 1.6 - 2.6 mg/dL   Protime-INR    Collection Time: 11/21/18  3:26 AM   Result Value Ref Range    Prothrombin Time 17.2 (H) 9.0 - 12.5 sec    INR 1.8 (H) 0.8 - 1.2   Renal function panel    Collection Time: 11/21/18  3:26 AM   Result Value Ref Range    Glucose 108 70 - 110 mg/dL    Sodium 144 136 - 145 mmol/L    Potassium 3.8 3.5 - 5.1 mmol/L    Chloride 109 95 - 110 mmol/L    CO2 23 23 - 29 mmol/L    BUN, Bld 81 (H) 6 - 20 mg/dL    Calcium 9.3 8.7 - 10.5 mg/dL    Creatinine 1.9 (H) 0.5 - 1.4 mg/dL    Albumin 3.2 (L) 3.5 - 5.2 g/dL    Phosphorus 3.4 2.7 - 4.5 mg/dL    eGFR if  34.2 (A) >60 mL/min/1.73 m^2    eGFR if non  29.7 (A) >60 mL/min/1.73 m^2    Anion Gap 12 8 - 16 mmol/L   CBC auto differential    Collection Time:  11/21/18  3:26 AM   Result Value Ref Range    WBC 5.97 3.90 - 12.70 K/uL    RBC 2.85 (L) 4.00 - 5.40 M/uL    Hemoglobin 8.8 (L) 12.0 - 16.0 g/dL    Hematocrit 26.1 (L) 37.0 - 48.5 %    MCV 92 82 - 98 fL    MCH 30.9 27.0 - 31.0 pg    MCHC 33.7 32.0 - 36.0 g/dL    RDW 17.6 (H) 11.5 - 14.5 %    Platelets 48 (L) 150 - 350 K/uL    MPV 13.0 (H) 9.2 - 12.9 fL    Immature Granulocytes 1.2 (H) 0.0 - 0.5 %    Gran # (ANC) 4.1 1.8 - 7.7 K/uL    Immature Grans (Abs) 0.07 (H) 0.00 - 0.04 K/uL    Lymph # 0.5 (L) 1.0 - 4.8 K/uL    Mono # 1.3 (H) 0.3 - 1.0 K/uL    Eos # 0.0 0.0 - 0.5 K/uL    Baso # 0.01 0.00 - 0.20 K/uL    nRBC 2 (A) 0 /100 WBC    Gran% 69.0 38.0 - 73.0 %    Lymph% 8.5 (L) 18.0 - 48.0 %    Mono% 20.9 (H) 4.0 - 15.0 %    Eosinophil% 0.2 0.0 - 8.0 %    Basophil% 0.2 0.0 - 1.9 %    Differential Method Automated    Phosphorus    Collection Time: 11/21/18  3:26 AM   Result Value Ref Range    Phosphorus 3.4 2.7 - 4.5 mg/dL   Fibrinogen    Collection Time: 11/21/18  3:26 AM   Result Value Ref Range    Fibrinogen 87 (LL) 182 - 366 mg/dL   Protime-INR    Collection Time: 11/21/18  3:26 AM   Result Value Ref Range    Prothrombin Time 17.2 (H) 9.0 - 12.5 sec    INR 1.8 (H) 0.8 - 1.2   CBC auto differential    Collection Time: 11/21/18  3:26 AM   Result Value Ref Range    WBC 5.97 3.90 - 12.70 K/uL    RBC 2.85 (L) 4.00 - 5.40 M/uL    Hemoglobin 8.8 (L) 12.0 - 16.0 g/dL    Hematocrit 26.1 (L) 37.0 - 48.5 %    MCV 92 82 - 98 fL    MCH 30.9 27.0 - 31.0 pg    MCHC 33.7 32.0 - 36.0 g/dL    RDW 17.6 (H) 11.5 - 14.5 %    Platelets 48 (L) 150 - 350 K/uL    MPV 13.0 (H) 9.2 - 12.9 fL    Immature Granulocytes 1.2 (H) 0.0 - 0.5 %    Gran # (ANC) 4.1 1.8 - 7.7 K/uL    Immature Grans (Abs) 0.07 (H) 0.00 - 0.04 K/uL    Lymph # 0.5 (L) 1.0 - 4.8 K/uL    Mono # 1.3 (H) 0.3 - 1.0 K/uL    Eos # 0.0 0.0 - 0.5 K/uL    Baso # 0.01 0.00 - 0.20 K/uL    nRBC 2 (A) 0 /100 WBC    Gran% 69.0 38.0 - 73.0 %    Lymph% 8.5 (L) 18.0 - 48.0 %    Mono% 20.9  (H) 4.0 - 15.0 %    Eosinophil% 0.2 0.0 - 8.0 %    Basophil% 0.2 0.0 - 1.9 %    Differential Method Automated    POCT glucose    Collection Time: 11/21/18  3:33 AM   Result Value Ref Range    POCT Glucose 113 (H) 70 - 110 mg/dL   POCT glucose    Collection Time: 11/21/18  9:41 AM   Result Value Ref Range    POCT Glucose 103 70 - 110 mg/dL   POCT glucose    Collection Time: 11/21/18 11:17 AM   Result Value Ref Range    POCT Glucose 132 (H) 70 - 110 mg/dL   Fibrinogen    Collection Time: 11/21/18  1:57 PM   Result Value Ref Range    Fibrinogen 109 (L) 182 - 366 mg/dL   CBC auto differential    Collection Time: 11/21/18  1:57 PM   Result Value Ref Range    WBC 6.62 3.90 - 12.70 K/uL    RBC 2.84 (L) 4.00 - 5.40 M/uL    Hemoglobin 8.9 (L) 12.0 - 16.0 g/dL    Hematocrit 26.5 (L) 37.0 - 48.5 %    MCV 93 82 - 98 fL    MCH 31.3 (H) 27.0 - 31.0 pg    MCHC 33.6 32.0 - 36.0 g/dL    RDW 18.5 (H) 11.5 - 14.5 %    Platelets 72 (L) 150 - 350 K/uL    MPV 11.7 9.2 - 12.9 fL    Immature Granulocytes 2.1 (H) 0.0 - 0.5 %    Gran # (ANC) 4.6 1.8 - 7.7 K/uL    Immature Grans (Abs) 0.14 (H) 0.00 - 0.04 K/uL    Lymph # 0.5 (L) 1.0 - 4.8 K/uL    Mono # 1.3 (H) 0.3 - 1.0 K/uL    Eos # 0.0 0.0 - 0.5 K/uL    Baso # 0.01 0.00 - 0.20 K/uL    nRBC 1 (A) 0 /100 WBC    Gran% 69.8 38.0 - 73.0 %    Lymph% 8.0 (L) 18.0 - 48.0 %    Mono% 19.9 (H) 4.0 - 15.0 %    Eosinophil% 0.0 0.0 - 8.0 %    Basophil% 0.2 0.0 - 1.9 %    Differential Method Automated    POCT glucose    Collection Time: 11/21/18  1:57 PM   Result Value Ref Range    POCT Glucose 121 (H) 70 - 110 mg/dL         Diagnostic Results     Brain imaging:     MRI DWI brain and total spine 11/21/18:   pending...      CT Cervical and thoracic spine 11/21/18:  No acute abnormality of the cervical or thoracic spine.     CTH 11/20/18  Stable right thalamic hemorrhage.  No new hemorrhage identified.      CTH - Right Thalamic hemorrhage           Vessel Imaging:     CTA head and neck -  unremarkable           Cardiac Evaluation:   JORDAN 11/15/18  · Normal left ventricular systolic function. The estimated ejection fraction is 70%  · Concentric left ventricular remodeling.  · No wall motion abnormalities.  · Normal LV diastolic function.  · Normal right ventricular systolic function.  · Trace mitral regurgitation.  · Mild aortic regurgitation.  · Trace tricuspid regurgitation.  · Trace pulmonic regurgitation.  · Normal central venous pressure (3 mm Hg).  · The estimated PA systolic pressure is 24.72 mm Hg  · No pericardial effusion.          Jose Pan MD  [facility]  Department of Vascular Neurology   [facility]

## 2018-11-21 NOTE — ASSESSMENT & PLAN NOTE
Patient is a 53 y.o. female with a h/o HTN, Hep C, cirrhosis (MELD 15),Hx of pancreatitis, GERD, and illicit substance abuse (crack cocaine/THC) admitted for thoracic aortic dissection treated with TEVAR and Lumbar drain, had Acute right thalamic hemorrhage on CTH.       Etiology - Hemorrhagic transformation of Right thalamic stroke v/s Primary right thalamic hemorrhagic stroke v/s End Stage live disease v/s 2/2 Lumbar drain with increased SFP / MAP gradient.     11/20/18: Sudden LOF of both lower extremities with no response to painful stimuli. Repeat CT head w/o new pathology or worsening hemorrhage.      Recommend:  -MRI DWI of brain and total spine pending, will f/u when available  -Continue with removal of CSF, per vascular sx instructions  -SBP goal <160 with MAP goal of 100      Antithrombotics for secondary stroke prevention:  Hold Antiplatelets    Statins for secondary stroke prevention and hyperlipidemia, if present: rec getting  LDL and start Lipitor if LDL > 130      Aggressive risk factor modification: HTN, Illicit drug use     Rehab efforts: PT/OT/SLP to evaluate and treat    Diagnostics ordered/pending: MRI head and spine c/t/l    VTE prophylaxis: dvt ppx and scd's    BP parameters:  SBP <160

## 2018-11-21 NOTE — PT/OT/SLP PROGRESS
Speech Language Pathology Treatment    Patient Name:  Leah Cleaning   MRN:  [id number]  Admitting Diagnosis: Aortic dissection distal to left subclavian    Recommendations:                 General Recommendations:  Speech/language therapy and Cognitive-linguistic therapy  Diet recommendations:  Regular, Liquid Diet Level: Thin   Aspiration Precautions: Standard aspiration precautions   General Precautions: Standard, aspiration, fall  Communication strategies:  none    Subjective     Awake/alert    Pain/Comfort:  · Pain Rating 1: 0/10  · Pain Rating Post-Intervention 1: 0/10    Objective:     Has the patient been evaluated by SLP for swallowing?   Yes  Keep patient NPO? No   Current Respiratory Status: room air      Pt repositioned upright in bed with EVD clamped by NSG prior to repositioning. Pt tolerated satish cracker x3 bites (1 whole cracker) and thin liquids via straw x12 oz. Adequate oral transit time, timely swallow initiation and no overt clinical s/s of airway compromise noted across all trials. Pt with mild oral residue post swallow which she ind'ly cleared with lingual sweep and thin liquid wash. Pt getting prepped to go for MRI at end of session. Recommend regular diet/thin liquids at this time. SLP will follow up.     Assessment:     Leah Cleaning is a 53 y.o. female with an SLP diagnosis of Dysphagia, Dysarthria and Cognitive-Linguistic Impairment.     Goals:   Multidisciplinary Problems     SLP Goals        Problem: SLP Goal    Goal Priority Disciplines Outcome   SLP Goal     SLP    Description:  Speech Language Pathology Goals  Goals expected to be met by 11/27:    1.Pt will tolerate diet of  thin liqiuds and purees without overt clinical signs of aspiration   2.Pt will participate in trials of soft solids within speech therapy sessions to help determine least restrictive diet   3. Pt will complete OMEs x10 w/ SLP model to enhance oral motor function   4. Pt will utilize compensatory  strategies independently at the phrase level  to enhance speech intelligibility   5. Pt will demonstrate orientation to self, place, situation , family members, date w/ min cues   6. Pt will complete problem solving skills w/ 75 % acc to enhance safety awareness   7. Pt will generate 10 items per category to enhance organizations skills   8. Pt will demonstrate sustained attention to task across 5 consecutive minutes w/ min cues to enhance attention skills  9. Pt will participate in ongoing assessment of speech language and cognitive linguistic skills to help rule out deficits and determine therapeutic plan of care                             Plan:     · Patient to be seen:  4 x/week   · Plan of Care expires:  12/17/18  · Plan of Care reviewed with:  patient   · SLP Follow-Up:  Yes       Discharge recommendations:  rehabilitation facility       Time Tracking:     SLP Treatment Date:   11/21/18  Speech Start Time:  1308  Speech Stop Time:  1318     Speech Total Time (min):  10 min    Billable Minutes: Treatment Swallowing Dysfunction 10    Chelle Olvera CCC-SLP  11/21/2018

## 2018-11-21 NOTE — PROGRESS NOTES
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 Cleaning is a 52 yo female with a history significant for Hep C cirrhosis, HTN, cocaine and marijuana use who presented to [facility] on the early morning of 11/15 as a transfer from [facility] for Type B aortic dissection. She initially presented to [facility] on the evening of 11/14 for sharp epigastric pain that radiated to her back which started initially on the night of 11/13. CT at OSH noted aortic dissection from left subclavian to just proximal of celiac artery. She was transferred to [facility] for vascular surgery consult on a nicardipine gtt. She was transitioned from nicardipine gtt to esmolol gtt. CTA upon arrival confirmed dissection and she was admitted to MICU for medical management of the dissection with vascular surgery following.     Hospital/ICU Course:  Mrs. Cleaning was admitted to MICU for Type B aortic dissection on esmolol gtt to maintain HR of 60 and SBP <120. Vascular surgery following, no surgical intervention warranted initially. CTA read concerning for possible aortic rupture in the descending aorta with high-density fluid noted in the posterior mediastinum; additionally, there was a suspected penetrating ulcer in the upper abdominal aorta adjacent to the true lumen. Due to high risk/ possible rupture, Lumbar drain was placed by interventional radiology and TEVAR completed by vascular surgery. Early 11/16, pt noted to be in hemorragic shock complicated by liver disease as vasopressor requirements increased significantly, rising lactic acidosis, and patient was found to be bleeding from  bilateral groin sites with significant hematoma formation to left groin. Pressure was held for an extended period and patient received 4 U PRBC, 3 U FFP, 1 U platelets, 1 U cryo, and vitamin K. With improvement in coagulopathy and volume resuscitation, she was able to be weaned from vasopressors. However, on 11/17, a stroke code was called around 0800 due to acute left sided weakness; right sided thalamic bleed noted on CT. No intervention per vascular neurology, however have had to give multiple units of platelets, FFP, and cyro in order to keep platelets > 50,000, INR at 1.5, and fibrinogen >100. Repeat CT showed that hemorrhage had not changed. Neuro exam much improved with lessening left sided hemiparesis. New RUQ abdominal pain reported on 11/19 and, US with doppler noted probable right portal vein thrombosis; anticoagulation contraindicated.      On the early AM of 11/20, it was noted that Mrs. Cleaning was no longer able to move bilateral lower extremities and left upper extremity (this is a change as she was having improved strength in left upper/lower extremity previously and able to move RLE). Additionally, she was more somnolent with expressive aphasia noted. Discussions with vascular surgery, vascular neurology and [facility] team held with plans for stat CT head. Spinal drain opened up to drain 30 ml's in first hour and then 15 ml/hr subsequent to that. Additionally, levophed started to maintain MAP of 100 mm Hg but aim to keep SBP <160 given concern for spinal ischemia, but with subsequent hemorrhagic CVA.     Interval History/Significant Events: LP drain continues to drain. Received 1 unit of cryo overnight. No complaints this AM. On 4L nasal canula.     Review of Systems   Unable to perform ROS: Mental status change     Objective:     Vital Signs (Most Recent):  Temp: (!) 95.9 °F (35.5 °C) (11/21/18 1300)  Pulse: 68 (11/21/18 1330)  Resp: 19 (11/21/18 1330)  BP: (!) 144/77 (11/21/18 1330)  SpO2: 96 %  (11/21/18 1330) Vital Signs (24h Range):  Temp:  [95.5 °F (35.3 °C)-96.6 °F (35.9 °C)] 95.9 °F (35.5 °C)  Pulse:  [67-72] 68  Resp:  [12-40] 19  SpO2:  [88 %-100 %] 96 %  BP: (126-145)/(63-81) 144/77   Weight: 68 kg (149 lb 14.6 oz)  Body mass index is 24.2 kg/m².      Intake/Output Summary (Last 24 hours) at 11/21/2018 1451  Last data filed at 11/21/2018 1300  Gross per 24 hour   Intake 572.83 ml   Output 899 ml   Net -326.17 ml       Physical Exam   Constitutional: She appears well-developed. She has a sickly appearance. Nasal cannula in place.   HENT:   Head: Normocephalic and atraumatic.   Eyes: EOM are normal. Scleral icterus is present.   Neck: Neck supple. No JVD present. No tracheal deviation present. No thyromegaly present.   Cardiovascular: Normal rate, regular rhythm, S1 normal and S2 normal.   No murmur heard.  Pulses:       Radial pulses are 2+ on the right side, and 2+ on the left side.        Dorsalis pedis pulses are 2+ on the right side, and 2+ on the left side.   Warm extremities   Pulmonary/Chest: No accessory muscle usage. No tachypnea. No respiratory distress. She has decreased breath sounds in the left middle field and the left lower field. She has no wheezes. She has no rhonchi. She has no rales.   Abdominal: Soft. Bowel sounds are normal. She exhibits distension. There is tenderness (mild bilateral upper quadrants). There is no guarding.   Lymphadenopathy:     She has no cervical adenopathy.   Neurological: She displays atrophy (Weaker left upper and lower extremity). No sensory deficit. Coordination normal.   Alert, making eye contact. Expressive aphasia/dysarthria noted. Spontaneous movement of RUE and RLE. Weakness of RUE. Unable to move LLE however positive sensation.    Skin: Skin is dry. She is not diaphoretic. There is pallor.       Vents:  Oxygen Concentration (%): 40 (11/17/18 0339)  Lines/Drains/Airways     Central Venous Catheter Line                 Percutaneous Central Line  Insertion/Assessment - triple lumen  11/15/18 right internal jugular 6 days          Drain                 Lumbar Drain 11/15/18 1500 5 days         Urethral Catheter 11/15/18 1518 Non-latex;Straight-tip 16 Fr. 5 days          Peripheral Intravenous Line                 Peripheral IV - Single Lumen 11/19/18 1300 Anterior;Right Upper Arm 2 days              Significant Labs:    CBC/Anemia Profile:  Recent Labs   Lab 11/20/18  1506 11/21/18  0326 11/21/18  1357   WBC 6.90 5.97  5.97 6.62   HGB 8.6* 8.8*  8.8* 8.9*   HCT 25.7* 26.1*  26.1* 26.5*   PLT 52* 48*  48* 72*   MCV 92 92  92 93   RDW 17.4* 17.6*  17.6* 18.5*        Chemistries:  Recent Labs   Lab 11/19/18  2028 11/20/18  0300 11/21/18  0326    143 144   K 3.7 3.9 3.8    108 109   CO2 22* 22* 23   BUN 66* 69* 81*   CREATININE 1.8* 1.9* 1.9*   CALCIUM 9.5 9.3 9.3   ALBUMIN 3.3* 3.1*  3.1* 3.2*  3.2*   PROT 7.5 7.1 7.6   BILITOT 4.5* 4.4* 5.4*   ALKPHOS 109 103 119   * 94* 83*   * 326* 253*   MG 2.2 2.3 2.4   PHOS 2.8 3.2  3.2 3.4  3.4       All pertinent labs within the past 24 hours have been reviewed.    Significant Imaging:  I have reviewed and interpreted all pertinent imaging results/findings within the past 24 hours.    Assessment/Plan:     Neuro   Encephalopathy, metabolic    --multifactorial: thalamic CVA, hepatic encephalopathy, delirium   --see above. Continue lactulose, rifaxamin   --minimize deliriogenic medications.      Nontraumatic thalamic hemorrhage    Noted on 11/17 with left hemiparesis, dysarthria, left sided facial droop. These symptoms improved through 11/19 with no intervention per vascular neurology other than maintaining SBP <160 and correcting coagulopathy for goal INR <1.5, platelets >50,000 and fibrinogen >100  --worsening of neurologic status again on 11/20 with worsening dysarthria, LUE, LLE, RLE paralysis.   --Repeat stat CT head 11/20 with stable thalamic hemorrhage; no acute  abnormalities  --spinal drain draining per vascular surgery recommendations.   --in discussions with vascular neurology, vascular surgery, plan to leave spinal drain open to drain 15 ml/hr, maintain MAP of 100 and SBP <160. Continue correcting coagulopathy  --hourly neuro checks   --CT cervical and CT thoracic without contrast 11/21 no detection of spinal cord hematoma however sub optimal evaluation of spinal cord without contrast  --MRI brain and full spine pending        Psychiatric   Marijuana abuse    --see above     Cocaine abuse    --toxicology screen positive for cocaine, THC, opiates at OSH       Cardiac/Vascular   * Aortic dissection distal to left subclavian    --appreciate vascular surgery assistance  --s/p lumbar drain placement and TEVAR on 11/5  --lumbar drain reopened given acute lower extremity weakness on AM of 11/20. Continue to drain 15 ml/hr per protocol given symptoms   --continue neuro/ neurovascular exams          Pseudoaneurysm of left femoral artery    --noted 11/16. Hematoma appears unchanged in size   --continue to monitor      Essential hypertension    --goal MAP of 100, SBP <160 per vascular surgery and neurology recommendations  --continue levophed PRN to maintain this      Renal/   KARL (acute kidney injury)    --urine lytes indicative of pre-renal state. Likely related to hemorraghic shock but could be at risk for KENDALL too  --continue to trend for now. Urine output marginal, no need for RRT at this time.   --avoid nephrotoxic agents if possible      Hematology   Portal vein thrombosis    --noted on US liver with doppler on 11/19. Holding anticoagulation given above bleeding/coagulopathy  --consider repeating imaging in next few days     Thrombocytopenia    --2/2 cirrhosis  --plts goal >50,000     Coagulopathy    --2/2 cirrhosis  --goal INR <1.5 given hemorrhagic CVA. Continue vitamin K and FFP as needed        Oncology   Acute blood loss anemia    --transfuse to maintain Hgb 8-9 for  now given concern for spinal ischemia.   --trend      GI   Transaminitis    --suspect shock liver  --improving      Chronic hepatitis C with cirrhosis    --per patient, has known about cirrhosis for >10 years. Has never received treatment for Hep C  --continue supportive care for coagulopathy, thrombocytopenia in setting of Type B aortic dissection with hemorrhagic shock and new thalamic bleed   --lactulose and rifaximin   --CT abd/pelvis 11/21 with small ileus  --Will consider NG tube If abdominal symptoms worsen or no increase in bowel movements     Orthopedic   Acute midline thoracic back pain    --resolved      Other   Goals of care, counseling/discussion    --Goals of care discussion with Ms. Cleaning and her sister  --Code status changed to DNR     Hemorrhagic shock    --required levo, gurwinder, and vaso on 11/16 with bleeding from bilateral groin sites s/p TEVAR   --shock resolved with volume resuscitation, improving coagulopathy  --trend CBC's and target Hgb 8-9                  Critical Care Daily Checklist:    A: Awake: RASS Goal/Actual Goal: RASS Goal: 0-->alert and calm  Actual: Vergara Agitation Sedation Scale (RASS): Drowsy   B: Spontaneous Breathing Trial Performed?     C: SAT & SBT Coordinated?  N/A                      D: Delirium: CAM-ICU Overall CAM-ICU: Negative   E: Early Mobility Performed? Yes   F: Feeding Goal:    Status:     Current Diet Order   Procedures    Diet Adult Regular (IDDSI Level 7)      AS: Analgesia/Sedation None   T: Thromboembolic Prophylaxis Holding due to brain bleed   H: HOB > 300 Yes   U: Stress Ulcer Prophylaxis (if needed) PPI   G: Glucose Control At goal 140-180   B: Bowel Function     I: Indwelling Catheter (Lines & Perry) Necessity necessary   D: De-escalation of Antimicrobials/Pharmacotherapies None    Plan for the day/ETD MRI; hemodynamically stabilize    Code Status:  Family/Goals of Care: DNR       Critical Care Time: 45 minutes  Critical secondary to Patient has a  condition that poses threat to life and bodily function: aortic dissection, hemorrhagic CVA      Critical care was time spent personally by me on the following activities: development of treatment plan with patient or surrogate and bedside caregivers, discussions with consultants, evaluation of patient's response to treatment, examination of patient, ordering and performing treatments and interventions, ordering and review of laboratory studies, ordering and review of radiographic studies, pulse oximetry, re-evaluation of patient's condition. This critical care time did not overlap with that of any other provider or involve time for any procedures.     Any Hoover PA-C  Critical Care Medicine  [facility]

## 2018-11-21 NOTE — PROGRESS NOTES
Pt transported to MRI on continuous monitoring and 4L NC by RNx2. VSS. Lumbar drain clamped for travel. Ambu bag accompanied pt. Will continue to monitor.

## 2018-11-21 NOTE — PROGRESS NOTES
[facility] AVA Agarwal notified of pt not staying still in MRI. MRI tech unable to get good scan. Orders received for 2mg versed. VSS. 2mg versed override pull by Ana Bear RN in MRI given by Liliya Hudson RN. Will continue to monitor.

## 2018-11-21 NOTE — SIGNIFICANT EVENT
Patient with ileus noted today on imaging and exam. Upon return from MRI, one episode of emesis. Have asked RN to place NGT for decompression.     I also called Mrs. Nancy Burton, patient's sister and medical decision maker, and gave her an update on Mrs. Cleaning's clinical status and multiple medical problems at this time. She verbalized understanding and all questions answered. She states that she will be at hospital likely tomorrow evening and should be here for several days visiting.

## 2018-11-21 NOTE — ASSESSMENT & PLAN NOTE
Noted on 11/17 with left hemiparesis, dysarthria, left sided facial droop. These symptoms improved through 11/19 with no intervention per vascular neurology other than maintaining SBP <160 and correcting coagulopathy for goal INR <1.5, platelets >50,000 and fibrinogen >100  --worsening of neurologic status again on 11/20 with worsening dysarthria, LUE, LLE, RLE paralysis.   --Repeat stat CT head 11/20 with stable thalamic hemorrhage; no acute abnormalities  --spinal drain draining per vascular surgery recommendations.   --in discussions with vascular neurology, vascular surgery, plan to leave spinal drain open to drain 15 ml/hr, maintain MAP of 100 and SBP <160. Continue correcting coagulopathy  --hourly neuro checks   --CT cervical and CT thoracic without contrast 11/21 no detection of spinal cord hematoma however sub optimal evaluation of spinal cord without contrast  --MRI brain and full spine pending

## 2018-11-21 NOTE — SUBJECTIVE & OBJECTIVE
Neurologic Chief Complaint: dysarthria, poor attention span, ULE weakness  Right Thalamic hemorrhage     Subjective:     Interval History: Patient is seen for follow-up neurological assessment and treatment recommendations: Worsening clinical exam overnight/this am. Inability to respond to pain in lower extremities. Lumbar CSF drain removed 30 cc's then 15cc/hr thereafter. Patient underwent CT head without any worsening of previous hemorrhage or new pathology.     HPI, Past Medical, Family, and Social History remains the same as documented in the initial encounter.     Review of Systems   Constitutional: Negative for chills and fever.   Respiratory: Negative for cough and shortness of breath.    Cardiovascular: Negative for chest pain and palpitations.   Gastrointestinal: Positive for abdominal pain (RUQ ). Negative for blood in stool, nausea and vomiting.   Musculoskeletal: Positive for neck pain (discomfort at RIJ site). Negative for back pain and neck stiffness.   Neurological: Negative for tremors, seizures, syncope, weakness, numbness and headaches.   Hematological: Bruises/bleeds easily.   Psychiatric/Behavioral: Positive for confusion.     Scheduled Meds:   lactulose  30 g Oral BID    pantoprazole  40 mg Oral Daily    rifAXImin  550 mg Oral BID     Continuous Infusions:   norepinephrine bitartrate-D5W 0.02 mcg/kg/min (11/20/18 0744)     PRN Meds:sodium chloride, sodium chloride, midazolam, ondansetron    Objective:     Vital Signs (Most Recent):  Temp: (!) 95.9 °F (35.5 °C) (11/21/18 1300)  Pulse: 68 (11/21/18 1630)  Resp: (!) 21 (11/21/18 1630)  BP: (!) 147/75 (11/21/18 1630)  SpO2: 95 % (11/21/18 1630)  BP Location: Left arm    Vital Signs Range (Last 24H):  Temp:  [95.5 °F (35.3 °C)-96.6 °F (35.9 °C)]   Pulse:  [67-72]   Resp:  [12-40]   BP: (128-149)/(63-81)   SpO2:  [88 %-100 %]   BP Location: Left arm    Physical Exam   Constitutional: She appears well-developed and well-nourished.   HENT:   Head:  Normocephalic and atraumatic.   Eyes: EOM are normal. Pupils are equal, round, and reactive to light.   Neck: Normal range of motion. Neck supple.   Cardiovascular: Normal rate and regular rhythm.   Pulmonary/Chest: Effort normal and breath sounds normal.   Abdominal: Soft. Bowel sounds are normal. There is no guarding.   Genitourinary:   Genitourinary Comments: Perry in place   Musculoskeletal: She exhibits no edema or deformity.   Neurological: She is alert.   LUE weakness   Skin: Skin is warm and dry.   Vitals reviewed.      Neurological Exam:   @1600:  LOC: alert, mild cues to cooperate   Attention Span: fair  Language: mild aphasia  Articulation: mild dysarthria  Orientation: AOx3  Visual Fields: Full  EOM (CN III, IV, VI): Full/intact  Pupils (CN II, III): PERRL  Facial Sensation (CN V): Normal  Facial Movement (CN VII): mild L facial droop   Motor: Arm left  Normal 0/5  Leg left  Normal 0/5  Arm right  Normal 5/5  Leg right Normal 2/5  Sensation: Able to feel light touch on proximal legs and R lower leg.   Tone: Decreased tone throughout LEs and LUE.   Reflexes +2 throughout       Laboratory:    Recent Results (from the past 24 hour(s))   POCT glucose    Collection Time: 11/20/18  8:04 PM   Result Value Ref Range    POCT Glucose 107 70 - 110 mg/dL   POCT glucose    Collection Time: 11/20/18 11:54 PM   Result Value Ref Range    POCT Glucose 116 (H) 70 - 110 mg/dL   Hepatic function panel    Collection Time: 11/21/18  3:26 AM   Result Value Ref Range    Total Protein 7.6 6.0 - 8.4 g/dL    Albumin 3.2 (L) 3.5 - 5.2 g/dL    Total Bilirubin 5.4 (H) 0.1 - 1.0 mg/dL    Bilirubin, Direct 3.0 (H) 0.1 - 0.3 mg/dL     (H) 10 - 40 U/L    ALT 83 (H) 10 - 44 U/L    Alkaline Phosphatase 119 55 - 135 U/L   Magnesium    Collection Time: 11/21/18  3:26 AM   Result Value Ref Range    Magnesium 2.4 1.6 - 2.6 mg/dL   Protime-INR    Collection Time: 11/21/18  3:26 AM   Result Value Ref Range    Prothrombin Time 17.2 (H) 9.0  - 12.5 sec    INR 1.8 (H) 0.8 - 1.2   Renal function panel    Collection Time: 11/21/18  3:26 AM   Result Value Ref Range    Glucose 108 70 - 110 mg/dL    Sodium 144 136 - 145 mmol/L    Potassium 3.8 3.5 - 5.1 mmol/L    Chloride 109 95 - 110 mmol/L    CO2 23 23 - 29 mmol/L    BUN, Bld 81 (H) 6 - 20 mg/dL    Calcium 9.3 8.7 - 10.5 mg/dL    Creatinine 1.9 (H) 0.5 - 1.4 mg/dL    Albumin 3.2 (L) 3.5 - 5.2 g/dL    Phosphorus 3.4 2.7 - 4.5 mg/dL    eGFR if  34.2 (A) >60 mL/min/1.73 m^2    eGFR if non  29.7 (A) >60 mL/min/1.73 m^2    Anion Gap 12 8 - 16 mmol/L   CBC auto differential    Collection Time: 11/21/18  3:26 AM   Result Value Ref Range    WBC 5.97 3.90 - 12.70 K/uL    RBC 2.85 (L) 4.00 - 5.40 M/uL    Hemoglobin 8.8 (L) 12.0 - 16.0 g/dL    Hematocrit 26.1 (L) 37.0 - 48.5 %    MCV 92 82 - 98 fL    MCH 30.9 27.0 - 31.0 pg    MCHC 33.7 32.0 - 36.0 g/dL    RDW 17.6 (H) 11.5 - 14.5 %    Platelets 48 (L) 150 - 350 K/uL    MPV 13.0 (H) 9.2 - 12.9 fL    Immature Granulocytes 1.2 (H) 0.0 - 0.5 %    Gran # (ANC) 4.1 1.8 - 7.7 K/uL    Immature Grans (Abs) 0.07 (H) 0.00 - 0.04 K/uL    Lymph # 0.5 (L) 1.0 - 4.8 K/uL    Mono # 1.3 (H) 0.3 - 1.0 K/uL    Eos # 0.0 0.0 - 0.5 K/uL    Baso # 0.01 0.00 - 0.20 K/uL    nRBC 2 (A) 0 /100 WBC    Gran% 69.0 38.0 - 73.0 %    Lymph% 8.5 (L) 18.0 - 48.0 %    Mono% 20.9 (H) 4.0 - 15.0 %    Eosinophil% 0.2 0.0 - 8.0 %    Basophil% 0.2 0.0 - 1.9 %    Differential Method Automated    Phosphorus    Collection Time: 11/21/18  3:26 AM   Result Value Ref Range    Phosphorus 3.4 2.7 - 4.5 mg/dL   Fibrinogen    Collection Time: 11/21/18  3:26 AM   Result Value Ref Range    Fibrinogen 87 (LL) 182 - 366 mg/dL   Protime-INR    Collection Time: 11/21/18  3:26 AM   Result Value Ref Range    Prothrombin Time 17.2 (H) 9.0 - 12.5 sec    INR 1.8 (H) 0.8 - 1.2   CBC auto differential    Collection Time: 11/21/18  3:26 AM   Result Value Ref Range    WBC 5.97 3.90 - 12.70 K/uL     RBC 2.85 (L) 4.00 - 5.40 M/uL    Hemoglobin 8.8 (L) 12.0 - 16.0 g/dL    Hematocrit 26.1 (L) 37.0 - 48.5 %    MCV 92 82 - 98 fL    MCH 30.9 27.0 - 31.0 pg    MCHC 33.7 32.0 - 36.0 g/dL    RDW 17.6 (H) 11.5 - 14.5 %    Platelets 48 (L) 150 - 350 K/uL    MPV 13.0 (H) 9.2 - 12.9 fL    Immature Granulocytes 1.2 (H) 0.0 - 0.5 %    Gran # (ANC) 4.1 1.8 - 7.7 K/uL    Immature Grans (Abs) 0.07 (H) 0.00 - 0.04 K/uL    Lymph # 0.5 (L) 1.0 - 4.8 K/uL    Mono # 1.3 (H) 0.3 - 1.0 K/uL    Eos # 0.0 0.0 - 0.5 K/uL    Baso # 0.01 0.00 - 0.20 K/uL    nRBC 2 (A) 0 /100 WBC    Gran% 69.0 38.0 - 73.0 %    Lymph% 8.5 (L) 18.0 - 48.0 %    Mono% 20.9 (H) 4.0 - 15.0 %    Eosinophil% 0.2 0.0 - 8.0 %    Basophil% 0.2 0.0 - 1.9 %    Differential Method Automated    POCT glucose    Collection Time: 11/21/18  3:33 AM   Result Value Ref Range    POCT Glucose 113 (H) 70 - 110 mg/dL   POCT glucose    Collection Time: 11/21/18  9:41 AM   Result Value Ref Range    POCT Glucose 103 70 - 110 mg/dL   POCT glucose    Collection Time: 11/21/18 11:17 AM   Result Value Ref Range    POCT Glucose 132 (H) 70 - 110 mg/dL   Fibrinogen    Collection Time: 11/21/18  1:57 PM   Result Value Ref Range    Fibrinogen 109 (L) 182 - 366 mg/dL   CBC auto differential    Collection Time: 11/21/18  1:57 PM   Result Value Ref Range    WBC 6.62 3.90 - 12.70 K/uL    RBC 2.84 (L) 4.00 - 5.40 M/uL    Hemoglobin 8.9 (L) 12.0 - 16.0 g/dL    Hematocrit 26.5 (L) 37.0 - 48.5 %    MCV 93 82 - 98 fL    MCH 31.3 (H) 27.0 - 31.0 pg    MCHC 33.6 32.0 - 36.0 g/dL    RDW 18.5 (H) 11.5 - 14.5 %    Platelets 72 (L) 150 - 350 K/uL    MPV 11.7 9.2 - 12.9 fL    Immature Granulocytes 2.1 (H) 0.0 - 0.5 %    Gran # (ANC) 4.6 1.8 - 7.7 K/uL    Immature Grans (Abs) 0.14 (H) 0.00 - 0.04 K/uL    Lymph # 0.5 (L) 1.0 - 4.8 K/uL    Mono # 1.3 (H) 0.3 - 1.0 K/uL    Eos # 0.0 0.0 - 0.5 K/uL    Baso # 0.01 0.00 - 0.20 K/uL    nRBC 1 (A) 0 /100 WBC    Gran% 69.8 38.0 - 73.0 %    Lymph% 8.0 (L) 18.0 - 48.0 %     Mono% 19.9 (H) 4.0 - 15.0 %    Eosinophil% 0.0 0.0 - 8.0 %    Basophil% 0.2 0.0 - 1.9 %    Differential Method Automated    POCT glucose    Collection Time: 11/21/18  1:57 PM   Result Value Ref Range    POCT Glucose 121 (H) 70 - 110 mg/dL         Diagnostic Results     Brain imaging:     MRI DWI brain and total spine 11/21/18:   pending...      CT Cervical and thoracic spine 11/21/18:  No acute abnormality of the cervical or thoracic spine.     CTH 11/20/18  Stable right thalamic hemorrhage.  No new hemorrhage identified.      CTH - Right Thalamic hemorrhage           Vessel Imaging:     CTA head and neck - unremarkable           Cardiac Evaluation:   JORDAN 11/15/18  · Normal left ventricular systolic function. The estimated ejection fraction is 70%  · Concentric left ventricular remodeling.  · No wall motion abnormalities.  · Normal LV diastolic function.  · Normal right ventricular systolic function.  · Trace mitral regurgitation.  · Mild aortic regurgitation.  · Trace tricuspid regurgitation.  · Trace pulmonic regurgitation.  · Normal central venous pressure (3 mm Hg).  · The estimated PA systolic pressure is 24.72 mm Hg  · No pericardial effusion.

## 2018-11-22 PROBLEM — D68.8 HYPOFIBRINOGENEMIA: Status: ACTIVE | Noted: 2018-11-22

## 2018-11-22 PROBLEM — K56.7 ILEUS: Status: ACTIVE | Noted: 2018-11-22

## 2018-11-22 LAB
ALBUMIN SERPL BCP-MCNC: 2.9 G/DL
ALBUMIN SERPL BCP-MCNC: 3 G/DL
ALBUMIN SERPL BCP-MCNC: 3 G/DL
ALP SERPL-CCNC: 111 U/L
ALT SERPL W/O P-5'-P-CCNC: 66 U/L
AMMONIA PLAS-SCNC: 39 UMOL/L
ANION GAP SERPL CALC-SCNC: 12 MMOL/L
ANION GAP SERPL CALC-SCNC: 12 MMOL/L
AST SERPL-CCNC: 181 U/L
BASOPHILS # BLD AUTO: 0 K/UL
BASOPHILS # BLD AUTO: 0.01 K/UL
BASOPHILS # BLD AUTO: 0.01 K/UL
BASOPHILS NFR BLD: 0 %
BASOPHILS NFR BLD: 0.1 %
BASOPHILS NFR BLD: 0.1 %
BILIRUB DIRECT SERPL-MCNC: 4.1 MG/DL
BILIRUB SERPL-MCNC: 7.4 MG/DL
BLD PROD TYP BPU: NORMAL
BLD PROD TYP BPU: NORMAL
BLOOD UNIT EXPIRATION DATE: NORMAL
BLOOD UNIT EXPIRATION DATE: NORMAL
BLOOD UNIT TYPE CODE: 5100
BLOOD UNIT TYPE CODE: 5100
BLOOD UNIT TYPE: NORMAL
BLOOD UNIT TYPE: NORMAL
BUN SERPL-MCNC: 83 MG/DL
BUN SERPL-MCNC: 90 MG/DL
CALCIUM SERPL-MCNC: 9.3 MG/DL
CALCIUM SERPL-MCNC: 9.3 MG/DL
CHLORIDE SERPL-SCNC: 111 MMOL/L
CHLORIDE SERPL-SCNC: 113 MMOL/L
CO2 SERPL-SCNC: 22 MMOL/L
CO2 SERPL-SCNC: 23 MMOL/L
CODING SYSTEM: NORMAL
CODING SYSTEM: NORMAL
CREAT SERPL-MCNC: 1.6 MG/DL
CREAT SERPL-MCNC: 1.8 MG/DL
CREAT UR-MCNC: 50 MG/DL
DIFFERENTIAL METHOD: ABNORMAL
DISPENSE STATUS: NORMAL
DISPENSE STATUS: NORMAL
EOSINOPHIL # BLD AUTO: 0 K/UL
EOSINOPHIL NFR BLD: 0 %
ERYTHROCYTE [DISTWIDTH] IN BLOOD BY AUTOMATED COUNT: 18.6 %
ERYTHROCYTE [DISTWIDTH] IN BLOOD BY AUTOMATED COUNT: 19.1 %
ERYTHROCYTE [DISTWIDTH] IN BLOOD BY AUTOMATED COUNT: 19.4 %
EST. GFR  (AFRICAN AMERICAN): 36.5 ML/MIN/1.73 M^2
EST. GFR  (AFRICAN AMERICAN): 42.1 ML/MIN/1.73 M^2
EST. GFR  (NON AFRICAN AMERICAN): 31.7 ML/MIN/1.73 M^2
EST. GFR  (NON AFRICAN AMERICAN): 36.5 ML/MIN/1.73 M^2
FIBRINOGEN PPP-MCNC: 124 MG/DL
FIBRINOGEN PPP-MCNC: 93 MG/DL
GLUCOSE SERPL-MCNC: 108 MG/DL
GLUCOSE SERPL-MCNC: 108 MG/DL
HCT VFR BLD AUTO: 25.2 %
HCT VFR BLD AUTO: 26.3 %
HCT VFR BLD AUTO: 27 %
HGB BLD-MCNC: 8.6 G/DL
HGB BLD-MCNC: 8.7 G/DL
HGB BLD-MCNC: 8.7 G/DL
IMM GRANULOCYTES # BLD AUTO: 0.06 K/UL
IMM GRANULOCYTES # BLD AUTO: 0.07 K/UL
IMM GRANULOCYTES # BLD AUTO: 0.08 K/UL
IMM GRANULOCYTES NFR BLD AUTO: 0.6 %
IMM GRANULOCYTES NFR BLD AUTO: 0.8 %
IMM GRANULOCYTES NFR BLD AUTO: 0.9 %
INR PPP: 1.8
INR PPP: 1.8
LYMPHOCYTES # BLD AUTO: 0.4 K/UL
LYMPHOCYTES # BLD AUTO: 0.4 K/UL
LYMPHOCYTES # BLD AUTO: 0.6 K/UL
LYMPHOCYTES NFR BLD: 4.6 %
LYMPHOCYTES NFR BLD: 4.8 %
LYMPHOCYTES NFR BLD: 6.5 %
MAGNESIUM SERPL-MCNC: 2.4 MG/DL
MCH RBC QN AUTO: 30.4 PG
MCH RBC QN AUTO: 30.6 PG
MCH RBC QN AUTO: 31.2 PG
MCHC RBC AUTO-ENTMCNC: 32.2 G/DL
MCHC RBC AUTO-ENTMCNC: 33.1 G/DL
MCHC RBC AUTO-ENTMCNC: 34.1 G/DL
MCV RBC AUTO: 91 FL
MCV RBC AUTO: 93 FL
MCV RBC AUTO: 94 FL
MONOCYTES # BLD AUTO: 0.8 K/UL
MONOCYTES # BLD AUTO: 1 K/UL
MONOCYTES # BLD AUTO: 1.1 K/UL
MONOCYTES NFR BLD: 10.5 %
MONOCYTES NFR BLD: 12 %
MONOCYTES NFR BLD: 8.6 %
NEUTROPHILS # BLD AUTO: 7.7 K/UL
NEUTROPHILS # BLD AUTO: 7.8 K/UL
NEUTROPHILS # BLD AUTO: 8.1 K/UL
NEUTROPHILS NFR BLD: 82.3 %
NEUTROPHILS NFR BLD: 82.5 %
NEUTROPHILS NFR BLD: 85.7 %
NRBC BLD-RTO: 1 /100 WBC
PHOSPHATE SERPL-MCNC: 2.6 MG/DL
PHOSPHATE SERPL-MCNC: 2.6 MG/DL
PHOSPHATE SERPL-MCNC: 2.9 MG/DL
PLATELET # BLD AUTO: 41 K/UL
PLATELET # BLD AUTO: 42 K/UL
PLATELET # BLD AUTO: 51 K/UL
PMV BLD AUTO: 11.6 FL
PMV BLD AUTO: 11.7 FL
PMV BLD AUTO: 13.1 FL
POCT GLUCOSE: 110 MG/DL (ref 70–110)
POCT GLUCOSE: 117 MG/DL (ref 70–110)
POCT GLUCOSE: 120 MG/DL (ref 70–110)
POCT GLUCOSE: 124 MG/DL (ref 70–110)
POTASSIUM SERPL-SCNC: 3.2 MMOL/L
POTASSIUM SERPL-SCNC: 3.6 MMOL/L
PROT SERPL-MCNC: 7.5 G/DL
PROTHROMBIN TIME: 18 SEC
PROTHROMBIN TIME: 18 SEC
RBC # BLD AUTO: 2.76 M/UL
RBC # BLD AUTO: 2.84 M/UL
RBC # BLD AUTO: 2.86 M/UL
SODIUM SERPL-SCNC: 145 MMOL/L
SODIUM SERPL-SCNC: 148 MMOL/L
SODIUM UR-SCNC: <20 MMOL/L
TRANS PLATPHERESIS VOL PATIENT: NORMAL ML
UNIT NUMBER: NORMAL
UUN UR-MCNC: 829 MG/DL
WBC # BLD AUTO: 9.04 K/UL
WBC # BLD AUTO: 9.36 K/UL
WBC # BLD AUTO: 9.88 K/UL

## 2018-11-22 PROCEDURE — 85025 COMPLETE CBC W/AUTO DIFF WBC: CPT | Mod: 91

## 2018-11-22 PROCEDURE — 84540 ASSAY OF URINE/UREA-N: CPT

## 2018-11-22 PROCEDURE — 25000003 PHARM REV CODE 250: Performed by: NURSE PRACTITIONER

## 2018-11-22 PROCEDURE — 82140 ASSAY OF AMMONIA: CPT

## 2018-11-22 PROCEDURE — 84300 ASSAY OF URINE SODIUM: CPT

## 2018-11-22 PROCEDURE — 85384 FIBRINOGEN ACTIVITY: CPT | Mod: 91

## 2018-11-22 PROCEDURE — P9012 CRYOPRECIPITATE EACH UNIT: HCPCS

## 2018-11-22 PROCEDURE — 80069 RENAL FUNCTION PANEL: CPT

## 2018-11-22 PROCEDURE — 83735 ASSAY OF MAGNESIUM: CPT

## 2018-11-22 PROCEDURE — 82570 ASSAY OF URINE CREATININE: CPT

## 2018-11-22 PROCEDURE — 27000221 HC OXYGEN, UP TO 24 HOURS

## 2018-11-22 PROCEDURE — S0028 INJECTION, FAMOTIDINE, 20 MG: HCPCS | Performed by: NURSE PRACTITIONER

## 2018-11-22 PROCEDURE — P9035 PLATELET PHERES LEUKOREDUCED: HCPCS

## 2018-11-22 PROCEDURE — 20000000 HC ICU ROOM

## 2018-11-22 PROCEDURE — 94761 N-INVAS EAR/PLS OXIMETRY MLT: CPT

## 2018-11-22 PROCEDURE — 80069 RENAL FUNCTION PANEL: CPT | Mod: 91

## 2018-11-22 PROCEDURE — 80076 HEPATIC FUNCTION PANEL: CPT

## 2018-11-22 PROCEDURE — 85610 PROTHROMBIN TIME: CPT

## 2018-11-22 PROCEDURE — 99233 SBSQ HOSP IP/OBS HIGH 50: CPT | Mod: ,,, | Performed by: PSYCHIATRY & NEUROLOGY

## 2018-11-22 PROCEDURE — 63600175 PHARM REV CODE 636 W HCPCS: Performed by: NURSE PRACTITIONER

## 2018-11-22 PROCEDURE — 99291 CRITICAL CARE FIRST HOUR: CPT | Mod: ,,, | Performed by: NURSE PRACTITIONER

## 2018-11-22 PROCEDURE — 85384 FIBRINOGEN ACTIVITY: CPT

## 2018-11-22 RX ORDER — LACTULOSE 10 G/15ML
30 SOLUTION ORAL 3 TIMES DAILY
Status: DISCONTINUED | OUTPATIENT
Start: 2018-11-22 | End: 2018-11-22

## 2018-11-22 RX ORDER — POTASSIUM CHLORIDE 29.8 MG/ML
40 INJECTION INTRAVENOUS ONCE
Status: COMPLETED | OUTPATIENT
Start: 2018-11-22 | End: 2018-11-22

## 2018-11-22 RX ORDER — SYRING-NEEDL,DISP,INSUL,0.3 ML 29 G X1/2"
296 SYRINGE, EMPTY DISPOSABLE MISCELLANEOUS
Status: COMPLETED | OUTPATIENT
Start: 2018-11-22 | End: 2018-11-22

## 2018-11-22 RX ORDER — HYDROCODONE BITARTRATE AND ACETAMINOPHEN 500; 5 MG/1; MG/1
TABLET ORAL
Status: DISCONTINUED | OUTPATIENT
Start: 2018-11-22 | End: 2018-11-23

## 2018-11-22 RX ORDER — LACTULOSE 10 G/15ML
200 SOLUTION ORAL; RECTAL 3 TIMES DAILY
Status: DISCONTINUED | OUTPATIENT
Start: 2018-11-22 | End: 2018-11-23

## 2018-11-22 RX ORDER — PSEUDOEPHEDRINE/ACETAMINOPHEN 30MG-500MG
100 TABLET ORAL
Status: COMPLETED | OUTPATIENT
Start: 2018-11-22 | End: 2018-11-22

## 2018-11-22 RX ADMIN — POTASSIUM CHLORIDE 40 MEQ: 400 INJECTION, SOLUTION INTRAVENOUS at 09:11

## 2018-11-22 RX ADMIN — Medication 100 ML: at 03:11

## 2018-11-22 RX ADMIN — FAMOTIDINE 20 MG: 10 INJECTION, SOLUTION INTRAVENOUS at 09:11

## 2018-11-22 RX ADMIN — LACTULOSE 30 G: 20 SOLUTION ORAL at 12:11

## 2018-11-22 RX ADMIN — LACTULOSE 200 G: 10 SOLUTION ORAL at 10:11

## 2018-11-22 RX ADMIN — MAGESIUM CITRATE 296 ML: 1.75 LIQUID ORAL at 03:11

## 2018-11-22 RX ADMIN — SODIUM CHLORIDE 500 ML: 0.9 INJECTION, SOLUTION INTRAVENOUS at 03:11

## 2018-11-22 RX ADMIN — SODIUM CHLORIDE, SODIUM LACTATE, POTASSIUM CHLORIDE, AND CALCIUM CHLORIDE 1000 ML: .6; .31; .03; .02 INJECTION, SOLUTION INTRAVENOUS at 02:11

## 2018-11-22 NOTE — ASSESSMENT & PLAN NOTE
--per patient, has known about cirrhosis for >10 years. Has never received treatment for Hep C  --continue supportive care for coagulopathy, thrombocytopenia in setting of Type B aortic dissection with hemorrhagic shock and new thalamic bleed   --lactulose and rifaximin   --CT abd/pelvis 11/21 with small ileus; NG placed.  Continue lactulose enemas.

## 2018-11-22 NOTE — SUBJECTIVE & OBJECTIVE
Interval History/Significant Events: increasing oxygen requirements overnight; on 10 L NC.  NG with total 750 ml output in 12 hours.     Review of Systems   Unable to perform ROS: Mental status change   Respiratory: Positive for shortness of breath.    Cardiovascular: Negative for chest pain.   Gastrointestinal: Positive for abdominal pain (generalized).     Objective:     Vital Signs (Most Recent):  Temp: 97.8 °F (36.6 °C) (11/22/18 0901)  Pulse: 79 (11/22/18 0930)  Resp: (!) 25 (11/22/18 0930)  BP: (!) 140/73 (11/22/18 0900)  SpO2: (!) 92 % (11/22/18 0930) Vital Signs (24h Range):  Temp:  [93.8 °F (34.3 °C)-98.7 °F (37.1 °C)] 97.8 °F (36.6 °C)  Pulse:  [64-81] 79  Resp:  [17-30] 25  SpO2:  [90 %-99 %] 92 %  BP: (134-153)/(63-81) 140/73   Weight: 68.3 kg (150 lb 9.2 oz)  Body mass index is 24.3 kg/m².      Intake/Output Summary (Last 24 hours) at 11/22/2018 1018  Last data filed at 11/22/2018 1000  Gross per 24 hour   Intake 535.5 ml   Output 1984 ml   Net -1448.5 ml       Physical Exam   Constitutional: She appears well-developed. She has a sickly appearance. Nasal cannula in place.   HENT:   Head: Normocephalic and atraumatic.   Eyes: EOM are normal. Scleral icterus is present.   Neck: Neck supple. No JVD present. No tracheal deviation present. No thyromegaly present.   Cardiovascular: Normal rate, regular rhythm, S1 normal and S2 normal.   Murmur heard.  Pulses:       Radial pulses are 2+ on the right side, and 2+ on the left side.        Dorsalis pedis pulses are 2+ on the right side, and 2+ on the left side.   Warm extremities   Pulmonary/Chest: No accessory muscle usage. No tachypnea. No respiratory distress. She has decreased breath sounds in the left middle field and the left lower field. She has no wheezes. She has no rhonchi. She has rales in the right lower field and the left lower field.   Abdominal: Soft. Bowel sounds are normal. She exhibits distension. There is no tenderness. There is no guarding.    Ng with bilious output   Genitourinary:   Genitourinary Comments: Urine catheter with bashir output   Lymphadenopathy:     She has no cervical adenopathy.   Neurological: No sensory deficit.   Drowsy, conversational, oriented to person, place and time. Expressive aphasia/dysarthria noted. Spontaneous movement of RUE and RLE. RLE 3/5. LUE good hand squeeze.  LLE no movement.  Sensation intake to both LUE/LLE.  PERRL.  No eye deviation or nystagmus. Left facial weakness noted.     Skin: Skin is warm. She is not diaphoretic. There is pallor.   Nursing note and vitals reviewed.      Vents:  Oxygen Concentration (%): 40 (11/21/18 1800)  Lines/Drains/Airways     Central Venous Catheter Line                 Percutaneous Central Line Insertion/Assessment - triple lumen  11/15/18 right internal jugular 7 days          Drain                 Lumbar Drain 11/15/18 1500 6 days         Urethral Catheter 11/15/18 1518 Non-latex;Straight-tip 16 Fr. 6 days         NG/OG Tube 11/21/18 1837 18 Fr. Right nostril less than 1 day          Peripheral Intravenous Line                 Peripheral IV - Single Lumen 11/19/18 1300 Anterior;Right Upper Arm 2 days              Significant Labs:    CBC/Anemia Profile:  Recent Labs   Lab 11/21/18  0326 11/21/18  1357 11/22/18  0303   WBC 5.97  5.97 6.62 9.36   HGB 8.8*  8.8* 8.9* 8.6*   HCT 26.1*  26.1* 26.5* 25.2*   PLT 48*  48* 72* 51*   MCV 92  92 93 91   RDW 17.6*  17.6* 18.5* 18.6*        Chemistries:  Recent Labs   Lab 11/21/18  0326 11/22/18  0303    145   K 3.8 3.6    111*   CO2 23 22*   BUN 81* 90*   CREATININE 1.9* 1.8*   CALCIUM 9.3 9.3   ALBUMIN 3.2*  3.2* 3.0*  3.0*   PROT 7.6 7.5   BILITOT 5.4* 7.4*   ALKPHOS 119 111   ALT 83* 66*   * 181*   MG 2.4 2.4   PHOS 3.4  3.4 2.6*  2.6*       All pertinent labs within the past 24 hours have been reviewed.    Significant Imaging:  I have reviewed and interpreted all pertinent imaging results/findings within the  past 24 hours.

## 2018-11-22 NOTE — PROGRESS NOTES
[facility]  Vascular Neurology  Comprehensive Stroke Center  Progress Note    Assessment/Plan:     Nontraumatic thalamic hemorrhage      Patient is a 53 y.o. female with a h/o HTN, Hep C, cirrhosis (MELD 15),Hx of pancreatitis, GERD, and illicit substance abuse (crack cocaine/THC) admitted for thoracic aortic dissection treated with TEVAR and Lumbar drain, had Acute right thalamic hemorrhage on CTH.       Etiology - Hemorrhagic transformation of Right thalamic stroke v/s Primary right thalamic hemorrhagic stroke v/s End Stage live disease v/s 2/2 Lumbar drain with increased SFP / MAP gradient.     11/20/18: Sudden LOF of both lower extremities with no response to painful stimuli. Repeat CT head w/o new pathology or worsening hemorrhage.      Recommend:  CT head without any worsening of previous hemorrhage or new pathology. CT spine without obvious cause for new deficits. MRI head and spine showing known stable pathology, severe spinal stenosis at L4/L5 that is too low to account for presentation, and no signs of intraspinal or extraspinal hematoma that would be impinging.   -Continue spinal protocol per vascular sx instructions  -SBP goal <160 with MAP goal of 100  -Continue frequent neuro checks       Antithrombotics for secondary stroke prevention:  Hold Antiplatelets    Statins for secondary stroke prevention and hyperlipidemia, if present: rec getting  LDL and start Lipitor if LDL > 130    Aggressive risk factor modification: HTN, Illicit drug use     Rehab efforts: PT/OT/SLP to evaluate and treat    Diagnostics ordered/pending: None     VTE prophylaxis: dvt ppx and scd's    BP parameters:  SBP <160              No notes on file    STROKE DOCUMENTATION   Acute Stroke Times   Last Known Normal Date: 11/17/18  Last Known Normal Time: 0700  Symptom Onset Date: 11/17/18  Stroke Team Called Date: 11/17/18  Stroke Team Called Time: 0800  Stroke Team Arrival Date: 11/17/18  Stroke Team Arrival Time:  0805  CT Interpretation Time: 0834    NIH Scale:  1a. Level Of Consciousness: 2-->Not alert: requires repeated stimulation to attend, or is obtunded and requires strong or painful stimulation to make movements (not stereotyped)  1b. LOC Questions: 2-->Answers neither question correctly  1c. LOC Commands: 2-->Performs neither task correctly  2. Best Gaze: 0-->Normal  3. Visual: 0-->No visual loss  4. Facial Palsy: 1-->Minor paralysis (flattened nasolabial fold, asymmetry on smiling)  5a. Motor Arm, Left: 4-->No movement  5b. Motor Arm, Right: 0-->No drift: limb holds 90 (or 45) degrees for full 10 secs  6a. Motor Leg, Left: 4-->No movement  6b. Motor Leg, Right: 3-->No effort against gravity: leg falls to bed immediately  7. Limb Ataxia: 2-->Present in two limbs  8. Sensory: 1-->Mild-to-moderate sensory loss: patient feels pinprick is less sharp or is dull on the affected side: or there is a loss of superficial pain with pinprick, but patient is aware of being touched  9. Best Language: 0-->No aphasia: normal  10. Dysarthria: 1-->Mild-to-moderate dysarthria: patient slurs at least some words and, at worst, can be understood with some difficulty  11. Extinction and Inattention (formerly Neglect): 1-->Visual, tactile, auditory, spatial, or personal inattention or extinction to bilateral simultaneous stimulation in one of the sensory modalities  Total (NIH Stroke Scale): 23       Modified Wendel Score: 0  V[place] Coma Scale:    ABCD2 Score:    [id number] Score:   HAS -BLED Score:   ICH Score:   Hunt & Farnsworth Classification:          Neurologic Chief Complaint: dysarthria, poor attention span, LUE weakness, LLE paralgia, RLE weakness  Right Thalamic hemorrhage     Subjective:     Interval History: Patient is seen for follow-up neurological assessment and treatment recommendations: CT head without any worsening of previous hemorrhage or new pathology. CT spine without obvious cause for new deficits. MRI head and spine  showing known stable pathology, severe spinal stenosis at L4/L5 that is too low to account for presentation, and no signs of intraspinal or extraspinal hematoma that would be impinging. Patient requiring more O2 this am (8L).     HPI, Past Medical, Family, and Social History remains the same as documented in the initial encounter.     Review of Systems   Constitutional: Negative for chills and fever.   Respiratory: Negative for cough and shortness of breath.    Cardiovascular: Negative for chest pain and palpitations.   Gastrointestinal: Negative for nausea and vomiting. Abdominal pain: RUQ    Musculoskeletal: Positive for neck pain (discomfort at RIJ site). Negative for back pain and neck stiffness.   Neurological: Negative for tremors, seizures, syncope, weakness, numbness and headaches.   Hematological: Bruises/bleeds easily.   Psychiatric/Behavioral: Positive for confusion and decreased concentration. Negative for agitation and behavioral problems.     Scheduled Meds:   famotidine (PF)  20 mg Intravenous Daily    lactulose  200 g Rectal TID     Continuous Infusions:   norepinephrine bitartrate-D5W 0.02 mcg/kg/min (11/20/18 0744)     PRN Meds:sodium chloride, ondansetron    Objective:     Vital Signs (Most Recent):  Temp: 98 °F (36.7 °C) (11/22/18 1100)  Pulse: 78 (11/22/18 1100)  Resp: (!) 24 (11/22/18 1100)  BP: (!) 153/75 (11/22/18 1100)  SpO2: (!) 94 % (11/22/18 1100)  BP Location: Left arm    Vital Signs Range (Last 24H):  Temp:  [93.8 °F (34.3 °C)-98.7 °F (37.1 °C)]   Pulse:  [64-81]   Resp:  [17-34]   BP: (134-153)/(63-81)   SpO2:  [90 %-99 %]   BP Location: Left arm    Physical Exam   Constitutional: She appears well-developed and well-nourished.   HENT:   Head: Normocephalic and atraumatic.   Eyes: EOM are normal. Pupils are equal, round, and reactive to light.   Neck: Normal range of motion. Neck supple.   Cardiovascular: Normal rate and regular rhythm.   Pulmonary/Chest: Effort normal and breath  sounds normal.   Abdominal: Soft. Bowel sounds are normal. There is no guarding.   Genitourinary:   Genitourinary Comments: Perry in place   Musculoskeletal: She exhibits no edema or deformity.   Neurological: She is alert.   LUE weakness   Skin: Skin is warm and dry.   Vitals reviewed.      Neurological Exam:   LOC: alert, mild cues to cooperate   Attention Span: poor  Language: mild aphasia  Articulation: mild dysarthria  Orientation: AOx1, not time or place  Visual Fields: Full  EOM (CN III, IV, VI): Full/intact  Pupils (CN II, III): PERRL  Facial Sensation (CN V): Normal  Facial Movement (CN VII): mild L facial droop   Motor: Arm left  Normal 0/5  Leg left  Normal 0/5  Arm right  Normal 5/5  Leg right Normal 2/5  Sensation: Able to feel light touch on proximal legs and R lower leg.   Tone: Decreased tone throughout LEs and LUE.   Reflexes +2 throughout       Laboratory:    Recent Results (from the past 24 hour(s))   Fibrinogen    Collection Time: 11/21/18  1:57 PM   Result Value Ref Range    Fibrinogen 109 (L) 182 - 366 mg/dL   CBC auto differential    Collection Time: 11/21/18  1:57 PM   Result Value Ref Range    WBC 6.62 3.90 - 12.70 K/uL    RBC 2.84 (L) 4.00 - 5.40 M/uL    Hemoglobin 8.9 (L) 12.0 - 16.0 g/dL    Hematocrit 26.5 (L) 37.0 - 48.5 %    MCV 93 82 - 98 fL    MCH 31.3 (H) 27.0 - 31.0 pg    MCHC 33.6 32.0 - 36.0 g/dL    RDW 18.5 (H) 11.5 - 14.5 %    Platelets 72 (L) 150 - 350 K/uL    MPV 11.7 9.2 - 12.9 fL    Immature Granulocytes 2.1 (H) 0.0 - 0.5 %    Gran # (ANC) 4.6 1.8 - 7.7 K/uL    Immature Grans (Abs) 0.14 (H) 0.00 - 0.04 K/uL    Lymph # 0.5 (L) 1.0 - 4.8 K/uL    Mono # 1.3 (H) 0.3 - 1.0 K/uL    Eos # 0.0 0.0 - 0.5 K/uL    Baso # 0.01 0.00 - 0.20 K/uL    nRBC 1 (A) 0 /100 WBC    Gran% 69.8 38.0 - 73.0 %    Lymph% 8.0 (L) 18.0 - 48.0 %    Mono% 19.9 (H) 4.0 - 15.0 %    Eosinophil% 0.0 0.0 - 8.0 %    Basophil% 0.2 0.0 - 1.9 %    Differential Method Automated    POCT glucose    Collection Time:  11/21/18  1:57 PM   Result Value Ref Range    POCT Glucose 121 (H) 70 - 110 mg/dL   POCT glucose    Collection Time: 11/21/18  8:11 PM   Result Value Ref Range    POCT Glucose 114 (H) 70 - 110 mg/dL   POCT glucose    Collection Time: 11/22/18 12:17 AM   Result Value Ref Range    POCT Glucose 120 (H) 70 - 110 mg/dL   Hepatic function panel    Collection Time: 11/22/18  3:03 AM   Result Value Ref Range    Total Protein 7.5 6.0 - 8.4 g/dL    Albumin 3.0 (L) 3.5 - 5.2 g/dL    Total Bilirubin 7.4 (H) 0.1 - 1.0 mg/dL    Bilirubin, Direct 4.1 (H) 0.1 - 0.3 mg/dL     (H) 10 - 40 U/L    ALT 66 (H) 10 - 44 U/L    Alkaline Phosphatase 111 55 - 135 U/L   Magnesium    Collection Time: 11/22/18  3:03 AM   Result Value Ref Range    Magnesium 2.4 1.6 - 2.6 mg/dL   Renal function panel    Collection Time: 11/22/18  3:03 AM   Result Value Ref Range    Glucose 108 70 - 110 mg/dL    Sodium 145 136 - 145 mmol/L    Potassium 3.6 3.5 - 5.1 mmol/L    Chloride 111 (H) 95 - 110 mmol/L    CO2 22 (L) 23 - 29 mmol/L    BUN, Bld 90 (H) 6 - 20 mg/dL    Calcium 9.3 8.7 - 10.5 mg/dL    Creatinine 1.8 (H) 0.5 - 1.4 mg/dL    Albumin 3.0 (L) 3.5 - 5.2 g/dL    Phosphorus 2.6 (L) 2.7 - 4.5 mg/dL    eGFR if  36.5 (A) >60 mL/min/1.73 m^2    eGFR if non  31.7 (A) >60 mL/min/1.73 m^2    Anion Gap 12 8 - 16 mmol/L   Protime-INR    Collection Time: 11/22/18  3:03 AM   Result Value Ref Range    Prothrombin Time 18.0 (H) 9.0 - 12.5 sec    INR 1.8 (H) 0.8 - 1.2   CBC auto differential    Collection Time: 11/22/18  3:03 AM   Result Value Ref Range    WBC 9.36 3.90 - 12.70 K/uL    RBC 2.76 (L) 4.00 - 5.40 M/uL    Hemoglobin 8.6 (L) 12.0 - 16.0 g/dL    Hematocrit 25.2 (L) 37.0 - 48.5 %    MCV 91 82 - 98 fL    MCH 31.2 (H) 27.0 - 31.0 pg    MCHC 34.1 32.0 - 36.0 g/dL    RDW 18.6 (H) 11.5 - 14.5 %    Platelets 51 (L) 150 - 350 K/uL    MPV 11.6 9.2 - 12.9 fL    Immature Granulocytes 0.9 (H) 0.0 - 0.5 %    Gran # (ANC) 7.7 1.8 -  7.7 K/uL    Immature Grans (Abs) 0.08 (H) 0.00 - 0.04 K/uL    Lymph # 0.4 (L) 1.0 - 4.8 K/uL    Mono # 1.1 (H) 0.3 - 1.0 K/uL    Eos # 0.0 0.0 - 0.5 K/uL    Baso # 0.00 0.00 - 0.20 K/uL    nRBC 1 (A) 0 /100 WBC    Gran% 82.5 (H) 38.0 - 73.0 %    Lymph% 4.6 (L) 18.0 - 48.0 %    Mono% 12.0 4.0 - 15.0 %    Eosinophil% 0.0 0.0 - 8.0 %    Basophil% 0.0 0.0 - 1.9 %    Differential Method Automated    Phosphorus    Collection Time: 11/22/18  3:03 AM   Result Value Ref Range    Phosphorus 2.6 (L) 2.7 - 4.5 mg/dL   Fibrinogen    Collection Time: 11/22/18  3:03 AM   Result Value Ref Range    Fibrinogen 93 (LL) 182 - 366 mg/dL   Protime-INR    Collection Time: 11/22/18  3:03 AM   Result Value Ref Range    Prothrombin Time 18.0 (H) 9.0 - 12.5 sec    INR 1.8 (H) 0.8 - 1.2   POCT glucose    Collection Time: 11/22/18  3:03 AM   Result Value Ref Range    POCT Glucose 124 (H) 70 - 110 mg/dL   POCT glucose    Collection Time: 11/22/18 10:18 AM   Result Value Ref Range    POCT Glucose 117 (H) 70 - 110 mg/dL   Ammonia    Collection Time: 11/22/18 11:25 AM   Result Value Ref Range    Ammonia 39 10 - 50 umol/L         Diagnostic Results     Brain imaging:     MRI DWI brain  11/21/18:   Stable acute right thalamic hemorrhage with surrounding edema and mass effect.  Small punctate foci of diffusion signal abnormality in the posterior temporal/parietal regions with low ADC signal and subtle FLAIR signal abnormality suggesting punctate infarcts. No evidence major vascular territory infarct.    MRI total spine 11/21/18:   Mildly increased central T2 signal of the spinal cord at T4 through T6 may reflect prominence of the central canal versus a small syrinx.  No cord expansion to suggest edema or acute infarction in this region.  Otherwise, no abnormal cord signal to suggest infarct.  No evidence of epidural of subdural hematoma.  Multilevel spondylosis most severe at L5-S1 with severe spinal canal stenosis and L4-L5 with moderate to severe  neural foraminal narrowing.    CT abdomen pelvis 11/20/18:   Did not show any compression of spinal cord by known hematoma    CT Cervical and thoracic spine 11/21/18:  No acute abnormality of the cervical or thoracic spine.     CTH 11/20/18  Stable right thalamic hemorrhage.  No new hemorrhage identified.      CTH - Right Thalamic hemorrhage           Vessel Imaging:     CTA head and neck - unremarkable           Cardiac Evaluation:   JORDAN 11/15/18  · Normal left ventricular systolic function. The estimated ejection fraction is 70%  · Concentric left ventricular remodeling.  · No wall motion abnormalities.  · Normal LV diastolic function.  · Normal right ventricular systolic function.  · Trace mitral regurgitation.  · Mild aortic regurgitation.  · Trace tricuspid regurgitation.  · Trace pulmonic regurgitation.  · Normal central venous pressure (3 mm Hg).  · The estimated PA systolic pressure is 24.72 mm Hg  · No pericardial effusion.          Jose Pan MD  Comprehensive Stroke Center  Department of Vascular Neurology   [facility]

## 2018-11-22 NOTE — ASSESSMENT & PLAN NOTE
53 F with Hep C cirrhosis (MELD 15 on admission), HTN, crack cocaine abuse, who presented with acute tearing chest/back pain to OSH, Found to have retrograde type B dissection. Despite aggressive anti-impulse therapy, persistent pain requiring intervention. Preoperative CSF drain placement by Neuro IR. Successful TEVAR. POD 1 left groin hematoma and coagulopathy requiring manual pressure and product resuscitation. POD 2 new onset left upper arm weakness, found to have right thalamic hemorrhage 2/2 HTN. F/u CT head stable findings. Significant reactive left pleural effusion with surrounding atelectasis noted also.     Neuro: Pt more lethargic today; change in neuro exam no movement of LUE  - Spinal drain 15 cc per hr (output 6-14cc); goal map >100  - Discussed with stroke team, will keep SBP <160  - Hold off on spinal drain removal; will consider clamping tomorrow 11/23/18  - CT and MRI spine without evidence of spinal cord hematoma or infarct  - MRI head with mass effect; stroke was hemorrhagic 2/2 HTN not embolic event from TEVAR   right thalamic bleed  - con't q1h neuro checks    CV:   - ease the spinal perfusion protocol. BP <160, MAP >100    Resp:  - encourage IS  - O2 supp prn  - Moderate Left pleural effusion and small right pleural effusion on CTA stoke multiphase noted. Left pleural effusion likely reactive following TEVAR. Con't pulmonary toilet.     Renal/Endocrine:  - con't monitoring UOP  - BUN 90 from 81 continues to increase despite stable to slight improvement in Cr  - Recommend nephrology consult  - Recommend IVF as pt NPO with KARL    GI: Hep C cirrhosis with MELD 15 on admission  - protonix  - NPO diet; consider alternat route of nutrition as pt has had minimal PO intake and is now NPO  - Concern for ileus on imaging; NGT placed with 700 output  - T bili increased 7 from 5.4; RUQ US pending  - Recommend hepatology consult for cirrhosis and concern for metabolic encephalopathy    Heme/ID:  - ABLA  stable.   - con't correct coagulopathy prn  - monitor thrombocytopenia     Dispo:  - ICU care

## 2018-11-22 NOTE — SUBJECTIVE & OBJECTIVE
Medications:  Continuous Infusions:   norepinephrine bitartrate-D5W 0.02 mcg/kg/min (11/20/18 0744)     Scheduled Meds:   famotidine (PF)  20 mg Intravenous Daily     PRN Meds:sodium chloride, ondansetron     Objective:     Vital Signs (Most Recent):  Temp: 98.7 °F (37.1 °C) (11/22/18 0500)  Pulse: 78 (11/22/18 0630)  Resp: 18 (11/22/18 0630)  BP: (!) 144/69 (11/22/18 0600)  SpO2: (!) 93 % (11/22/18 0630) Vital Signs (24h Range):  Temp:  [93.8 °F (34.3 °C)-98.7 °F (37.1 °C)] 98.7 °F (37.1 °C)  Pulse:  [64-81] 78  Resp:  [17-30] 18  SpO2:  [90 %-99 %] 93 %  BP: (134-150)/(63-81) 144/69          Physical Exam   Constitutional: She is oriented to person, place, and time. She appears well-developed and well-nourished. She appears lethargic.   HENT:   Head: Normocephalic and atraumatic.   Eyes: EOM are normal. Pupils are equal, round, and reactive to light.   Neck: Normal range of motion. Neck supple.   Cardiovascular: Normal rate and regular rhythm.   Pulmonary/Chest: Effort normal and breath sounds normal.   Abdominal: Soft. Bowel sounds are normal.   Genitourinary:   Genitourinary Comments: Perry in place   Musculoskeletal:   LUE no movement today  RUE 5/5, intact fine motor movement  LLE does not withdrawal to pain  RLE knee flexion, and moves toes  CSF drain site dressing c/d/i   Neurological: She is oriented to person, place, and time. She appears lethargic.   Skin: Skin is warm.       Significant Labs:  CBC:   Recent Labs   Lab 11/22/18  0303   WBC 9.36   RBC 2.76*   HGB 8.6*   HCT 25.2*   PLT 51*   MCV 91   MCH 31.2*   MCHC 34.1     CMP:   Recent Labs   Lab 11/22/18  0303      CALCIUM 9.3   ALBUMIN 3.0*  3.0*   PROT 7.5      K 3.6   CO2 22*   *   BUN 90*   CREATININE 1.8*   ALKPHOS 111   ALT 66*   *   BILITOT 7.4*     Coagulation:   Recent Labs   Lab 11/22/18  0303   LABPROT 18.0*  18.0*   INR 1.8*  1.8*       Significant Diagnostics:  MRI: Mri Brain Without Contrast    Result  Date: 11/21/2018  Stable acute right thalamic hemorrhage with surrounding edema and mass effect. Small punctate foci of diffusion signal abnormality in the posterior temporal/parietal regions with low ADC signal and subtle FLAIR signal abnormality suggesting punctate infarcts. No evidence major vascular territory infarct. This report was flagged in Epic as abnormal. Electronically signed by resident: Skyla Coffey Date:    11/21/2018 Time:    16:02 Electronically signed by: Pillo Edwards MD Date:    11/21/2018 Time:    18:23

## 2018-11-22 NOTE — ASSESSMENT & PLAN NOTE
--goal MAP of 100, SBP <160 per vascular surgery and neurology recommendations  --continue levophed PRN; has not required.

## 2018-11-22 NOTE — NURSING
Called CC team about increased o2 NC requirements to maintain o2 sats 92%. No orders at this time. Will continue to closely monitor.

## 2018-11-22 NOTE — ASSESSMENT & PLAN NOTE
--Noted on 11/17 with left hemiparesis, dysarthria, left sided facial droop. These symptoms improved through 11/19 with no intervention per vascular neurology other than maintaining SBP <160 and correcting coagulopathy for goal INR <1.5, platelets >50,000 and fibrinogen >100  --worsening of neurologic status again on 11/20 with worsening dysarthria, LUE, LLE, RLE paralysis.   --Repeat stat CT head 11/20 with stable thalamic hemorrhage; no acute abnormalities  --spinal drain draining per vascular surgery recommendations.   --in discussions with vascular neurology, vascular surgery, plan to leave spinal drain open to drain 15 ml/hr, maintain MAP of 100 and SBP <160. Continue correcting coagulopathy  --hourly neuro checks   --CT cervical and CT thoracic without contrast 11/21 no detection of spinal cord hematoma however sub optimal evaluation of spinal cord without contrast  --MRI brain with unchanged hemorrhage.

## 2018-11-22 NOTE — ASSESSMENT & PLAN NOTE
Patient is a 53 y.o. female with a h/o HTN, Hep C, cirrhosis (MELD 15),Hx of pancreatitis, GERD, and illicit substance abuse (crack cocaine/THC) admitted for thoracic aortic dissection treated with TEVAR and Lumbar drain, had Acute right thalamic hemorrhage on CTH.       Etiology - Hemorrhagic transformation of Right thalamic stroke v/s Primary right thalamic hemorrhagic stroke v/s End Stage live disease v/s 2/2 Lumbar drain with increased SFP / MAP gradient.     11/20/18: Sudden LOF of both lower extremities with no response to painful stimuli. Repeat CT head w/o new pathology or worsening hemorrhage.      Recommend:  CT head without any worsening of previous hemorrhage or new pathology. CT spine without obvious cause for new deficits. MRI head and spine showing known stable pathology, severe spinal stenosis at L4/L5 that is too low to account for presentation, and no signs of intraspinal or extraspinal hematoma that would be impinging.   -Continue spinal protocol per vascular sx instructions  -SBP goal <160 with MAP goal of 100  -Continue frequent neuro checks       Antithrombotics for secondary stroke prevention:  Hold Antiplatelets    Statins for secondary stroke prevention and hyperlipidemia, if present: rec getting  LDL and start Lipitor if LDL > 130      Aggressive risk factor modification: HTN, Illicit drug use     Rehab efforts: PT/OT/SLP to evaluate and treat    Diagnostics ordered/pending: None     VTE prophylaxis: dvt ppx and scd's    BP parameters:  SBP <160

## 2018-11-22 NOTE — ASSESSMENT & PLAN NOTE
--2/2 cirrhosis  --goal INR 1.5- 1.8  given hemorrhagic CVA. Continue vitamin K and FFP as needed

## 2018-11-22 NOTE — ASSESSMENT & PLAN NOTE
--ileus noted on imaging from 11/21.  Bowel sounds present.  NG output decreasing (bilious).   --given enema.

## 2018-11-22 NOTE — SUBJECTIVE & OBJECTIVE
Neurologic Chief Complaint: dysarthria, poor attention span, LUE weakness, LLE paralgia, RLE weakness  Right Thalamic hemorrhage     Subjective:     Interval History: Patient is seen for follow-up neurological assessment and treatment recommendations: CT head without any worsening of previous hemorrhage or new pathology. CT spine without obvious cause for new deficits. MRI head and spine showing known stable pathology, severe spinal stenosis at L4/L5 that is too low to account for presentation, and no signs of intraspinal or extraspinal hematoma that would be impinging. Patient requiring more O2 this am (8L).     HPI, Past Medical, Family, and Social History remains the same as documented in the initial encounter.     Review of Systems   Constitutional: Negative for chills and fever.   Respiratory: Negative for cough and shortness of breath.    Cardiovascular: Negative for chest pain and palpitations.   Gastrointestinal: Negative for nausea and vomiting. Abdominal pain: RUQ    Musculoskeletal: Positive for neck pain (discomfort at RIJ site). Negative for back pain and neck stiffness.   Neurological: Negative for tremors, seizures, syncope, weakness, numbness and headaches.   Hematological: Bruises/bleeds easily.   Psychiatric/Behavioral: Positive for confusion and decreased concentration. Negative for agitation and behavioral problems.     Scheduled Meds:   famotidine (PF)  20 mg Intravenous Daily    lactulose  200 g Rectal TID     Continuous Infusions:   norepinephrine bitartrate-D5W 0.02 mcg/kg/min (11/20/18 0744)     PRN Meds:sodium chloride, ondansetron    Objective:     Vital Signs (Most Recent):  Temp: 98 °F (36.7 °C) (11/22/18 1100)  Pulse: 78 (11/22/18 1100)  Resp: (!) 24 (11/22/18 1100)  BP: (!) 153/75 (11/22/18 1100)  SpO2: (!) 94 % (11/22/18 1100)  BP Location: Left arm    Vital Signs Range (Last 24H):  Temp:  [93.8 °F (34.3 °C)-98.7 °F (37.1 °C)]   Pulse:  [64-81]   Resp:  [17-34]   BP:  (134-153)/(63-81)   SpO2:  [90 %-99 %]   BP Location: Left arm    Physical Exam   Constitutional: She appears well-developed and well-nourished.   HENT:   Head: Normocephalic and atraumatic.   Eyes: EOM are normal. Pupils are equal, round, and reactive to light.   Neck: Normal range of motion. Neck supple.   Cardiovascular: Normal rate and regular rhythm.   Pulmonary/Chest: Effort normal and breath sounds normal.   Abdominal: Soft. Bowel sounds are normal. There is no guarding.   Genitourinary:   Genitourinary Comments: Perry in place   Musculoskeletal: She exhibits no edema or deformity.   Neurological: She is alert.   LUE weakness   Skin: Skin is warm and dry.   Vitals reviewed.      Neurological Exam:   LOC: alert, mild cues to cooperate   Attention Span: poor  Language: mild aphasia  Articulation: mild dysarthria  Orientation: AOx1, not time or place  Visual Fields: Full  EOM (CN III, IV, VI): Full/intact  Pupils (CN II, III): PERRL  Facial Sensation (CN V): Normal  Facial Movement (CN VII): mild L facial droop   Motor: Arm left  Normal 0/5  Leg left  Normal 0/5  Arm right  Normal 5/5  Leg right Normal 2/5  Sensation: Able to feel light touch on proximal legs and R lower leg.   Tone: Decreased tone throughout LEs and LUE.   Reflexes +2 throughout       Laboratory:    Recent Results (from the past 24 hour(s))   Fibrinogen    Collection Time: 11/21/18  1:57 PM   Result Value Ref Range    Fibrinogen 109 (L) 182 - 366 mg/dL   CBC auto differential    Collection Time: 11/21/18  1:57 PM   Result Value Ref Range    WBC 6.62 3.90 - 12.70 K/uL    RBC 2.84 (L) 4.00 - 5.40 M/uL    Hemoglobin 8.9 (L) 12.0 - 16.0 g/dL    Hematocrit 26.5 (L) 37.0 - 48.5 %    MCV 93 82 - 98 fL    MCH 31.3 (H) 27.0 - 31.0 pg    MCHC 33.6 32.0 - 36.0 g/dL    RDW 18.5 (H) 11.5 - 14.5 %    Platelets 72 (L) 150 - 350 K/uL    MPV 11.7 9.2 - 12.9 fL    Immature Granulocytes 2.1 (H) 0.0 - 0.5 %    Gran # (ANC) 4.6 1.8 - 7.7 K/uL    Immature Grans  (Abs) 0.14 (H) 0.00 - 0.04 K/uL    Lymph # 0.5 (L) 1.0 - 4.8 K/uL    Mono # 1.3 (H) 0.3 - 1.0 K/uL    Eos # 0.0 0.0 - 0.5 K/uL    Baso # 0.01 0.00 - 0.20 K/uL    nRBC 1 (A) 0 /100 WBC    Gran% 69.8 38.0 - 73.0 %    Lymph% 8.0 (L) 18.0 - 48.0 %    Mono% 19.9 (H) 4.0 - 15.0 %    Eosinophil% 0.0 0.0 - 8.0 %    Basophil% 0.2 0.0 - 1.9 %    Differential Method Automated    POCT glucose    Collection Time: 11/21/18  1:57 PM   Result Value Ref Range    POCT Glucose 121 (H) 70 - 110 mg/dL   POCT glucose    Collection Time: 11/21/18  8:11 PM   Result Value Ref Range    POCT Glucose 114 (H) 70 - 110 mg/dL   POCT glucose    Collection Time: 11/22/18 12:17 AM   Result Value Ref Range    POCT Glucose 120 (H) 70 - 110 mg/dL   Hepatic function panel    Collection Time: 11/22/18  3:03 AM   Result Value Ref Range    Total Protein 7.5 6.0 - 8.4 g/dL    Albumin 3.0 (L) 3.5 - 5.2 g/dL    Total Bilirubin 7.4 (H) 0.1 - 1.0 mg/dL    Bilirubin, Direct 4.1 (H) 0.1 - 0.3 mg/dL     (H) 10 - 40 U/L    ALT 66 (H) 10 - 44 U/L    Alkaline Phosphatase 111 55 - 135 U/L   Magnesium    Collection Time: 11/22/18  3:03 AM   Result Value Ref Range    Magnesium 2.4 1.6 - 2.6 mg/dL   Renal function panel    Collection Time: 11/22/18  3:03 AM   Result Value Ref Range    Glucose 108 70 - 110 mg/dL    Sodium 145 136 - 145 mmol/L    Potassium 3.6 3.5 - 5.1 mmol/L    Chloride 111 (H) 95 - 110 mmol/L    CO2 22 (L) 23 - 29 mmol/L    BUN, Bld 90 (H) 6 - 20 mg/dL    Calcium 9.3 8.7 - 10.5 mg/dL    Creatinine 1.8 (H) 0.5 - 1.4 mg/dL    Albumin 3.0 (L) 3.5 - 5.2 g/dL    Phosphorus 2.6 (L) 2.7 - 4.5 mg/dL    eGFR if  36.5 (A) >60 mL/min/1.73 m^2    eGFR if non  31.7 (A) >60 mL/min/1.73 m^2    Anion Gap 12 8 - 16 mmol/L   Protime-INR    Collection Time: 11/22/18  3:03 AM   Result Value Ref Range    Prothrombin Time 18.0 (H) 9.0 - 12.5 sec    INR 1.8 (H) 0.8 - 1.2   CBC auto differential    Collection Time: 11/22/18  3:03 AM    Result Value Ref Range    WBC 9.36 3.90 - 12.70 K/uL    RBC 2.76 (L) 4.00 - 5.40 M/uL    Hemoglobin 8.6 (L) 12.0 - 16.0 g/dL    Hematocrit 25.2 (L) 37.0 - 48.5 %    MCV 91 82 - 98 fL    MCH 31.2 (H) 27.0 - 31.0 pg    MCHC 34.1 32.0 - 36.0 g/dL    RDW 18.6 (H) 11.5 - 14.5 %    Platelets 51 (L) 150 - 350 K/uL    MPV 11.6 9.2 - 12.9 fL    Immature Granulocytes 0.9 (H) 0.0 - 0.5 %    Gran # (ANC) 7.7 1.8 - 7.7 K/uL    Immature Grans (Abs) 0.08 (H) 0.00 - 0.04 K/uL    Lymph # 0.4 (L) 1.0 - 4.8 K/uL    Mono # 1.1 (H) 0.3 - 1.0 K/uL    Eos # 0.0 0.0 - 0.5 K/uL    Baso # 0.00 0.00 - 0.20 K/uL    nRBC 1 (A) 0 /100 WBC    Gran% 82.5 (H) 38.0 - 73.0 %    Lymph% 4.6 (L) 18.0 - 48.0 %    Mono% 12.0 4.0 - 15.0 %    Eosinophil% 0.0 0.0 - 8.0 %    Basophil% 0.0 0.0 - 1.9 %    Differential Method Automated    Phosphorus    Collection Time: 11/22/18  3:03 AM   Result Value Ref Range    Phosphorus 2.6 (L) 2.7 - 4.5 mg/dL   Fibrinogen    Collection Time: 11/22/18  3:03 AM   Result Value Ref Range    Fibrinogen 93 (LL) 182 - 366 mg/dL   Protime-INR    Collection Time: 11/22/18  3:03 AM   Result Value Ref Range    Prothrombin Time 18.0 (H) 9.0 - 12.5 sec    INR 1.8 (H) 0.8 - 1.2   POCT glucose    Collection Time: 11/22/18  3:03 AM   Result Value Ref Range    POCT Glucose 124 (H) 70 - 110 mg/dL   POCT glucose    Collection Time: 11/22/18 10:18 AM   Result Value Ref Range    POCT Glucose 117 (H) 70 - 110 mg/dL   Ammonia    Collection Time: 11/22/18 11:25 AM   Result Value Ref Range    Ammonia 39 10 - 50 umol/L         Diagnostic Results     Brain imaging:     MRI DWI brain  11/21/18:   Stable acute right thalamic hemorrhage with surrounding edema and mass effect.  Small punctate foci of diffusion signal abnormality in the posterior temporal/parietal regions with low ADC signal and subtle FLAIR signal abnormality suggesting punctate infarcts. No evidence major vascular territory infarct.    MRI total spine 11/21/18:   Mildly increased  central T2 signal of the spinal cord at T4 through T6 may reflect prominence of the central canal versus a small syrinx.  No cord expansion to suggest edema or acute infarction in this region.  Otherwise, no abnormal cord signal to suggest infarct.  No evidence of epidural of subdural hematoma.  Multilevel spondylosis most severe at L5-S1 with severe spinal canal stenosis and L4-L5 with moderate to severe neural foraminal narrowing.    CT abdomen pelvis 11/20/18:   Did not show any compression of spinal cord by known hematoma    CT Cervical and thoracic spine 11/21/18:  No acute abnormality of the cervical or thoracic spine.     CTH 11/20/18  Stable right thalamic hemorrhage.  No new hemorrhage identified.      CTH - Right Thalamic hemorrhage           Vessel Imaging:     CTA head and neck - unremarkable           Cardiac Evaluation:   JORDAN 11/15/18  · Normal left ventricular systolic function. The estimated ejection fraction is 70%  · Concentric left ventricular remodeling.  · No wall motion abnormalities.  · Normal LV diastolic function.  · Normal right ventricular systolic function.  · Trace mitral regurgitation.  · Mild aortic regurgitation.  · Trace tricuspid regurgitation.  · Trace pulmonic regurgitation.  · Normal central venous pressure (3 mm Hg).  · The estimated PA systolic pressure is 24.72 mm Hg  · No pericardial effusion.

## 2018-11-22 NOTE — PLAN OF CARE
Problem: Patient Care Overview  Goal: Plan of Care Review  Outcome: Ongoing (interventions implemented as appropriate)  Pt AAOx2, disoriented to place and situation. Pt on bearhugger on throughout shift, temps now 97-98. Pt on 8L NC to maintain o2 sats >92%. MAPs maintained >100 and SBP <160. NG hooked to LIWS, 700cc drained throughout shift. Lactulose given through NG, no BM yet. Lumbar drains 5-15cc/h, ICPs 7-11. U/O 30-60cc/h. Labs monitored closely, reported to CC resident. Plan of care reviewed with pt. VSS at this time. Will continue to monitor. See flow sheet for full assessment details.

## 2018-11-22 NOTE — ASSESSMENT & PLAN NOTE
--FeUrea 33.16%.  Suspect a component of iATN given recent hemorrhagic shock.   --no hydronephrosis on CT imaging.   --bun increasing, sCr grossly unchanged.    --administer 1 L crystalloid.   --continue to trend for now. Urine output marginal, no need for RRT at this time.   --avoid nephrotoxic agents if possible.

## 2018-11-22 NOTE — NURSING
Notified Dr. Mortensen of H&H, platelets, fibrinogen, and chems. No orders given at this time. VSS. Will continue to monitor.

## 2018-11-22 NOTE — PROGRESS NOTES
[facility]  Vascular Surgery  Progress Note    Patient Name: Leah Cleaning  MRN: [id number]  Admission Date: 11/15/2018  Primary Care Provider: Mary Colorado MD    Subjective:     Interval History: NAEON. Pt with change in neuro exam, no longer moving LUE. More lethargic this morning.    Post-Op Info:  Procedure(s) (LRB):  REPAIR, ANEURYSM, ENDOVASCULAR GRAFT, AORTA, THORACIC (N/A)   7 Days Post-Op       Medications:  Continuous Infusions:   norepinephrine bitartrate-D5W 0.02 mcg/kg/min (11/20/18 0744)     Scheduled Meds:   famotidine (PF)  20 mg Intravenous Daily     PRN Meds:sodium chloride, ondansetron     Objective:     Vital Signs (Most Recent):  Temp: 98.7 °F (37.1 °C) (11/22/18 0500)  Pulse: 78 (11/22/18 0630)  Resp: 18 (11/22/18 0630)  BP: (!) 144/69 (11/22/18 0600)  SpO2: (!) 93 % (11/22/18 0630) Vital Signs (24h Range):  Temp:  [93.8 °F (34.3 °C)-98.7 °F (37.1 °C)] 98.7 °F (37.1 °C)  Pulse:  [64-81] 78  Resp:  [17-30] 18  SpO2:  [90 %-99 %] 93 %  BP: (134-150)/(63-81) 144/69          Physical Exam   Constitutional: She is oriented to person, place, and time. She appears well-developed and well-nourished. She appears lethargic.   HENT:   Head: Normocephalic and atraumatic.   Eyes: EOM are normal. Pupils are equal, round, and reactive to light.   Neck: Normal range of motion. Neck supple.   Cardiovascular: Normal rate and regular rhythm.   Pulmonary/Chest: Effort normal and breath sounds normal.   Abdominal: Soft. Bowel sounds are normal.   Genitourinary:   Genitourinary Comments: Perry in place   Musculoskeletal:   LUE no movement today  RUE 5/5, intact fine motor movement  LLE does not withdrawal to pain  RLE knee flexion, and moves toes  CSF drain site dressing c/d/i   Neurological: She is oriented to person, place, and time. She appears lethargic.   Skin: Skin is warm.       Significant Labs:  CBC:   Recent Labs   Lab 11/22/18  0303   WBC 9.36   RBC 2.76*   HGB 8.6*   HCT 25.2*    PLT 51*   MCV 91   MCH 31.2*   MCHC 34.1     CMP:   Recent Labs   Lab 11/22/18  0303      CALCIUM 9.3   ALBUMIN 3.0*  3.0*   PROT 7.5      K 3.6   CO2 22*   *   BUN 90*   CREATININE 1.8*   ALKPHOS 111   ALT 66*   *   BILITOT 7.4*     Coagulation:   Recent Labs   Lab 11/22/18  0303   LABPROT 18.0*  18.0*   INR 1.8*  1.8*       Significant Diagnostics:  MRI: Mri Brain Without Contrast    Result Date: 11/21/2018  Stable acute right thalamic hemorrhage with surrounding edema and mass effect. Small punctate foci of diffusion signal abnormality in the posterior temporal/parietal regions with low ADC signal and subtle FLAIR signal abnormality suggesting punctate infarcts. No evidence major vascular territory infarct. This report was flagged in Epic as abnormal. Electronically signed by resident: Skyla Coffey Date:    11/21/2018 Time:    16:02 Electronically signed by: Pillo Edwards MD Date:    11/21/2018 Time:    18:23    Assessment/Plan:     * Aortic dissection distal to left subclavian    53 F with Hep C cirrhosis (MELD 15 on admission), HTN, crack cocaine abuse, who presented with acute tearing chest/back pain to OSH, Found to have retrograde type B dissection. Despite aggressive anti-impulse therapy, persistent pain requiring intervention. Preoperative CSF drain placement by Neuro IR. Successful TEVAR. POD 1 left groin hematoma and coagulopathy requiring manual pressure and product resuscitation. POD 2 new onset left upper arm weakness, found to have right thalamic hemorrhage 2/2 HTN. F/u CT head stable findings. Significant reactive left pleural effusion with surrounding atelectasis noted also.     Neuro: Pt more lethargic today; change in neuro exam no movement of LUE  - Spinal drain 15 cc per hr (output 6-14cc); goal map >100  - Discussed with stroke team, will keep SBP <160  - Hold off on spinal drain removal; will consider clamping tomorrow 11/23/18  - CT and MRI spine without  evidence of spinal cord hematoma or infarct  - MRI head with mass effect; stroke was hemorrhagic 2/2 HTN not embolic event from TEVAR   right thalamic bleed  - con't q1h neuro checks    CV:   - ease the spinal perfusion protocol. BP <160, MAP >100    Resp:  - encourage IS  - O2 supp prn  - Moderate Left pleural effusion and small right pleural effusion on CTA stoke multiphase noted. Left pleural effusion likely reactive following TEVAR. Con't pulmonary toilet.     Renal/Endocrine:  - con't monitoring UOP  - BUN 90 from 81 continues to increase despite stable to slight improvement in Cr  - Recommend nephrology consult  - Recommend IVF as pt NPO with KARL    GI: Hep C cirrhosis with MELD 15 on admission  - protonix  - NPO diet; consider alternat route of nutrition as pt has had minimal PO intake and is now NPO  - Concern for ileus on imaging; NGT placed with 700 output  - T bili increased 7 from 5.4; RUQ US pending  - Recommend hepatology consult for cirrhosis and concern for metabolic encephalopathy    Heme/ID:  - ABLA stable.   - con't correct coagulopathy prn  - monitor thrombocytopenia     Dispo:  - ICU care          Perla Castro MD  Vascular Surgery  [facility]

## 2018-11-22 NOTE — PROGRESS NOTES
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 Cleaning is a 52 yo female with a history significant for Hep C cirrhosis, HTN, cocaine and marijuana use who presented to [facility] on the early morning of 11/15 as a transfer from [facility] for Type B aortic dissection. She initially presented to [facility] on the evening of 11/14 for sharp epigastric pain that radiated to her back which started initially on the night of 11/13. CT at OSH noted aortic dissection from left subclavian to just proximal of celiac artery. She was transferred to [facility] for vascular surgery consult on a nicardipine gtt. She was transitioned from nicardipine gtt to esmolol gtt. CTA upon arrival confirmed dissection and she was admitted to MICU for medical management of the dissection with vascular surgery following.     Hospital/ICU Course:  Mrs. Cleaning was admitted to MICU for Type B aortic dissection on esmolol gtt to maintain HR of 60 and SBP <120. Vascular surgery following, no surgical intervention warranted initially. CTA read concerning for possible aortic rupture in the descending aorta with high-density fluid noted in the posterior mediastinum; additionally, there was a suspected penetrating ulcer in the upper abdominal aorta adjacent to the true lumen. Due to high risk/ possible rupture, Lumbar drain was placed by interventional radiology and TEVAR completed by vascular surgery. Early 11/16, pt noted to be in hemorragic shock complicated by liver disease as vasopressor requirements increased significantly, rising lactic acidosis, and patient was found to be bleeding from  bilateral groin sites with significant hematoma formation to left groin. Pressure was held for an extended period and patient received 4 U PRBC, 3 U FFP, 1 U platelets, 1 U cryo, and vitamin K. With improvement in coagulopathy and volume resuscitation, she was able to be weaned from vasopressors. However, on 11/17, a stroke code was called around 0800 due to acute left sided weakness; right sided thalamic bleed noted on CT. No intervention per vascular neurology, however have had to give multiple units of platelets, FFP, and cyro in order to keep platelets > 50,000, INR at 1.5, and fibrinogen >100. Repeat CT showed that hemorrhage had not changed. Neuro exam much improved with lessening left sided hemiparesis. New RUQ abdominal pain reported on 11/19 and, US with doppler noted probable right portal vein thrombosis; anticoagulation contraindicated.      On the early AM of 11/20, it was noted that Mrs. Cleaning was no longer able to move bilateral lower extremities and left upper extremity (this is a change as she was having improved strength in left upper/lower extremity previously and able to move RLE). Additionally, she was more somnolent with expressive aphasia noted. Discussions with vascular surgery, vascular neurology and [facility] team held with plans for stat CT head. Spinal drain opened up to drain 30 ml's in first hour and then 15 ml/hr subsequent to that. Additionally, levophed started to maintain MAP of 100 mm Hg but aim to keep SBP <160 given concern for spinal ischemia, but with subsequent hemorrhagic CVA. MRI Brain and spine performed on 11/21 with stable acute right thalamic hemorrhage, small punctate foci in posterior temporal/parietal regions, no cord edema or signs of cord infarction.  Episode of emesis evening of 11/21, imaging suggesting ileus.  NG placed with 800 ml output.     Interval History/Significant Events: increasing oxygen requirements overnight; on 10 L NC.  NG with total 750 ml output in 12  hours.     Review of Systems   Unable to perform ROS: Mental status change   Respiratory: Positive for shortness of breath.    Cardiovascular: Negative for chest pain.   Gastrointestinal: Positive for abdominal pain (generalized).     Objective:     Vital Signs (Most Recent):  Temp: 97.8 °F (36.6 °C) (11/22/18 0901)  Pulse: 79 (11/22/18 0930)  Resp: (!) 25 (11/22/18 0930)  BP: (!) 140/73 (11/22/18 0900)  SpO2: (!) 92 % (11/22/18 0930) Vital Signs (24h Range):  Temp:  [93.8 °F (34.3 °C)-98.7 °F (37.1 °C)] 97.8 °F (36.6 °C)  Pulse:  [64-81] 79  Resp:  [17-30] 25  SpO2:  [90 %-99 %] 92 %  BP: (134-153)/(63-81) 140/73   Weight: 68.3 kg (150 lb 9.2 oz)  Body mass index is 24.3 kg/m².      Intake/Output Summary (Last 24 hours) at 11/22/2018 1018  Last data filed at 11/22/2018 1000  Gross per 24 hour   Intake 535.5 ml   Output 1984 ml   Net -1448.5 ml       Physical Exam   Constitutional: She appears well-developed. She has a sickly appearance. Nasal cannula in place.   HENT:   Head: Normocephalic and atraumatic.   Eyes: EOM are normal. Scleral icterus is present.   Neck: Neck supple. No JVD present. No tracheal deviation present. No thyromegaly present.   Cardiovascular: Normal rate, regular rhythm, S1 normal and S2 normal.   Murmur heard.  Pulses:       Radial pulses are 2+ on the right side, and 2+ on the left side.        Dorsalis pedis pulses are 2+ on the right side, and 2+ on the left side.   Warm extremities   Pulmonary/Chest: No accessory muscle usage. No tachypnea. No respiratory distress. She has decreased breath sounds in the left middle field and the left lower field. She has no wheezes. She has no rhonchi. She has rales in the right lower field and the left lower field.   Abdominal: Soft. Bowel sounds are normal. She exhibits distension. There is no tenderness. There is no guarding.   Ng with bilious output   Genitourinary:   Genitourinary Comments: Urine catheter with bashir output   Lymphadenopathy:     She  has no cervical adenopathy.   Neurological: No sensory deficit.   Drowsy, conversational, oriented to person, place and time. Expressive aphasia/dysarthria noted. Spontaneous movement of RUE and RLE. RLE 3/5. LUE good hand squeeze.  LLE no movement.  Sensation intake to both LUE/LLE.  PERRL.  No eye deviation or nystagmus. Left facial weakness noted.     Skin: Skin is warm. She is not diaphoretic. There is pallor.   Nursing note and vitals reviewed.      Vents:  Oxygen Concentration (%): 40 (11/21/18 1800)  Lines/Drains/Airways     Central Venous Catheter Line                 Percutaneous Central Line Insertion/Assessment - triple lumen  11/15/18 right internal jugular 7 days          Drain                 Lumbar Drain 11/15/18 1500 6 days         Urethral Catheter 11/15/18 1518 Non-latex;Straight-tip 16 Fr. 6 days         NG/OG Tube 11/21/18 1837 18 Fr. Right nostril less than 1 day          Peripheral Intravenous Line                 Peripheral IV - Single Lumen 11/19/18 1300 Anterior;Right Upper Arm 2 days              Significant Labs:    CBC/Anemia Profile:  Recent Labs   Lab 11/21/18  0326 11/21/18  1357 11/22/18  0303   WBC 5.97  5.97 6.62 9.36   HGB 8.8*  8.8* 8.9* 8.6*   HCT 26.1*  26.1* 26.5* 25.2*   PLT 48*  48* 72* 51*   MCV 92  92 93 91   RDW 17.6*  17.6* 18.5* 18.6*        Chemistries:  Recent Labs   Lab 11/21/18  0326 11/22/18  0303    145   K 3.8 3.6    111*   CO2 23 22*   BUN 81* 90*   CREATININE 1.9* 1.8*   CALCIUM 9.3 9.3   ALBUMIN 3.2*  3.2* 3.0*  3.0*   PROT 7.6 7.5   BILITOT 5.4* 7.4*   ALKPHOS 119 111   ALT 83* 66*   * 181*   MG 2.4 2.4   PHOS 3.4  3.4 2.6*  2.6*       All pertinent labs within the past 24 hours have been reviewed.    Significant Imaging:  I have reviewed and interpreted all pertinent imaging results/findings within the past 24 hours.    Assessment/Plan:     Neuro   Encephalopathy, metabolic    --multifactorial: thalamic CVA, hepatic  encephalopathy, delirium   --see above. Continue lactulose, rifaxamin   --minimize deliriogenic medications.      Nontraumatic thalamic hemorrhage    --Noted on 11/17 with left hemiparesis, dysarthria, left sided facial droop. These symptoms improved through 11/19 with no intervention per vascular neurology other than maintaining SBP <160 and correcting coagulopathy for goal INR <1.5, platelets >50,000 and fibrinogen >100  --worsening of neurologic status again on 11/20 with worsening dysarthria, LUE, LLE, RLE paralysis.   --Repeat stat CT head 11/20 with stable thalamic hemorrhage; no acute abnormalities  --spinal drain draining per vascular surgery recommendations.   --in discussions with vascular neurology, vascular surgery, plan to leave spinal drain open to drain 15 ml/hr, maintain MAP of 100 and SBP <160. Continue correcting coagulopathy  --hourly neuro checks   --CT cervical and CT thoracic without contrast 11/21 no detection of spinal cord hematoma however sub optimal evaluation of spinal cord without contrast  --MRI brain with unchanged hemorrhage.         Psychiatric   Marijuana abuse    --see above     Cocaine abuse    --toxicology screen positive for cocaine, THC, opiates at OSH       Cardiac/Vascular   * Aortic dissection distal to left subclavian    --appreciate vascular surgery assistance  --s/p lumbar drain placement and TEVAR on 11/5  --lumbar drain reopened given acute lower extremity weakness on AM of 11/20. Continue to drain 15 ml/hr per protocol given symptoms   --continue neuro/ neurovascular exams          Pseudoaneurysm of left femoral artery    --noted 11/16. Hematoma appears to be improving per physical exam.   --continue to monitor      Essential hypertension    --goal MAP of 100, SBP <160 per vascular surgery and neurology recommendations  --continue levophed PRN; has not required.      Renal/   KARL (acute kidney injury)    --FeUrea 33.16%.  Suspect a component of iATN given recent  hemorrhagic shock.   --no hydronephrosis on CT imaging.   --bun increasing, sCr grossly unchanged.    --administer 1 L crystalloid.   --continue to trend for now. Urine output marginal, no need for RRT at this time.   --avoid nephrotoxic agents if possible.       Hematology   Hypofibrinogenemia    --transfuse cryo for fibrinogen goal >100       Portal vein thrombosis    --noted on US liver with doppler on 11/19. Holding anticoagulation given above bleeding/coagulopathy  --consider repeating imaging in next few days     Thrombocytopenia    --2/2 cirrhosis  --plts goal >50,000     Coagulopathy    --2/2 cirrhosis  --goal INR 1.5- 1.8  given hemorrhagic CVA. Continue vitamin K and FFP as needed        Oncology   Acute blood loss anemia    --transfuse to maintain Hgb 8-9 for now given concern for spinal ischemia.   --trend      GI   Ileus    --ileus noted on imaging from 11/21.  Bowel sounds present.  NG output decreasing (bilious).   --given enema.      Transaminitis    --suspect shock liver  --improving      Chronic hepatitis C with cirrhosis    --per patient, has known about cirrhosis for >10 years. Has never received treatment for Hep C  --continue supportive care for coagulopathy, thrombocytopenia in setting of Type B aortic dissection with hemorrhagic shock and new thalamic bleed   --lactulose and rifaximin   --CT abd/pelvis 11/21 with small ileus; NG placed.  Continue lactulose enemas.      Orthopedic   Acute midline thoracic back pain    --resolved      Other   Goals of care, counseling/discussion    --Goals of care discussion with Ms. Cleaning and her sister  --Code status changed to DNR     Hemorrhagic shock    --required levo, gurwinder, and vaso on 11/16 with bleeding from bilateral groin sites s/p TEVAR   --shock resolved with volume resuscitation, improving coagulopathy  --trend CBC's and target Hgb 8-9               DVT ppx:  SCDs, no pharmacological ppx given coagulopathy    Discussed with Dr. Pedroza.     Patient  and her sister updated at bedside.     Critical Care Time: 35 minutes    Critical secondary to Patient has a condition that poses threat to life and bodily function:  Thalamic hemorrhage, decompensated hep C cirrhosis with coagulopathy and thrombocytopenia, Type B aortic dissection.      Critical care was time spent personally by me on the following activities: development of treatment plan with patient or surrogate and bedside caregivers, discussions with consultants, evaluation of patient's response to treatment, examination of patient, ordering and performing treatments and interventions, ordering and review of laboratory studies, ordering and review of radiographic studies, pulse oximetry, re-evaluation of patient's condition. This critical care time did not overlap with that of any other provider or involve time for any procedures.     Alissa Wilkerson NP  Critical Care Medicine  [facility]

## 2018-11-23 PROBLEM — E87.0 HYPERNATREMIA: Status: ACTIVE | Noted: 2018-11-23

## 2018-11-23 LAB
ALBUMIN SERPL BCP-MCNC: 3 G/DL
ALBUMIN SERPL BCP-MCNC: 3 G/DL
ALP SERPL-CCNC: 124 U/L
ALT SERPL W/O P-5'-P-CCNC: 57 U/L
ANION GAP SERPL CALC-SCNC: 12 MMOL/L
ANION GAP SERPL CALC-SCNC: 13 MMOL/L
ANISOCYTOSIS BLD QL SMEAR: ABNORMAL
ANISOCYTOSIS BLD QL SMEAR: ABNORMAL
AST SERPL-CCNC: 148 U/L
BASO STIPL BLD QL SMEAR: ABNORMAL
BASO STIPL BLD QL SMEAR: ABNORMAL
BASOPHILS # BLD AUTO: 0.01 K/UL
BASOPHILS # BLD AUTO: 0.01 K/UL
BASOPHILS NFR BLD: 0.1 %
BASOPHILS NFR BLD: 0.1 %
BILIRUB DIRECT SERPL-MCNC: 4.8 MG/DL
BILIRUB SERPL-MCNC: 7.8 MG/DL
BLD PROD TYP BPU: NORMAL
BLOOD UNIT EXPIRATION DATE: NORMAL
BLOOD UNIT TYPE CODE: 5100
BLOOD UNIT TYPE CODE: 6200
BLOOD UNIT TYPE: NORMAL
BUN SERPL-MCNC: 81 MG/DL
BUN SERPL-MCNC: 83 MG/DL
BURR CELLS BLD QL SMEAR: ABNORMAL
CALCIUM SERPL-MCNC: 9.3 MG/DL
CALCIUM SERPL-MCNC: 9.7 MG/DL
CHLORIDE SERPL-SCNC: 116 MMOL/L
CHLORIDE SERPL-SCNC: 117 MMOL/L
CO2 SERPL-SCNC: 23 MMOL/L
CO2 SERPL-SCNC: 23 MMOL/L
CODING SYSTEM: NORMAL
CREAT SERPL-MCNC: 1.5 MG/DL
CREAT SERPL-MCNC: 1.6 MG/DL
DIFFERENTIAL METHOD: ABNORMAL
DIFFERENTIAL METHOD: ABNORMAL
DISPENSE STATUS: NORMAL
EOSINOPHIL # BLD AUTO: 0 K/UL
EOSINOPHIL # BLD AUTO: 0 K/UL
EOSINOPHIL NFR BLD: 0.1 %
EOSINOPHIL NFR BLD: 0.1 %
ERYTHROCYTE [DISTWIDTH] IN BLOOD BY AUTOMATED COUNT: 19.9 %
ERYTHROCYTE [DISTWIDTH] IN BLOOD BY AUTOMATED COUNT: 20.5 %
EST. GFR  (AFRICAN AMERICAN): 42.1 ML/MIN/1.73 M^2
EST. GFR  (AFRICAN AMERICAN): 45.5 ML/MIN/1.73 M^2
EST. GFR  (NON AFRICAN AMERICAN): 36.5 ML/MIN/1.73 M^2
EST. GFR  (NON AFRICAN AMERICAN): 39.5 ML/MIN/1.73 M^2
FIBRINOGEN PPP-MCNC: 147 MG/DL
FIBRINOGEN PPP-MCNC: 272 MG/DL
GLUCOSE SERPL-MCNC: 127 MG/DL
GLUCOSE SERPL-MCNC: 98 MG/DL
HCT VFR BLD AUTO: 26.9 %
HCT VFR BLD AUTO: 27.5 %
HGB BLD-MCNC: 8.7 G/DL
HGB BLD-MCNC: 8.9 G/DL
HYPOCHROMIA BLD QL SMEAR: ABNORMAL
HYPOCHROMIA BLD QL SMEAR: ABNORMAL
IMM GRANULOCYTES # BLD AUTO: 0.08 K/UL
IMM GRANULOCYTES # BLD AUTO: 0.12 K/UL
IMM GRANULOCYTES NFR BLD AUTO: 0.8 %
IMM GRANULOCYTES NFR BLD AUTO: 1.3 %
INR PPP: 1.6
INR PPP: 2
LYMPHOCYTES # BLD AUTO: 0.6 K/UL
LYMPHOCYTES # BLD AUTO: 0.7 K/UL
LYMPHOCYTES NFR BLD: 6.6 %
LYMPHOCYTES NFR BLD: 6.8 %
MAGNESIUM SERPL-MCNC: 2.5 MG/DL
MCH RBC QN AUTO: 30.7 PG
MCH RBC QN AUTO: 31.1 PG
MCHC RBC AUTO-ENTMCNC: 32.3 G/DL
MCHC RBC AUTO-ENTMCNC: 32.4 G/DL
MCV RBC AUTO: 95 FL
MCV RBC AUTO: 96 FL
MONOCYTES # BLD AUTO: 0.7 K/UL
MONOCYTES # BLD AUTO: 1 K/UL
MONOCYTES NFR BLD: 10.8 %
MONOCYTES NFR BLD: 7.2 %
NEUTROPHILS # BLD AUTO: 7.8 K/UL
NEUTROPHILS # BLD AUTO: 7.8 K/UL
NEUTROPHILS NFR BLD: 81.4 %
NEUTROPHILS NFR BLD: 84.7 %
NRBC BLD-RTO: 0 /100 WBC
NRBC BLD-RTO: 1 /100 WBC
NUM UNITS TRANS FFP: NORMAL
OVALOCYTES BLD QL SMEAR: ABNORMAL
PAPPENHEIMER BOD BLD QL SMEAR: PRESENT
PAPPENHEIMER BOD BLD QL SMEAR: PRESENT
PHOSPHATE SERPL-MCNC: 2.6 MG/DL
PHOSPHATE SERPL-MCNC: 2.6 MG/DL
PLATELET # BLD AUTO: 33 K/UL
PLATELET # BLD AUTO: 40 K/UL
PLATELET BLD QL SMEAR: ABNORMAL
PLATELET BLD QL SMEAR: ABNORMAL
PMV BLD AUTO: 13.9 FL
PMV BLD AUTO: ABNORMAL FL
POCT GLUCOSE: 106 MG/DL (ref 70–110)
POCT GLUCOSE: 108 MG/DL (ref 70–110)
POCT GLUCOSE: 124 MG/DL (ref 70–110)
POCT GLUCOSE: 149 MG/DL (ref 70–110)
POIKILOCYTOSIS BLD QL SMEAR: SLIGHT
POIKILOCYTOSIS BLD QL SMEAR: SLIGHT
POLYCHROMASIA BLD QL SMEAR: ABNORMAL
POLYCHROMASIA BLD QL SMEAR: ABNORMAL
POTASSIUM SERPL-SCNC: 3.4 MMOL/L
POTASSIUM SERPL-SCNC: 3.4 MMOL/L
PROT SERPL-MCNC: 7.4 G/DL
PROTHROMBIN TIME: 16.2 SEC
PROTHROMBIN TIME: 19.5 SEC
RBC # BLD AUTO: 2.8 M/UL
RBC # BLD AUTO: 2.9 M/UL
SCHISTOCYTES BLD QL SMEAR: ABNORMAL
SODIUM SERPL-SCNC: 151 MMOL/L
SODIUM SERPL-SCNC: 153 MMOL/L
TARGETS BLD QL SMEAR: ABNORMAL
TARGETS BLD QL SMEAR: ABNORMAL
TRANS ERYTHROCYTES VOL PATIENT: NORMAL ML
TRANS PLATPHERESIS VOL PATIENT: NORMAL ML
TRANS PLATPHERESIS VOL PATIENT: NORMAL ML
WBC # BLD AUTO: 9.19 K/UL
WBC # BLD AUTO: 9.56 K/UL

## 2018-11-23 PROCEDURE — 85610 PROTHROMBIN TIME: CPT | Mod: 91

## 2018-11-23 PROCEDURE — 25000003 PHARM REV CODE 250: Performed by: NURSE PRACTITIONER

## 2018-11-23 PROCEDURE — 80076 HEPATIC FUNCTION PANEL: CPT

## 2018-11-23 PROCEDURE — 20000000 HC ICU ROOM

## 2018-11-23 PROCEDURE — S0028 INJECTION, FAMOTIDINE, 20 MG: HCPCS | Performed by: NURSE PRACTITIONER

## 2018-11-23 PROCEDURE — 80048 BASIC METABOLIC PNL TOTAL CA: CPT

## 2018-11-23 PROCEDURE — 85384 FIBRINOGEN ACTIVITY: CPT | Mod: 91

## 2018-11-23 PROCEDURE — 27100092 HC HIGH FLOW DELIVERY CANNULA

## 2018-11-23 PROCEDURE — P9035 PLATELET PHERES LEUKOREDUCED: HCPCS

## 2018-11-23 PROCEDURE — 63600175 PHARM REV CODE 636 W HCPCS: Performed by: STUDENT IN AN ORGANIZED HEALTH CARE EDUCATION/TRAINING PROGRAM

## 2018-11-23 PROCEDURE — 80069 RENAL FUNCTION PANEL: CPT

## 2018-11-23 PROCEDURE — 99291 CRITICAL CARE FIRST HOUR: CPT | Mod: ,,, | Performed by: PHYSICIAN ASSISTANT

## 2018-11-23 PROCEDURE — P9017 PLASMA 1 DONOR FRZ W/IN 8 HR: HCPCS

## 2018-11-23 PROCEDURE — 27100171 HC OXYGEN HIGH FLOW UP TO 24 HOURS

## 2018-11-23 PROCEDURE — 94761 N-INVAS EAR/PLS OXIMETRY MLT: CPT

## 2018-11-23 PROCEDURE — 63600175 PHARM REV CODE 636 W HCPCS: Performed by: PHYSICIAN ASSISTANT

## 2018-11-23 PROCEDURE — 25000003 PHARM REV CODE 250: Performed by: PHYSICIAN ASSISTANT

## 2018-11-23 PROCEDURE — 83735 ASSAY OF MAGNESIUM: CPT

## 2018-11-23 PROCEDURE — 99233 SBSQ HOSP IP/OBS HIGH 50: CPT | Mod: ,,, | Performed by: PSYCHIATRY & NEUROLOGY

## 2018-11-23 PROCEDURE — 85025 COMPLETE CBC W/AUTO DIFF WBC: CPT | Mod: 91

## 2018-11-23 RX ORDER — FUROSEMIDE 10 MG/ML
40 INJECTION INTRAMUSCULAR; INTRAVENOUS ONCE
Status: COMPLETED | OUTPATIENT
Start: 2018-11-23 | End: 2018-11-23

## 2018-11-23 RX ORDER — HYDRALAZINE HYDROCHLORIDE 20 MG/ML
10 INJECTION INTRAMUSCULAR; INTRAVENOUS ONCE
Status: COMPLETED | OUTPATIENT
Start: 2018-11-23 | End: 2018-11-23

## 2018-11-23 RX ORDER — POTASSIUM CHLORIDE 29.8 MG/ML
40 INJECTION INTRAVENOUS ONCE
Status: COMPLETED | OUTPATIENT
Start: 2018-11-23 | End: 2018-11-23

## 2018-11-23 RX ORDER — DEXTROSE MONOHYDRATE 50 MG/ML
INJECTION, SOLUTION INTRAVENOUS CONTINUOUS
Status: DISCONTINUED | OUTPATIENT
Start: 2018-11-23 | End: 2018-11-24

## 2018-11-23 RX ORDER — HYDROCODONE BITARTRATE AND ACETAMINOPHEN 500; 5 MG/1; MG/1
TABLET ORAL
Status: DISCONTINUED | OUTPATIENT
Start: 2018-11-23 | End: 2018-11-24

## 2018-11-23 RX ORDER — LACTULOSE 10 G/15ML
200 SOLUTION ORAL; RECTAL 2 TIMES DAILY
Status: DISCONTINUED | OUTPATIENT
Start: 2018-11-23 | End: 2018-11-23

## 2018-11-23 RX ORDER — POTASSIUM CHLORIDE 20 MEQ/15ML
40 SOLUTION ORAL ONCE
Status: COMPLETED | OUTPATIENT
Start: 2018-11-23 | End: 2018-11-23

## 2018-11-23 RX ORDER — LACTULOSE 10 G/15ML
20 SOLUTION ORAL 3 TIMES DAILY
Status: DISCONTINUED | OUTPATIENT
Start: 2018-11-23 | End: 2018-11-24

## 2018-11-23 RX ORDER — POTASSIUM CHLORIDE 20 MEQ/1
40 TABLET, EXTENDED RELEASE ORAL ONCE
Status: DISCONTINUED | OUTPATIENT
Start: 2018-11-23 | End: 2018-11-23

## 2018-11-23 RX ADMIN — RIFAXIMIN 550 MG: 550 TABLET ORAL at 09:11

## 2018-11-23 RX ADMIN — POTASSIUM CHLORIDE 40 MEQ: 400 INJECTION, SOLUTION INTRAVENOUS at 05:11

## 2018-11-23 RX ADMIN — DEXTROSE: 5 SOLUTION INTRAVENOUS at 05:11

## 2018-11-23 RX ADMIN — LACTULOSE 200 G: 10 SOLUTION ORAL at 09:11

## 2018-11-23 RX ADMIN — HYDRALAZINE HYDROCHLORIDE 10 MG: 20 INJECTION INTRAMUSCULAR; INTRAVENOUS at 04:11

## 2018-11-23 RX ADMIN — POTASSIUM CHLORIDE 40 MEQ: 20 SOLUTION ORAL at 05:11

## 2018-11-23 RX ADMIN — FUROSEMIDE 40 MG: 10 INJECTION, SOLUTION INTRAMUSCULAR; INTRAVENOUS at 01:11

## 2018-11-23 RX ADMIN — LACTULOSE 20 G: 20 SOLUTION ORAL at 09:11

## 2018-11-23 RX ADMIN — HYDRALAZINE HYDROCHLORIDE 10 MG: 20 INJECTION INTRAMUSCULAR; INTRAVENOUS at 09:11

## 2018-11-23 RX ADMIN — DEXTROSE 500 ML: 5 SOLUTION INTRAVENOUS at 11:11

## 2018-11-23 RX ADMIN — FAMOTIDINE 20 MG: 10 INJECTION, SOLUTION INTRAVENOUS at 09:11

## 2018-11-23 NOTE — ASSESSMENT & PLAN NOTE
--per patient, has known about cirrhosis for >10 years. Has never received treatment for Hep C  --continue supportive care for coagulopathy, thrombocytopenia in setting of Type B aortic dissection with hemorrhagic shock and new thalamic bleed   --CT abd/pelvis 11/21 with small ileus; NG placed.  Multiple bowel movements overnight, likely ileus resolved.   --Continue lactulose enemas.

## 2018-11-23 NOTE — ASSESSMENT & PLAN NOTE
--ileus noted on imaging from 11/21.  Bowel sounds present.  NG output decreasing (bilious). Multiple bowel movements.   --given enema.   --Likely resolved.

## 2018-11-23 NOTE — SUBJECTIVE & OBJECTIVE
Neurologic Chief Complaint: dysarthria, poor attention span, LUE weakness, LLE paralgia, RLE weakness  Right Thalamic hemorrhage     Subjective:     Interval History: Patient is seen for follow-up neurological assessment and treatment recommendations: Now has movement in LUE hand. Will remove spinal drain.      HPI, Past Medical, Family, and Social History remains the same as documented in the initial encounter.     Review of Systems   Constitutional: Negative for chills and fever.   Respiratory: Negative for cough and shortness of breath.    Cardiovascular: Negative for chest pain and palpitations.   Gastrointestinal: Negative for nausea and vomiting. Abdominal pain: RUQ    Musculoskeletal: Positive for neck pain (discomfort at RIJ site). Negative for back pain and neck stiffness.   Neurological: Negative for tremors, seizures, syncope, weakness, numbness and headaches.   Hematological: Bruises/bleeds easily.   Psychiatric/Behavioral: Positive for confusion and decreased concentration. Negative for agitation and behavioral problems.     Scheduled Meds:   famotidine (PF)  20 mg Intravenous Daily    lactulose  200 g Rectal BID     Continuous Infusions:   norepinephrine bitartrate-D5W 0.02 mcg/kg/min (11/20/18 0744)     PRN Meds:sodium chloride, sodium chloride, sodium chloride, sodium chloride, ondansetron    Objective:     Vital Signs (Most Recent):  Temp: 96.7 °F (35.9 °C) (11/23/18 1038)  Pulse: 82 (11/23/18 1129)  Resp: (!) 28 (11/23/18 1129)  BP: (!) 145/82 (11/23/18 1100)  SpO2: (!) 94 % (11/23/18 1129)  BP Location: Left arm    Vital Signs Range (Last 24H):  Temp:  [96 °F (35.6 °C)-97.4 °F (36.3 °C)]   Pulse:  [73-87]   Resp:  [9-36]   BP: (141-175)/(72-88)   SpO2:  [92 %-100 %]   BP Location: Left arm    Physical Exam   Constitutional: She appears well-developed and well-nourished.   HENT:   Head: Normocephalic and atraumatic.   Eyes: EOM are normal. Pupils are equal, round, and reactive to light. Scleral  icterus is present.   Neck: Normal range of motion. Neck supple.   Cardiovascular: Normal rate and regular rhythm.   Pulmonary/Chest: Effort normal and breath sounds normal.   Abdominal: Soft. Bowel sounds are normal. She exhibits distension. There is no guarding.   Genitourinary:   Genitourinary Comments: Perry in place   Musculoskeletal: She exhibits no edema or deformity.   Neurological: She is alert.   LUE weakness   Skin: Skin is warm and dry. Capillary refill takes less than 2 seconds.   Nursing note and vitals reviewed.      Neurological Exam:   LOC: alert, mild cues to cooperate   Attention Span: poor  Language: mild aphasia  Articulation: mild dysarthria  Orientation: AOx1, not time or place  Visual Fields: Full  EOM (CN III, IV, VI): Full/intact  Pupils (CN II, III): PERRL  Facial Sensation (CN V): Normal  Facial Movement (CN VII): mild L facial droop   Motor: Arm left  Normal 2/5  Leg left  Normal 0/5  Arm right  Normal 5/5  Leg right Normal 2/5  Sensation: Able to feel light touch on proximal legs and R lower leg.   Tone: Decreased tone throughout LEs and LUE.   Reflexes +2 throughout       Laboratory:    Recent Results (from the past 24 hour(s))   Fibrinogen    Collection Time: 11/22/18  3:07 PM   Result Value Ref Range    Fibrinogen 124 (L) 182 - 366 mg/dL   CBC auto differential    Collection Time: 11/22/18  3:07 PM   Result Value Ref Range    WBC 9.88 3.90 - 12.70 K/uL    RBC 2.86 (L) 4.00 - 5.40 M/uL    Hemoglobin 8.7 (L) 12.0 - 16.0 g/dL    Hematocrit 27.0 (L) 37.0 - 48.5 %    MCV 94 82 - 98 fL    MCH 30.4 27.0 - 31.0 pg    MCHC 32.2 32.0 - 36.0 g/dL    RDW 19.1 (H) 11.5 - 14.5 %    Platelets 42 (L) 150 - 350 K/uL    MPV 11.7 9.2 - 12.9 fL    Immature Granulocytes 0.6 (H) 0.0 - 0.5 %    Gran # (ANC) 8.1 (H) 1.8 - 7.7 K/uL    Immature Grans (Abs) 0.06 (H) 0.00 - 0.04 K/uL    Lymph # 0.6 (L) 1.0 - 4.8 K/uL    Mono # 1.0 0.3 - 1.0 K/uL    Eos # 0.0 0.0 - 0.5 K/uL    Baso # 0.01 0.00 - 0.20 K/uL     nRBC 1 (A) 0 /100 WBC    Gran% 82.3 (H) 38.0 - 73.0 %    Lymph% 6.5 (L) 18.0 - 48.0 %    Mono% 10.5 4.0 - 15.0 %    Eosinophil% 0.0 0.0 - 8.0 %    Basophil% 0.1 0.0 - 1.9 %    Differential Method Automated    Renal function panel    Collection Time: 11/22/18  5:02 PM   Result Value Ref Range    Glucose 108 70 - 110 mg/dL    Sodium 148 (H) 136 - 145 mmol/L    Potassium 3.2 (L) 3.5 - 5.1 mmol/L    Chloride 113 (H) 95 - 110 mmol/L    CO2 23 23 - 29 mmol/L    BUN, Bld 83 (H) 6 - 20 mg/dL    Calcium 9.3 8.7 - 10.5 mg/dL    Creatinine 1.6 (H) 0.5 - 1.4 mg/dL    Albumin 2.9 (L) 3.5 - 5.2 g/dL    Phosphorus 2.9 2.7 - 4.5 mg/dL    eGFR if  42.1 (A) >60 mL/min/1.73 m^2    eGFR if non  36.5 (A) >60 mL/min/1.73 m^2    Anion Gap 12 8 - 16 mmol/L   CBC auto differential    Collection Time: 11/22/18  9:13 PM   Result Value Ref Range    WBC 9.04 3.90 - 12.70 K/uL    RBC 2.84 (L) 4.00 - 5.40 M/uL    Hemoglobin 8.7 (L) 12.0 - 16.0 g/dL    Hematocrit 26.3 (L) 37.0 - 48.5 %    MCV 93 82 - 98 fL    MCH 30.6 27.0 - 31.0 pg    MCHC 33.1 32.0 - 36.0 g/dL    RDW 19.4 (H) 11.5 - 14.5 %    Platelets 41 (L) 150 - 350 K/uL    MPV 13.1 (H) 9.2 - 12.9 fL    Immature Granulocytes 0.8 (H) 0.0 - 0.5 %    Gran # (ANC) 7.8 (H) 1.8 - 7.7 K/uL    Immature Grans (Abs) 0.07 (H) 0.00 - 0.04 K/uL    Lymph # 0.4 (L) 1.0 - 4.8 K/uL    Mono # 0.8 0.3 - 1.0 K/uL    Eos # 0.0 0.0 - 0.5 K/uL    Baso # 0.01 0.00 - 0.20 K/uL    nRBC 1 (A) 0 /100 WBC    Gran% 85.7 (H) 38.0 - 73.0 %    Lymph% 4.8 (L) 18.0 - 48.0 %    Mono% 8.6 4.0 - 15.0 %    Eosinophil% 0.0 0.0 - 8.0 %    Basophil% 0.1 0.0 - 1.9 %    Differential Method Automated    POCT glucose    Collection Time: 11/22/18  9:13 PM   Result Value Ref Range    POCT Glucose 110 70 - 110 mg/dL   POCT glucose    Collection Time: 11/23/18 12:18 AM   Result Value Ref Range    POCT Glucose 108 70 - 110 mg/dL   Hepatic function panel    Collection Time: 11/23/18  2:56 AM   Result Value Ref  Range    Total Protein 7.4 6.0 - 8.4 g/dL    Albumin 3.0 (L) 3.5 - 5.2 g/dL    Total Bilirubin 7.8 (H) 0.1 - 1.0 mg/dL    Bilirubin, Direct 4.8 (H) 0.1 - 0.3 mg/dL     (H) 10 - 40 U/L    ALT 57 (H) 10 - 44 U/L    Alkaline Phosphatase 124 55 - 135 U/L   Renal function panel    Collection Time: 11/23/18  2:56 AM   Result Value Ref Range    Glucose 98 70 - 110 mg/dL    Sodium 151 (H) 136 - 145 mmol/L    Potassium 3.4 (L) 3.5 - 5.1 mmol/L    Chloride 116 (H) 95 - 110 mmol/L    CO2 23 23 - 29 mmol/L    BUN, Bld 83 (H) 6 - 20 mg/dL    Calcium 9.3 8.7 - 10.5 mg/dL    Creatinine 1.6 (H) 0.5 - 1.4 mg/dL    Albumin 3.0 (L) 3.5 - 5.2 g/dL    Phosphorus 2.6 (L) 2.7 - 4.5 mg/dL    eGFR if  42.1 (A) >60 mL/min/1.73 m^2    eGFR if non  36.5 (A) >60 mL/min/1.73 m^2    Anion Gap 12 8 - 16 mmol/L   Magnesium    Collection Time: 11/23/18  2:56 AM   Result Value Ref Range    Magnesium 2.5 1.6 - 2.6 mg/dL   Fibrinogen    Collection Time: 11/23/18  2:56 AM   Result Value Ref Range    Fibrinogen 147 (L) 182 - 366 mg/dL   Protime-INR    Collection Time: 11/23/18  2:56 AM   Result Value Ref Range    Prothrombin Time 19.5 (H) 9.0 - 12.5 sec    INR 2.0 (H) 0.8 - 1.2   CBC auto differential    Collection Time: 11/23/18  2:56 AM   Result Value Ref Range    WBC 9.56 3.90 - 12.70 K/uL    RBC 2.90 (L) 4.00 - 5.40 M/uL    Hemoglobin 8.9 (L) 12.0 - 16.0 g/dL    Hematocrit 27.5 (L) 37.0 - 48.5 %    MCV 95 82 - 98 fL    MCH 30.7 27.0 - 31.0 pg    MCHC 32.4 32.0 - 36.0 g/dL    RDW 19.9 (H) 11.5 - 14.5 %    Platelets 40 (L) 150 - 350 K/uL    MPV 13.9 (H) 9.2 - 12.9 fL    Immature Granulocytes 0.8 (H) 0.0 - 0.5 %    Gran # (ANC) 7.8 (H) 1.8 - 7.7 K/uL    Immature Grans (Abs) 0.08 (H) 0.00 - 0.04 K/uL    Lymph # 0.7 (L) 1.0 - 4.8 K/uL    Mono # 1.0 0.3 - 1.0 K/uL    Eos # 0.0 0.0 - 0.5 K/uL    Baso # 0.01 0.00 - 0.20 K/uL    nRBC 0 0 /100 WBC    Gran% 81.4 (H) 38.0 - 73.0 %    Lymph% 6.8 (L) 18.0 - 48.0 %    Mono%  10.8 4.0 - 15.0 %    Eosinophil% 0.1 0.0 - 8.0 %    Basophil% 0.1 0.0 - 1.9 %    Platelet Estimate Decreased (A)     Aniso Moderate     Poik Slight     Poly Occasional     Hypo Occasional     Target Cells Occasional     Glenn Cells Occasional     Basophilic Stippling Occasional     Pappenheimer Bodies Present     Differential Method Automated    Phosphorus    Collection Time: 11/23/18  2:56 AM   Result Value Ref Range    Phosphorus 2.6 (L) 2.7 - 4.5 mg/dL   POCT glucose    Collection Time: 11/23/18  2:56 AM   Result Value Ref Range    POCT Glucose 106 70 - 110 mg/dL   Prepare Fresh Frozen Plasma 2 Units    Collection Time: 11/23/18  7:27 AM   Result Value Ref Range    UNIT NUMBER [id number]     PRODUCT CODE Q3530I82     DISPENSE STATUS ISSUED     CODING SYSTEM [id number]     Unit Blood Type Code 5100     Unit Blood Type O POS     Unit Expiration [id number]     UNIT NUMBER [id number]     PRODUCT CODE T2735S28     DISPENSE STATUS ISSUED     CODING SYSTEM [id number]     Unit Blood Type Code 5100     Unit Blood Type O POS     Unit Expiration [id number]    Prepare Platelets 1 Dose    Collection Time: 11/23/18  7:28 AM   Result Value Ref Range    UNIT NUMBER [id number]     PRODUCT CODE V5469W87     DISPENSE STATUS RETURNED     CODING SYSTEM [id number]     Unit Blood Type Code 5100     Unit Blood Type O POS     Unit Expiration [id number]          Diagnostic Results     Brain imaging:     MRI [facility] brain  11/21/18:   Stable acute right thalamic hemorrhage with surrounding edema and mass effect.  Small punctate foci of diffusion signal abnormality in the posterior temporal/parietal regions with low ADC signal and subtle FLAIR signal abnormality suggesting punctate infarcts. No evidence major vascular territory infarct.    MRI total spine 11/21/18:   Mildly increased central T2 signal of the spinal cord at T4 through T6 may reflect prominence of the central canal versus a small syrinx.  No cord expansion to suggest edema  or acute infarction in this region.  Otherwise, no abnormal cord signal to suggest infarct.  No evidence of epidural of subdural hematoma.  Multilevel spondylosis most severe at L5-S1 with severe spinal canal stenosis and L4-L5 with moderate to severe neural foraminal narrowing.    CT abdomen pelvis 11/20/18:   Did not show any compression of spinal cord by known hematoma    CT Cervical and thoracic spine 11/21/18:  No acute abnormality of the cervical or thoracic spine.     CTH 11/20/18  Stable right thalamic hemorrhage.  No new hemorrhage identified.      CTH - Right Thalamic hemorrhage           Vessel Imaging:     CTA head and neck - unremarkable           Cardiac Evaluation:   JORDAN 11/15/18  · Normal left ventricular systolic function. The estimated ejection fraction is 70%  · Concentric left ventricular remodeling.  · No wall motion abnormalities.  · Normal LV diastolic function.  · Normal right ventricular systolic function.  · Trace mitral regurgitation.  · Mild aortic regurgitation.  · Trace tricuspid regurgitation.  · Trace pulmonic regurgitation.  · Normal central venous pressure (3 mm Hg).  · The estimated PA systolic pressure is 24.72 mm Hg  · No pericardial effusion.

## 2018-11-23 NOTE — ASSESSMENT & PLAN NOTE
--Noted on 11/17 with left hemiparesis, dysarthria, left sided facial droop. These symptoms improved through 11/19 with no intervention per vascular neurology other than maintaining SBP <160 and correcting coagulopathy for goal INR <1.5, platelets >50,000 and fibrinogen >100  --worsening of neurologic status again on 11/20 with worsening dysarthria, LUE, LLE, RLE paralysis.   --Repeat stat CT head 11/20 with stable thalamic hemorrhage; no acute abnormalities  --spinal drain draining per vascular surgery recommendations.   --in discussions with vascular neurology, vascular surgery, plan to leave spinal drain open to drain 15 ml/hr, maintain MAP of 100 and SBP <160. Continue correcting coagulopathy  --hourly neuro checks   --CT cervical and CT thoracic without contrast 11/21 no detection of spinal cord hematoma however sub optimal evaluation of spinal cord without contrast  --MRI brain with unchanged hemorrhage.   --Clamped LP drain today due to improvement in neurologic exam. Plans for IR to remove drain today.

## 2018-11-23 NOTE — PLAN OF CARE
Problem: Patient Care Overview  Goal: Plan of Care Review  Recommendations  Recommendation/Intervention:   1. As medically appropriate, ADAT to Regular with texture per SLP.   2. If unable to advance diet, recommend initiating TF of Isosource 1.5 at a goal rate of 45 mL/hr - to provide 1620 kcal/day, 73g protein/day, and 825mL free fluid/day.   3. If unable to advance diet or start TF, recommend Custom TPN 70g AA and 225g Dextrose + IV lipids daily - to provide 1545 kcal/day and 70g protein/day.   RD to monitor.    Goals: Patient to receive nutrition by RD follow-up  Nutrition Goal Status: new    Full assessment completed, see RD Note 11/23/2018.

## 2018-11-23 NOTE — PROGRESS NOTES
" [facility]  Adult Nutrition  Progress Note    SUMMARY       Recommendations  Recommendation/Intervention:   1. As medically appropriate, ADAT to Regular with texture per SLP.   2. If unable to advance diet, recommend initiating TF of Isosource 1.5 at a goal rate of 45 mL/hr - to provide 1620 kcal/day, 73g protein/day, and 825mL free fluid/day.   3. If unable to advance diet or start TF, recommend Custom TPN 70g AA and 225g Dextrose + IV lipids daily - to provide 1545 kcal/day and 70g protein/day.   RD to monitor.    Goals: Patient to receive nutrition by RD follow-up  Nutrition Goal Status: new  Communication of RD Recs: (POC)    Reason for Assessment  Reason for Assessment: length of stay  Diagnosis: (aortic dissection s/p TEVAR 11/15)  Relevant Medical History: hepatitis C, HTN, cocaine and marijuana abuse  General Information Comments: Patient with AMS. Patient with ileus, made NPO and NG placed for decompression. (Patient with no physical signs of malnutrition and weight gain since admission. RD does not feel patient meets criteria for malnutrition at this time.)  Nutrition Discharge Planning: Unable to determine at this time.    Nutrition Risk Screen  Nutrition Risk Screen: no indicators present    Nutrition/Diet History  Do you have any cultural, spiritual, Jainism conflicts, given your current situation?: none stated  Factors Affecting Nutritional Intake: NPO, altered gastrointestinal function    Anthropometrics  Temp: 96.7 °F (35.9 °C)  Height: 5' 6" (167.6 cm)  Height (inches): 66 in  Weight Method: Bed Scale  Weight: 68.3 kg (150 lb 9.2 oz)  Weight (lb): 150.58 lb  Ideal Body Weight (IBW), Female: 130 lb  % Ideal Body Weight, Female (lb): 98.53 lb  BMI (Calculated): 20.7  BMI Grade: 18.5-24.9 - normal  Usual Body Weight (UBW), k.4 kg(admit weight - 11/15)  % Usual Body Weight: 125.81  % Weight Change From Usual Weight: 25.55 %    Lab/Procedures/Meds  Pertinent Labs Reviewed: " reviewed  Pertinent Labs Comments: Na 151, K 3.4, Cl 116, BUN 83, Creat 1.6, Phos 2.6, Alb 3.0, T. bili 7.8  Pertinent Medications Reviewed: reviewed  Pertinent Medications Comments: famotidine, levophed    Physical Findings/Assessment  Overall Physical Appearance: on oxygen therapy, nourished  Tubes: nasogastric tube(LIWS)  Oral/Mouth Cavity: tooth/teeth missing  Skin: edema, incision(s)    Estimated/Assessed Needs  Weight Used For Calorie Calculations: 54.4 kg (119 lb 14.9 oz)(admit weight)  Energy Calorie Requirements (kcal): 1458 kcal/day  Energy Need Method: [place]-St Jeor(x 1.25)  Protein Requirements: 68-82 g/day(1.0-1.2 g/kg)  Weight Used For Protein Calculations: 68.3 kg (150 lb 9.2 oz)  Fluid Requirements (mL): 1 mL/kcal or per MD  Fluid Need Method: RDA Method  RDA Method (mL): 1458    Nutrition Prescription Ordered  Current Diet Order: NPO    Evaluation of Received Nutrient/Fluid Intake  I/O: +4.7L since admit  Comments: LBM 11/23  % Intake of Estimated Energy Needs: 0 - 25 %  % Meal Intake: NPO    Nutrition Risk  Level of Risk/Frequency of Follow-up: high(2x/week)     Assessment and Plan  Nutrition Problem  Inadequate energy intake    Related to (etiology):   Decreased ability to consume sufficient energy    Signs and Symptoms (as evidenced by):   NPO with no alternative means of nutrition at this time     Interventions/Recommendations (treatment strategy):  Initiate general/healthful diet or enteral/parenteral nutrition pending medical status    Nutrition Diagnosis Status:   New    Monitor and Evaluation  Food and Nutrient Intake: energy intake, food and beverage intake, enteral nutrition intake, parenteral nutrition intake  Food and Nutrient Adminstration: diet order, enteral and parenteral nutrition administration  Anthropometric Measurements: weight, weight change  Biochemical Data, Medical Tests and Procedures: electrolyte and renal panel, gastrointestinal profile, inflammatory  profile  Nutrition-Focused Physical Findings: overall appearance     Nutrition Follow-Up  RD Follow-up?: Yes

## 2018-11-23 NOTE — PROGRESS NOTES
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 Cleaning is a 54 yo female with a history significant for Hep C cirrhosis, HTN, cocaine and marijuana use who presented to [facility] on the early morning of 11/15 as a transfer from [facility] for Type B aortic dissection. She initially presented to [facility] on the evening of 11/14 for sharp epigastric pain that radiated to her back which started initially on the night of 11/13. CT at OSH noted aortic dissection from left subclavian to just proximal of celiac artery. She was transferred to [facility] for vascular surgery consult on a nicardipine gtt. She was transitioned from nicardipine gtt to esmolol gtt. CTA upon arrival confirmed dissection and she was admitted to MICU for medical management of the dissection with vascular surgery following.     Hospital/ICU Course:  Mrs. Cleaning was admitted to MICU for Type B aortic dissection on esmolol gtt to maintain HR of 60 and SBP <120. Vascular surgery following, no surgical intervention warranted initially. CTA read concerning for possible aortic rupture in the descending aorta with high-density fluid noted in the posterior mediastinum; additionally, there was a suspected penetrating ulcer in the upper abdominal aorta adjacent to the true lumen. Due to high risk/ possible rupture, Lumbar drain was placed by interventional radiology and TEVAR completed by vascular surgery. Early 11/16, pt noted to be in hemorragic shock complicated by liver disease as vasopressor requirements increased significantly, rising lactic acidosis, and patient was found to be bleeding from  bilateral groin sites with significant hematoma formation to left groin. Pressure was held for an extended period and patient received 4 U PRBC, 3 U FFP, 1 U platelets, 1 U cryo, and vitamin K. With improvement in coagulopathy and volume resuscitation, she was able to be weaned from vasopressors. However, on 11/17, a stroke code was called around 0800 due to acute left sided weakness; right sided thalamic bleed noted on CT. No intervention per vascular neurology, however have had to give multiple units of platelets, FFP, and cyro in order to keep platelets > 50,000, INR at 1.5, and fibrinogen >100. Repeat CT showed that hemorrhage had not changed. Neuro exam much improved with lessening left sided hemiparesis. New RUQ abdominal pain reported on 11/19 and, US with doppler noted probable right portal vein thrombosis; anticoagulation contraindicated.      On the early AM of 11/20, it was noted that Mrs. Cleaning was no longer able to move bilateral lower extremities and left upper extremity (this is a change as she was having improved strength in left upper/lower extremity previously and able to move RLE). Additionally, she was more somnolent with expressive aphasia noted. Discussions with vascular surgery, vascular neurology and [facility] team held with plans for stat CT head. Spinal drain opened up to drain 30 ml's in first hour and then 15 ml/hr subsequent to that. Additionally, levophed started to maintain MAP of 100 mm Hg but aim to keep SBP <160 given concern for spinal ischemia, but with subsequent hemorrhagic CVA. MRI Brain and spine performed on 11/21 with stable acute right thalamic hemorrhage, small punctate foci in posterior temporal/parietal regions, no cord edema or signs of cord infarction.  Episode of emesis evening of 11/21, imaging suggesting ileus.  NG placed with 800 ml output.  Lactulose enemas given with increased stool output.  LP drain clamped and removed 11/23.     Interval History/Significant Events:  Patient pulled out NG tube yesterday and new NG replaced overnight. NG tube to LIS with some red tinged output. 5 watery bowel movements overnight. Still complaining of abdominal pain. LP drain clamped this AM.     Review of Systems   Unable to perform ROS: Mental status change   Gastrointestinal: Positive for nausea. Negative for abdominal pain.     Objective:     Vital Signs (Most Recent):  Temp: 97.4 °F (36.3 °C) (11/23/18 0700)  Pulse: 81 (11/23/18 0754)  Resp: 17 (11/23/18 0754)  BP: (!) 158/84 (11/23/18 0700)  SpO2: (!) 94 % (11/23/18 0754) Vital Signs (24h Range):  Temp:  [96 °F (35.6 °C)-98 °F (36.7 °C)] 97.4 °F (36.3 °C)  Pulse:  [73-86] 81  Resp:  [9-36] 17  SpO2:  [92 %-100 %] 94 %  BP: (140-170)/(72-86) 158/84   Weight: 68.3 kg (150 lb 9.2 oz)  Body mass index is 24.3 kg/m².      Intake/Output Summary (Last 24 hours) at 11/23/2018 0823  Last data filed at 11/23/2018 0700  Gross per 24 hour   Intake 1378 ml   Output 1654 ml   Net -276 ml       Physical Exam   Constitutional: She appears well-developed and well-nourished. She has a sickly appearance. She is restrained. Nasal cannula in place.   HENT:   Head: Normocephalic and atraumatic.   Eyes: EOM are normal. Scleral icterus is present. Right eye exhibits no nystagmus. Left eye exhibits no nystagmus.   Neck: Normal range of motion. No tracheal deviation present. No thyromegaly present.   Cardiovascular: Normal rate, regular rhythm and intact distal pulses. Exam reveals no gallop and no friction rub.   No murmur heard.  Pulses:       Dorsalis pedis pulses are 3+ on the right side, and 3+ on the left side.   Pulmonary/Chest: Effort normal. No stridor. She has no decreased breath sounds. She has no wheezes. She has rhonchi (expiratory upper airway). She has no rales.   Abdominal: Soft. She exhibits distension (firm). Bowel sounds are decreased. There is tenderness.   Genitourinary:   Genitourinary Comments: Perry in place   Musculoskeletal: Normal range of  motion.   Neurological: She is alert. No sensory deficit.   Mumbled speech. Following commands. Spontaneous movement of bilateral upper extremities and right lower extremity. Reports sensation of LLE but unable to move. Strength weaker in LUE than RUE. Good strength in RLE.    Skin: Skin is warm. She is diaphoretic.       Vents:  Oxygen Concentration (%): 40 (11/21/18 1800)  Lines/Drains/Airways     Central Venous Catheter Line                 Percutaneous Central Line Insertion/Assessment - triple lumen  11/15/18 right internal jugular 8 days          Drain                 Lumbar Drain 11/15/18 1500 7 days         Urethral Catheter 11/15/18 1518 Non-latex;Straight-tip 16 Fr. 7 days         NG/OG Tube 11/22/18 2000 Right nostril less than 1 day          Peripheral Intravenous Line                 Peripheral IV - Single Lumen 11/19/18 1300 Anterior;Right Upper Arm 3 days              Significant Labs:    CBC/Anemia Profile:  Recent Labs   Lab 11/22/18  1507 11/22/18  2113 11/23/18  0256   WBC 9.88 9.04 9.56   HGB 8.7* 8.7* 8.9*   HCT 27.0* 26.3* 27.5*   PLT 42* 41* 40*   MCV 94 93 95   RDW 19.1* 19.4* 19.9*        Chemistries:  Recent Labs   Lab 11/22/18  0303 11/22/18  1702 11/23/18  0256    148* 151*   K 3.6 3.2* 3.4*   * 113* 116*   CO2 22* 23 23   BUN 90* 83* 83*   CREATININE 1.8* 1.6* 1.6*   CALCIUM 9.3 9.3 9.3   ALBUMIN 3.0*  3.0* 2.9* 3.0*  3.0*   PROT 7.5  --  7.4   BILITOT 7.4*  --  7.8*   ALKPHOS 111  --  124   ALT 66*  --  57*   *  --  148*   MG 2.4  --  2.5   PHOS 2.6*  2.6* 2.9 2.6*  2.6*       All pertinent labs within the past 24 hours have been reviewed.    Significant Imaging:  I have reviewed and interpreted all pertinent imaging results/findings within the past 24 hours.    Assessment/Plan:     Neuro   Encephalopathy, metabolic    --multifactorial: thalamic CVA, hepatic encephalopathy, delirium   --see above. Continue lactulose, rifaxamin   --minimize deliriogenic  medications.      Nontraumatic thalamic hemorrhage    --Noted on 11/17 with left hemiparesis, dysarthria, left sided facial droop. These symptoms improved through 11/19 with no intervention per vascular neurology other than maintaining SBP <160 and correcting coagulopathy for goal INR <1.5, platelets >50,000 and fibrinogen >100  --worsening of neurologic status again on 11/20 with worsening dysarthria, LUE, LLE, RLE paralysis.   --Repeat stat CT head 11/20 with stable thalamic hemorrhage; no acute abnormalities  --spinal drain draining per vascular surgery recommendations.   --in discussions with vascular neurology, vascular surgery, plan to leave spinal drain open to drain 15 ml/hr, maintain MAP of 100 and SBP <160. Continue correcting coagulopathy  --hourly neuro checks   --CT cervical and CT thoracic without contrast 11/21 no detection of spinal cord hematoma however sub optimal evaluation of spinal cord without contrast  --MRI brain with unchanged hemorrhage.   --Clamped LP drain today due to improvement in neurologic exam. Plans for IR to remove drain today.         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   --Drain now closed with plans to remove today by IR.   --continue neuro/ neurovascular exams          Pseudoaneurysm of left femoral artery    --noted 11/16. Hematoma appears to be improving per physical exam.   --continue to monitor      Essential hypertension    --goal MAP of 100, SBP <160 per vascular surgery and neurology recommendations  --continue levophed PRN; has not required.      Renal/   Hypernatremia    --Na 151; free water  deficit 3.2L  --correcting with D5W     KARL (acute kidney injury)    --FeUrea 33.16%.  Suspect a component of iATN given recent hemorrhagic shock.   --no hydronephrosis on CT imaging.   --bun increasing, sCr grossly unchanged.    --administer 1 L crystalloid.   --continue to trend for now. Urine output marginal, no need for RRT at this time.   --avoid nephrotoxic agents if possible.       Hematology   Hypofibrinogenemia    --transfuse cryo for fibrinogen goal >100       Portal vein thrombosis    --noted on US liver with doppler on 11/19. Holding anticoagulation given above bleeding/coagulopathy  --consider repeating imaging in next few days     Thrombocytopenia    --2/2 cirrhosis  --plts goal >50,000     Coagulopathy    --2/2 cirrhosis  --goal INR 1.5- 1.8  given hemorrhagic CVA. Continue vitamin K and FFP as needed        Oncology   Acute blood loss anemia    --transfuse to maintain Hgb 8-9   --trend      GI   Ileus    --ileus noted on imaging from 11/21.  Bowel sounds present.  NG output decreasing (bilious). Multiple bowel movements.   --given enema.   --Likely resolved.      Transaminitis    --suspect shock liver  --improving      Chronic hepatitis C with cirrhosis    --per patient, has known about cirrhosis for >10 years. Has never received treatment for Hep C  --continue supportive care for coagulopathy, thrombocytopenia in setting of Type B aortic dissection with hemorrhagic shock and new thalamic bleed   --CT abd/pelvis 11/21 with small ileus; NG placed.  Multiple bowel movements overnight, likely ileus resolved.   --Continue lactulose enemas.      Orthopedic   Acute midline thoracic back pain    --resolved      Other   Goals of care, counseling/discussion    --Goals of care discussion with Ms. Cleaning and her sister  --Code status changed to DNR     Hemorrhagic shock    --required levo, gurwinder, and vaso on 11/16 with bleeding from bilateral groin sites s/p TEVAR   --shock resolved with volume resuscitation,  improving coagulopathy  --trend CBC's and target Hgb 8-9                  Critical Care Daily Checklist:    A: Awake: RASS Goal/Actual Goal: RASS Goal: 0-->alert and calm  Actual: Vergara Agitation Sedation Scale (RASS): Alert and calm   B: Spontaneous Breathing Trial Performed?     C: SAT & SBT Coordinated?  N/A                      D: Delirium: CAM-ICU Overall CAM-ICU: Positive   E: Early Mobility Performed? Yes   F: Feeding Goal: Goals: Patient to receive nutrition by RD follow-up  Status: Nutrition Goal Status: new   Current Diet Order   Procedures    Diet NPO      AS: Analgesia/Sedation None   T: Thromboembolic Prophylaxis Holding due to thrombocytopenia/coagulopathy    H: HOB > 300 Yes   U: Stress Ulcer Prophylaxis (if needed) Famotidine   G: Glucose Control wnl   B: Bowel Function     I: Indwelling Catheter (Lines & Reese) Necessity Removing reese and central line today. Will continue 2 PIVs and flexiseal.   D: De-escalation of Antimicrobials/Pharmacotherapies None    Plan for the day/ETD Hemodynamically stabilize and supportive care    Code Status:  Family/Goals of Care: DNR       Critical Care Time: 45 minutes  Critical secondary to Patient has a condition that poses threat to life and bodily function: aortic dissection type B s/p TEVAR, thalamic hemorrhage, and decompensated liver cirrhosis.      Critical care was time spent personally by me on the following activities: development of treatment plan with patient or surrogate and bedside caregivers, discussions with consultants, evaluation of patient's response to treatment, examination of patient, ordering and performing treatments and interventions, ordering and review of laboratory studies, ordering and review of radiographic studies, pulse oximetry, re-evaluation of patient's condition. This critical care time did not overlap with that of any other provider or involve time for any procedures.     Any Hoover PA-C  Critical Care  Medicine  [facility]

## 2018-11-23 NOTE — PLAN OF CARE
Problem: Patient Care Overview  Goal: Plan of Care Review  Outcome: Outcome(s) achieved Date Met: 11/22/18  Pt AAOx2. Pt disoriented to place and situation. Pt follows commands. Moves RUE, RLE, and LUE spontaneously but does not move LLE. Sensation present in LLE but does not withdraw from pain. Pt on 11L O2, sats >95%. HR 70s-80s NSR. MAP goal >100 maintained. Pt axillary temp 96 degrees F, using bearhugger. CVPs 13-15. ICPs 2-5. Lumbar drainage 0-14cc/hr. UO 50-85 cc/hr. No output from NG tube. Restraints in use in order to prevent lines being pulled. 1 unit cryo, 1 unit platelets, and 1L LR given this shift. Brown bomb enema given; small chunks hard sincere-like stool. Pt remains flat in bed, weight shift assistance provided. Plan of care reviewed. Questions and concerns addressed. Will continue to monitor.     Problem: Infection, Risk/Actual (Adult)  Goal: Identify Related Risk Factors and Signs and Symptoms  Related risk factors and signs and symptoms are identified upon initiation of Human Response Clinical Practice Guideline (CPG)  Outcome: Ongoing (interventions implemented as appropriate)  .    Problem: Pressure Ulcer Risk (Pepe Scale) (Adult,Obstetrics,Pediatric)  Goal: Identify Related Risk Factors and Signs and Symptoms  Related risk factors and signs and symptoms are identified upon initiation of Human Response Clinical Practice Guideline (CPG)  Outcome: Ongoing (interventions implemented as appropriate)  .    Problem: Spiritual Distress, Risk/Actual (Adult,Obstetrics,Pediatric)  Goal: Identify Related Risk Factors and Signs and Symptoms  Related risk factors and signs and symptoms are identified upon initiation of Human Response Clinical Practice Guideline (CPG)  Outcome: Ongoing (interventions implemented as appropriate)  .    Problem: Restraint, Nonbehavioral (nonviolent)  Goal: Clinical Justification  Outcome: Ongoing (interventions implemented as appropriate)  .

## 2018-11-23 NOTE — PROGRESS NOTES
[facility]  Vascular Neurology  Comprehensive Stroke Center  Progress Note    Assessment/Plan:     Nontraumatic thalamic hemorrhage      Patient is a 53 y.o. female with a h/o HTN, Hep C, cirrhosis (MELD 15),Hx of pancreatitis, GERD, and illicit substance abuse (crack cocaine/THC) admitted for thoracic aortic dissection treated with TEVAR and Lumbar drain, had Acute right thalamic hemorrhage on CTH.   Etiology - Hemorrhagic transformation of Right thalamic stroke v/s Primary right thalamic hemorrhagic stroke v/s End Stage live disease v/s 2/2 Lumbar drain with increased SFP / MAP gradient.   11/20/18: Sudden LOF of both lower extremities with no response to painful stimuli. Repeat CT head w/o new pathology or worsening hemorrhage.  CT head without any worsening of previous hemorrhage or new pathology. CT spine without obvious cause for new deficits. MRI head and spine showing known stable pathology, severe spinal stenosis at L4/L5 that is too low to account for presentation, and no signs of intraspinal or extraspinal hematoma that would be impinging.        11/23/18: Patient regained much use of her LUE. Very alert today. Appears to have significantly more scleral icterus and abdominal distension.    Recommend:   -Removed spinal drain   -Continue current treatment  -SBP goal <160   -Continue frequent neuro checks       Antithrombotics for secondary stroke prevention:  Hold Antiplatelets    Statins for secondary stroke prevention and hyperlipidemia, if present: rec getting  LDL and start Lipitor if LDL > 130      Aggressive risk factor modification: HTN, Illicit drug use     Rehab efforts: PT/OT/SLP to evaluate and treat    Diagnostics ordered/pending: None     VTE prophylaxis: dvt ppx and scd's    BP parameters:  SBP <160         Essential hypertension    - rec SBP of ~ 160          No notes on file    STROKE DOCUMENTATION   Acute Stroke Times   Last Known Normal Date: 11/17/18  Last Known Normal  Time: 0700  Symptom Onset Date: 11/17/18  Stroke Team Called Date: 11/17/18  Stroke Team Called Time: 0800  Stroke Team Arrival Date: 11/17/18  Stroke Team Arrival Time: 0805  CT Interpretation Time: 0834    NIH Scale:  1a. Level Of Consciousness: 0-->Alert: keenly responsive  1b. LOC Questions: 0-->Answers both questions correctly  1c. LOC Commands: 0-->Performs both tasks correctly  2. Best Gaze: 0-->Normal  3. Visual: 0-->No visual loss  4. Facial Palsy: 1-->Minor paralysis (flattened nasolabial fold, asymmetry on smiling)  5a. Motor Arm, Left: 2-->Some effort against gravity: limb cannot get to or maintain (if cued) 90 (or 45) degrees, drifts down to bed, but has some effort against gravity  5b. Motor Arm, Right: 0-->No drift: limb holds 90 (or 45) degrees for full 10 secs  6a. Motor Leg, Left: 4-->No movement  6b. Motor Leg, Right: 3-->No effort against gravity: leg falls to bed immediately  7. Limb Ataxia: 2-->Present in two limbs  8. Sensory: 1-->Mild-to-moderate sensory loss: patient feels pinprick is less sharp or is dull on the affected side: or there is a loss of superficial pain with pinprick, but patient is aware of being touched  9. Best Language: 1-->Mild-to-moderate aphasia: some obvious loss of fluency or facility of comprehension, without significant limitation on ideas expressed or form of expression. Reduction of speech and/or comprehension, however, makes conversation. . . (see row details)  10. Dysarthria: 1-->Mild-to-moderate dysarthria: patient slurs at least some words and, at worst, can be understood with some difficulty  11. Extinction and Inattention (formerly Neglect): 0-->No abnormality  Total (NIH Stroke Scale): 15       Modified Nitesh Score: 0  Romero Coma Scale:    ABCD2 Score:    [id number] Score:   HAS -BLED Score:   ICH Score:   Hunt & Farnsworth Classification:        Neurologic Chief Complaint: dysarthria, poor attention span, LUE weakness, LLE paralgia, RLE weakness  Right  Thalamic hemorrhage     Subjective:     Interval History: Patient is seen for follow-up neurological assessment and treatment recommendations: Now has movement in LUE hand. Will remove spinal drain.      HPI, Past Medical, Family, and Social History remains the same as documented in the initial encounter.     Review of Systems   Constitutional: Negative for chills and fever.   Respiratory: Negative for cough and shortness of breath.    Cardiovascular: Negative for chest pain and palpitations.   Gastrointestinal: Negative for nausea and vomiting. Abdominal pain: RUQ    Musculoskeletal: Positive for neck pain (discomfort at RIJ site). Negative for back pain and neck stiffness.   Neurological: Negative for tremors, seizures, syncope, weakness, numbness and headaches.   Hematological: Bruises/bleeds easily.   Psychiatric/Behavioral: Positive for confusion and decreased concentration. Negative for agitation and behavioral problems.     Scheduled Meds:   famotidine (PF)  20 mg Intravenous Daily    lactulose  200 g Rectal BID     Continuous Infusions:   norepinephrine bitartrate-D5W 0.02 mcg/kg/min (11/20/18 0744)     PRN Meds:sodium chloride, sodium chloride, sodium chloride, sodium chloride, ondansetron    Objective:     Vital Signs (Most Recent):  Temp: 96.7 °F (35.9 °C) (11/23/18 1038)  Pulse: 82 (11/23/18 1129)  Resp: (!) 28 (11/23/18 1129)  BP: (!) 145/82 (11/23/18 1100)  SpO2: (!) 94 % (11/23/18 1129)  BP Location: Left arm    Vital Signs Range (Last 24H):  Temp:  [96 °F (35.6 °C)-97.4 °F (36.3 °C)]   Pulse:  [73-87]   Resp:  [9-36]   BP: (141-175)/(72-88)   SpO2:  [92 %-100 %]   BP Location: Left arm    Physical Exam   Constitutional: She appears well-developed and well-nourished.   HENT:   Head: Normocephalic and atraumatic.   Eyes: EOM are normal. Pupils are equal, round, and reactive to light. Scleral icterus is present.   Neck: Normal range of motion. Neck supple.   Cardiovascular: Normal rate and regular  rhythm.   Pulmonary/Chest: Effort normal and breath sounds normal.   Abdominal: Soft. Bowel sounds are normal. She exhibits distension. There is no guarding.   Genitourinary:   Genitourinary Comments: Perry in place   Musculoskeletal: She exhibits no edema or deformity.   Neurological: She is alert.   LUE weakness   Skin: Skin is warm and dry. Capillary refill takes less than 2 seconds.   Nursing note and vitals reviewed.      Neurological Exam:   LOC: alert, mild cues to cooperate   Attention Span: poor  Language: mild aphasia  Articulation: mild dysarthria  Orientation: AOx1, not time or place  Visual Fields: Full  EOM (CN III, IV, VI): Full/intact  Pupils (CN II, III): PERRL  Facial Sensation (CN V): Normal  Facial Movement (CN VII): mild L facial droop   Motor: Arm left  Normal  2/5  Leg left  Normal 0/5  Arm right  Normal 5/5  Leg right Normal 2/5  Sensation: Able to feel light touch on proximal legs and R lower leg.   Tone: Decreased tone throughout LEs and LUE.   Reflexes +2 throughout       Laboratory:    Recent Results (from the past 24 hour(s))   Fibrinogen    Collection Time: 11/22/18  3:07 PM   Result Value Ref Range    Fibrinogen 124 (L) 182 - 366 mg/dL   CBC auto differential    Collection Time: 11/22/18  3:07 PM   Result Value Ref Range    WBC 9.88 3.90 - 12.70 K/uL    RBC 2.86 (L) 4.00 - 5.40 M/uL    Hemoglobin 8.7 (L) 12.0 - 16.0 g/dL    Hematocrit 27.0 (L) 37.0 - 48.5 %    MCV 94 82 - 98 fL    MCH 30.4 27.0 - 31.0 pg    MCHC 32.2 32.0 - 36.0 g/dL    RDW 19.1 (H) 11.5 - 14.5 %    Platelets 42 (L) 150 - 350 K/uL    MPV 11.7 9.2 - 12.9 fL    Immature Granulocytes 0.6 (H) 0.0 - 0.5 %    Gran # (ANC) 8.1 (H) 1.8 - 7.7 K/uL    Immature Grans (Abs) 0.06 (H) 0.00 - 0.04 K/uL    Lymph # 0.6 (L) 1.0 - 4.8 K/uL    Mono # 1.0 0.3 - 1.0 K/uL    Eos # 0.0 0.0 - 0.5 K/uL    Baso # 0.01 0.00 - 0.20 K/uL    nRBC 1 (A) 0 /100 WBC    Gran% 82.3 (H) 38.0 - 73.0 %    Lymph% 6.5 (L) 18.0 - 48.0 %    Mono% 10.5 4.0 -  15.0 %    Eosinophil% 0.0 0.0 - 8.0 %    Basophil% 0.1 0.0 - 1.9 %    Differential Method Automated    Renal function panel    Collection Time: 11/22/18  5:02 PM   Result Value Ref Range    Glucose 108 70 - 110 mg/dL    Sodium 148 (H) 136 - 145 mmol/L    Potassium 3.2 (L) 3.5 - 5.1 mmol/L    Chloride 113 (H) 95 - 110 mmol/L    CO2 23 23 - 29 mmol/L    BUN, Bld 83 (H) 6 - 20 mg/dL    Calcium 9.3 8.7 - 10.5 mg/dL    Creatinine 1.6 (H) 0.5 - 1.4 mg/dL    Albumin 2.9 (L) 3.5 - 5.2 g/dL    Phosphorus 2.9 2.7 - 4.5 mg/dL    eGFR if  42.1 (A) >60 mL/min/1.73 m^2    eGFR if non  36.5 (A) >60 mL/min/1.73 m^2    Anion Gap 12 8 - 16 mmol/L   CBC auto differential    Collection Time: 11/22/18  9:13 PM   Result Value Ref Range    WBC 9.04 3.90 - 12.70 K/uL    RBC 2.84 (L) 4.00 - 5.40 M/uL    Hemoglobin 8.7 (L) 12.0 - 16.0 g/dL    Hematocrit 26.3 (L) 37.0 - 48.5 %    MCV 93 82 - 98 fL    MCH 30.6 27.0 - 31.0 pg    MCHC 33.1 32.0 - 36.0 g/dL    RDW 19.4 (H) 11.5 - 14.5 %    Platelets 41 (L) 150 - 350 K/uL    MPV 13.1 (H) 9.2 - 12.9 fL    Immature Granulocytes 0.8 (H) 0.0 - 0.5 %    Gran # (ANC) 7.8 (H) 1.8 - 7.7 K/uL    Immature Grans (Abs) 0.07 (H) 0.00 - 0.04 K/uL    Lymph # 0.4 (L) 1.0 - 4.8 K/uL    Mono # 0.8 0.3 - 1.0 K/uL    Eos # 0.0 0.0 - 0.5 K/uL    Baso # 0.01 0.00 - 0.20 K/uL    nRBC 1 (A) 0 /100 WBC    Gran% 85.7 (H) 38.0 - 73.0 %    Lymph% 4.8 (L) 18.0 - 48.0 %    Mono% 8.6 4.0 - 15.0 %    Eosinophil% 0.0 0.0 - 8.0 %    Basophil% 0.1 0.0 - 1.9 %    Differential Method Automated    POCT glucose    Collection Time: 11/22/18  9:13 PM   Result Value Ref Range    POCT Glucose 110 70 - 110 mg/dL   POCT glucose    Collection Time: 11/23/18 12:18 AM   Result Value Ref Range    POCT Glucose 108 70 - 110 mg/dL   Hepatic function panel    Collection Time: 11/23/18  2:56 AM   Result Value Ref Range    Total Protein 7.4 6.0 - 8.4 g/dL    Albumin 3.0 (L) 3.5 - 5.2 g/dL    Total Bilirubin 7.8 (H) 0.1  - 1.0 mg/dL    Bilirubin, Direct 4.8 (H) 0.1 - 0.3 mg/dL     (H) 10 - 40 U/L    ALT 57 (H) 10 - 44 U/L    Alkaline Phosphatase 124 55 - 135 U/L   Renal function panel    Collection Time: 11/23/18  2:56 AM   Result Value Ref Range    Glucose 98 70 - 110 mg/dL    Sodium 151 (H) 136 - 145 mmol/L    Potassium 3.4 (L) 3.5 - 5.1 mmol/L    Chloride 116 (H) 95 - 110 mmol/L    CO2 23 23 - 29 mmol/L    BUN, Bld 83 (H) 6 - 20 mg/dL    Calcium 9.3 8.7 - 10.5 mg/dL    Creatinine 1.6 (H) 0.5 - 1.4 mg/dL    Albumin 3.0 (L) 3.5 - 5.2 g/dL    Phosphorus 2.6 (L) 2.7 - 4.5 mg/dL    eGFR if  42.1 (A) >60 mL/min/1.73 m^2    eGFR if non  36.5 (A) >60 mL/min/1.73 m^2    Anion Gap 12 8 - 16 mmol/L   Magnesium    Collection Time: 11/23/18  2:56 AM   Result Value Ref Range    Magnesium 2.5 1.6 - 2.6 mg/dL   Fibrinogen    Collection Time: 11/23/18  2:56 AM   Result Value Ref Range    Fibrinogen 147 (L) 182 - 366 mg/dL   Protime-INR    Collection Time: 11/23/18  2:56 AM   Result Value Ref Range    Prothrombin Time 19.5 (H) 9.0 - 12.5 sec    INR 2.0 (H) 0.8 - 1.2   CBC auto differential    Collection Time: 11/23/18  2:56 AM   Result Value Ref Range    WBC 9.56 3.90 - 12.70 K/uL    RBC 2.90 (L) 4.00 - 5.40 M/uL    Hemoglobin 8.9 (L) 12.0 - 16.0 g/dL    Hematocrit 27.5 (L) 37.0 - 48.5 %    MCV 95 82 - 98 fL    MCH 30.7 27.0 - 31.0 pg    MCHC 32.4 32.0 - 36.0 g/dL    RDW 19.9 (H) 11.5 - 14.5 %    Platelets 40 (L) 150 - 350 K/uL    MPV 13.9 (H) 9.2 - 12.9 fL    Immature Granulocytes 0.8 (H) 0.0 - 0.5 %    Gran # (ANC) 7.8 (H) 1.8 - 7.7 K/uL    Immature Grans (Abs) 0.08 (H) 0.00 - 0.04 K/uL    Lymph # 0.7 (L) 1.0 - 4.8 K/uL    Mono # 1.0 0.3 - 1.0 K/uL    Eos # 0.0 0.0 - 0.5 K/uL    Baso # 0.01 0.00 - 0.20 K/uL    nRBC 0 0 /100 WBC    Gran% 81.4 (H) 38.0 - 73.0 %    Lymph% 6.8 (L) 18.0 - 48.0 %    Mono% 10.8 4.0 - 15.0 %    Eosinophil% 0.1 0.0 - 8.0 %    Basophil% 0.1 0.0 - 1.9 %    Platelet Estimate Decreased  (A)     Aniso Moderate     Poik Slight     Poly Occasional     Hypo Occasional     Target Cells Occasional     [place] Cells Occasional     Basophilic Stippling Occasional     Pappenheimer Bodies Present     Differential Method Automated    Phosphorus    Collection Time: 11/23/18  2:56 AM   Result Value Ref Range    Phosphorus 2.6 (L) 2.7 - 4.5 mg/dL   POCT glucose    Collection Time: 11/23/18  2:56 AM   Result Value Ref Range    POCT Glucose 106 70 - 110 mg/dL   Prepare Fresh Frozen Plasma 2 Units    Collection Time: 11/23/18  7:27 AM   Result Value Ref Range    UNIT NUMBER [id number]     PRODUCT CODE U3452M19     DISPENSE STATUS ISSUED     CODING SYSTEM [id number]     Unit Blood Type Code 5100     Unit Blood Type O POS     Unit Expiration [id number]     UNIT NUMBER [id number]     PRODUCT CODE A5898H66     DISPENSE STATUS ISSUED     CODING SYSTEM [id number]     Unit Blood Type Code 5100     Unit Blood Type O POS     Unit Expiration [id number]    Prepare Platelets 1 Dose    Collection Time: 11/23/18  7:28 AM   Result Value Ref Range    UNIT NUMBER [id number]     PRODUCT CODE F8267B56     DISPENSE STATUS RETURNED     CODING SYSTEM [id number]     Unit Blood Type Code 5100     Unit Blood Type O POS     Unit Expiration [id number]          Diagnostic Results     Brain imaging:     MRI DWI brain  11/21/18:   Stable acute right thalamic hemorrhage with surrounding edema and mass effect.  Small punctate foci of diffusion signal abnormality in the posterior temporal/parietal regions with low ADC signal and subtle FLAIR signal abnormality suggesting punctate infarcts. No evidence major vascular territory infarct.    MRI total spine 11/21/18:   Mildly increased central T2 signal of the spinal cord at T4 through T6 may reflect prominence of the central canal versus a small syrinx.  No cord expansion to suggest edema or acute infarction in this region.  Otherwise, no abnormal cord signal to suggest infarct.  No evidence of  epidural of subdural hematoma.  Multilevel spondylosis most severe at L5-S1 with severe spinal canal stenosis and L4-L5 with moderate to severe neural foraminal narrowing.    CT abdomen pelvis 11/20/18:   Did not show any compression of spinal cord by known hematoma    CT Cervical and thoracic spine 11/21/18:  No acute abnormality of the cervical or thoracic spine.     CTH 11/20/18  Stable right thalamic hemorrhage.  No new hemorrhage identified.      CTH - Right Thalamic hemorrhage           Vessel Imaging:     CTA head and neck - unremarkable           Cardiac Evaluation:   JORDAN 11/15/18  · Normal left ventricular systolic function. The estimated ejection fraction is 70%  · Concentric left ventricular remodeling.  · No wall motion abnormalities.  · Normal LV diastolic function.  · Normal right ventricular systolic function.  · Trace mitral regurgitation.  · Mild aortic regurgitation.  · Trace tricuspid regurgitation.  · Trace pulmonic regurgitation.  · Normal central venous pressure (3 mm Hg).  · The estimated PA systolic pressure is 24.72 mm Hg  · No pericardial effusion.          Jose Pan MD  Comprehensive Stroke Center  Department of Vascular Neurology   [facility]

## 2018-11-23 NOTE — ASSESSMENT & PLAN NOTE
53 F with Hep C cirrhosis (MELD 15 on admission), HTN, crack cocaine abuse, who presented with acute tearing chest/back pain to OSH, Found to have retrograde type B dissection. Despite aggressive anti-impulse therapy, persistent pain requiring intervention. Preoperative CSF drain placement by Neuro IR. Successful TEVAR. POD 1 left groin hematoma and coagulopathy requiring manual pressure and product resuscitation. POD 2 new onset left upper arm weakness, found to have right thalamic hemorrhage 2/2 HTN. F/u CT head stable findings. Significant reactive left pleural effusion with surrounding atelectasis noted also.     Neuro: Pt lethargic; moves LUE hand  - Spinal drain 15 cc per hr (output 6-14cc); goal map >100  - Discussed with stroke team, will keep SBP <160  - Plan to clamp spinal drain once coordinated with IR to remove; continue neurovascular exams  - CT and MRI spine without evidence of spinal cord hematoma or infarct  - MRI head with mass effect; stroke was hemorrhagic 2/2 HTN not embolic event from TEVAR   right thalamic bleed  - con't q1h neuro checks    CV:   - ease the spinal perfusion protocol. BP <160, MAP >100    Resp:  - encourage IS  - O2 supp prn  - Moderate Left pleural effusion and small right pleural effusion on CTA stoke multiphase noted. Left pleural effusion likely reactive following TEVAR. Con't pulmonary toilet.     Renal/Endocrine:  - con't monitoring UOP  - BUN 83 from 90, stable to slight improvement in Cr  - Recommend nephrology consult  - Recommend IVF as pt NPO with KARL    GI: Hep C cirrhosis with MELD 15 on admission  - protonix  - NPO diet; consider alternat route of nutrition as pt has had minimal PO intake and is now NPO  - Concern for ileus on imaging; NGT  - T bili increased 7.8 from 7  - Recommend hepatology consult for cirrhosis and concern for metabolic encephalopathy    Heme/ID:  - ABLA stable.   - con't correct coagulopathy prn  - monitor thrombocytopenia     Dispo:  - ICU  care

## 2018-11-23 NOTE — SUBJECTIVE & OBJECTIVE
Medications:  Continuous Infusions:   norepinephrine bitartrate-D5W 0.02 mcg/kg/min (11/20/18 0744)     Scheduled Meds:   famotidine (PF)  20 mg Intravenous Daily    lactulose  200 g Rectal TID     PRN Meds:sodium chloride, sodium chloride, ondansetron     Objective:     Vital Signs (Most Recent):  Temp: 96.1 °F (35.6 °C) (11/23/18 0500)  Pulse: 81 (11/23/18 0630)  Resp: (!) 21 (11/23/18 0630)  BP: (!) 156/80 (11/23/18 0600)  SpO2: 96 % (11/23/18 0630) Vital Signs (24h Range):  Temp:  [96 °F (35.6 °C)-98.1 °F (36.7 °C)] 96.1 °F (35.6 °C)  Pulse:  [73-86] 81  Resp:  [9-36] 21  SpO2:  [92 %-100 %] 96 %  BP: (140-170)/(72-86) 156/80          Physical Exam   Constitutional: She is oriented to person, place, and time. She appears well-developed and well-nourished. She appears lethargic.   HENT:   Head: Normocephalic and atraumatic.   Eyes: EOM are normal. Pupils are equal, round, and reactive to light.   Neck: Normal range of motion. Neck supple.   Cardiovascular: Normal rate and regular rhythm.   Pulmonary/Chest: Effort normal and breath sounds normal.   Abdominal: Soft. Bowel sounds are normal.   Genitourinary:   Genitourinary Comments: Perry in place   Musculoskeletal:   LUE wiggles fingers 2/5   RUE 5/5, intact fine motor movement  LLE does not withdrawal to pain  RLE knee flexion, and moves toes  CSF drain site dressing c/d/i   Neurological: She is oriented to person, place, and time. She appears lethargic.   Skin: Skin is warm.       Significant Labs:  CBC:   Recent Labs   Lab 11/23/18 0256   WBC 9.56   RBC 2.90*   HGB 8.9*   HCT 27.5*   PLT 40*   MCV 95   MCH 30.7   MCHC 32.4     CMP:   Recent Labs   Lab 11/23/18 0256   GLU 98   CALCIUM 9.3   ALBUMIN 3.0*  3.0*   PROT 7.4   *   K 3.4*   CO2 23   *   BUN 83*   CREATININE 1.6*   ALKPHOS 124   ALT 57*   *   BILITOT 7.8*     Coagulation:   Recent Labs   Lab 11/23/18  0256   LABPROT 19.5*   INR 2.0*

## 2018-11-23 NOTE — PROGRESS NOTES
[facility]  Vascular Surgery  Progress Note    Patient Name: Leah Cleaning  MRN: [id number]  Admission Date: 11/15/2018  Primary Care Provider: Mary Colorado MD    Subjective:     Interval History: NAEON. Now has movement in LUE hand. Plan to clamp spinal drain and have IR remove once we have goals for INR and PLT.    Post-Op Info:  Procedure(s) (LRB):  REPAIR, ANEURYSM, ENDOVASCULAR GRAFT, AORTA, THORACIC (N/A)   8 Days Post-Op       Medications:  Continuous Infusions:   norepinephrine bitartrate-D5W 0.02 mcg/kg/min (11/20/18 0744)     Scheduled Meds:   famotidine (PF)  20 mg Intravenous Daily    lactulose  200 g Rectal TID     PRN Meds:sodium chloride, sodium chloride, ondansetron     Objective:     Vital Signs (Most Recent):  Temp: 96.1 °F (35.6 °C) (11/23/18 0500)  Pulse: 81 (11/23/18 0630)  Resp: (!) 21 (11/23/18 0630)  BP: (!) 156/80 (11/23/18 0600)  SpO2: 96 % (11/23/18 0630) Vital Signs (24h Range):  Temp:  [96 °F (35.6 °C)-98.1 °F (36.7 °C)] 96.1 °F (35.6 °C)  Pulse:  [73-86] 81  Resp:  [9-36] 21  SpO2:  [92 %-100 %] 96 %  BP: (140-170)/(72-86) 156/80          Physical Exam   Constitutional: She is oriented to person, place, and time. She appears well-developed and well-nourished. She appears lethargic.   HENT:   Head: Normocephalic and atraumatic.   Eyes: EOM are normal. Pupils are equal, round, and reactive to light.   Neck: Normal range of motion. Neck supple.   Cardiovascular: Normal rate and regular rhythm.   Pulmonary/Chest: Effort normal and breath sounds normal.   Abdominal: Soft. Bowel sounds are normal.   Genitourinary:   Genitourinary Comments: Perry in place   Musculoskeletal:   LUE wiggles fingers 2/5   RUE 5/5, intact fine motor movement  LLE does not withdrawal to pain  RLE knee flexion, and moves toes  CSF drain site dressing c/d/i   Neurological: She is oriented to person, place, and time. She appears lethargic.   Skin: Skin is warm.       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 Despite aggressive anti-impulse therapy, persistent pain requiring intervention. Preoperative CSF drain placement by Neuro IR. Successful TEVAR. POD 1 left groin hematoma and coagulopathy requiring manual pressure and product resuscitation. POD 2 new onset left upper arm weakness, found to have right thalamic hemorrhage 2/2 HTN. F/u CT head stable findings. Significant reactive left pleural effusion with surrounding atelectasis noted also.     Neuro: Pt lethargic; moves LUE hand  - Spinal drain 15 cc per hr (output 6-14cc); goal map >100  - Discussed with stroke team, will keep SBP <160  - Plan to clamp spinal drain once coordinated with IR to remove; continue neurovascular exams  - CT and MRI spine without evidence of spinal cord hematoma or infarct  - MRI head with mass effect; stroke was hemorrhagic 2/2 HTN not embolic event from TEVAR   right thalamic bleed  - con't q1h neuro checks    CV:   - ease the spinal perfusion protocol. BP <160, MAP >100    Resp:  - encourage IS  - O2 supp prn  - Moderate Left pleural effusion and small right pleural effusion on CTA stoke multiphase noted. Left pleural effusion likely reactive following TEVAR. Con't pulmonary toilet.     Renal/Endocrine:  - con't monitoring UOP  - BUN 83 from 90, stable to slight  improvement in Cr  - Recommend nephrology consult  - Recommend IVF as pt NPO with KARL    GI: Hep C cirrhosis with MELD 15 on admission  - protonix  - NPO diet; consider alternat route of nutrition as pt has had minimal PO intake and is now NPO  - Concern for ileus on imaging; NGT  - T bili increased 7.8 from 7  - Recommend hepatology consult for cirrhosis and concern for metabolic encephalopathy    Heme/ID:  - ABLA stable.   - con't correct coagulopathy prn  - monitor thrombocytopenia     Dispo:  - ICU care          Perla Castro MD  Vascular Surgery  [facility]

## 2018-11-23 NOTE — ASSESSMENT & PLAN NOTE
Patient is a 53 y.o. female with a h/o HTN, Hep C, cirrhosis (MELD 15),Hx of pancreatitis, GERD, and illicit substance abuse (crack cocaine/THC) admitted for thoracic aortic dissection treated with TEVAR and Lumbar drain, had Acute right thalamic hemorrhage on CTH.   Etiology - Hemorrhagic transformation of Right thalamic stroke v/s Primary right thalamic hemorrhagic stroke v/s End Stage live disease v/s 2/2 Lumbar drain with increased SFP / MAP gradient.   11/20/18: Sudden LOF of both lower extremities with no response to painful stimuli. Repeat CT head w/o new pathology or worsening hemorrhage.  CT head without any worsening of previous hemorrhage or new pathology. CT spine without obvious cause for new deficits. MRI head and spine showing known stable pathology, severe spinal stenosis at L4/L5 that is too low to account for presentation, and no signs of intraspinal or extraspinal hematoma that would be impinging.        11/23/18: Patient regained much use of her LUE. Very alert today. Appears to have significantly more scleral icterus and abdominal distension.    Recommend:   -Removed spinal drain   -Continue current treatment  -SBP goal <160   -Continue frequent neuro checks       Antithrombotics for secondary stroke prevention:  Hold Antiplatelets    Statins for secondary stroke prevention and hyperlipidemia, if present: rec getting  LDL and start Lipitor if LDL > 130      Aggressive risk factor modification: HTN, Illicit drug use     Rehab efforts: PT/OT/SLP to evaluate and treat    Diagnostics ordered/pending: None     VTE prophylaxis: dvt ppx and scd's    BP parameters:  SBP <160

## 2018-11-23 NOTE — ASSESSMENT & PLAN NOTE
--appreciate vascular surgery assistance  --s/p lumbar drain placement and TEVAR on 11/5  --lumbar drain reopened given acute lower extremity weakness on AM of 11/20 to drain 15 ml/hr per protocol given symptoms.   --Drain now closed with plans to remove today by IR.   --continue neuro/ neurovascular exams

## 2018-11-23 NOTE — PLAN OF CARE
Problem: Patient Care Overview  Goal: Plan of Care Review  Outcome: Ongoing (interventions implemented as appropriate)  Pt AAOx4 this shift. Pt follows commands, and moves all extremities spontaneously except for LLE. Pt on bear hugger with axillary temperature 96-97. o2 sats >93% on 11L humidified NC. CVPs 13-17. SBP maintained <160 and MAPs at approximately 100. ICPs 1-3 with lumbar drainage of 0-5cc/h. NG tube with minimal output. U/O 50-150cc/h. Lactulose enema given overnight, pt responded with dark brown loose BMs x4. Lytes replaced per order. Labs and accuchecks monitored closely. Labs reported to Dr. Mortensen. Plan of care reviewed with pt. VSS at this time. Will continue to monitor. See flow sheet for full assessment details.

## 2018-11-23 NOTE — SUBJECTIVE & OBJECTIVE
Interval History/Significant Events: Patient pulled out NG tube yesterday and new NG replaced overnight. NG tube to LIS with some red tinged output. 5 watery bowel movements overnight. Still complaining of abdominal pain. LP drain clamped this AM.     Review of Systems   Unable to perform ROS: Mental status change   Gastrointestinal: Positive for nausea. Negative for abdominal pain.     Objective:     Vital Signs (Most Recent):  Temp: 97.4 °F (36.3 °C) (11/23/18 0700)  Pulse: 81 (11/23/18 0754)  Resp: 17 (11/23/18 0754)  BP: (!) 158/84 (11/23/18 0700)  SpO2: (!) 94 % (11/23/18 0754) Vital Signs (24h Range):  Temp:  [96 °F (35.6 °C)-98 °F (36.7 °C)] 97.4 °F (36.3 °C)  Pulse:  [73-86] 81  Resp:  [9-36] 17  SpO2:  [92 %-100 %] 94 %  BP: (140-170)/(72-86) 158/84   Weight: 68.3 kg (150 lb 9.2 oz)  Body mass index is 24.3 kg/m².      Intake/Output Summary (Last 24 hours) at 11/23/2018 0823  Last data filed at 11/23/2018 0700  Gross per 24 hour   Intake 1378 ml   Output 1654 ml   Net -276 ml       Physical Exam   Constitutional: She appears well-developed and well-nourished. She has a sickly appearance. She is restrained. Nasal cannula in place.   HENT:   Head: Normocephalic and atraumatic.   Eyes: EOM are normal. Scleral icterus is present. Right eye exhibits no nystagmus. Left eye exhibits no nystagmus.   Neck: Normal range of motion. No tracheal deviation present. No thyromegaly present.   Cardiovascular: Normal rate, regular rhythm and intact distal pulses. Exam reveals no gallop and no friction rub.   No murmur heard.  Pulses:       Dorsalis pedis pulses are 3+ on the right side, and 3+ on the left side.   Pulmonary/Chest: Effort normal. No stridor. She has no decreased breath sounds. She has no wheezes. She has rhonchi (expiratory upper airway). She has no rales.   Abdominal: Soft. She exhibits distension (firm). Bowel sounds are decreased. There is tenderness.   Genitourinary:   Genitourinary Comments: Vicky in  place   Musculoskeletal: Normal range of motion.   Neurological: She is alert. No sensory deficit.   Mumbled speech. Following commands. Spontaneous movement of bilateral upper extremities and right lower extremity. Reports sensation of LLE but unable to move. Strength weaker in LUE than RUE. Good strength in RLE.    Skin: Skin is warm. She is diaphoretic.       Vents:  Oxygen Concentration (%): 40 (11/21/18 1800)  Lines/Drains/Airways     Central Venous Catheter Line                 Percutaneous Central Line Insertion/Assessment - triple lumen  11/15/18 right internal jugular 8 days          Drain                 Lumbar Drain 11/15/18 1500 7 days         Urethral Catheter 11/15/18 1518 Non-latex;Straight-tip 16 Fr. 7 days         NG/OG Tube 11/22/18 2000 Right nostril less than 1 day          Peripheral Intravenous Line                 Peripheral IV - Single Lumen 11/19/18 1300 Anterior;Right Upper Arm 3 days              Significant Labs:    CBC/Anemia Profile:  Recent Labs   Lab 11/22/18  1507 11/22/18  2113 11/23/18  0256   WBC 9.88 9.04 9.56   HGB 8.7* 8.7* 8.9*   HCT 27.0* 26.3* 27.5*   PLT 42* 41* 40*   MCV 94 93 95   RDW 19.1* 19.4* 19.9*        Chemistries:  Recent Labs   Lab 11/22/18  0303 11/22/18  1702 11/23/18  0256    148* 151*   K 3.6 3.2* 3.4*   * 113* 116*   CO2 22* 23 23   BUN 90* 83* 83*   CREATININE 1.8* 1.6* 1.6*   CALCIUM 9.3 9.3 9.3   ALBUMIN 3.0*  3.0* 2.9* 3.0*  3.0*   PROT 7.5  --  7.4   BILITOT 7.4*  --  7.8*   ALKPHOS 111  --  124   ALT 66*  --  57*   *  --  148*   MG 2.4  --  2.5   PHOS 2.6*  2.6* 2.9 2.6*  2.6*       All pertinent labs within the past 24 hours have been reviewed.    Significant Imaging:  I have reviewed and interpreted all pertinent imaging results/findings within the past 24 hours.

## 2018-11-24 PROBLEM — K56.7 ILEUS: Status: RESOLVED | Noted: 2018-11-22 | Resolved: 2018-11-24

## 2018-11-24 PROBLEM — R57.8 HEMORRHAGIC SHOCK: Status: RESOLVED | Noted: 2018-11-16 | Resolved: 2018-11-24

## 2018-11-24 PROBLEM — J96.01 ACUTE HYPOXEMIC RESPIRATORY FAILURE: Status: ACTIVE | Noted: 2018-11-24

## 2018-11-24 LAB
ALBUMIN SERPL BCP-MCNC: 3.2 G/DL
ALBUMIN SERPL BCP-MCNC: 3.2 G/DL
ALLENS TEST: ABNORMAL
ALP SERPL-CCNC: 120 U/L
ALT SERPL W/O P-5'-P-CCNC: 48 U/L
AMMONIA PLAS-SCNC: 28 UMOL/L
ANION GAP SERPL CALC-SCNC: 11 MMOL/L
ANION GAP SERPL CALC-SCNC: 12 MMOL/L
AST SERPL-CCNC: 113 U/L
BASOPHILS # BLD AUTO: 0.01 K/UL
BASOPHILS # BLD AUTO: 0.02 K/UL
BASOPHILS NFR BLD: 0.1 %
BASOPHILS NFR BLD: 0.2 %
BILIRUB DIRECT SERPL-MCNC: 4.6 MG/DL
BILIRUB SERPL-MCNC: 8.1 MG/DL
BUN SERPL-MCNC: 76 MG/DL
BUN SERPL-MCNC: 81 MG/DL
CALCIUM SERPL-MCNC: 10.1 MG/DL
CALCIUM SERPL-MCNC: 9.5 MG/DL
CHLORIDE SERPL-SCNC: 114 MMOL/L
CHLORIDE SERPL-SCNC: 117 MMOL/L
CO2 SERPL-SCNC: 20 MMOL/L
CO2 SERPL-SCNC: 24 MMOL/L
CREAT SERPL-MCNC: 1.5 MG/DL
CREAT SERPL-MCNC: 1.6 MG/DL
DELSYS: ABNORMAL
DELSYS: ABNORMAL
DIFFERENTIAL METHOD: ABNORMAL
DIFFERENTIAL METHOD: ABNORMAL
EOSINOPHIL # BLD AUTO: 0 K/UL
EOSINOPHIL # BLD AUTO: 0 K/UL
EOSINOPHIL NFR BLD: 0.1 %
EOSINOPHIL NFR BLD: 0.5 %
ERYTHROCYTE [DISTWIDTH] IN BLOOD BY AUTOMATED COUNT: 21 %
ERYTHROCYTE [DISTWIDTH] IN BLOOD BY AUTOMATED COUNT: 21 %
EST. GFR  (AFRICAN AMERICAN): 42.1 ML/MIN/1.73 M^2
EST. GFR  (AFRICAN AMERICAN): 45.5 ML/MIN/1.73 M^2
EST. GFR  (NON AFRICAN AMERICAN): 36.5 ML/MIN/1.73 M^2
EST. GFR  (NON AFRICAN AMERICAN): 39.5 ML/MIN/1.73 M^2
FIBRINOGEN PPP-MCNC: 239 MG/DL
FIO2: 100
FIO2: 80
FLOW: 25
FLOW: 25
GLUCOSE SERPL-MCNC: 132 MG/DL
GLUCOSE SERPL-MCNC: 212 MG/DL
HCO3 UR-SCNC: 24.9 MMOL/L (ref 24–28)
HCO3 UR-SCNC: 25.7 MMOL/L (ref 24–28)
HCO3 UR-SCNC: 27.1 MMOL/L (ref 24–28)
HCT VFR BLD AUTO: 26.9 %
HCT VFR BLD AUTO: 27.8 %
HGB BLD-MCNC: 8.8 G/DL
HGB BLD-MCNC: 8.9 G/DL
IMM GRANULOCYTES # BLD AUTO: 0.12 K/UL
IMM GRANULOCYTES # BLD AUTO: 0.12 K/UL
IMM GRANULOCYTES NFR BLD AUTO: 1.4 %
IMM GRANULOCYTES NFR BLD AUTO: 1.4 %
INR PPP: 1.7
LYMPHOCYTES # BLD AUTO: 0.7 K/UL
LYMPHOCYTES # BLD AUTO: 0.8 K/UL
LYMPHOCYTES NFR BLD: 7.5 %
LYMPHOCYTES NFR BLD: 8.8 %
MAGNESIUM SERPL-MCNC: 2.4 MG/DL
MCH RBC QN AUTO: 30.9 PG
MCH RBC QN AUTO: 31.9 PG
MCHC RBC AUTO-ENTMCNC: 32 G/DL
MCHC RBC AUTO-ENTMCNC: 32.7 G/DL
MCV RBC AUTO: 97 FL
MCV RBC AUTO: 98 FL
MODE: ABNORMAL
MODE: ABNORMAL
MONOCYTES # BLD AUTO: 0.5 K/UL
MONOCYTES # BLD AUTO: 0.8 K/UL
MONOCYTES NFR BLD: 5.9 %
MONOCYTES NFR BLD: 9.1 %
NEUTROPHILS # BLD AUTO: 6.9 K/UL
NEUTROPHILS # BLD AUTO: 7.5 K/UL
NEUTROPHILS NFR BLD: 80 %
NEUTROPHILS NFR BLD: 85 %
NRBC BLD-RTO: 1 /100 WBC
NRBC BLD-RTO: 1 /100 WBC
PCO2 BLDA: 29.7 MMHG (ref 35–45)
PCO2 BLDA: 30.3 MMHG (ref 35–45)
PCO2 BLDA: 34.9 MMHG (ref 35–45)
PH SMN: 7.5 [PH] (ref 7.35–7.45)
PH SMN: 7.53 [PH] (ref 7.35–7.45)
PH SMN: 7.54 [PH] (ref 7.35–7.45)
PHOSPHATE SERPL-MCNC: 1.3 MG/DL
PHOSPHATE SERPL-MCNC: 1.3 MG/DL
PLATELET # BLD AUTO: 60 K/UL
PLATELET # BLD AUTO: 74 K/UL
PMV BLD AUTO: 12.3 FL
PMV BLD AUTO: 13 FL
PO2 BLDA: 33 MMHG (ref 80–100)
PO2 BLDA: 44 MMHG (ref 80–100)
PO2 BLDA: 45 MMHG (ref 40–60)
POC BE: 2 MMOL/L
POC BE: 3 MMOL/L
POC BE: 4 MMOL/L
POC SATURATED O2: 73 % (ref 95–100)
POC SATURATED O2: 85 % (ref 95–100)
POC SATURATED O2: 86 % (ref 95–100)
POC TCO2: 26 MMOL/L (ref 23–27)
POC TCO2: 27 MMOL/L (ref 23–27)
POC TCO2: 28 MMOL/L (ref 24–29)
POCT GLUCOSE: 138 MG/DL (ref 70–110)
POCT GLUCOSE: 158 MG/DL (ref 70–110)
POCT GLUCOSE: 218 MG/DL (ref 70–110)
POCT GLUCOSE: 95 MG/DL (ref 70–110)
POTASSIUM SERPL-SCNC: 3.3 MMOL/L
POTASSIUM SERPL-SCNC: 3.7 MMOL/L
PROT SERPL-MCNC: 7.9 G/DL
PROTHROMBIN TIME: 16.8 SEC
RBC # BLD AUTO: 2.76 M/UL
RBC # BLD AUTO: 2.88 M/UL
SAMPLE: ABNORMAL
SITE: ABNORMAL
SODIUM SERPL-SCNC: 146 MMOL/L
SODIUM SERPL-SCNC: 152 MMOL/L
SP02: 90
WBC # BLD AUTO: 8.57 K/UL
WBC # BLD AUTO: 8.82 K/UL

## 2018-11-24 PROCEDURE — 37799 UNLISTED PX VASCULAR SURGERY: CPT

## 2018-11-24 PROCEDURE — 92526 ORAL FUNCTION THERAPY: CPT

## 2018-11-24 PROCEDURE — 99900035 HC TECH TIME PER 15 MIN (STAT)

## 2018-11-24 PROCEDURE — 85384 FIBRINOGEN ACTIVITY: CPT

## 2018-11-24 PROCEDURE — 63600175 PHARM REV CODE 636 W HCPCS: Performed by: NURSE PRACTITIONER

## 2018-11-24 PROCEDURE — 27100171 HC OXYGEN HIGH FLOW UP TO 24 HOURS

## 2018-11-24 PROCEDURE — 25000003 PHARM REV CODE 250: Performed by: INTERNAL MEDICINE

## 2018-11-24 PROCEDURE — 83735 ASSAY OF MAGNESIUM: CPT

## 2018-11-24 PROCEDURE — 99233 SBSQ HOSP IP/OBS HIGH 50: CPT | Mod: ,,, | Performed by: PSYCHIATRY & NEUROLOGY

## 2018-11-24 PROCEDURE — 25000003 PHARM REV CODE 250: Performed by: NURSE PRACTITIONER

## 2018-11-24 PROCEDURE — 94761 N-INVAS EAR/PLS OXIMETRY MLT: CPT

## 2018-11-24 PROCEDURE — 63600175 PHARM REV CODE 636 W HCPCS: Performed by: STUDENT IN AN ORGANIZED HEALTH CARE EDUCATION/TRAINING PROGRAM

## 2018-11-24 PROCEDURE — S0028 INJECTION, FAMOTIDINE, 20 MG: HCPCS | Performed by: NURSE PRACTITIONER

## 2018-11-24 PROCEDURE — 20000000 HC ICU ROOM

## 2018-11-24 PROCEDURE — 80076 HEPATIC FUNCTION PANEL: CPT

## 2018-11-24 PROCEDURE — 85025 COMPLETE CBC W/AUTO DIFF WBC: CPT | Mod: 91

## 2018-11-24 PROCEDURE — 80048 BASIC METABOLIC PNL TOTAL CA: CPT

## 2018-11-24 PROCEDURE — 80069 RENAL FUNCTION PANEL: CPT

## 2018-11-24 PROCEDURE — 99291 CRITICAL CARE FIRST HOUR: CPT | Mod: ,,, | Performed by: NURSE PRACTITIONER

## 2018-11-24 PROCEDURE — 82140 ASSAY OF AMMONIA: CPT

## 2018-11-24 PROCEDURE — 25000003 PHARM REV CODE 250: Performed by: PHYSICIAN ASSISTANT

## 2018-11-24 PROCEDURE — 85610 PROTHROMBIN TIME: CPT

## 2018-11-24 PROCEDURE — 27100092 HC HIGH FLOW DELIVERY CANNULA

## 2018-11-24 PROCEDURE — 82803 BLOOD GASES ANY COMBINATION: CPT

## 2018-11-24 RX ORDER — LACTULOSE 10 G/15ML
20 SOLUTION ORAL 3 TIMES DAILY
Status: DISCONTINUED | OUTPATIENT
Start: 2018-11-24 | End: 2018-11-24

## 2018-11-24 RX ORDER — HYDRALAZINE HYDROCHLORIDE 20 MG/ML
10 INJECTION INTRAMUSCULAR; INTRAVENOUS ONCE
Status: COMPLETED | OUTPATIENT
Start: 2018-11-24 | End: 2018-11-24

## 2018-11-24 RX ORDER — FUROSEMIDE 10 MG/ML
40 INJECTION INTRAMUSCULAR; INTRAVENOUS ONCE
Status: COMPLETED | OUTPATIENT
Start: 2018-11-24 | End: 2018-11-24

## 2018-11-24 RX ORDER — POTASSIUM CHLORIDE 20 MEQ/15ML
40 SOLUTION ORAL ONCE
Status: COMPLETED | OUTPATIENT
Start: 2018-11-24 | End: 2018-11-24

## 2018-11-24 RX ORDER — LACTULOSE 10 G/15ML
20 SOLUTION ORAL 3 TIMES DAILY
Status: DISCONTINUED | OUTPATIENT
Start: 2018-11-24 | End: 2018-11-25

## 2018-11-24 RX ORDER — HYDRALAZINE HYDROCHLORIDE 20 MG/ML
10 INJECTION INTRAMUSCULAR; INTRAVENOUS EVERY 6 HOURS PRN
Status: DISCONTINUED | OUTPATIENT
Start: 2018-11-24 | End: 2018-11-26

## 2018-11-24 RX ORDER — METOPROLOL TARTRATE 1 MG/ML
5 INJECTION, SOLUTION INTRAVENOUS EVERY 6 HOURS PRN
Status: DISCONTINUED | OUTPATIENT
Start: 2018-11-24 | End: 2018-11-24

## 2018-11-24 RX ORDER — FAMOTIDINE 20 MG/1
20 TABLET, FILM COATED ORAL DAILY
Status: DISCONTINUED | OUTPATIENT
Start: 2018-11-25 | End: 2018-11-24

## 2018-11-24 RX ORDER — FAMOTIDINE 20 MG/1
20 TABLET, FILM COATED ORAL DAILY
Status: DISCONTINUED | OUTPATIENT
Start: 2018-11-25 | End: 2018-11-26

## 2018-11-24 RX ORDER — METOPROLOL TARTRATE 1 MG/ML
5 INJECTION, SOLUTION INTRAVENOUS ONCE
Status: COMPLETED | OUTPATIENT
Start: 2018-11-24 | End: 2018-11-24

## 2018-11-24 RX ADMIN — FAMOTIDINE 20 MG: 10 INJECTION, SOLUTION INTRAVENOUS at 08:11

## 2018-11-24 RX ADMIN — LACTULOSE 20 G: 20 SOLUTION ORAL at 10:11

## 2018-11-24 RX ADMIN — HYDRALAZINE HYDROCHLORIDE 10 MG: 20 INJECTION INTRAMUSCULAR; INTRAVENOUS at 02:11

## 2018-11-24 RX ADMIN — METOPROLOL TARTRATE 5 MG: 5 INJECTION, SOLUTION INTRAVENOUS at 09:11

## 2018-11-24 RX ADMIN — LACTULOSE 20 G: 20 SOLUTION ORAL at 02:11

## 2018-11-24 RX ADMIN — HYDRALAZINE HYDROCHLORIDE 10 MG: 20 INJECTION INTRAMUSCULAR; INTRAVENOUS at 05:11

## 2018-11-24 RX ADMIN — RIFAXIMIN 550 MG: 550 TABLET ORAL at 10:11

## 2018-11-24 RX ADMIN — RIFAXIMIN 550 MG: 550 TABLET ORAL at 12:11

## 2018-11-24 RX ADMIN — FUROSEMIDE 40 MG: 10 INJECTION, SOLUTION INTRAMUSCULAR; INTRAVENOUS at 04:11

## 2018-11-24 RX ADMIN — HYDRALAZINE HYDROCHLORIDE 10 MG: 20 INJECTION INTRAMUSCULAR; INTRAVENOUS at 01:11

## 2018-11-24 RX ADMIN — HYDRALAZINE HYDROCHLORIDE 10 MG: 20 INJECTION INTRAMUSCULAR; INTRAVENOUS at 10:11

## 2018-11-24 RX ADMIN — POTASSIUM CHLORIDE 40 MEQ: 20 SOLUTION ORAL at 10:11

## 2018-11-24 NOTE — ASSESSMENT & PLAN NOTE
--Suspect a component of iATN given recent hemorrhagic shock.   --no hydronephrosis on CT imaging.   --improving    -- Urine output responded to lasix  --avoid nephrotoxic agents if possible.

## 2018-11-24 NOTE — ASSESSMENT & PLAN NOTE
Requiring HiFlo, High FiO2 Oxygen.  PE vs Volume Overload vs Hepato-Pulmonary shunting.   --No fevers or leukocytosis. Not hypotensive. Pneumonia less likely  --ABGs correlating with SaO2  --Bilateral LE negative for DVT lowers suspicion of PE. Would not be a candidate for anticoagulation  --Chest X-Ray with mild pulmonary congestion, Diurese  --Wean Oxygen to keep SaO2 > 88%

## 2018-11-24 NOTE — NURSING
Pt due to void after reese removal ~1700, reports no urge to void, bladder scanned pt, revealed 372ml, >450ml, and 375ml at 3 different angles.     Care team notified, MD to place PRN in and out cath orders with PRN bladder scan orders.

## 2018-11-24 NOTE — SUBJECTIVE & OBJECTIVE
Interval History/Significant Events:   Required a unit of RBC's for drop in Hgb last night. No evidence of active bleeding. Was on Hiflo Oxygen 25 L / 100% this morning. Weaning down. LE US negative for DVT's.  Will diurese.     Review of Systems   Constitutional: Negative for chills, diaphoresis and fever.   Respiratory: Negative for cough, shortness of breath and wheezing.    Cardiovascular: Negative for chest pain and palpitations.   Gastrointestinal: Negative for abdominal pain, nausea and vomiting.     Objective:     Vital Signs (Most Recent):  Temp: 96.1 °F (35.6 °C) (11/24/18 1100)  Pulse: 62 (11/24/18 1430)  Resp: 19 (11/24/18 1430)  BP: (!) 156/96 (11/24/18 1415)  SpO2: 97 % (11/24/18 1430) Vital Signs (24h Range):  Temp:  [96 °F (35.6 °C)-97.6 °F (36.4 °C)] 96.1 °F (35.6 °C)  Pulse:  [61-83] 62  Resp:  [16-41] 19  SpO2:  [82 %-98 %] 97 %  BP: (139-183)/() 156/96   Weight: 68.3 kg (150 lb 9.2 oz)  Body mass index is 24.3 kg/m².      Intake/Output Summary (Last 24 hours) at 11/24/2018 1606  Last data filed at 11/24/2018 1600  Gross per 24 hour   Intake 3343 ml   Output 1785 ml   Net 1558 ml       Physical Exam   Constitutional: No distress. Nasal cannula in place.   HENT:   Head: Normocephalic and atraumatic.   Eyes: Pupils are equal, round, and reactive to light. Scleral icterus is present.   Cardiovascular: Normal rate, regular rhythm, normal heart sounds and intact distal pulses.   No murmur heard.  Pulmonary/Chest: Effort normal and breath sounds normal. No respiratory distress. She has no wheezes. She has no rales.   Abdominal: Soft. Bowel sounds are normal. She exhibits no distension. There is no tenderness.   Neurological: She is alert.   Oriented times 2. (Person and Place)  Right Arm and Right Leg 3/5 strength  Left Arm and Left Leg 1/5 Strength   Skin: Skin is warm and dry. Capillary refill takes less than 2 seconds. She is not diaphoretic.   Psychiatric: She has a normal mood and affect. Her  speech is delayed. Cognition and memory are impaired.   Nursing note and vitals reviewed.      Vents:  Oxygen Concentration (%): 80 (11/24/18 1304)  Lines/Drains/Airways     Drain                 Urethral Catheter 11/24/18 1134 less than 1 day    Female External Urinary Catheter 11/23/18 1730 less than 1 day          Peripheral Intravenous Line                 Peripheral IV - Single Lumen 11/23/18 1352 Anterior;Left Upper Arm 1 day         Peripheral IV - Single Lumen 11/23/18 1353 Anterior;Left Forearm 1 day              Significant Labs:    CBC/Anemia Profile:  Recent Labs   Lab 11/23/18  1431 11/24/18  0300 11/24/18  1343   WBC 9.19 8.57 8.82   HGB 8.7* 8.8* 8.9*   HCT 26.9* 26.9* 27.8*   PLT 33* 74* 60*   MCV 96 98 97   RDW 20.5* 21.0* 21.0*        Chemistries:  Recent Labs   Lab 11/22/18  1702 11/23/18  0256 11/23/18  1535 11/24/18  0300 11/24/18  1343   * 151* 153* 152* 146*   K 3.2* 3.4* 3.4* 3.3* 3.7   * 116* 117* 117* 114*   CO2 23 23 23 24 20*   BUN 83* 83* 81* 81* 76*   CREATININE 1.6* 1.6* 1.5* 1.6* 1.5*   CALCIUM 9.3 9.3 9.7 10.1 9.5   ALBUMIN 2.9* 3.0*  3.0*  --  3.2*  3.2*  --    PROT  --  7.4  --  7.9  --    BILITOT  --  7.8*  --  8.1*  --    ALKPHOS  --  124  --  120  --    ALT  --  57*  --  48*  --    AST  --  148*  --  113*  --    MG  --  2.5  --  2.4  --    PHOS 2.9 2.6*  2.6*  --  1.3*  1.3*  --        ABGs:   Recent Labs   Lab 11/24/18  1034   PH 7.532*   PCO2 29.7*   HCO3 24.9   POCSATURATED 86*   BE 2     All pertinent labs within the past 24 hours have been reviewed.    Significant Imaging:  I have reviewed all pertinent imaging results/findings within the past 24 hours.     Bilateral US LE  Impression:       No evidence of deep venous thrombosis in either lower extremity.    Bilateral subcutaneous edema.    Heterogeneous echogenicity 3.9 cm area, adjacent to the region of the left saphenous femoral junction, nonspecific and possibly correlating with focal edematous change.   However small seroma, hematoma, or less likely abscess cannot be excluded.    Electronically signed by resident: Chelle Whiting  Date: 11/24/2018  Time: 13:43     Chest X-Ray  FINDINGS:  Central line has been removed.  NG tube has been removed.  Aortic stent device noted.  The cardiac silhouette is prominent.  Low lung volume.  Lungs are mostly clear.  No large pleural effusion.  No pneumothorax.  The osseous structures appear within normal limits.   Impression:       Better aeration of both lung fields compared to the prior exam      Electronically signed by: Maggie Flores MD  Date: 11/24/2018  Time: 09:04     Abdominal US  Impression:       1. Status post cholecystectomy.  2. Findings in keeping with underlying cirrhosis.  3. Moderate volume ascites.  4. Partially visualized right pleural effusion.    Electronically signed by resident: Tamica Wolfe  Date: 11/23/2018  Time: 05:06    Electronically signed by: Liz Perea MD  Date: 11/23/2018  Time: 05:48

## 2018-11-24 NOTE — NURSING
"Critical care team notified that patient, while restrained, adjusted self down and sideways in bed to reach NG tube. RN entered room to find pt in bed, leaned to right side with NG tube in hand, entirely pulled from nare verbally calling for nurse to "get a tissue to blow [her] nose".     Orally suctioned pt, TF turned off, positioned upright. Will continue to monitor.     Waiting to order replacing NG tube at this time per critical care.   "

## 2018-11-24 NOTE — ASSESSMENT & PLAN NOTE
--appreciate vascular surgery assistance  --s/p lumbar drain placement and TEVAR on 11/5  --lumbar drain removed 11/23/18  --continue neuro/ neurovascular exams

## 2018-11-24 NOTE — PLAN OF CARE
Problem: Patient Care Overview  Goal: Plan of Care Review  Outcome: Ongoing (interventions implemented as appropriate)    Events overnight:  Increasing supplemental O2 requirements from 11L HFNC to 25L/100% comfort flow by end of night shift.    Inability to void post reese removal requiring in-and-out cath x3 with residual volumes (280-350+ml) noted on follow-up bladder scans, critical care team made aware.      Hydralazine, 10mg IVP x2 required overnight for SBP >160.     Pt removal of NG tube at 0530, TF stopped, pt remained NPO pending formal swallow eval.     Assessment Note:     Plan of care reviewed with patient throughout shift. Questions encouraged and answered.      Pt remained AxOx3, disoriented to situation, throughout shift with periods of restlessness and impulsivity, redirectable, bilateral wrist restraints continued overnight.     Increased strength noted to LUE/LLE overnight, remains weaker than R side.     Afebrile, HR 70-80 NSR, -180/70-80s, MAP >100, RR 25-25, SpO2 >92% with increasing supplemental O2. Pulses intact, no signs of bleeding.     BMS remains in place with minimal output (<100cc yellow/clear) overnight.     Please see flow sheet for further assessment details.

## 2018-11-24 NOTE — PROGRESS NOTES
[facility]  Vascular Surgery  Progress Note    Patient Name: Leah Cleaning  MRN: [id number]  Admission Date: 11/15/2018  Primary Care Provider: Mary Colorado MD    Subjective:     Interval History: NAEON. Neuro exam stable after removal of spinal drain. Pulled NGT out x 2 overnight.    Post-Op Info:  Procedure(s) (LRB):  REPAIR, ANEURYSM, ENDOVASCULAR GRAFT, AORTA, THORACIC (N/A)   9 Days Post-Op       Medications:  Continuous Infusions:   dextrose 5 % 75 mL/hr at 11/23/18 1800    norepinephrine bitartrate-D5W 0.02 mcg/kg/min (11/20/18 0744)     Scheduled Meds:   famotidine (PF)  20 mg Intravenous Daily    lactulose  20 g Per NG tube TID    rifAXImin  550 mg Oral BID     PRN Meds:sodium chloride, sodium chloride, sodium chloride, ondansetron     Objective:     Vital Signs (Most Recent):  Temp: 96 °F (35.6 °C) (11/24/18 0700)  Pulse: 79 (11/24/18 0715)  Resp: (!) 22 (11/24/18 0715)  BP: (!) 167/84 (11/24/18 0700)  SpO2: 95 % (11/24/18 0715) Vital Signs (24h Range):  Temp:  [95.8 °F (35.4 °C)-97.6 °F (36.4 °C)] 96 °F (35.6 °C)  Pulse:  [71-87] 79  Resp:  [16-42] 22  SpO2:  [90 %-99 %] 95 %  BP: (144-183)/(72-88) 167/84     Date 11/24/18 0700 - 11/25/18 0659   Shift 4525-2498 6372-2247 4428-4029 24 Hour Total   INTAKE   I.V.(mL/kg) 75(1.1)   75(1.1)   Shift Total(mL/kg) 75(1.1)   75(1.1)   OUTPUT   Urine(mL/kg/hr) 350   350   Shift Total(mL/kg) 350(5.1)   350(5.1)   Weight (kg) 68.3 68.3 68.3 68.3       Physical Exam   Constitutional: She is oriented to person, place, and time. She appears well-developed and well-nourished. She appears lethargic.   HENT:   Head: Normocephalic and atraumatic.   Eyes: EOM are normal. Pupils are equal, round, and reactive to light.   Neck: Normal range of motion. Neck supple.   Cardiovascular: Normal rate and regular rhythm.   Pulmonary/Chest: Effort normal and breath sounds normal.   Abdominal: Soft. Bowel sounds are normal.   Musculoskeletal:   LUE wiggles  fingers 3/5   RUE 5/5, intact fine motor movement  LLE plantar flex ankle, no other movement  RLE knee flexion, and moves toes   Neurological: She is oriented to person, place, and time. She appears lethargic.   Skin: Skin is warm.       Significant Labs:  CBC:   Recent Labs   Lab 11/24/18 0300   WBC 8.57   RBC 2.76*   HGB 8.8*   HCT 26.9*   PLT 74*   MCV 98   MCH 31.9*   MCHC 32.7     CMP:   Recent Labs   Lab 11/24/18 0300   *   CALCIUM 10.1   ALBUMIN 3.2*  3.2*   PROT 7.9   *   K 3.3*   CO2 24   *   BUN 81*   CREATININE 1.6*   ALKPHOS 120   ALT 48*   *   BILITOT 8.1*     Coagulation:   Recent Labs   Lab 11/24/18 0300   LABPROT 16.8*   INR 1.7*     Assessment/Plan:     * Aortic dissection distal to left subclavian    53 F with Hep C cirrhosis (MELD 15 on admission), HTN, crack cocaine abuse, who presented with acute tearing chest/back pain to OSH, Found to have retrograde type B dissection. Despite aggressive anti-impulse therapy, persistent pain requiring intervention. Preoperative CSF drain placement by Neuro IR. Successful TEVAR. POD 1 left groin hematoma and coagulopathy requiring manual pressure and product resuscitation. POD 2 new onset left upper arm weakness, found to have right thalamic hemorrhage 2/2 HTN. F/u CT head stable findings. Significant reactive left pleural effusion with surrounding atelectasis noted also.     Neuro: Pt more lethargic than yesterday; moves LUE hand and plantar flexes LLE ankle  - Spinal drain removed yesterday; stable neuro exam  - CT and MRI spine without evidence of spinal cord hematoma or infarct  - MRI head with mass effect; stroke was hemorrhagic 2/2 HTN not embolic event from TEVAR   right thalamic bleed  - Can move to Q4hr neurovascular checks    CV:   - SBP goal normotensive    Resp:  - encourage IS  - O2 supp prn  - Moderate Left pleural effusion and small right pleural effusion on CTA stoke multiphase noted. Left pleural effusion  likely reactive following TEVAR. Con't pulmonary toilet.     Renal/Endocrine:  - con't monitoring UOP  - BUN 81 stable Cr  - Recommend nephrology consult    GI: Hep C cirrhosis with MELD 15 on admission  - protonix  - Started on TFs pt has pulled NGT twice overnight  - Having BMs  - T bili continues to increase 8.1 from 7.8  - Recommend hepatology consult for cirrhosis and concern for metabolic encephalopathy    Heme/ID:  - ABLA stable.   - con't correct coagulopathy prn  - monitor thrombocytopenia     Dispo:  - Care per MICU   - Please call with any change in neurovascular exam         Perla Castro MD  Vascular Surgery  [facility]

## 2018-11-24 NOTE — NURSING
Pt in and out cath'ed, 175ccs bashir clear urine obtained, bladder scan repeated, 388mls read, MD notified.     Pt abd round, taut, no complaints of discomfort, denies urge to void.     MD response: repeat bladder scan and in and out cath @ 2330, report results.

## 2018-11-24 NOTE — ASSESSMENT & PLAN NOTE
53 F with Hep C cirrhosis (MELD 15 on admission), HTN, crack cocaine abuse, who presented with acute tearing chest/back pain to OSH, Found to have retrograde type B dissection. Despite aggressive anti-impulse therapy, persistent pain requiring intervention. Preoperative CSF drain placement by Neuro IR. Successful TEVAR. POD 1 left groin hematoma and coagulopathy requiring manual pressure and product resuscitation. POD 2 new onset left upper arm weakness, found to have right thalamic hemorrhage 2/2 HTN. F/u CT head stable findings. Significant reactive left pleural effusion with surrounding atelectasis noted also.     Neuro: Pt more lethargic than yesterday; moves LUE hand and plantar flexes LLE ankle  - Spinal drain removed yesterday; stable neuro exam  - CT and MRI spine without evidence of spinal cord hematoma or infarct  - MRI head with mass effect; stroke was hemorrhagic 2/2 HTN not embolic event from TEVAR   right thalamic bleed  - Can move to Q4hr neurovascular checks    CV:   - SBP goal normotensive    Resp:  - encourage IS  - O2 supp prn  - Moderate Left pleural effusion and small right pleural effusion on CTA stoke multiphase noted. Left pleural effusion likely reactive following TEVAR. Con't pulmonary toilet.     Renal/Endocrine:  - con't monitoring UOP  - BUN 81 stable Cr  - Recommend nephrology consult    GI: Hep C cirrhosis with MELD 15 on admission  - protonix  - Started on TFs pt has pulled NGT twice overnight  - Having BMs  - T bili continues to increase 8.1 from 7.8  - Recommend hepatology consult for cirrhosis and concern for metabolic encephalopathy    Heme/ID:  - ABLA stable.   - con't correct coagulopathy prn  - monitor thrombocytopenia     Dispo:  - Care per MICU   - Please call with any change in neurovascular exam

## 2018-11-24 NOTE — ASSESSMENT & PLAN NOTE
--goal SBP <160 per vascular surgery and neurology recommendations. PRN IV Hydralazine  --No longer requiring

## 2018-11-24 NOTE — ASSESSMENT & PLAN NOTE
--per patient, has known about cirrhosis for >10 years. Has never received treatment for Hep C  --continue supportive care for coagulopathy, thrombocytopenia in setting of Type B aortic dissection with hemorrhagic shock and new thalamic bleed   --Oral Lactulose and Rifaximin

## 2018-11-24 NOTE — PLAN OF CARE
Problem: SLP Goal  Goal: SLP Goal  Speech Language Pathology Goals  Goals expected to be met by 11/27:    1.Pt will tolerate diet of  thin liqiuds and purees without overt clinical signs of aspiration   2.Pt will participate in trials of soft solids within speech therapy sessions to help determine least restrictive diet   3. Pt will complete OMEs x10 w/ SLP model to enhance oral motor function   4. Pt will utilize compensatory strategies independently at the phrase level  to enhance speech intelligibility   5. Pt will demonstrate orientation to self, place, situation , family members, date w/ min cues   6. Pt will complete problem solving skills w/ 75 % acc to enhance safety awareness   7. Pt will generate 10 items per category to enhance organizations skills   8. Pt will demonstrate sustained attention to task across 5 consecutive minutes w/ min cues to enhance attention skills  9. Pt will participate in ongoing assessment of speech language and cognitive linguistic skills to help rule out deficits and determine therapeutic plan of care            Outcome: Ongoing (interventions implemented as appropriate)  ST paged to bedside for re-assessment of swallow function. Patient with decreased sustained attention and decreased oral manipulation of solid trials this service day. REC: Puree diet with thin liquids, medications crushed in puree, provided strict aspiration precautions, monitoring for pocketing, and ongoing monitoring for S/S aspiration. Please Continue to monitor for signs and symptoms of aspiration and discontinue oral feeding should you notice any of the following: watery eyes, reddened facial area, wet vocal quality, increased work of breathing, change in respiratory status, increased congestion, coughing, and.or fever. ST to continue to follow per POC.     DEMETRICE Donovan., [facility]-SLP  Speech-Language Pathology  Pager: [phone]  11/24/2018

## 2018-11-24 NOTE — PLAN OF CARE
Problem: Patient Care Overview  Goal: Plan of Care Review  Outcome: Ongoing (interventions implemented as appropriate)  Pt VSS at this time; afebrile. Pt AAOx3, disoriented to situation. Pt currently moves all extremities (LLE= plantarflexion present only). Follows commands, slurred speech. 11L O2, sats >93%. NSR HR 70-80s. SBP 140s-170s today, hydralazine required 2 times today. D5W @ 75 cc/hr. Central line, lumbar drain, and reese catheter removed today. 2 peripheral IVs placed. BMS placed, 550cc output today after lactulose enema. Purewick in place. Pt remains NPO, speech consulted. NG tube ouput bright red 150 cc over 24 hours, MD aware. Tube feeds started @ 10cc/hr and free water boluses given per order. 2 FFP and 2 units platelets given this shift. Restraints in place, will remove when appropriate. Plan of care reviewed. Questions and concerns addressed. Will continue to monitor.     Problem: Infection, Risk/Actual (Adult)  Goal: Identify Related Risk Factors and Signs and Symptoms  Related risk factors and signs and symptoms are identified upon initiation of Human Response Clinical Practice Guideline (CPG)  Outcome: Ongoing (interventions implemented as appropriate)  .    Problem: Pressure Ulcer Risk (Pepe Scale) (Adult,Obstetrics,Pediatric)  Goal: Identify Related Risk Factors and Signs and Symptoms  Related risk factors and signs and symptoms are identified upon initiation of Human Response Clinical Practice Guideline (CPG)  Outcome: Ongoing (interventions implemented as appropriate)  .    Problem: Spiritual Distress, Risk/Actual (Adult,Obstetrics,Pediatric)  Goal: Spiritual Well-being  Patient will demonstrate the desired outcomes by discharge/transition of care.  Outcome: Ongoing (interventions implemented as appropriate)  .    Problem: Restraint, Nonbehavioral (nonviolent)  Goal: Absence of Injury/Harm  Outcome: Ongoing (interventions implemented as appropriate)  .

## 2018-11-24 NOTE — ASSESSMENT & PLAN NOTE
--Improving  --multifactorial: thalamic CVA, hepatic encephalopathy, delirium   --see above. Continue lactulose, rifaxamin   --minimize deliriogenic medications.

## 2018-11-24 NOTE — PROGRESS NOTES
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 Cleaning is a 54 yo female with a history significant for Hep C cirrhosis, HTN, cocaine and marijuana use who presented to [facility] on the early morning of 11/15 as a transfer from [facility] for Type B aortic dissection. She initially presented to [facility] on the evening of 11/14 for sharp epigastric pain that radiated to her back which started initially on the night of 11/13. CT at OSH noted aortic dissection from left subclavian to just proximal of celiac artery. She was transferred to [facility] for vascular surgery consult on a nicardipine gtt. She was transitioned from nicardipine gtt to esmolol gtt. CTA upon arrival confirmed dissection and she was admitted to MICU for medical management of the dissection with vascular surgery following.     Hospital/ICU Course:  Mrs. Cleaning was admitted to MICU for Type B aortic dissection on esmolol gtt to maintain HR of 60 and SBP <120. Vascular surgery following, no surgical intervention warranted initially. CTA read concerning for possible aortic rupture in the descending aorta with high-density fluid noted in the posterior mediastinum; additionally, there was a suspected penetrating ulcer in the upper abdominal aorta adjacent to the true lumen. Due to high risk/ possible rupture, Lumbar drain was placed by interventional radiology and TEVAR completed by vascular surgery. Early 11/16, pt noted to be in hemorragic shock complicated by liver disease as vasopressor requirements increased significantly, rising lactic acidosis, and patient was found to be bleeding from  bilateral groin sites with significant hematoma formation to left groin. Pressure was held for an extended period and patient received 4 U PRBC, 3 U FFP, 1 U platelets, 1 U cryo, and vitamin K. With improvement in coagulopathy and volume resuscitation, she was able to be weaned from vasopressors. However, on 11/17, a stroke code was called around 0800 due to acute left sided weakness; right sided thalamic bleed noted on CT. No intervention per vascular neurology, however have had to give multiple units of platelets, FFP, and cyro in order to keep platelets > 50,000, INR at 1.5, and fibrinogen >100. Repeat CT showed that hemorrhage had not changed. Neuro exam much improved with lessening left sided hemiparesis. New RUQ abdominal pain reported on 11/19 and, US with doppler noted probable right portal vein thrombosis; anticoagulation contraindicated.      On the early AM of 11/20, it was noted that Mrs. Cleaning was no longer able to move bilateral lower extremities and left upper extremity (this is a change as she was having improved strength in left upper/lower extremity previously and able to move RLE). Additionally, she was more somnolent with expressive aphasia noted. Discussions with vascular surgery, vascular neurology and [facility] team held with plans for stat CT head. Spinal drain opened up to drain 30 ml's in first hour and then 15 ml/hr subsequent to that. Additionally, levophed started to maintain MAP of 100 mm Hg but aim to keep SBP <160 given concern for spinal ischemia, but with subsequent hemorrhagic CVA. MRI Brain and spine performed on 11/21 with stable acute right thalamic hemorrhage, small punctate foci in posterior temporal/parietal regions, no cord edema or signs of cord infarction.  Episode of emesis evening of 11/21, imaging suggesting ileus.  NG placed with 800 ml output.  Lactulose enemas given with increased stool output.  LP drain clamped and removed 11/23.  Oxygen requirements increased and was  placed on high flow nasal cannula. LE US negative for DVT. Began diuresing patient patient on 11/23/18. Passed swallow assessment and started on a diet on 11/23/18.     Interval History/Significant Events:   Required a unit of RBC's for drop in Hgb last night. No evidence of active bleeding. Was on Hiflo Oxygen 25 L / 100% this morning. Weaning down. LE US negative for DVT's.  Will diurese.     Review of Systems   Constitutional: Negative for chills, diaphoresis and fever.   Respiratory: Negative for cough, shortness of breath and wheezing.    Cardiovascular: Negative for chest pain and palpitations.   Gastrointestinal: Negative for abdominal pain, nausea and vomiting.     Objective:     Vital Signs (Most Recent):  Temp: 96.1 °F (35.6 °C) (11/24/18 1100)  Pulse: 62 (11/24/18 1430)  Resp: 19 (11/24/18 1430)  BP: (!) 156/96 (11/24/18 1415)  SpO2: 97 % (11/24/18 1430) Vital Signs (24h Range):  Temp:  [96 °F (35.6 °C)-97.6 °F (36.4 °C)] 96.1 °F (35.6 °C)  Pulse:  [61-83] 62  Resp:  [16-41] 19  SpO2:  [82 %-98 %] 97 %  BP: (139-183)/() 156/96   Weight: 68.3 kg (150 lb 9.2 oz)  Body mass index is 24.3 kg/m².      Intake/Output Summary (Last 24 hours) at 11/24/2018 1606  Last data filed at 11/24/2018 1600  Gross per 24 hour   Intake 3343 ml   Output 1785 ml   Net 1558 ml       Physical Exam   Constitutional: No distress. Nasal cannula in place.   HENT:   Head: Normocephalic and atraumatic.   Eyes: Pupils are equal, round, and reactive to light. Scleral icterus is present.   Cardiovascular: Normal rate, regular rhythm, normal heart sounds and intact distal pulses.   No murmur heard.  Pulmonary/Chest: Effort normal and breath sounds normal. No respiratory distress. She has no wheezes. She has no rales.   Abdominal: Soft. Bowel sounds are normal. She exhibits no distension. There is no tenderness.   Neurological: She is alert.   Oriented times 2. (Person and Place)  Right Arm and Right Leg 3/5 strength  Left Arm and  Left Leg 1/5 Strength   Skin: Skin is warm and dry. Capillary refill takes less than 2 seconds. She is not diaphoretic.   Psychiatric: She has a normal mood and affect. Her speech is delayed. Cognition and memory are impaired.   Nursing note and vitals reviewed.      Vents:  Oxygen Concentration (%): 80 (11/24/18 1304)  Lines/Drains/Airways     Drain                 Urethral Catheter 11/24/18 1134 less than 1 day    Female External Urinary Catheter 11/23/18 1730 less than 1 day          Peripheral Intravenous Line                 Peripheral IV - Single Lumen 11/23/18 1352 Anterior;Left Upper Arm 1 day         Peripheral IV - Single Lumen 11/23/18 1353 Anterior;Left Forearm 1 day              Significant Labs:    CBC/Anemia Profile:  Recent Labs   Lab 11/23/18  1431 11/24/18  0300 11/24/18  1343   WBC 9.19 8.57 8.82   HGB 8.7* 8.8* 8.9*   HCT 26.9* 26.9* 27.8*   PLT 33* 74* 60*   MCV 96 98 97   RDW 20.5* 21.0* 21.0*        Chemistries:  Recent Labs   Lab 11/22/18  1702 11/23/18  0256 11/23/18  1535 11/24/18  0300 11/24/18  1343   * 151* 153* 152* 146*   K 3.2* 3.4* 3.4* 3.3* 3.7   * 116* 117* 117* 114*   CO2 23 23 23 24 20*   BUN 83* 83* 81* 81* 76*   CREATININE 1.6* 1.6* 1.5* 1.6* 1.5*   CALCIUM 9.3 9.3 9.7 10.1 9.5   ALBUMIN 2.9* 3.0*  3.0*  --  3.2*  3.2*  --    PROT  --  7.4  --  7.9  --    BILITOT  --  7.8*  --  8.1*  --    ALKPHOS  --  124  --  120  --    ALT  --  57*  --  48*  --    AST  --  148*  --  113*  --    MG  --  2.5  --  2.4  --    PHOS 2.9 2.6*  2.6*  --  1.3*  1.3*  --        ABGs:   Recent Labs   Lab 11/24/18  1034   PH 7.532*   PCO2 29.7*   HCO3 24.9   POCSATURATED 86*   BE 2     All pertinent labs within the past 24 hours have been reviewed.    Significant Imaging:  I have reviewed all pertinent imaging results/findings within the past 24 hours.     Bilateral US LE  Impression:       No evidence of deep venous thrombosis in either lower extremity.    Bilateral subcutaneous  edema.    Heterogeneous echogenicity 3.9 cm area, adjacent to the region of the left saphenous femoral junction, nonspecific and possibly correlating with focal edematous change.  However small seroma, hematoma, or less likely abscess cannot be excluded.    Electronically signed by resident: Chelle Whiting  Date: 11/24/2018  Time: 13:43     Chest X-Ray  FINDINGS:  Central line has been removed.  NG tube has been removed.  Aortic stent device noted.  The cardiac silhouette is prominent.  Low lung volume.  Lungs are mostly clear.  No large pleural effusion.  No pneumothorax.  The osseous structures appear within normal limits.   Impression:       Better aeration of both lung fields compared to the prior exam      Electronically signed by: Maggie Flores MD  Date: 11/24/2018  Time: 09:04     Abdominal US  Impression:       1. Status post cholecystectomy.  2. Findings in keeping with underlying cirrhosis.  3. Moderate volume ascites.  4. Partially visualized right pleural effusion.    Electronically signed by resident: Tamica Wolfe  Date: 11/23/2018  Time: 05:06    Electronically signed by: Liz Perea MD  Date: 11/23/2018  Time: 05:48         Assessment/Plan:     Neuro   Encephalopathy, metabolic    --Improving  --multifactorial: thalamic CVA, hepatic encephalopathy, delirium   --see above. Continue lactulose, rifaxamin   --minimize deliriogenic medications.      Nontraumatic thalamic hemorrhage    --Noted on 11/17 with left hemiparesis, dysarthria, left sided facial droop. These symptoms improved through 11/19 with no intervention per vascular neurology other than maintaining SBP <160 and correcting coagulopathy for goal INR <1.5, platelets >50,000 and fibrinogen >100  --worsening of neurologic status again on 11/20 with worsening dysarthria, LUE, LLE, RLE paralysis.   --Repeat stat CT head 11/20 with stable thalamic hemorrhage; no acute abnormalities  --CT cervical and CT thoracic without contrast 11/21  no detection of spinal cord hematoma however sub optimal evaluation of spinal cord without contrast  --Lumbar spinal drain removed 11/23/18  --Maintain SBP <160. High MAP goals no longer indicated  --neuro checks q 4 hrs          Psychiatric   Marijuana abuse    --see above     Cocaine abuse    --toxicology screen positive for cocaine, THC, opiates at OSH       Pulmonary   Acute hypoxemic respiratory failure    Requiring HiFlo, High FiO2 Oxygen.  PE vs Volume Overload vs Hepato-Pulmonary shunting.   --No fevers or leukocytosis. Not hypotensive. Pneumonia less likely  --ABGs correlating with SaO2  --Bilateral LE negative for DVT lowers suspicion of PE. Would not be a candidate for anticoagulation  --Chest X-Ray with mild pulmonary congestion, Diurese  --Wean Oxygen to keep SaO2 > 88%     Cardiac/Vascular   * Aortic dissection distal to left subclavian    --appreciate vascular surgery assistance  --s/p lumbar drain placement and TEVAR on 11/5  --lumbar drain removed 11/23/18  --continue neuro/ neurovascular exams          Pseudoaneurysm of left femoral artery    --noted 11/16. Hematoma appears to be improving per physical exam.   --continue to monitor      Essential hypertension    --goal SBP <160 per vascular surgery and neurology recommendations. PRN IV Hydralazine  --No longer requiring        Renal/   Hypernatremia    --Improving with oral free water  --Stop D5W     KARL (acute kidney injury)    --Suspect a component of iATN given recent hemorrhagic shock.   --no hydronephrosis on CT imaging.   --improving    -- Urine output responded to lasix  --avoid nephrotoxic agents if possible.       Hematology   Hypofibrinogenemia    --transfuse cryo for fibrinogen goal >100       Portal vein thrombosis    --noted on US liver with doppler on 11/19. Holding anticoagulation given above bleeding/coagulopathy  --consider repeating imaging in next few days     Thrombocytopenia    --2/2 cirrhosis  --plts goal >50,000      Coagulopathy    --2/2 cirrhosis  --goal INR 1.5- 1.8  given hemorrhagic CVA. Continue vitamin K and FFP as needed        Oncology   Acute blood loss anemia    --transfuse to maintain Hgb 8-9   --Received 1 u RBC today     GI   Transaminitis    --suspect shock liver  --improving   --Tbili still > 8     Chronic hepatitis C with cirrhosis    --per patient, has known about cirrhosis for >10 years. Has never received treatment for Hep C  --continue supportive care for coagulopathy, thrombocytopenia in setting of Type B aortic dissection with hemorrhagic shock and new thalamic bleed   --Oral Lactulose and Rifaximin      Orthopedic   Acute midline thoracic back pain    --resolved      Other   Goals of care, counseling/discussion    --Code status  DNR       Critical Care Time: 60 minutes  Critical secondary to Patient has a condition that poses threat to life and bodily function: Acute Hypoxic Respiratory Failure Requiring HiFlo, High FiO2 Oxygen.      Critical care was time spent personally by me on the following activities: development of treatment plan with patient or surrogate and bedside caregivers, discussions with consultants, evaluation of patient's response to treatment, examination of patient, ordering and performing treatments and interventions, ordering and review of laboratory studies, ordering and review of radiographic studies, pulse oximetry, re-evaluation of patient's condition. This critical care time did not overlap with that of any other provider or involve time for any procedures.     Rosa Maria Johnston NP  Critical Care Medicine  [facility]

## 2018-11-24 NOTE — ASSESSMENT & PLAN NOTE
Patient is a 53 y.o. female with a h/o HTN, Hep C, cirrhosis (MELD 15),Hx of pancreatitis, GERD, and illicit substance abuse (crack cocaine/THC) admitted for thoracic aortic dissection treated with TEVAR and Lumbar drain, had Acute right thalamic hemorrhage on CTH.   Etiology - Hemorrhagic transformation of Right thalamic stroke v/s Primary right thalamic hemorrhagic stroke v/s End Stage live disease v/s 2/2 Lumbar drain with increased SFP / MAP gradient.   11/20/18: Sudden LOF of both lower extremities with no response to painful stimuli. Repeat CT head w/o new pathology or worsening hemorrhage.  CT head without any worsening of previous hemorrhage or new pathology. CT spine without obvious cause for new deficits. MRI head and spine showing known stable pathology, severe spinal stenosis at L4/L5 that is too low to account for presentation, and no signs of intraspinal or extraspinal hematoma that would be impinging.  11/23/18: Patient regained much use of her LUE. Very alert today. Appears to have significantly more scleral icterus and abdominal distension. Spinal drain removed.  11/24/18: Regained some use of LLE, very alert today.       Recommend:   -Continue current treatment  -SBP goal <160   -Neuro checks q4h  -PT/OT/SLP now that drain is removed      Antithrombotics for secondary stroke prevention:  Hold Antiplatelets    Statins for secondary stroke prevention and hyperlipidemia, if present: rec getting  LDL and start Lipitor if LDL > 130      Aggressive risk factor modification: HTN, Illicit drug use     Rehab efforts: PT/OT/SLP to evaluate and treat    Diagnostics ordered/pending: None     VTE prophylaxis: dvt ppx and scd's    BP parameters:  SBP <160

## 2018-11-24 NOTE — PROGRESS NOTES
[facility]  Vascular Neurology  Comprehensive Stroke Center  Progress Note    Assessment/Plan:     Nontraumatic thalamic hemorrhage      Patient is a 53 y.o. female with a h/o HTN, Hep C, cirrhosis (MELD 15),Hx of pancreatitis, GERD, and illicit substance abuse (crack cocaine/THC) admitted for thoracic aortic dissection treated with TEVAR and Lumbar drain, had Acute right thalamic hemorrhage on CTH.   Etiology - Hemorrhagic transformation of Right thalamic stroke v/s Primary right thalamic hemorrhagic stroke v/s End Stage live disease v/s 2/2 Lumbar drain with increased SFP / MAP gradient.   11/20/18: Sudden LOF of both lower extremities with no response to painful stimuli. Repeat CT head w/o new pathology or worsening hemorrhage.  CT head without any worsening of previous hemorrhage or new pathology. CT spine without obvious cause for new deficits. MRI head and spine showing known stable pathology, severe spinal stenosis at L4/L5 that is too low to account for presentation, and no signs of intraspinal or extraspinal hematoma that would be impinging.  11/23/18: Patient regained much use of her LUE. Very alert today. Appears to have significantly more scleral icterus and abdominal distension. Spinal drain removed.  11/24/18: Regained some use of LLE, very alert today.       Recommend:   -Continue current treatment  -SBP goal <160   -Neuro checks q4h  -Aggressive PT/OT/SLP now that drain is removed      Antithrombotics for secondary stroke prevention:  Hold Antiplatelets    Statins for secondary stroke prevention and hyperlipidemia, if present: rec getting  LDL and start Lipitor if LDL > 130      Aggressive risk factor modification: HTN, Illicit drug use     Rehab efforts: PT/OT/SLP to evaluate and treat    Diagnostics ordered/pending: None     VTE prophylaxis: dvt ppx and scd's    BP parameters:  SBP <160         Essential hypertension    - rec SBP of ~ 160          No notes on file    STROKE  DOCUMENTATION   Acute Stroke Times   Last Known Normal Date: 11/17/18  Last Known Normal Time: 0700  Symptom Onset Date: 11/17/18  Stroke Team Called Date: 11/17/18  Stroke Team Called Time: 0800  Stroke Team Arrival Date: 11/17/18  Stroke Team Arrival Time: 0805  CT Interpretation Time: 0834    NIH Scale:          Modified Nitesh Score: 0  Romero Coma Scale:    ABCD2 Score:    [id number] Score:   HAS -BLED Score:   ICH Score:   Hunt & Farnsworth Classification:      Hemorrhagic change of an Ischemic Stroke: Does this patient have an ischemic stroke with hemorrhagic changes? Yes    Neurologic Chief Complaint: dysarthria, poor attention span, LUE weakness, LLE paralgia, RLE weakness  Right Thalamic hemorrhage     Subjective:     Interval History: Patient is seen for follow-up neurological assessment and treatment recommendations: Increased movement in LUE hand. Spinal drain removed. Some movement in LLE now. No imaging pending.       HPI, Past Medical, Family, and Social History remains the same as documented in the initial encounter.     Review of Systems   Constitutional: Negative for chills and fever.   Respiratory: Negative for cough and shortness of breath.    Cardiovascular: Negative for chest pain and palpitations.   Gastrointestinal: Negative for nausea and vomiting. Abdominal pain: RUQ    Musculoskeletal: Positive for neck pain (discomfort at RIJ site). Negative for back pain and neck stiffness.   Neurological: Negative for tremors, seizures, syncope, weakness, numbness and headaches.   Hematological: Bruises/bleeds easily.   Psychiatric/Behavioral: Positive for confusion and decreased concentration. Negative for agitation and behavioral problems.     Scheduled Meds:   [START ON 11/25/2018] famotidine  20 mg Per NG tube Daily    lactulose  20 g Oral TID    rifAXImin  550 mg Oral BID     Continuous Infusions:   dextrose 5 % 150 mL/hr at 11/24/18 1100     PRN Meds:ondansetron    Objective:     Vital Signs  (Most Recent):  Temp: 96 °F (35.6 °C) (11/24/18 0700)  Pulse: 63 (11/24/18 1130)  Resp: (!) 34 (11/24/18 1130)  BP: (!) 154/75 (11/24/18 1130)  SpO2: (!) 91 % (11/24/18 1130)  BP Location: Right arm    Vital Signs Range (Last 24H):  Temp:  [95.8 °F (35.4 °C)-97.6 °F (36.4 °C)]   Pulse:  [62-83]   Resp:  [16-42]   BP: (144-183)/()   SpO2:  [83 %-99 %]   BP Location: Right arm    Physical Exam   Constitutional: She appears well-developed and well-nourished.   HENT:   Head: Normocephalic and atraumatic.   Eyes: EOM are normal. Pupils are equal, round, and reactive to light. Scleral icterus is present.   Neck: Normal range of motion. Neck supple.   Cardiovascular: Normal rate and regular rhythm.   Pulmonary/Chest: Effort normal and breath sounds normal.   Abdominal: Soft. Bowel sounds are normal. She exhibits distension. There is no guarding.   Genitourinary:   Genitourinary Comments: Perry in place   Musculoskeletal: She exhibits no edema or deformity.   Neurological: She is alert.   LUE weakness   Skin: Skin is warm and dry. Capillary refill takes less than 2 seconds.   Nursing note and vitals reviewed.      Neurological Exam:   LOC: Alert  Attention Span: fair  Language: mild aphasia  Articulation: mild dysarthria  Orientation: AOx1, not time or place  Visual Fields: Full  EOM (CN III, IV, VI): Full/intact  Pupils (CN II, III): PERRL  Facial Sensation (CN V): Normal  Facial Movement (CN VII): mild L facial droop   Motor: Arm left  Normal 2/5  Leg left  Normal  1/5  Arm right  Normal 5/5  Leg right Normal 2/5  Sensation: Able to feel light touch on proximal legs and R lower leg.   Tone: Decreased tone throughout LEs and LUE.   Reflexes +2 throughout       Laboratory:    Recent Results (from the past 24 hour(s))   CBC auto differential    Collection Time: 11/23/18  2:31 PM   Result Value Ref Range    WBC 9.19 3.90 - 12.70 K/uL    RBC 2.80 (L) 4.00 - 5.40 M/uL    Hemoglobin 8.7 (L) 12.0 - 16.0 g/dL    Hematocrit  26.9 (L) 37.0 - 48.5 %    MCV 96 82 - 98 fL    MCH 31.1 (H) 27.0 - 31.0 pg    MCHC 32.3 32.0 - 36.0 g/dL    RDW 20.5 (H) 11.5 - 14.5 %    Platelets 33 (LL) 150 - 350 K/uL    MPV SEE COMMENT 9.2 - 12.9 fL    Immature Granulocytes 1.3 (H) 0.0 - 0.5 %    Gran # (ANC) 7.8 (H) 1.8 - 7.7 K/uL    Immature Grans (Abs) 0.12 (H) 0.00 - 0.04 K/uL    Lymph # 0.6 (L) 1.0 - 4.8 K/uL    Mono # 0.7 0.3 - 1.0 K/uL    Eos # 0.0 0.0 - 0.5 K/uL    Baso # 0.01 0.00 - 0.20 K/uL    nRBC 1 (A) 0 /100 WBC    Gran% 84.7 (H) 38.0 - 73.0 %    Lymph% 6.6 (L) 18.0 - 48.0 %    Mono% 7.2 4.0 - 15.0 %    Eosinophil% 0.1 0.0 - 8.0 %    Basophil% 0.1 0.0 - 1.9 %    Platelet Estimate Decreased (A)     Aniso Moderate     Poik Slight     Poly Occasional     Hypo Occasional     Ovalocytes Occasional     Target Cells CANCELED     Basophilic Stippling Occasional     Fragmented Cells Occasional     Pappenheimer Bodies Present     Differential Method Automated    Fibrinogen    Collection Time: 11/23/18  2:31 PM   Result Value Ref Range    Fibrinogen 272 182 - 366 mg/dL   Protime-INR    Collection Time: 11/23/18  2:31 PM   Result Value Ref Range    Prothrombin Time 16.2 (H) 9.0 - 12.5 sec    INR 1.6 (H) 0.8 - 1.2   Basic metabolic panel    Collection Time: 11/23/18  3:35 PM   Result Value Ref Range    Sodium 153 (H) 136 - 145 mmol/L    Potassium 3.4 (L) 3.5 - 5.1 mmol/L    Chloride 117 (H) 95 - 110 mmol/L    CO2 23 23 - 29 mmol/L    Glucose 127 (H) 70 - 110 mg/dL    BUN, Bld 81 (H) 6 - 20 mg/dL    Creatinine 1.5 (H) 0.5 - 1.4 mg/dL    Calcium 9.7 8.7 - 10.5 mg/dL    Anion Gap 13 8 - 16 mmol/L    eGFR if African American 45.5 (A) >60 mL/min/1.73 m^2    eGFR if non  39.5 (A) >60 mL/min/1.73 m^2   POCT glucose    Collection Time: 11/23/18  3:50 PM   Result Value Ref Range    POCT Glucose 124 (H) 70 - 110 mg/dL   Prepare Platelets 1 Dose    Collection Time: 11/23/18  4:08 PM   Result Value Ref Range    UNIT NUMBER [id number]     PRODUCT CODE  [id number]     DISPENSE STATUS TRANSFUSED     CODING SYSTEM [id number]     Unit Blood Type Code 6200     Unit Blood Type A POS     Unit Expiration [id number]    POCT glucose    Collection Time: 11/23/18  9:32 PM   Result Value Ref Range    POCT Glucose 149 (H) 70 - 110 mg/dL   POCT glucose    Collection Time: 11/24/18 12:27 AM   Result Value Ref Range    POCT Glucose 138 (H) 70 - 110 mg/dL   Hepatic function panel    Collection Time: 11/24/18  3:00 AM   Result Value Ref Range    Total Protein 7.9 6.0 - 8.4 g/dL    Albumin 3.2 (L) 3.5 - 5.2 g/dL    Total Bilirubin 8.1 (H) 0.1 - 1.0 mg/dL    Bilirubin, Direct 4.6 (H) 0.1 - 0.3 mg/dL     (H) 10 - 40 U/L    ALT 48 (H) 10 - 44 U/L    Alkaline Phosphatase 120 55 - 135 U/L   Renal function panel    Collection Time: 11/24/18  3:00 AM   Result Value Ref Range    Glucose 132 (H) 70 - 110 mg/dL    Sodium 152 (H) 136 - 145 mmol/L    Potassium 3.3 (L) 3.5 - 5.1 mmol/L    Chloride 117 (H) 95 - 110 mmol/L    CO2 24 23 - 29 mmol/L    BUN, Bld 81 (H) 6 - 20 mg/dL    Calcium 10.1 8.7 - 10.5 mg/dL    Creatinine 1.6 (H) 0.5 - 1.4 mg/dL    Albumin 3.2 (L) 3.5 - 5.2 g/dL    Phosphorus 1.3 (L) 2.7 - 4.5 mg/dL    eGFR if  42.1 (A) >60 mL/min/1.73 m^2    eGFR if non  36.5 (A) >60 mL/min/1.73 m^2    Anion Gap 11 8 - 16 mmol/L   Magnesium    Collection Time: 11/24/18  3:00 AM   Result Value Ref Range    Magnesium 2.4 1.6 - 2.6 mg/dL   Fibrinogen    Collection Time: 11/24/18  3:00 AM   Result Value Ref Range    Fibrinogen 239 182 - 366 mg/dL   Protime-INR    Collection Time: 11/24/18  3:00 AM   Result Value Ref Range    Prothrombin Time 16.8 (H) 9.0 - 12.5 sec    INR 1.7 (H) 0.8 - 1.2   CBC auto differential    Collection Time: 11/24/18  3:00 AM   Result Value Ref Range    WBC 8.57 3.90 - 12.70 K/uL    RBC 2.76 (L) 4.00 - 5.40 M/uL    Hemoglobin 8.8 (L) 12.0 - 16.0 g/dL    Hematocrit 26.9 (L) 37.0 - 48.5 %    MCV 98 82 - 98 fL    MCH 31.9 (H) 27.0 - 31.0 pg     MCHC 32.7 32.0 - 36.0 g/dL    RDW 21.0 (H) 11.5 - 14.5 %    Platelets 74 (L) 150 - 350 K/uL    MPV 12.3 9.2 - 12.9 fL    Immature Granulocytes 1.4 (H) 0.0 - 0.5 %    Gran # (ANC) 6.9 1.8 - 7.7 K/uL    Immature Grans (Abs) 0.12 (H) 0.00 - 0.04 K/uL    Lymph # 0.8 (L) 1.0 - 4.8 K/uL    Mono # 0.8 0.3 - 1.0 K/uL    Eos # 0.0 0.0 - 0.5 K/uL    Baso # 0.02 0.00 - 0.20 K/uL    nRBC 1 (A) 0 /100 WBC    Gran% 80.0 (H) 38.0 - 73.0 %    Lymph% 8.8 (L) 18.0 - 48.0 %    Mono% 9.1 4.0 - 15.0 %    Eosinophil% 0.5 0.0 - 8.0 %    Basophil% 0.2 0.0 - 1.9 %    Differential Method Automated    Phosphorus    Collection Time: 11/24/18  3:00 AM   Result Value Ref Range    Phosphorus 1.3 (L) 2.7 - 4.5 mg/dL   Ammonia    Collection Time: 11/24/18  9:24 AM   Result Value Ref Range    Ammonia 28 10 - 50 umol/L   ISTAT PROCEDURE    Collection Time: 11/24/18  9:30 AM   Result Value Ref Range    POC PH 7.497 (H) 7.35 - 7.45    POC PCO2 34.9 (L) 35 - 45 mmHg    POC PO2 45 40 - 60 mmHg    POC HCO3 27.1 24 - 28 mmol/L    POC BE 4 -2 to 2 mmol/L    POC SATURATED O2 85 (L) 95 - 100 %    POC TCO2 28 24 - 29 mmol/L    Sample VENOUS     Site Other     Allens Test N/A     DelSys Nasal Can     Mode SPONT     Flow 25     FiO2 100    POCT glucose    Collection Time: 11/24/18  9:32 AM   Result Value Ref Range    POCT Glucose 158 (H) 70 - 110 mg/dL   ISTAT PROCEDURE    Collection Time: 11/24/18 10:26 AM   Result Value Ref Range    POC PH 7.537 (H) 7.35 - 7.45    POC PCO2 30.3 (L) 35 - 45 mmHg    POC PO2 33 (LL) 80 - 100 mmHg    POC HCO3 25.7 24 - 28 mmol/L    POC BE 3 -2 to 2 mmol/L    POC SATURATED O2 73 (L) 95 - 100 %    POC TCO2 27 23 - 27 mmol/L    Sample ARTERIAL     Site RR     Allens Test Pass    ISTAT PROCEDURE    Collection Time: 11/24/18 10:34 AM   Result Value Ref Range    POC PH 7.532 (H) 7.35 - 7.45    POC PCO2 29.7 (L) 35 - 45 mmHg    POC PO2 44 (LL) 80 - 100 mmHg    POC HCO3 24.9 24 - 28 mmol/L    POC BE 2 -2 to 2 mmol/L    POC SATURATED O2  86 (L) 95 - 100 %    POC TCO2 26 23 - 27 mmol/L    Sample ARTERIAL     Site LR     Allens Test Pass     DelSys Nasal Can     Mode SPONT     Flow 25     FiO2 80     Sp02 90          Diagnostic Results     Brain imaging:     MRI DWI brain  11/21/18:   Stable acute right thalamic hemorrhage with surrounding edema and mass effect.  Small punctate foci of diffusion signal abnormality in the posterior temporal/parietal regions with low ADC signal and subtle FLAIR signal abnormality suggesting punctate infarcts. No evidence major vascular territory infarct.    MRI total spine 11/21/18:   Mildly increased central T2 signal of the spinal cord at T4 through T6 may reflect prominence of the central canal versus a small syrinx.  No cord expansion to suggest edema or acute infarction in this region.  Otherwise, no abnormal cord signal to suggest infarct.  No evidence of epidural of subdural hematoma.  Multilevel spondylosis most severe at L5-S1 with severe spinal canal stenosis and L4-L5 with moderate to severe neural foraminal narrowing.    CT abdomen pelvis 11/20/18:   Did not show any compression of spinal cord by known hematoma    CT Cervical and thoracic spine 11/21/18:  No acute abnormality of the cervical or thoracic spine.     CTH 11/20/18  Stable right thalamic hemorrhage.  No new hemorrhage identified.      CTH - Right Thalamic hemorrhage           Vessel Imaging:     CTA head and neck - unremarkable           Cardiac Evaluation:   JORDAN 11/15/18  · Normal left ventricular systolic function. The estimated ejection fraction is 70%  · Concentric left ventricular remodeling.  · No wall motion abnormalities.  · Normal LV diastolic function.  · Normal right ventricular systolic function.  · Trace mitral regurgitation.  · Mild aortic regurgitation.  · Trace tricuspid regurgitation.  · Trace pulmonic regurgitation.  · Normal central venous pressure (3 mm Hg).  · The estimated PA systolic pressure is 24.72 mm Hg  · No  pericardial effusion.          Jose Pan MD  [facility]  Department of Vascular Neurology   [facility]

## 2018-11-24 NOTE — PROGRESS NOTES
Pharmacist Intervention IV to PO Note    Leah Cleaning is a 53 y.o. female being treated with IV medication famotidine    Patient Data:    Vital Signs (Most Recent):  Temp: 96 °F (35.6 °C) (11/24/18 0700)  Pulse: 63 (11/24/18 1130)  Resp: (!) 34 (11/24/18 1130)  BP: (!) 154/75 (11/24/18 1130)  SpO2: (!) 91 % (11/24/18 1130)   Vital Signs (72h Range):  Temp:  [93.8 °F (34.3 °C)-98.7 °F (37.1 °C)]   Pulse:  [62-87]   Resp:  [9-42]   BP: (134-183)/()   SpO2:  [83 %-100 %]      CBC:  Recent Labs   Lab 11/23/18  0256 11/23/18  1431 11/24/18  0300   WBC 9.56 9.19 8.57   RBC 2.90* 2.80* 2.76*   HGB 8.9* 8.7* 8.8*   HCT 27.5* 26.9* 26.9*   PLT 40* 33* 74*   MCV 95 96 98   MCH 30.7 31.1* 31.9*   MCHC 32.4 32.3 32.7     CMP:     Recent Labs   Lab 11/22/18  0303 11/22/18  1702 11/23/18  0256 11/23/18  1535 11/24/18  0300    108 98 127* 132*   CALCIUM 9.3 9.3 9.3 9.7 10.1   ALBUMIN 3.0*  3.0* 2.9* 3.0*  3.0*  --  3.2*  3.2*   PROT 7.5  --  7.4  --  7.9    148* 151* 153* 152*   K 3.6 3.2* 3.4* 3.4* 3.3*   CO2 22* 23 23 23 24   * 113* 116* 117* 117*   BUN 90* 83* 83* 81* 81*   CREATININE 1.8* 1.6* 1.6* 1.5* 1.6*   ALKPHOS 111  --  124  --  120   ALT 66*  --  57*  --  48*   *  --  148*  --  113*   BILITOT 7.4*  --  7.8*  --  8.1*       Dietary Orders:  Diet Orders            Dietary nutrition supplements [facility]; [facility] - Any flavor starting at 11/24 1200    Diet Dysphagia Mechanical Soft (IDDSI Level 5): Dysphagia 2 (Mechanical Soft Ground) starting at 11/24 0909    Tube Feedings/Formulas [facility]; Isosource 1.5 [place]; 10; NG; Tube Feeding Bag; Beneprotein (protein powder); Daily; 73; grams: Tube Feeding (I) starting at 11/23 1518            Based on the following criteria, this patient qualifies for intravenous to oral conversion:  [x] The patients gastrointestinal tract is functioning (tolerating medications via oral or enteral route for 24 hours and tolerating  food or enteral feeds for 24 hours).  [x] The patient is hemodynamically stable for 24 hours (heart rate <100 beats per minute, systolic blood pressure >99 mm Hg, and respiratory rate <20 breaths per minute).  [x] The patient shows clinical improvement (afebrile for at least 24 hours and white blood cell count downtrending or normalized). Additionally, the patient must be non-neutropenic (absolute neutrophil count >500 cells/mm3).  [x] For antimicrobials, the patient has received IV therapy for at least 24 hours.    IV medication famotidine 20 mg daily will be changed to oral medication 20 mg daily    Pharmacist's Name: Kyleigh Steele  Pharmacist's Extension: [phone]

## 2018-11-24 NOTE — SUBJECTIVE & OBJECTIVE
Neurologic Chief Complaint: dysarthria, poor attention span, LUE weakness, LLE paralgia, RLE weakness  Right Thalamic hemorrhage     Subjective:     Interval History: Patient is seen for follow-up neurological assessment and treatment recommendations: Increased movement in LUE hand. Spinal drain removed. Some movement in LLE now. No imaging pending.       HPI, Past Medical, Family, and Social History remains the same as documented in the initial encounter.     Review of Systems   Constitutional: Negative for chills and fever.   Respiratory: Negative for cough and shortness of breath.    Cardiovascular: Negative for chest pain and palpitations.   Gastrointestinal: Negative for nausea and vomiting. Abdominal pain: RUQ    Musculoskeletal: Positive for neck pain (discomfort at RIJ site). Negative for back pain and neck stiffness.   Neurological: Negative for tremors, seizures, syncope, weakness, numbness and headaches.   Hematological: Bruises/bleeds easily.   Psychiatric/Behavioral: Positive for confusion and decreased concentration. Negative for agitation and behavioral problems.     Scheduled Meds:   [START ON 11/25/2018] famotidine  20 mg Per NG tube Daily    lactulose  20 g Oral TID    rifAXImin  550 mg Oral BID     Continuous Infusions:   dextrose 5 % 150 mL/hr at 11/24/18 1100     PRN Meds:ondansetron    Objective:     Vital Signs (Most Recent):  Temp: 96 °F (35.6 °C) (11/24/18 0700)  Pulse: 63 (11/24/18 1130)  Resp: (!) 34 (11/24/18 1130)  BP: (!) 154/75 (11/24/18 1130)  SpO2: (!) 91 % (11/24/18 1130)  BP Location: Right arm    Vital Signs Range (Last 24H):  Temp:  [95.8 °F (35.4 °C)-97.6 °F (36.4 °C)]   Pulse:  [62-83]   Resp:  [16-42]   BP: (144-183)/()   SpO2:  [83 %-99 %]   BP Location: Right arm    Physical Exam   Constitutional: She appears well-developed and well-nourished.   HENT:   Head: Normocephalic and atraumatic.   Eyes: EOM are normal. Pupils are equal, round, and reactive to light.  Scleral icterus is present.   Neck: Normal range of motion. Neck supple.   Cardiovascular: Normal rate and regular rhythm.   Pulmonary/Chest: Effort normal and breath sounds normal.   Abdominal: Soft. Bowel sounds are normal. She exhibits distension. There is no guarding.   Genitourinary:   Genitourinary Comments: Perry in place   Musculoskeletal: She exhibits no edema or deformity.   Neurological: She is alert.   LUE weakness   Skin: Skin is warm and dry. Capillary refill takes less than 2 seconds.   Nursing note and vitals reviewed.      Neurological Exam:   LOC: Alert  Attention Span: fair  Language: mild aphasia  Articulation: mild dysarthria  Orientation: AOx1, not time or place  Visual Fields: Full  EOM (CN III, IV, VI): Full/intact  Pupils (CN II, III): PERRL  Facial Sensation (CN V): Normal  Facial Movement (CN VII): mild L facial droop   Motor: Arm left  Normal 2/5  Leg left  Normal 1/5  Arm right  Normal 5/5  Leg right Normal 2/5  Sensation: Able to feel light touch on proximal legs and R lower leg.   Tone: Decreased tone throughout LEs and LUE.   Reflexes +2 throughout       Laboratory:    Recent Results (from the past 24 hour(s))   CBC auto differential    Collection Time: 11/23/18  2:31 PM   Result Value Ref Range    WBC 9.19 3.90 - 12.70 K/uL    RBC 2.80 (L) 4.00 - 5.40 M/uL    Hemoglobin 8.7 (L) 12.0 - 16.0 g/dL    Hematocrit 26.9 (L) 37.0 - 48.5 %    MCV 96 82 - 98 fL    MCH 31.1 (H) 27.0 - 31.0 pg    MCHC 32.3 32.0 - 36.0 g/dL    RDW 20.5 (H) 11.5 - 14.5 %    Platelets 33 (LL) 150 - 350 K/uL    MPV SEE COMMENT 9.2 - 12.9 fL    Immature Granulocytes 1.3 (H) 0.0 - 0.5 %    Gran # (ANC) 7.8 (H) 1.8 - 7.7 K/uL    Immature Grans (Abs) 0.12 (H) 0.00 - 0.04 K/uL    Lymph # 0.6 (L) 1.0 - 4.8 K/uL    Mono # 0.7 0.3 - 1.0 K/uL    Eos # 0.0 0.0 - 0.5 K/uL    Baso # 0.01 0.00 - 0.20 K/uL    nRBC 1 (A) 0 /100 WBC    Gran% 84.7 (H) 38.0 - 73.0 %    Lymph% 6.6 (L) 18.0 - 48.0 %    Mono% 7.2 4.0 - 15.0 %     Eosinophil% 0.1 0.0 - 8.0 %    Basophil% 0.1 0.0 - 1.9 %    Platelet Estimate Decreased (A)     Aniso Moderate     Poik Slight     Poly Occasional     Hypo Occasional     Ovalocytes Occasional     Target Cells CANCELED     Basophilic Stippling Occasional     Fragmented Cells Occasional     Pappenheimer Bodies Present     Differential Method Automated    Fibrinogen    Collection Time: 11/23/18  2:31 PM   Result Value Ref Range    Fibrinogen 272 182 - 366 mg/dL   Protime-INR    Collection Time: 11/23/18  2:31 PM   Result Value Ref Range    Prothrombin Time 16.2 (H) 9.0 - 12.5 sec    INR 1.6 (H) 0.8 - 1.2   Basic metabolic panel    Collection Time: 11/23/18  3:35 PM   Result Value Ref Range    Sodium 153 (H) 136 - 145 mmol/L    Potassium 3.4 (L) 3.5 - 5.1 mmol/L    Chloride 117 (H) 95 - 110 mmol/L    CO2 23 23 - 29 mmol/L    Glucose 127 (H) 70 - 110 mg/dL    BUN, Bld 81 (H) 6 - 20 mg/dL    Creatinine 1.5 (H) 0.5 - 1.4 mg/dL    Calcium 9.7 8.7 - 10.5 mg/dL    Anion Gap 13 8 - 16 mmol/L    eGFR if African American 45.5 (A) >60 mL/min/1.73 m^2    eGFR if non  39.5 (A) >60 mL/min/1.73 m^2   POCT glucose    Collection Time: 11/23/18  3:50 PM   Result Value Ref Range    POCT Glucose 124 (H) 70 - 110 mg/dL   Prepare Platelets 1 Dose    Collection Time: 11/23/18  4:08 PM   Result Value Ref Range    UNIT NUMBER [id number]     PRODUCT CODE S9150Y54     DISPENSE STATUS TRANSFUSED     CODING SYSTEM [id number]     Unit Blood Type Code 6200     Unit Blood Type A POS     Unit Expiration [id number]    POCT glucose    Collection Time: 11/23/18  9:32 PM   Result Value Ref Range    POCT Glucose 149 (H) 70 - 110 mg/dL   POCT glucose    Collection Time: 11/24/18 12:27 AM   Result Value Ref Range    POCT Glucose 138 (H) 70 - 110 mg/dL   Hepatic function panel    Collection Time: 11/24/18  3:00 AM   Result Value Ref Range    Total Protein 7.9 6.0 - 8.4 g/dL    Albumin 3.2 (L) 3.5 - 5.2 g/dL    Total Bilirubin 8.1 (H) 0.1 -  1.0 mg/dL    Bilirubin, Direct 4.6 (H) 0.1 - 0.3 mg/dL     (H) 10 - 40 U/L    ALT 48 (H) 10 - 44 U/L    Alkaline Phosphatase 120 55 - 135 U/L   Renal function panel    Collection Time: 11/24/18  3:00 AM   Result Value Ref Range    Glucose 132 (H) 70 - 110 mg/dL    Sodium 152 (H) 136 - 145 mmol/L    Potassium 3.3 (L) 3.5 - 5.1 mmol/L    Chloride 117 (H) 95 - 110 mmol/L    CO2 24 23 - 29 mmol/L    BUN, Bld 81 (H) 6 - 20 mg/dL    Calcium 10.1 8.7 - 10.5 mg/dL    Creatinine 1.6 (H) 0.5 - 1.4 mg/dL    Albumin 3.2 (L) 3.5 - 5.2 g/dL    Phosphorus 1.3 (L) 2.7 - 4.5 mg/dL    eGFR if  42.1 (A) >60 mL/min/1.73 m^2    eGFR if non  36.5 (A) >60 mL/min/1.73 m^2    Anion Gap 11 8 - 16 mmol/L   Magnesium    Collection Time: 11/24/18  3:00 AM   Result Value Ref Range    Magnesium 2.4 1.6 - 2.6 mg/dL   Fibrinogen    Collection Time: 11/24/18  3:00 AM   Result Value Ref Range    Fibrinogen 239 182 - 366 mg/dL   Protime-INR    Collection Time: 11/24/18  3:00 AM   Result Value Ref Range    Prothrombin Time 16.8 (H) 9.0 - 12.5 sec    INR 1.7 (H) 0.8 - 1.2   CBC auto differential    Collection Time: 11/24/18  3:00 AM   Result Value Ref Range    WBC 8.57 3.90 - 12.70 K/uL    RBC 2.76 (L) 4.00 - 5.40 M/uL    Hemoglobin 8.8 (L) 12.0 - 16.0 g/dL    Hematocrit 26.9 (L) 37.0 - 48.5 %    MCV 98 82 - 98 fL    MCH 31.9 (H) 27.0 - 31.0 pg    MCHC 32.7 32.0 - 36.0 g/dL    RDW 21.0 (H) 11.5 - 14.5 %    Platelets 74 (L) 150 - 350 K/uL    MPV 12.3 9.2 - 12.9 fL    Immature Granulocytes 1.4 (H) 0.0 - 0.5 %    Gran # (ANC) 6.9 1.8 - 7.7 K/uL    Immature Grans (Abs) 0.12 (H) 0.00 - 0.04 K/uL    Lymph # 0.8 (L) 1.0 - 4.8 K/uL    Mono # 0.8 0.3 - 1.0 K/uL    Eos # 0.0 0.0 - 0.5 K/uL    Baso # 0.02 0.00 - 0.20 K/uL    nRBC 1 (A) 0 /100 WBC    Gran% 80.0 (H) 38.0 - 73.0 %    Lymph% 8.8 (L) 18.0 - 48.0 %    Mono% 9.1 4.0 - 15.0 %    Eosinophil% 0.5 0.0 - 8.0 %    Basophil% 0.2 0.0 - 1.9 %    Differential Method Automated     Phosphorus    Collection Time: 11/24/18  3:00 AM   Result Value Ref Range    Phosphorus 1.3 (L) 2.7 - 4.5 mg/dL   Ammonia    Collection Time: 11/24/18  9:24 AM   Result Value Ref Range    Ammonia 28 10 - 50 umol/L   ISTAT PROCEDURE    Collection Time: 11/24/18  9:30 AM   Result Value Ref Range    POC PH 7.497 (H) 7.35 - 7.45    POC PCO2 34.9 (L) 35 - 45 mmHg    POC PO2 45 40 - 60 mmHg    POC HCO3 27.1 24 - 28 mmol/L    POC BE 4 -2 to 2 mmol/L    POC SATURATED O2 85 (L) 95 - 100 %    POC TCO2 28 24 - 29 mmol/L    Sample VENOUS     Site Other     Allens Test N/A     DelSys Nasal Can     Mode SPONT     Flow 25     FiO2 100    POCT glucose    Collection Time: 11/24/18  9:32 AM   Result Value Ref Range    POCT Glucose 158 (H) 70 - 110 mg/dL   ISTAT PROCEDURE    Collection Time: 11/24/18 10:26 AM   Result Value Ref Range    POC PH 7.537 (H) 7.35 - 7.45    POC PCO2 30.3 (L) 35 - 45 mmHg    POC PO2 33 (LL) 80 - 100 mmHg    POC HCO3 25.7 24 - 28 mmol/L    POC BE 3 -2 to 2 mmol/L    POC SATURATED O2 73 (L) 95 - 100 %    POC TCO2 27 23 - 27 mmol/L    Sample ARTERIAL     Site RR     Allens Test Pass    ISTAT PROCEDURE    Collection Time: 11/24/18 10:34 AM   Result Value Ref Range    POC PH 7.532 (H) 7.35 - 7.45    POC PCO2 29.7 (L) 35 - 45 mmHg    POC PO2 44 (LL) 80 - 100 mmHg    POC HCO3 24.9 24 - 28 mmol/L    POC BE 2 -2 to 2 mmol/L    POC SATURATED O2 86 (L) 95 - 100 %    POC TCO2 26 23 - 27 mmol/L    Sample ARTERIAL     Site LR     Allens Test Pass     DelSys Nasal Can     Mode SPONT     Flow 25     FiO2 80     Sp02 90          Diagnostic Results     Brain imaging:     MRI DWI brain  11/21/18:   Stable acute right thalamic hemorrhage with surrounding edema and mass effect.  Small punctate foci of diffusion signal abnormality in the posterior temporal/parietal regions with low ADC signal and subtle FLAIR signal abnormality suggesting punctate infarcts. No evidence major vascular territory infarct.    MRI total spine  11/21/18:   Mildly increased central T2 signal of the spinal cord at T4 through T6 may reflect prominence of the central canal versus a small syrinx.  No cord expansion to suggest edema or acute infarction in this region.  Otherwise, no abnormal cord signal to suggest infarct.  No evidence of epidural of subdural hematoma.  Multilevel spondylosis most severe at L5-S1 with severe spinal canal stenosis and L4-L5 with moderate to severe neural foraminal narrowing.    CT abdomen pelvis 11/20/18:   Did not show any compression of spinal cord by known hematoma    CT Cervical and thoracic spine 11/21/18:  No acute abnormality of the cervical or thoracic spine.     CTH 11/20/18  Stable right thalamic hemorrhage.  No new hemorrhage identified.      CTH - Right Thalamic hemorrhage           Vessel Imaging:     CTA head and neck - unremarkable           Cardiac Evaluation:   JORDAN 11/15/18  · Normal left ventricular systolic function. The estimated ejection fraction is 70%  · Concentric left ventricular remodeling.  · No wall motion abnormalities.  · Normal LV diastolic function.  · Normal right ventricular systolic function.  · Trace mitral regurgitation.  · Mild aortic regurgitation.  · Trace tricuspid regurgitation.  · Trace pulmonic regurgitation.  · Normal central venous pressure (3 mm Hg).  · The estimated PA systolic pressure is 24.72 mm Hg  · No pericardial effusion.

## 2018-11-24 NOTE — SUBJECTIVE & OBJECTIVE
Medications:  Continuous Infusions:   dextrose 5 % 75 mL/hr at 11/23/18 1800    norepinephrine bitartrate-D5W 0.02 mcg/kg/min (11/20/18 0744)     Scheduled Meds:   famotidine (PF)  20 mg Intravenous Daily    lactulose  20 g Per NG tube TID    rifAXImin  550 mg Oral BID     PRN Meds:sodium chloride, sodium chloride, sodium chloride, ondansetron     Objective:     Vital Signs (Most Recent):  Temp: 96 °F (35.6 °C) (11/24/18 0700)  Pulse: 79 (11/24/18 0715)  Resp: (!) 22 (11/24/18 0715)  BP: (!) 167/84 (11/24/18 0700)  SpO2: 95 % (11/24/18 0715) Vital Signs (24h Range):  Temp:  [95.8 °F (35.4 °C)-97.6 °F (36.4 °C)] 96 °F (35.6 °C)  Pulse:  [71-87] 79  Resp:  [16-42] 22  SpO2:  [90 %-99 %] 95 %  BP: (144-183)/(72-88) 167/84     Date 11/24/18 0700 - 11/25/18 0659   Shift 7311-6932 4713-6286 3438-8794 24 Hour Total   INTAKE   I.V.(mL/kg) 75(1.1)   75(1.1)   Shift Total(mL/kg) 75(1.1)   75(1.1)   OUTPUT   Urine(mL/kg/hr) 350   350   Shift Total(mL/kg) 350(5.1)   350(5.1)   Weight (kg) 68.3 68.3 68.3 68.3       Physical Exam   Constitutional: She is oriented to person, place, and time. She appears well-developed and well-nourished. She appears lethargic.   HENT:   Head: Normocephalic and atraumatic.   Eyes: EOM are normal. Pupils are equal, round, and reactive to light.   Neck: Normal range of motion. Neck supple.   Cardiovascular: Normal rate and regular rhythm.   Pulmonary/Chest: Effort normal and breath sounds normal.   Abdominal: Soft. Bowel sounds are normal.   Musculoskeletal:   LUE wiggles fingers 3/5   RUE 5/5, intact fine motor movement  LLE plantar flex ankle, no other movement  RLE knee flexion, and moves toes   Neurological: She is oriented to person, place, and time. She appears lethargic.   Skin: Skin is warm.       Significant Labs:  CBC:   Recent Labs   Lab 11/24/18  0300   WBC 8.57   RBC 2.76*   HGB 8.8*   HCT 26.9*   PLT 74*   MCV 98   MCH 31.9*   MCHC 32.7     CMP:   Recent Labs   Lab  11/24/18  0300   *   CALCIUM 10.1   ALBUMIN 3.2*  3.2*   PROT 7.9   *   K 3.3*   CO2 24   *   BUN 81*   CREATININE 1.6*   ALKPHOS 120   ALT 48*   *   BILITOT 8.1*     Coagulation:   Recent Labs   Lab 11/24/18  0300   LABPROT 16.8*   INR 1.7*

## 2018-11-24 NOTE — ASSESSMENT & PLAN NOTE
--Noted on 11/17 with left hemiparesis, dysarthria, left sided facial droop. These symptoms improved through 11/19 with no intervention per vascular neurology other than maintaining SBP <160 and correcting coagulopathy for goal INR <1.5, platelets >50,000 and fibrinogen >100  --worsening of neurologic status again on 11/20 with worsening dysarthria, LUE, LLE, RLE paralysis.   --Repeat stat CT head 11/20 with stable thalamic hemorrhage; no acute abnormalities  --CT cervical and CT thoracic without contrast 11/21 no detection of spinal cord hematoma however sub optimal evaluation of spinal cord without contrast  --Lumbar spinal drain removed 11/23/18  --Maintain SBP <160. High MAP goals no longer indicated  --neuro checks q 4 hrs

## 2018-11-24 NOTE — PT/OT/SLP PROGRESS
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    Recommendations:                 General Recommendations:  Dysphagia therapy and Cognitive-linguistic therapy  Diet recommendations:  Puree, Liquid Diet Level: Thin   Aspiration Precautions: 1 bite/sip at a time, Alternating bites/sips, Assistance with meals, Check for pocketing/oral residue, Frequent oral care, HOB to 90 degrees, Meds crushed in puree, Monitor for s/s of aspiration, Remain upright 30 minutes post meal, Small bites/sips and Strict aspiration precautions   Continue to monitor for signs and symptoms of aspiration and discontinue oral feeding should you notice any of the following: watery eyes, reddened facial area, wet vocal quality, increased work of breathing, change in respiratory status, increased congestion, coughing, fever, etc.  General Precautions: Standard, aspiration, fall  Communication strategies:  go to room if call light pushed    Subjective     SLP paged by nurse, nurse explains Patient pulled NG tube  Pt presents agitated, restless  Pt explains, "I'm not hungry, give me the cracker"  No family present  CXR 11/24/2018: Better aeration of both lung fields compared to the prior exam    Pain/Comfort:  · Pain Rating 1: 0/10    Objective:     Has the patient been evaluated by SLP for swallowing?   Yes  Keep patient NPO? No   Current Respiratory Status: other (comment)(comfort flow )      Pt seen for ongoing monitoring of tolerance of diet. Pt found awake in bed, with soft bilateral wrist restraints 2/2 increased agitation and restlessness. Nurse at bedside explans PO diet re-initiated this am following removal of NG tube.   SLP noted suction canister with blood tinged secretions.  Patient moving about in bed and restless. SLP assisted Nurse in repositioning PAtient in bed. Patient with clear vocal quality and adequate cough upon " command. She demonstrated decreased lingual coordiantion and strength upon review of the oral mechanism. She accepted trials of thin liquids and solids this service day. She demonstrated mild oral holding and was talking while chewing on pieces of saltine cracker. Patient intially declined use of liquid wash to clear stasis and continue to talk with solid trials on lingual surface despite cues from SLP to attend to each bite/sip and refrain from talking with food in mouth. Patient eventually accepted liquid wash x3. Pt with mild lingual stasis remaining following use of liquid washes and was cued to use effortful swallow and lingual sweep to clear remaining solid stasis. Patient declined additional truials 2/2 feeling full. Nurse at bedside and updated on findings/recommendations. Patient talking over SLP t/o SLP education and not able to demonstrate understanging of SLP education or aspiraiton precautions. [place] updated. MD team paged.    Assessment:     Leah Cleaning is a 53 y.o. female with an SLP diagnosis of Dysphagia and Cognitive-Linguistic Impairment.  She presents with risk of aspiration 2/2 decreased sustained attention and respiratory status.  Patient requires assistance with all meals for safety. Please continue to monitor for signs and symptoms of aspiration and discontinue oral feeding should you notice any of the following: watery eyes, reddened facial area, wet vocal quality, increased work of breathing, change in respiratory status, increased congestion, coughing, and.or fever.      Goals:   Multidisciplinary Problems     SLP Goals        Problem: SLP Goal    Goal Priority Disciplines Outcome   SLP Goal     SLP Ongoing (interventions implemented as appropriate)   Description:  Speech Language Pathology Goals  Goals expected to be met by 11/27:    1.Pt will tolerate diet of  thin liqiuds and purees without overt clinical signs of aspiration   2.Pt will participate in trials of soft solids  within speech therapy sessions to help determine least restrictive diet   3. Pt will complete OMEs x10 w/ SLP model to enhance oral motor function   4. Pt will utilize compensatory strategies independently at the phrase level  to enhance speech intelligibility   5. Pt will demonstrate orientation to self, place, situation , family members, date w/ min cues   6. Pt will complete problem solving skills w/ 75 % acc to enhance safety awareness   7. Pt will generate 10 items per category to enhance organizations skills   8. Pt will demonstrate sustained attention to task across 5 consecutive minutes w/ min cues to enhance attention skills  9. Pt will participate in ongoing assessment of speech language and cognitive linguistic skills to help rule out deficits and determine therapeutic plan of care                             Plan:     · Patient to be seen:  4 x/week   · Plan of Care expires:  12/17/18  · Plan of Care reviewed with:  patient   · SLP Follow-Up:  Yes       Discharge recommendations:  rehabilitation facility   Barriers to Discharge:  Safety Awareness Impaired    Time Tracking:     SLP Treatment Date:   11/24/18  Speech Start Time:  1155  Speech Stop Time:  1205     Speech Total Time (min):  10 min    Billable Minutes: Treatment Swallowing Dysfunction 10    PILAR Donovan, [facility]-SLP  Speech-Language Pathology  Pager: [phone]      11/24/2018

## 2018-11-25 LAB
ALBUMIN SERPL BCP-MCNC: 2.7 G/DL
ALBUMIN SERPL BCP-MCNC: 2.7 G/DL
ALP SERPL-CCNC: 114 U/L
ALT SERPL W/O P-5'-P-CCNC: 43 U/L
ANION GAP SERPL CALC-SCNC: 12 MMOL/L
AST SERPL-CCNC: 99 U/L
BASOPHILS # BLD AUTO: 0.01 K/UL
BASOPHILS # BLD AUTO: 0.01 K/UL
BASOPHILS NFR BLD: 0.1 %
BASOPHILS NFR BLD: 0.1 %
BILIRUB DIRECT SERPL-MCNC: 3.9 MG/DL
BILIRUB SERPL-MCNC: 8.1 MG/DL
BLD PROD TYP BPU: NORMAL
BLOOD UNIT EXPIRATION DATE: NORMAL
BLOOD UNIT TYPE CODE: 5100
BLOOD UNIT TYPE CODE: 5100
BLOOD UNIT TYPE CODE: 6200
BLOOD UNIT TYPE: NORMAL
BUN SERPL-MCNC: 73 MG/DL
CALCIUM SERPL-MCNC: 9.3 MG/DL
CHLORIDE SERPL-SCNC: 114 MMOL/L
CO2 SERPL-SCNC: 20 MMOL/L
CODING SYSTEM: NORMAL
CREAT SERPL-MCNC: 1.3 MG/DL
D DIMER PPP IA.FEU-MCNC: >33 MG/L FEU
DIFFERENTIAL METHOD: ABNORMAL
DIFFERENTIAL METHOD: ABNORMAL
DISPENSE STATUS: NORMAL
EOSINOPHIL # BLD AUTO: 0 K/UL
EOSINOPHIL # BLD AUTO: 0 K/UL
EOSINOPHIL NFR BLD: 0.3 %
EOSINOPHIL NFR BLD: 0.3 %
ERYTHROCYTE [DISTWIDTH] IN BLOOD BY AUTOMATED COUNT: 21 %
ERYTHROCYTE [DISTWIDTH] IN BLOOD BY AUTOMATED COUNT: 21.3 %
EST. GFR  (AFRICAN AMERICAN): 54.1 ML/MIN/1.73 M^2
EST. GFR  (NON AFRICAN AMERICAN): 47 ML/MIN/1.73 M^2
FIBRINOGEN PPP-MCNC: 80 MG/DL
GLUCOSE SERPL-MCNC: 59 MG/DL
HCT VFR BLD AUTO: 26.7 %
HCT VFR BLD AUTO: 28.4 %
HGB BLD-MCNC: 8.6 G/DL
HGB BLD-MCNC: 9.2 G/DL
IMM GRANULOCYTES # BLD AUTO: 0.19 K/UL
IMM GRANULOCYTES # BLD AUTO: 0.21 K/UL
IMM GRANULOCYTES NFR BLD AUTO: 2.1 %
IMM GRANULOCYTES NFR BLD AUTO: 2.2 %
INR PPP: 2
LYMPHOCYTES # BLD AUTO: 0.6 K/UL
LYMPHOCYTES # BLD AUTO: 0.9 K/UL
LYMPHOCYTES NFR BLD: 6.7 %
LYMPHOCYTES NFR BLD: 9.1 %
MAGNESIUM SERPL-MCNC: 2 MG/DL
MCH RBC QN AUTO: 31 PG
MCH RBC QN AUTO: 31.1 PG
MCHC RBC AUTO-ENTMCNC: 32.2 G/DL
MCHC RBC AUTO-ENTMCNC: 32.4 G/DL
MCV RBC AUTO: 96 FL
MCV RBC AUTO: 96 FL
MONOCYTES # BLD AUTO: 0.6 K/UL
MONOCYTES # BLD AUTO: 0.7 K/UL
MONOCYTES NFR BLD: 5.9 %
MONOCYTES NFR BLD: 8 %
NEUTROPHILS # BLD AUTO: 7.4 K/UL
NEUTROPHILS # BLD AUTO: 7.9 K/UL
NEUTROPHILS NFR BLD: 82.4 %
NEUTROPHILS NFR BLD: 82.8 %
NRBC BLD-RTO: 2 /100 WBC
NRBC BLD-RTO: 2 /100 WBC
NUM UNITS TRANS FFP: NORMAL
PHOSPHATE SERPL-MCNC: 1.5 MG/DL
PHOSPHATE SERPL-MCNC: 1.5 MG/DL
PLATELET # BLD AUTO: 37 K/UL
PLATELET # BLD AUTO: 42 K/UL
PMV BLD AUTO: 12.7 FL
PMV BLD AUTO: ABNORMAL FL
POCT GLUCOSE: 102 MG/DL (ref 70–110)
POCT GLUCOSE: 122 MG/DL (ref 70–110)
POCT GLUCOSE: 58 MG/DL (ref 70–110)
POCT GLUCOSE: 88 MG/DL (ref 70–110)
POCT GLUCOSE: 95 MG/DL (ref 70–110)
POCT GLUCOSE: 96 MG/DL (ref 70–110)
POCT GLUCOSE: 97 MG/DL (ref 70–110)
POTASSIUM SERPL-SCNC: 4.5 MMOL/L
PROT SERPL-MCNC: 7 G/DL
PROTHROMBIN TIME: 19.7 SEC
RBC # BLD AUTO: 2.77 M/UL
RBC # BLD AUTO: 2.96 M/UL
SODIUM SERPL-SCNC: 146 MMOL/L
TRANS PLATPHERESIS VOL PATIENT: NORMAL ML
UNIT NUMBER: NORMAL
WBC # BLD AUTO: 8.91 K/UL
WBC # BLD AUTO: 9.59 K/UL

## 2018-11-25 PROCEDURE — 97530 THERAPEUTIC ACTIVITIES: CPT

## 2018-11-25 PROCEDURE — P9017 PLASMA 1 DONOR FRZ W/IN 8 HR: HCPCS

## 2018-11-25 PROCEDURE — 85025 COMPLETE CBC W/AUTO DIFF WBC: CPT

## 2018-11-25 PROCEDURE — P9012 CRYOPRECIPITATE EACH UNIT: HCPCS

## 2018-11-25 PROCEDURE — 25000003 PHARM REV CODE 250: Performed by: NURSE PRACTITIONER

## 2018-11-25 PROCEDURE — 85384 FIBRINOGEN ACTIVITY: CPT

## 2018-11-25 PROCEDURE — 25000003 PHARM REV CODE 250: Performed by: INTERNAL MEDICINE

## 2018-11-25 PROCEDURE — 63600175 PHARM REV CODE 636 W HCPCS: Performed by: NURSE PRACTITIONER

## 2018-11-25 PROCEDURE — P9035 PLATELET PHERES LEUKOREDUCED: HCPCS

## 2018-11-25 PROCEDURE — 85610 PROTHROMBIN TIME: CPT

## 2018-11-25 PROCEDURE — 83735 ASSAY OF MAGNESIUM: CPT

## 2018-11-25 PROCEDURE — 20000000 HC ICU ROOM

## 2018-11-25 PROCEDURE — 25000003 PHARM REV CODE 250: Performed by: STUDENT IN AN ORGANIZED HEALTH CARE EDUCATION/TRAINING PROGRAM

## 2018-11-25 PROCEDURE — 25000003 PHARM REV CODE 250: Performed by: PHYSICIAN ASSISTANT

## 2018-11-25 PROCEDURE — 85379 FIBRIN DEGRADATION QUANT: CPT

## 2018-11-25 PROCEDURE — 27000221 HC OXYGEN, UP TO 24 HOURS

## 2018-11-25 PROCEDURE — 80076 HEPATIC FUNCTION PANEL: CPT

## 2018-11-25 PROCEDURE — 97166 OT EVAL MOD COMPLEX 45 MIN: CPT

## 2018-11-25 PROCEDURE — 99900035 HC TECH TIME PER 15 MIN (STAT)

## 2018-11-25 PROCEDURE — 27100171 HC OXYGEN HIGH FLOW UP TO 24 HOURS

## 2018-11-25 PROCEDURE — 27100092 HC HIGH FLOW DELIVERY CANNULA

## 2018-11-25 PROCEDURE — 63600175 PHARM REV CODE 636 W HCPCS: Performed by: STUDENT IN AN ORGANIZED HEALTH CARE EDUCATION/TRAINING PROGRAM

## 2018-11-25 PROCEDURE — 80069 RENAL FUNCTION PANEL: CPT

## 2018-11-25 PROCEDURE — 94761 N-INVAS EAR/PLS OXIMETRY MLT: CPT

## 2018-11-25 PROCEDURE — 99291 CRITICAL CARE FIRST HOUR: CPT | Mod: ,,, | Performed by: NURSE PRACTITIONER

## 2018-11-25 RX ORDER — PSEUDOEPHEDRINE/ACETAMINOPHEN 30MG-500MG
100 TABLET ORAL
Status: COMPLETED | OUTPATIENT
Start: 2018-11-25 | End: 2018-11-25

## 2018-11-25 RX ORDER — LABETALOL HCL 20 MG/4 ML
10 SYRINGE (ML) INTRAVENOUS ONCE
Status: COMPLETED | OUTPATIENT
Start: 2018-11-25 | End: 2018-11-25

## 2018-11-25 RX ORDER — AMLODIPINE BESYLATE 10 MG/1
10 TABLET ORAL DAILY
Status: DISCONTINUED | OUTPATIENT
Start: 2018-11-25 | End: 2018-11-26

## 2018-11-25 RX ORDER — SYRING-NEEDL,DISP,INSUL,0.3 ML 29 G X1/2"
296 SYRINGE, EMPTY DISPOSABLE MISCELLANEOUS
Status: COMPLETED | OUTPATIENT
Start: 2018-11-25 | End: 2018-11-25

## 2018-11-25 RX ORDER — HYDROCODONE BITARTRATE AND ACETAMINOPHEN 500; 5 MG/1; MG/1
TABLET ORAL
Status: DISCONTINUED | OUTPATIENT
Start: 2018-11-25 | End: 2018-11-26

## 2018-11-25 RX ORDER — HYDROCODONE BITARTRATE AND ACETAMINOPHEN 500; 5 MG/1; MG/1
TABLET ORAL
Status: DISCONTINUED | OUTPATIENT
Start: 2018-11-25 | End: 2018-11-25

## 2018-11-25 RX ORDER — FUROSEMIDE 10 MG/ML
40 INJECTION INTRAMUSCULAR; INTRAVENOUS ONCE
Status: COMPLETED | OUTPATIENT
Start: 2018-11-25 | End: 2018-11-25

## 2018-11-25 RX ORDER — CARVEDILOL 3.12 MG/1
3.12 TABLET ORAL 2 TIMES DAILY
Status: DISCONTINUED | OUTPATIENT
Start: 2018-11-25 | End: 2018-11-25

## 2018-11-25 RX ORDER — LACTULOSE 10 G/15ML
30 SOLUTION ORAL EVERY 6 HOURS
Status: DISCONTINUED | OUTPATIENT
Start: 2018-11-25 | End: 2018-11-26

## 2018-11-25 RX ORDER — CARVEDILOL 6.25 MG/1
6.25 TABLET ORAL 2 TIMES DAILY
Status: DISCONTINUED | OUTPATIENT
Start: 2018-11-25 | End: 2018-11-26

## 2018-11-25 RX ADMIN — AMLODIPINE BESYLATE 10 MG: 10 TABLET ORAL at 10:11

## 2018-11-25 RX ADMIN — SODIUM CHLORIDE 500 ML: 0.9 INJECTION, SOLUTION INTRAVENOUS at 02:11

## 2018-11-25 RX ADMIN — FUROSEMIDE 40 MG: 10 INJECTION, SOLUTION INTRAMUSCULAR; INTRAVENOUS at 10:11

## 2018-11-25 RX ADMIN — HYDRALAZINE HYDROCHLORIDE 10 MG: 20 INJECTION INTRAMUSCULAR; INTRAVENOUS at 04:11

## 2018-11-25 RX ADMIN — RIFAXIMIN 550 MG: 550 TABLET ORAL at 08:11

## 2018-11-25 RX ADMIN — Medication 100 ML: at 02:11

## 2018-11-25 RX ADMIN — LACTULOSE 20 G: 20 SOLUTION ORAL at 08:11

## 2018-11-25 RX ADMIN — DEXTROSE MONOHYDRATE 12.5 G: 500 INJECTION PARENTERAL at 04:11

## 2018-11-25 RX ADMIN — FAMOTIDINE 20 MG: 20 TABLET ORAL at 08:11

## 2018-11-25 RX ADMIN — LACTULOSE 30 G: 20 SOLUTION ORAL at 05:11

## 2018-11-25 RX ADMIN — HYDRALAZINE HYDROCHLORIDE 10 MG: 20 INJECTION INTRAMUSCULAR; INTRAVENOUS at 08:11

## 2018-11-25 RX ADMIN — CARVEDILOL 3.12 MG: 3.12 TABLET, FILM COATED ORAL at 11:11

## 2018-11-25 RX ADMIN — PHYTONADIONE 10 MG: 10 INJECTION, EMULSION INTRAMUSCULAR; INTRAVENOUS; SUBCUTANEOUS at 07:11

## 2018-11-25 RX ADMIN — LABETALOL HYDROCHLORIDE 10 MG: 5 INJECTION, SOLUTION INTRAVENOUS at 01:11

## 2018-11-25 RX ADMIN — CARVEDILOL 6.25 MG: 6.25 TABLET, FILM COATED ORAL at 08:11

## 2018-11-25 RX ADMIN — HYDRALAZINE HYDROCHLORIDE 10 MG: 20 INJECTION INTRAMUSCULAR; INTRAVENOUS at 11:11

## 2018-11-25 RX ADMIN — MAGESIUM CITRATE 296 ML: 1.75 LIQUID ORAL at 02:11

## 2018-11-25 NOTE — ASSESSMENT & PLAN NOTE
--Noted on 11/17 with left hemiparesis, dysarthria, left sided facial droop. These symptoms improved through 11/19 with no intervention per vascular neurology other than maintaining SBP <160 and correcting coagulopathy for goal INR <1.5, platelets >50,000 and fibrinogen >100  --worsening of neurologic status again on 11/20 with worsening dysarthria, LUE, LLE, RLE paralysis.   --Repeat stat CT head 11/20 with stable thalamic hemorrhage; no acute abnormalities  --CT cervical and CT thoracic without contrast 11/21 no detection of spinal cord hematoma however sub optimal evaluation of spinal cord without contrast  --Lumbar spinal drain removed 11/23/18  --Maintain SBP <160. High MAP goals no longer indicated  --Started on oral BP medications (amlodipine and carvedilol)  --neuro checks q 4 hrs

## 2018-11-25 NOTE — ASSESSMENT & PLAN NOTE
Requiring HiFlo, High FiO2 Oxygen. Improving PE vs Volume Overload vs Hepato-Pulmonary shunting.   --No fevers or leukocytosis. Not hypotensive. Pneumonia less likely  --Bilateral LE negative for DVT lowers suspicion of PE. Would not be a candidate for anticoagulation  --Chest X-Ray with mild pulmonary congestion, Continue Diureses  --Wean Oxygen to keep SaO2 > 88%

## 2018-11-25 NOTE — PROGRESS NOTES
Weaned patient off comfort flow to high flow nasal cannula at 6L. Will continue to monitor to meet patient oxygen requirements.

## 2018-11-25 NOTE — SUBJECTIVE & OBJECTIVE
Interval History/Significant Events:   Oxygen requirements decreasing. Given FFP and Vit. K with coagulapathy.  Has not had any BMs in the past 24 hours.     Review of Systems   Constitutional: Negative for chills, diaphoresis and fever.   Respiratory: Negative for cough, shortness of breath and wheezing.    Cardiovascular: Negative for chest pain and palpitations.   Gastrointestinal: Positive for abdominal distention. Negative for abdominal pain, nausea and vomiting.     Objective:     Vital Signs (Most Recent):  Temp: 98.2 °F (36.8 °C) (11/25/18 0815)  Pulse: 83 (11/25/18 1000)  Resp: (!) 41 (11/25/18 1000)  BP: (!) 166/90 (11/25/18 1000)  SpO2: (!) 87 % (11/25/18 1000) Vital Signs (24h Range):  Temp:  [95.9 °F (35.5 °C)-98.5 °F (36.9 °C)] 98.2 °F (36.8 °C)  Pulse:  [61-90] 83  Resp:  [19-53] 41  SpO2:  [82 %-100 %] 87 %  BP: (139-184)/() 166/90   Weight: 68.3 kg (150 lb 9.2 oz)  Body mass index is 24.3 kg/m².      Intake/Output Summary (Last 24 hours) at 11/25/2018 1022  Last data filed at 11/25/2018 1000  Gross per 24 hour   Intake 1050 ml   Output 1275 ml   Net -225 ml       Physical Exam   Constitutional: No distress. Nasal cannula in place.   HENT:   Head: Normocephalic and atraumatic.   Eyes: Pupils are equal, round, and reactive to light. Scleral icterus is present.   Cardiovascular: Normal rate, regular rhythm, normal heart sounds and intact distal pulses.   No murmur heard.  Pulmonary/Chest: Effort normal and breath sounds normal. No respiratory distress. She has no wheezes. She has no rales.   Abdominal: Soft. She exhibits distension. Bowel sounds are decreased. There is no tenderness.   Neurological: She is alert.   Oriented times 2. (Person and Place)  Right Arm and Right Leg 3/5 strength  Left Arm and Left Leg 1/5 Strength   Skin: Skin is warm and dry. Capillary refill takes less than 2 seconds. She is not diaphoretic.   Psychiatric: She has a normal mood and affect. Her speech is delayed.  Cognition and memory are impaired.   Nursing note and vitals reviewed.      Vents:  Oxygen Concentration (%): 50 (11/25/18 0753)  Lines/Drains/Airways     Drain            Female External Urinary Catheter 11/23/18 1730 1 day         Urethral Catheter 11/24/18 1134 less than 1 day          Peripheral Intravenous Line                 Peripheral IV - Single Lumen 11/23/18 1352 Anterior;Left Upper Arm 1 day         Peripheral IV - Single Lumen 11/23/18 1353 Anterior;Left Forearm 1 day              Significant Labs:    CBC/Anemia Profile:  Recent Labs   Lab 11/24/18  0300 11/24/18  1343 11/25/18  0330   WBC 8.57 8.82 8.91   HGB 8.8* 8.9* 8.6*   HCT 26.9* 27.8* 26.7*   PLT 74* 60* 42*   MCV 98 97 96   RDW 21.0* 21.0* 21.0*        Chemistries:  Recent Labs   Lab 11/24/18  0300 11/24/18  1343 11/25/18  0330   * 146* 146*   K 3.3* 3.7 4.5   * 114* 114*   CO2 24 20* 20*   BUN 81* 76* 73*   CREATININE 1.6* 1.5* 1.3   CALCIUM 10.1 9.5 9.3   ALBUMIN 3.2*  3.2*  --  2.7*  2.7*   PROT 7.9  --  7.0   BILITOT 8.1*  --  8.1*   ALKPHOS 120  --  114   ALT 48*  --  43   *  --  99*   MG 2.4  --  2.0   PHOS 1.3*  1.3*  --  1.5*  1.5*       Coagulation:   Recent Labs   Lab 11/25/18  0330   INR 2.0*     All pertinent labs within the past 24 hours have been reviewed.    Significant Imaging:  I have reviewed all pertinent imaging results/findings within the past 24 hours.

## 2018-11-25 NOTE — PROGRESS NOTES
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 Cleaning is a 54 yo female with a history significant for Hep C cirrhosis, HTN, cocaine and marijuana use who presented to [facility] on the early morning of 11/15 as a transfer from [facility] for Type B aortic dissection. She initially presented to [facility] on the evening of 11/14 for sharp epigastric pain that radiated to her back which started initially on the night of 11/13. CT at OSH noted aortic dissection from left subclavian to just proximal of celiac artery. She was transferred to [facility] for vascular surgery consult on a nicardipine gtt. She was transitioned from nicardipine gtt to esmolol gtt. CTA upon arrival confirmed dissection and she was admitted to MICU for medical management of the dissection with vascular surgery following.     Hospital/ICU Course:  Mrs. Cleaning was admitted to MICU for Type B aortic dissection on esmolol gtt to maintain HR of 60 and SBP <120. Vascular surgery following, no surgical intervention warranted initially. CTA read concerning for possible aortic rupture in the descending aorta with high-density fluid noted in the posterior mediastinum; additionally, there was a suspected penetrating ulcer in the upper abdominal aorta adjacent to the true lumen. Due to high risk/ possible rupture, Lumbar drain was placed by interventional radiology and TEVAR completed by vascular surgery. Early 11/16, pt noted to be in hemorragic shock complicated by liver disease as vasopressor requirements increased significantly, rising lactic acidosis, and patient was found to be bleeding from  bilateral groin sites with significant hematoma formation to left groin. Pressure was held for an extended period and patient received 4 U PRBC, 3 U FFP, 1 U platelets, 1 U cryo, and vitamin K. With improvement in coagulopathy and volume resuscitation, she was able to be weaned from vasopressors. However, on 11/17, a stroke code was called around 0800 due to acute left sided weakness; right sided thalamic bleed noted on CT. No intervention per vascular neurology, however have had to give multiple units of platelets, FFP, and cyro in order to keep platelets > 50,000, INR at 1.5, and fibrinogen >100. Repeat CT showed that hemorrhage had not changed. Neuro exam much improved with lessening left sided hemiparesis. New RUQ abdominal pain reported on 11/19 and, US with doppler noted probable right portal vein thrombosis; anticoagulation contraindicated.      On the early AM of 11/20, it was noted that Mrs. Cleaning was no longer able to move bilateral lower extremities and left upper extremity (this is a change as she was having improved strength in left upper/lower extremity previously and able to move RLE). Additionally, she was more somnolent with expressive aphasia noted. Discussions with vascular surgery, vascular neurology and [facility] team held with plans for stat CT head. Spinal drain opened up to drain 30 ml's in first hour and then 15 ml/hr subsequent to that. Additionally, levophed started to maintain MAP of 100 mm Hg but aim to keep SBP <160 given concern for spinal ischemia, but with subsequent hemorrhagic CVA. MRI Brain and spine performed on 11/21 with stable acute right thalamic hemorrhage, small punctate foci in posterior temporal/parietal regions, no cord edema or signs of cord infarction.  Episode of emesis evening of 11/21, imaging suggesting ileus.  NG placed with 800 ml output.  Lactulose enemas given with increased stool output.  LP drain clamped and removed 11/23.  Oxygen requirements increased and was  placed on high flow nasal cannula. LE US negative for DVT. Began diuresing patient patient on 11/23/18. Passed swallow assessment and started on a diet on 11/23/18.     Interval History/Significant Events:   Oxygen requirements decreasing. Given FFP and Vit. K with coagulapathy.  Has not had any BMs in the past 24 hours.     Review of Systems   Constitutional: Negative for chills, diaphoresis and fever.   Respiratory: Negative for cough, shortness of breath and wheezing.    Cardiovascular: Negative for chest pain and palpitations.   Gastrointestinal: Positive for abdominal distention. Negative for abdominal pain, nausea and vomiting.     Objective:     Vital Signs (Most Recent):  Temp: 98.2 °F (36.8 °C) (11/25/18 0815)  Pulse: 83 (11/25/18 1000)  Resp: (!) 41 (11/25/18 1000)  BP: (!) 166/90 (11/25/18 1000)  SpO2: (!) 87 % (11/25/18 1000) Vital Signs (24h Range):  Temp:  [95.9 °F (35.5 °C)-98.5 °F (36.9 °C)] 98.2 °F (36.8 °C)  Pulse:  [61-90] 83  Resp:  [19-53] 41  SpO2:  [82 %-100 %] 87 %  BP: (139-184)/() 166/90   Weight: 68.3 kg (150 lb 9.2 oz)  Body mass index is 24.3 kg/m².      Intake/Output Summary (Last 24 hours) at 11/25/2018 1022  Last data filed at 11/25/2018 1000  Gross per 24 hour   Intake 1050 ml   Output 1275 ml   Net -225 ml       Physical Exam   Constitutional: No distress. Nasal cannula in place.   HENT:   Head: Normocephalic and atraumatic.   Eyes: Pupils are equal, round, and reactive to light. Scleral icterus is present.   Cardiovascular: Normal rate, regular rhythm, normal heart sounds and intact distal pulses.   No murmur heard.  Pulmonary/Chest: Effort normal and breath sounds normal. No respiratory distress. She has no wheezes. She has no rales.   Abdominal: Soft. She exhibits distension. Bowel sounds are decreased. There is no tenderness.   Neurological: She is alert.   Oriented times 2. (Person and Place)  Right Arm and Right Leg 3/5 strength  Left Arm and Left Leg 1/5 Strength    Skin: Skin is warm and dry. Capillary refill takes less than 2 seconds. She is not diaphoretic.   Psychiatric: She has a normal mood and affect. Her speech is delayed. Cognition and memory are impaired.   Nursing note and vitals reviewed.      Vents:  Oxygen Concentration (%): 50 (11/25/18 0753)  Lines/Drains/Airways     Drain            Female External Urinary Catheter 11/23/18 1730 1 day         Urethral Catheter 11/24/18 1134 less than 1 day          Peripheral Intravenous Line                 Peripheral IV - Single Lumen 11/23/18 1352 Anterior;Left Upper Arm 1 day         Peripheral IV - Single Lumen 11/23/18 1353 Anterior;Left Forearm 1 day              Significant Labs:    CBC/Anemia Profile:  Recent Labs   Lab 11/24/18  0300 11/24/18  1343 11/25/18  0330   WBC 8.57 8.82 8.91   HGB 8.8* 8.9* 8.6*   HCT 26.9* 27.8* 26.7*   PLT 74* 60* 42*   MCV 98 97 96   RDW 21.0* 21.0* 21.0*        Chemistries:  Recent Labs   Lab 11/24/18  0300 11/24/18  1343 11/25/18  0330   * 146* 146*   K 3.3* 3.7 4.5   * 114* 114*   CO2 24 20* 20*   BUN 81* 76* 73*   CREATININE 1.6* 1.5* 1.3   CALCIUM 10.1 9.5 9.3   ALBUMIN 3.2*  3.2*  --  2.7*  2.7*   PROT 7.9  --  7.0   BILITOT 8.1*  --  8.1*   ALKPHOS 120  --  114   ALT 48*  --  43   *  --  99*   MG 2.4  --  2.0   PHOS 1.3*  1.3*  --  1.5*  1.5*       Coagulation:   Recent Labs   Lab 11/25/18  0330   INR 2.0*     All pertinent labs within the past 24 hours have been reviewed.    Significant Imaging:  I have reviewed all pertinent imaging results/findings within the past 24 hours.    Assessment/Plan:     Neuro   Encephalopathy, metabolic    --Improving  --multifactorial: thalamic CVA, hepatic encephalopathy, delirium   --see above. Continue lactulose, rifaxamin   --minimize deliriogenic medications.      Nontraumatic thalamic hemorrhage    --Noted on 11/17 with left hemiparesis, dysarthria, left sided facial droop. These symptoms improved through 11/19 with no  intervention per vascular neurology other than maintaining SBP <160 and correcting coagulopathy for goal INR <1.5, platelets >50,000 and fibrinogen >100  --worsening of neurologic status again on 11/20 with worsening dysarthria, LUE, LLE, RLE paralysis.   --Repeat stat CT head 11/20 with stable thalamic hemorrhage; no acute abnormalities  --CT cervical and CT thoracic without contrast 11/21 no detection of spinal cord hematoma however sub optimal evaluation of spinal cord without contrast  --Lumbar spinal drain removed 11/23/18  --Maintain SBP <160. High MAP goals no longer indicated  --Started on oral BP medications (amlodipine and carvedilol)  --neuro checks q 4 hrs          Psychiatric   Marijuana abuse    --see above     Cocaine abuse    --toxicology screen positive for cocaine, THC, opiates at OSH       Pulmonary   Acute hypoxemic respiratory failure    Requiring HiFlo, High FiO2 Oxygen. Improving PE vs Volume Overload vs Hepato-Pulmonary shunting.   --No fevers or leukocytosis. Not hypotensive. Pneumonia less likely  --Bilateral LE negative for DVT lowers suspicion of PE. Would not be a candidate for anticoagulation  --Chest X-Ray with mild pulmonary congestion, Continue Diureses  --Wean Oxygen to keep SaO2 > 88%     Cardiac/Vascular   * Aortic dissection distal to left subclavian    --appreciate vascular surgery assistance  --s/p lumbar drain placement and TEVAR on 11/5  --lumbar drain removed 11/23/18  --continue neuro/ neurovascular exams          Pseudoaneurysm of left femoral artery    --noted 11/16. Hematoma appears to be improving per physical exam.   --continue to monitor      Essential hypertension    --goal SBP <160 per vascular surgery and neurology recommendations. PRN IV Hydralazine  --No longer requiring        Renal/   Hypernatremia    --Improving with oral free water       KARL (acute kidney injury)    --Suspect a component of iATN given recent hemorrhagic shock.   --no hydronephrosis  on CT imaging.   --improving    --Continue Diureses  --avoid nephrotoxic agents if possible.       Hematology   Hypofibrinogenemia    --transfuse cryo for fibrinogen goal >100       Portal vein thrombosis    --noted on US liver with doppler on 11/19. Holding anticoagulation given above bleeding/coagulopathy  --consider repeating imaging in next few days     Thrombocytopenia    --2/2 cirrhosis  --plts goal >50,000     Coagulopathy    --2/2 cirrhosis  --goal INR 1.5- 1.8  given hemorrhagic CVA. Continue vitamin K and FFP as needed        Oncology   Acute blood loss anemia    --transfuse to maintain Hgb 8-9        GI   Transaminitis    --suspect shock liver       Chronic hepatitis C with cirrhosis    --per patient, has known about cirrhosis for >10 years. Has never received treatment for Hep C  --continue supportive care for coagulopathy, thrombocytopenia in setting of Type B aortic dissection with hemorrhagic shock and new thalamic bleed   --Oral Lactulose and Rifaximin      Orthopedic   Acute midline thoracic back pain    --resolved      Other   Goals of care, counseling/discussion    --Code status  DNR       Critical Care Time: 60 minutes  Critical secondary to Patient has a condition that poses threat to life and bodily function: Severe Respiratory Distress      Critical care was time spent personally by me on the following activities: development of treatment plan with patient or surrogate and bedside caregivers, discussions with consultants, evaluation of patient's response to treatment, examination of patient, ordering and performing treatments and interventions, ordering and review of laboratory studies, ordering and review of radiographic studies, pulse oximetry, re-evaluation of patient's condition. This critical care time did not overlap with that of any other provider or involve time for any procedures.     Rosa Maria Johnston NP  Critical Care Medicine  [facility]

## 2018-11-25 NOTE — PLAN OF CARE
Problem: Patient Care Overview  Goal: Plan of Care Review  Pt VSS at this time. 80-90 bpm on cardiac monitoring, BP goal of sys <160 maintained, O2 sats >88 on 30 L and 60% FiO2 comfort flow, afebrile (w bear hugger). Pt oriented to self but confused, following commands and moving extremities w left sided weakness. Pt updated on current condition and POC for am shift. All questions answered and emotional support provided. Pt tolerating oxygen being weaned currently. Pt assisted with weight shift, and encouraged to cough/deep breathe. Remains free of falls and injury for am shift. MD Indra updated on current condition, vitals, labs, and gtts. No new orders received, will continue to monitor.

## 2018-11-25 NOTE — PT/OT/SLP EVAL
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 Pt has a tub/shower combo with no DME present. Pt does not work or drive. Pt reports x2 recent falls in the last 3 months in which per sister, pt fell out of bed and fell onto floor and another fall occurred when pt was ambulating within the home. Per pt's sister, pt has been declining with functional mobility for the last month and has begun requiring increased assistance for mobility.   Previous level of function: PTA, pt was (I) with ADLs but per sister report pt began requiring assistance for bath tub transfers. PTA, pt was mod (I) for functional mobility using "walking stick" around the home and in the community mobility.   Roles and Routines: sister, caretaker to self   Equipment Used at Home:  none  Assistance upon Discharge: Per sister, pt's family will be unable to provide 24/7 care/assistance upon d/c but can provide occasional assistance.     Past Medical History:   Diagnosis Date    Arthritis     Bell's palsy 2012    Cirrhosis     GERD (gastroesophageal reflux disease)     Hepatitis C     Hypertension     Sciatica        Past Surgical History:   Procedure Laterality Date    CHOLECYSTECTOMY      ENDOVASCULAR GRAFT REPAIR OF ANEURYSM OF THORACIC AORTA N/A 11/15/2018    Procedure: REPAIR, ANEURYSM, ENDOVASCULAR GRAFT, AORTA, THORACIC;  Surgeon: Hermila Storm MD;  Location: [facility] OR [address] [place] [address];  Service: Peripheral Vascular;  Laterality: N/A;  Contrast: 94  ml  mGy: " 1169.79  flouro time: 9.7 minutes    REPAIR, ANEURYSM, ENDOVASCULAR GRAFT, AORTA, THORACIC N/A 11/15/2018    Performed by Hermila Storm MD at [facility] OR [address] [place] [address]       Subjective     Chief Complaint: pain at R flank   Patient/Family Comments/goals: to get better     Pain/Comfort:  · Pain Rating 1: 5/10  · Location - Side 1: Right  · Location 1: flank  · Pain Addressed 1: Reposition, Distraction, Nurse notified  · Pain Rating Post-Intervention 1: 5/10    Patients cultural, spiritual, Hindu conflicts given the current situation: none stated    Objective:     Communicated with: RN prior to session.  Patient found with: HOB elevated, 2-pt restraints, and sister present and blood pressure cuff, reese catheter, peripheral IV, telemetry, pulse ox (continuous), oxygen(comfort flow, bear hugger ) upon OT entry to room. Pt agreeable to therapy session.     General Precautions: Standard, fall, aspiration   Orthopedic Precautions:N/A   Braces: N/A     Occupational Performance:  · Patient completed Scooting/Bridging with total assistance, via drawsheet towards HOB  persons and max A for anterior scooting towards EOB  · Patient completed Supine to Sit to the R side of bed with maximal assistance for trunk elevation and to bring L LE towards EOB.   · Patient completed Sit to Supine with maximal assistance for controlled trunk descent and to bring B LEs into bed.   · Rolling to the Left: mod A   · Rolling to the Right: max A     Functional Mobility/Transfers:  · No transfer performed this date due to impaired L LE function and impaired static sitting balance.     Activities of Daily Living:  · Feeding:  minimum assistance to bring cup of water to mouth; pt with R eye close due to diplopia and pt unable to sucessfully bring straw to mouth.   · Lower Body Dressing: total assistance to don B  socks   · Toileting: total assistance reese cath in place, pt incontinent of bowels as pt experienced BM and required total A to be  "cleanned     Cognitive/Visual Perceptual:  Cognitive/Psychosocial Skills:     -       Oriented to: Person, Situation and Pt able to state she was in the hospital but unable to state name of facility. Pt stated the year correctly but stated "October"    -       Follows Commands/attention:Follows two-step commands  -       Communication: clear/fluent  -       Memory: Poor immediate recall  -       Safety awareness/insight to disability: impaired   -       Mood/Affect/Coping skills/emotional control: Appropriate to situation  Visual/Perceptual:      -Impaired  convergence/divergence; Intact tracking. Pt did report diplopia and has R eye close majoirity of therapy session. Pt able to accurately state 4/5 correct numbers held up on therapist hand.       Physical Exam:  Balance:    - Static sit: min <> max A   -  Dynamic sit: max A    Postural examination/scapula alignment:    -       Rounded shoulders  -       Forward head  Skin integrity: Visible skin intact  Edema:  Moderate L UE   Sensation:    -       Intact for B UEs  Dominant hand:    -       right   Upper Extremity Range of Motion:     -       Right Upper Extremity: WFL  -       Left Upper Extremity: WFL with PROM/ and AAROM  Upper Extremity Strength:    -       Right Upper Extremity: 4/5   -       Left Upper Extremity: grossly 3/5    Strength:    -       Right Upper Extremity: 5/5  -       Left Upper Extremity: 3/5  Fine Motor Coordination:    -       Impaired  Left hand thumb/finger opposition skills      AMPAC 6 Click ADL:  AMPAC Total Score: 12    Treatment & Education:  Pt educated by therapist on:   - Pt educated on role of OT, POC, and goals for therapy.    - Pt and sister educated on the importance of supporting B UEs with pillows in bed in order to maintain joint integrity   - Patient and family aware of patient's deficits and therapy progression.   - Pt and sister educated on the benefits of IP Rehab vs SNF.  - Pt educated on the importance of " "simultaneously performing ankle pumps, hand pumps, bicep curls, and shoulder flexion in order to improve B UE/LE function.   - Increased time provided for repositioning. OT assisted RN with blue pads changed 2* being soiled.   - Time provided for therapeutic counseling and discussion of health disposition.   - Pt tolerated sitting EOB for ~6-7 mins with min <> max A for static sitting balance.   - vitals stable and WNL when sitting EOB, RN present during therapy session.    - Importance of OOB ax's with staff member assistance and sitting OOB majority of day.   - Pt completed ADLs and functional mobility for treatment session as noted above   - Pt verbalized understanding. Pt expressed no further concerns/questions.  - whiteboard updated   Education:    Patient left HOB elevated with all lines intact, call button in reach, restraints reapplied at end of session and RN present    Assessment:     Leah Cleaning is a 53 y.o. 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  She presents with the following performance deficits affecting function: weakness, impaired sensation, impaired endurance, impaired functional mobilty, gait instability, impaired balance, decreased upper extremity function, decreased lower extremity function, decreased coordination, decreased safety awareness, pain, impaired cardiopulmonary response to activity, edema.  Pt tolerated session well. At this time, pt required increased assistance for all bed mobility, self-care tasks, and functional mobility. Pt would greatly benefit from IP Rehab in order to improve overall return to PLOF. Pt will continue to benefit from skilled OT in order to address the previously stated deficits.     Rehab Prognosis: Good; patient would benefit from acute skilled OT services to address these deficits and reach maximum level of function.         Clinical Decision Makin.  OT Mod:  "Pt evaluation falls under moderate " "complexity for evaluation coding due to identification of 3-5 performance deficits noted as stated above. Eval required Min/Mod assistance to complete on this date and detailed assessment(s) were utilized. Moreover, an expanded review of history and occupational profile obtained with additional review of cognitive, physical and psychosocial hx."     Plan:     Patient to be seen 4 x/week to address the above listed problems via self-care/home management, therapeutic activities, therapeutic exercises  · Plan of Care Expires: 12/24/18  · Plan of Care Reviewed with: patient, sibling    This Plan of care has been discussed with the patient who was involved in its development and understands and is in agreement with the identified goals and treatment plan    GOALS:   Multidisciplinary Problems     Occupational Therapy Goals        Problem: Occupational Therapy Goal    Goal Priority Disciplines Outcome Interventions   Occupational Therapy Goal     OT, PT/OT Ongoing (interventions implemented as appropriate)    Description:  Goals to be met by: 12/9/18     Patient will increase functional independence with ADLs by performing:    UE Dressing with Minimal Assistance.  LE Dressing with Moderate Assistance.  Grooming while EOB with Minimal Assistance.  Toileting from bedside commode with Moderate Assistance for hygiene and clothing management.   Toilet transfer to bedside commode with Minimal Assistance.                      Time Tracking:     OT Date of Treatment: 11/25/18  OT Start Time: 1030  OT Stop Time: 1100  OT Total Time (min): 30 min    Billable Minutes:Evaluation 22  Therapeutic Activity 8    María Contreras OT  11/25/2018    "

## 2018-11-25 NOTE — PLAN OF CARE
Problem: Patient Care Overview  Goal: Plan of Care Review  Outcome: Outcome(s) achieved Date Met: 11/24/18  Pt oriented to self, place, time, but not situation. Pt moves all extremities and follows commands. VSS at this time. Pt on 30L, 70% FiO2, sats goal >88% maintained. HR NSR 60s-80s throughout shift. SBP 140s-170s, PRN metoprolol and hydralazine given this shift. 1 liquid bowel movement this shift. Perry inserted, adequate urine output. SLP started pt on pureed diet today. Restraints in place, will remove when appropriate. Plan of care reviewed. Questions and concerns addressed. Will continue to monitor.     Problem: Infection, Risk/Actual (Adult)  Goal: Identify Related Risk Factors and Signs and Symptoms  Related risk factors and signs and symptoms are identified upon initiation of Human Response Clinical Practice Guideline (CPG)  Outcome: Ongoing (interventions implemented as appropriate)  .    Problem: Pressure Ulcer Risk (Pepe Scale) (Adult,Obstetrics,Pediatric)  Goal: Identify Related Risk Factors and Signs and Symptoms  Related risk factors and signs and symptoms are identified upon initiation of Human Response Clinical Practice Guideline (CPG)  Outcome: Ongoing (interventions implemented as appropriate)  .    Problem: Spiritual Distress, Risk/Actual (Adult,Obstetrics,Pediatric)  Goal: Identify Related Risk Factors and Signs and Symptoms  Related risk factors and signs and symptoms are identified upon initiation of Human Response Clinical Practice Guideline (CPG)  Outcome: Ongoing (interventions implemented as appropriate)  .    Problem: Restraint, Nonbehavioral (nonviolent)  Goal: Clinical Justification  Outcome: Ongoing (interventions implemented as appropriate)  .

## 2018-11-25 NOTE — PLAN OF CARE
Problem: Occupational Therapy Goal  Goal: Occupational Therapy Goal  Goals to be met by: 12/9/18     Patient will increase functional independence with ADLs by performing:    UE Dressing with Minimal Assistance.  LE Dressing with Moderate Assistance.  Grooming while EOB with Minimal Assistance.  Toileting from bedside commode with Moderate Assistance for hygiene and clothing management.   Toilet transfer to bedside commode with Minimal Assistance.    Outcome: Ongoing (interventions implemented as appropriate)  Initial eval completed   POC implemented and goals established     María Contreras, OTR/L  [phone]  11/25/2018

## 2018-11-25 NOTE — ASSESSMENT & PLAN NOTE
--Suspect a component of iATN given recent hemorrhagic shock.   --no hydronephrosis on CT imaging.   --improving    --Continue Diureses  --avoid nephrotoxic agents if possible.

## 2018-11-26 PROBLEM — R57.9 SHOCK: Status: ACTIVE | Noted: 2018-11-26

## 2018-11-26 PROBLEM — Z51.5 PALLIATIVE CARE ENCOUNTER: Status: ACTIVE | Noted: 2018-11-26

## 2018-11-26 PROBLEM — E87.0 HYPERNATREMIA: Status: RESOLVED | Noted: 2018-11-23 | Resolved: 2018-11-26

## 2018-11-26 PROBLEM — D68.8 HYPOFIBRINOGENEMIA: Status: RESOLVED | Noted: 2018-11-22 | Resolved: 2018-11-26

## 2018-11-26 LAB
ALBUMIN SERPL BCP-MCNC: 2.4 G/DL
ALBUMIN SERPL BCP-MCNC: 2.6 G/DL
ALLENS TEST: ABNORMAL
ALP SERPL-CCNC: 106 U/L
ALT SERPL W/O P-5'-P-CCNC: 34 U/L
AMMONIA PLAS-SCNC: 36 UMOL/L
ANION GAP SERPL CALC-SCNC: 13 MMOL/L
ANION GAP SERPL CALC-SCNC: 9 MMOL/L
AST SERPL-CCNC: 68 U/L
BASOPHILS # BLD AUTO: 0.01 K/UL
BASOPHILS NFR BLD: 0.1 %
BILIRUB SERPL-MCNC: 8 MG/DL
BUN SERPL-MCNC: 69 MG/DL
BUN SERPL-MCNC: 72 MG/DL
CALCIUM SERPL-MCNC: 9.2 MG/DL
CALCIUM SERPL-MCNC: 9.7 MG/DL
CHLORIDE SERPL-SCNC: 111 MMOL/L
CHLORIDE SERPL-SCNC: 112 MMOL/L
CO2 SERPL-SCNC: 18 MMOL/L
CO2 SERPL-SCNC: 23 MMOL/L
CREAT SERPL-MCNC: 1.3 MG/DL
CREAT SERPL-MCNC: 1.7 MG/DL
DELSYS: ABNORMAL
DIFFERENTIAL METHOD: ABNORMAL
EOSINOPHIL # BLD AUTO: 0 K/UL
EOSINOPHIL NFR BLD: 0.4 %
ERYTHROCYTE [DISTWIDTH] IN BLOOD BY AUTOMATED COUNT: 22.9 %
EST. GFR  (AFRICAN AMERICAN): 39.1 ML/MIN/1.73 M^2
EST. GFR  (AFRICAN AMERICAN): 54.1 ML/MIN/1.73 M^2
EST. GFR  (NON AFRICAN AMERICAN): 33.9 ML/MIN/1.73 M^2
EST. GFR  (NON AFRICAN AMERICAN): 47 ML/MIN/1.73 M^2
FIBRINOGEN PPP-MCNC: 144 MG/DL
FLOW: 15
GLUCOSE SERPL-MCNC: 114 MG/DL
GLUCOSE SERPL-MCNC: 142 MG/DL
HCO3 UR-SCNC: 19.2 MMOL/L (ref 24–28)
HCT VFR BLD AUTO: 29.9 %
HGB BLD-MCNC: 9.3 G/DL
IMM GRANULOCYTES # BLD AUTO: 0.17 K/UL
IMM GRANULOCYTES NFR BLD AUTO: 1.8 %
INR PPP: 1.9
LACTATE SERPL-SCNC: 3.2 MMOL/L
LACTATE SERPL-SCNC: 4.6 MMOL/L
LACTATE SERPL-SCNC: 6.3 MMOL/L
LYMPHOCYTES # BLD AUTO: 0.7 K/UL
LYMPHOCYTES NFR BLD: 7.2 %
MAGNESIUM SERPL-MCNC: 1.6 MG/DL
MCH RBC QN AUTO: 31 PG
MCHC RBC AUTO-ENTMCNC: 31.1 G/DL
MCV RBC AUTO: 100 FL
MODE: ABNORMAL
MONOCYTES # BLD AUTO: 0.7 K/UL
MONOCYTES NFR BLD: 7.1 %
NEUTROPHILS # BLD AUTO: 8.1 K/UL
NEUTROPHILS NFR BLD: 83.4 %
NRBC BLD-RTO: 1 /100 WBC
PCO2 BLDA: 34.5 MMHG (ref 35–45)
PH SMN: 7.35 [PH] (ref 7.35–7.45)
PHOSPHATE SERPL-MCNC: 2.5 MG/DL
PHOSPHATE SERPL-MCNC: 2.5 MG/DL
PLATELET # BLD AUTO: 74 K/UL
PMV BLD AUTO: 13.7 FL
PO2 BLDA: 76 MMHG (ref 80–100)
POC BE: -6 MMOL/L
POC SATURATED O2: 95 % (ref 95–100)
POC TCO2: 20 MMOL/L (ref 23–27)
POCT GLUCOSE: 114 MG/DL (ref 70–110)
POCT GLUCOSE: 115 MG/DL (ref 70–110)
POCT GLUCOSE: 129 MG/DL (ref 70–110)
POCT GLUCOSE: 134 MG/DL (ref 70–110)
POTASSIUM SERPL-SCNC: 3.8 MMOL/L
POTASSIUM SERPL-SCNC: 4.5 MMOL/L
PROT SERPL-MCNC: 6.5 G/DL
PROTHROMBIN TIME: 18.1 SEC
RBC # BLD AUTO: 3 M/UL
SAMPLE: ABNORMAL
SITE: ABNORMAL
SODIUM SERPL-SCNC: 142 MMOL/L
SODIUM SERPL-SCNC: 144 MMOL/L
SP02: 98
T4 FREE SERPL-MCNC: 0.99 NG/DL
TSH SERPL DL<=0.005 MIU/L-ACNC: 0.71 UIU/ML
WBC # BLD AUTO: 9.7 K/UL

## 2018-11-26 PROCEDURE — 36415 COLL VENOUS BLD VENIPUNCTURE: CPT

## 2018-11-26 PROCEDURE — 93010 ELECTROCARDIOGRAM REPORT: CPT | Mod: ,,, | Performed by: INTERNAL MEDICINE

## 2018-11-26 PROCEDURE — 85025 COMPLETE CBC W/AUTO DIFF WBC: CPT

## 2018-11-26 PROCEDURE — 27100092 HC HIGH FLOW DELIVERY CANNULA

## 2018-11-26 PROCEDURE — 82803 BLOOD GASES ANY COMBINATION: CPT

## 2018-11-26 PROCEDURE — 83605 ASSAY OF LACTIC ACID: CPT

## 2018-11-26 PROCEDURE — 25000003 PHARM REV CODE 250

## 2018-11-26 PROCEDURE — 25000003 PHARM REV CODE 250: Performed by: PHYSICIAN ASSISTANT

## 2018-11-26 PROCEDURE — 99900035 HC TECH TIME PER 15 MIN (STAT)

## 2018-11-26 PROCEDURE — 85384 FIBRINOGEN ACTIVITY: CPT

## 2018-11-26 PROCEDURE — 83605 ASSAY OF LACTIC ACID: CPT | Mod: 91

## 2018-11-26 PROCEDURE — 27000190 HC CPAP FULL FACE MASK W/VALVE

## 2018-11-26 PROCEDURE — 02HV33Z INSERTION OF INFUSION DEVICE INTO SUPERIOR VENA CAVA, PERCUTANEOUS APPROACH: ICD-10-PCS | Performed by: INTERNAL MEDICINE

## 2018-11-26 PROCEDURE — 82140 ASSAY OF AMMONIA: CPT

## 2018-11-26 PROCEDURE — 25000003 PHARM REV CODE 250: Performed by: INTERNAL MEDICINE

## 2018-11-26 PROCEDURE — 25000003 PHARM REV CODE 250: Performed by: NURSE PRACTITIONER

## 2018-11-26 PROCEDURE — 80069 RENAL FUNCTION PANEL: CPT

## 2018-11-26 PROCEDURE — 93005 ELECTROCARDIOGRAM TRACING: CPT

## 2018-11-26 PROCEDURE — 99223 1ST HOSP IP/OBS HIGH 75: CPT | Mod: ,,, | Performed by: CLINICAL NURSE SPECIALIST

## 2018-11-26 PROCEDURE — 84439 ASSAY OF FREE THYROXINE: CPT

## 2018-11-26 PROCEDURE — 25500020 PHARM REV CODE 255: Performed by: STUDENT IN AN ORGANIZED HEALTH CARE EDUCATION/TRAINING PROGRAM

## 2018-11-26 PROCEDURE — 27000221 HC OXYGEN, UP TO 24 HOURS

## 2018-11-26 PROCEDURE — 85610 PROTHROMBIN TIME: CPT

## 2018-11-26 PROCEDURE — 25000003 PHARM REV CODE 250: Performed by: STUDENT IN AN ORGANIZED HEALTH CARE EDUCATION/TRAINING PROGRAM

## 2018-11-26 PROCEDURE — 83735 ASSAY OF MAGNESIUM: CPT

## 2018-11-26 PROCEDURE — 27100171 HC OXYGEN HIGH FLOW UP TO 24 HOURS

## 2018-11-26 PROCEDURE — 84443 ASSAY THYROID STIM HORMONE: CPT

## 2018-11-26 PROCEDURE — 20000000 HC ICU ROOM

## 2018-11-26 PROCEDURE — 63600175 PHARM REV CODE 636 W HCPCS: Performed by: NURSE PRACTITIONER

## 2018-11-26 PROCEDURE — 87040 BLOOD CULTURE FOR BACTERIA: CPT | Mod: 59

## 2018-11-26 PROCEDURE — 99233 SBSQ HOSP IP/OBS HIGH 50: CPT | Mod: ,,, | Performed by: PSYCHIATRY & NEUROLOGY

## 2018-11-26 PROCEDURE — 63600175 PHARM REV CODE 636 W HCPCS

## 2018-11-26 PROCEDURE — 82800 BLOOD PH: CPT

## 2018-11-26 PROCEDURE — 80053 COMPREHEN METABOLIC PANEL: CPT

## 2018-11-26 PROCEDURE — 63600175 PHARM REV CODE 636 W HCPCS: Performed by: STUDENT IN AN ORGANIZED HEALTH CARE EDUCATION/TRAINING PROGRAM

## 2018-11-26 PROCEDURE — 99291 CRITICAL CARE FIRST HOUR: CPT | Mod: ,,, | Performed by: NURSE PRACTITIONER

## 2018-11-26 PROCEDURE — 94761 N-INVAS EAR/PLS OXIMETRY MLT: CPT

## 2018-11-26 PROCEDURE — 94660 CPAP INITIATION&MGMT: CPT

## 2018-11-26 PROCEDURE — 37799 UNLISTED PX VASCULAR SURGERY: CPT

## 2018-11-26 RX ORDER — VANCOMYCIN HCL IN 5 % DEXTROSE 1G/250ML
15 PLASTIC BAG, INJECTION (ML) INTRAVENOUS
Status: DISCONTINUED | OUTPATIENT
Start: 2018-11-27 | End: 2018-11-27

## 2018-11-26 RX ORDER — ATROPINE SULFATE 0.1 MG/ML
1 INJECTION INTRAVENOUS ONCE
Status: COMPLETED | OUTPATIENT
Start: 2018-11-26 | End: 2018-11-26

## 2018-11-26 RX ORDER — IBUPROFEN 200 MG
24 TABLET ORAL
Status: DISCONTINUED | OUTPATIENT
Start: 2018-11-26 | End: 2018-11-27

## 2018-11-26 RX ORDER — POTASSIUM CHLORIDE 20 MEQ/1
20 TABLET, EXTENDED RELEASE ORAL ONCE
Status: DISCONTINUED | OUTPATIENT
Start: 2018-11-26 | End: 2018-11-26

## 2018-11-26 RX ORDER — FUROSEMIDE 10 MG/ML
40 INJECTION INTRAMUSCULAR; INTRAVENOUS ONCE
Status: COMPLETED | OUTPATIENT
Start: 2018-11-26 | End: 2018-11-26

## 2018-11-26 RX ORDER — NOREPINEPHRINE BITARTRATE/D5W 4MG/250ML
PLASTIC BAG, INJECTION (ML) INTRAVENOUS
Status: COMPLETED
Start: 2018-11-26 | End: 2018-11-26

## 2018-11-26 RX ORDER — IBUPROFEN 200 MG
16 TABLET ORAL
Status: DISCONTINUED | OUTPATIENT
Start: 2018-11-26 | End: 2018-11-27

## 2018-11-26 RX ORDER — GLUCAGON 1 MG
1 KIT INJECTION
Status: DISCONTINUED | OUTPATIENT
Start: 2018-11-26 | End: 2018-11-27

## 2018-11-26 RX ORDER — PHENYLEPHRINE HCL IN 0.9% NACL 1 MG/10 ML
400 SYRINGE (ML) INTRAVENOUS ONCE
Status: COMPLETED | OUTPATIENT
Start: 2018-11-26 | End: 2018-11-26

## 2018-11-26 RX ORDER — NALOXONE HCL 0.4 MG/ML
0.4 VIAL (ML) INJECTION ONCE
Status: COMPLETED | OUTPATIENT
Start: 2018-11-26 | End: 2018-11-26

## 2018-11-26 RX ORDER — OXYCODONE HYDROCHLORIDE 5 MG/1
5 TABLET ORAL EVERY 8 HOURS PRN
Status: DISCONTINUED | OUTPATIENT
Start: 2018-11-26 | End: 2018-11-26

## 2018-11-26 RX ORDER — NIFEDIPINE 30 MG/1
60 TABLET, EXTENDED RELEASE ORAL DAILY
Status: DISCONTINUED | OUTPATIENT
Start: 2018-11-26 | End: 2018-11-26

## 2018-11-26 RX ORDER — FAMOTIDINE 10 MG/ML
20 INJECTION INTRAVENOUS DAILY
Status: DISCONTINUED | OUTPATIENT
Start: 2018-11-27 | End: 2018-11-27

## 2018-11-26 RX ORDER — ATROPINE SULFATE 0.1 MG/ML
INJECTION INTRAVENOUS
Status: COMPLETED
Start: 2018-11-26 | End: 2018-11-26

## 2018-11-26 RX ORDER — LISINOPRIL 20 MG/1
20 TABLET ORAL DAILY
Status: DISCONTINUED | OUTPATIENT
Start: 2018-11-26 | End: 2018-11-26

## 2018-11-26 RX ORDER — POTASSIUM CHLORIDE 20 MEQ/15ML
20 SOLUTION ORAL ONCE
Status: COMPLETED | OUTPATIENT
Start: 2018-11-26 | End: 2018-11-26

## 2018-11-26 RX ORDER — MAGNESIUM SULFATE HEPTAHYDRATE 40 MG/ML
2 INJECTION, SOLUTION INTRAVENOUS ONCE
Status: COMPLETED | OUTPATIENT
Start: 2018-11-26 | End: 2018-11-26

## 2018-11-26 RX ORDER — PHENYLEPHRINE HCL IN 0.9% NACL 1 MG/10 ML
SYRINGE (ML) INTRAVENOUS
Status: COMPLETED
Start: 2018-11-26 | End: 2018-11-26

## 2018-11-26 RX ORDER — NOREPINEPHRINE BITARTRATE/D5W 4MG/250ML
0.02 PLASTIC BAG, INJECTION (ML) INTRAVENOUS CONTINUOUS
Status: DISCONTINUED | OUTPATIENT
Start: 2018-11-26 | End: 2018-11-26

## 2018-11-26 RX ORDER — VANCOMYCIN HCL IN 5 % DEXTROSE 1.5G/250ML
20 PLASTIC BAG, INJECTION (ML) INTRAVENOUS ONCE
Status: COMPLETED | OUTPATIENT
Start: 2018-11-26 | End: 2018-11-26

## 2018-11-26 RX ADMIN — Medication 0.04 MCG/KG/MIN: at 12:11

## 2018-11-26 RX ADMIN — Medication 0.1 MCG/KG/MIN: at 01:11

## 2018-11-26 RX ADMIN — RIFAXIMIN 550 MG: 550 TABLET ORAL at 09:11

## 2018-11-26 RX ADMIN — FUROSEMIDE 40 MG: 10 INJECTION, SOLUTION INTRAMUSCULAR; INTRAVENOUS at 09:11

## 2018-11-26 RX ADMIN — SODIUM CHLORIDE, SODIUM LACTATE, POTASSIUM CHLORIDE, AND CALCIUM CHLORIDE 500 ML: .6; .31; .03; .02 INJECTION, SOLUTION INTRAVENOUS at 11:11

## 2018-11-26 RX ADMIN — LACTULOSE 30 G: 20 SOLUTION ORAL at 06:11

## 2018-11-26 RX ADMIN — Medication 400 MCG: at 12:11

## 2018-11-26 RX ADMIN — CARVEDILOL 6.25 MG: 6.25 TABLET, FILM COATED ORAL at 09:11

## 2018-11-26 RX ADMIN — IOHEXOL 15 ML: 350 INJECTION, SOLUTION INTRAVENOUS at 11:11

## 2018-11-26 RX ADMIN — NALOXONE HYDROCHLORIDE 0.4 MG: 0.4 INJECTION, SOLUTION INTRAMUSCULAR; INTRAVENOUS; SUBCUTANEOUS at 12:11

## 2018-11-26 RX ADMIN — Medication 1500 MG: at 05:11

## 2018-11-26 RX ADMIN — PIPERACILLIN AND TAZOBACTAM 4.5 G: 4; .5 INJECTION, POWDER, LYOPHILIZED, FOR SOLUTION INTRAVENOUS; PARENTERAL at 04:11

## 2018-11-26 RX ADMIN — ATROPINE SULFATE 1 MG: 0.1 INJECTION, SOLUTION INTRAVENOUS at 12:11

## 2018-11-26 RX ADMIN — NIFEDIPINE 60 MG: 30 TABLET, FILM COATED, EXTENDED RELEASE ORAL at 09:11

## 2018-11-26 RX ADMIN — LACTULOSE 30 G: 20 SOLUTION ORAL at 12:11

## 2018-11-26 RX ADMIN — ATROPINE SULFATE 1 MG: 0.1 INJECTION INTRAVENOUS at 12:11

## 2018-11-26 RX ADMIN — LACTULOSE 30 G: 20 SOLUTION ORAL at 11:11

## 2018-11-26 RX ADMIN — NOREPINEPHRINE BITARTRATE 0.2 MCG/KG/MIN: 1 INJECTION, SOLUTION, CONCENTRATE INTRAVENOUS at 04:11

## 2018-11-26 RX ADMIN — LISINOPRIL 20 MG: 20 TABLET ORAL at 09:11

## 2018-11-26 RX ADMIN — OXYCODONE HYDROCHLORIDE 5 MG: 5 TABLET ORAL at 11:11

## 2018-11-26 RX ADMIN — SODIUM CHLORIDE, SODIUM LACTATE, POTASSIUM CHLORIDE, AND CALCIUM CHLORIDE 500 ML: .6; .31; .03; .02 INJECTION, SOLUTION INTRAVENOUS at 05:11

## 2018-11-26 RX ADMIN — MAGNESIUM SULFATE IN WATER 2 G: 40 INJECTION, SOLUTION INTRAVENOUS at 09:11

## 2018-11-26 RX ADMIN — CALCIUM CHLORIDE 1 G: 100 INJECTION, SOLUTION INTRAVENOUS at 01:11

## 2018-11-26 RX ADMIN — FAMOTIDINE 20 MG: 20 TABLET ORAL at 09:11

## 2018-11-26 RX ADMIN — POTASSIUM CHLORIDE 20 MEQ: 20 SOLUTION ORAL at 09:11

## 2018-11-26 NOTE — CONSULTS
Palliative Care Acknowledgement of Consult - .date    Consult received. Palliative Care Provider: ANTONY James will touch base with team prior to seeing patient. Full consult to follow.    Thank you for allowing us to be a part of the care of this patient.          Chantel Lawrence LCSW, ACHP-SW

## 2018-11-26 NOTE — PT/OT/SLP PROGRESS
Speech Language Pathology      Leah Cleaning  MRN: [id number]    Patient not seen today secondary to RN hold (pt on BiPAP and not appropriate for PO trials at this time). ST will f/u per POC.     Angel Zavaleta CF-SLP  Speech-Language Pathology  Pager: [phone]

## 2018-11-26 NOTE — SUBJECTIVE & OBJECTIVE
Medications:  Continuous Infusions:  Scheduled Meds:   amLODIPine  10 mg Oral Daily    carvedilol  6.25 mg Oral BID    famotidine  20 mg Oral Daily    lactulose  30 g Oral Q6H    magnesium sulfate IVPB  2 g Intravenous Once    potassium chloride 10%  20 mEq Oral Once    rifAXImin  550 mg Oral BID     PRN Meds:sodium chloride, sodium chloride, hydrALAZINE, ondansetron     Objective:     Vital Signs (Most Recent):  Temp: 97.9 °F (36.6 °C) (11/26/18 0300)  Pulse: (!) 55 (11/26/18 0600)  Resp: 15 (11/26/18 0600)  BP: (!) 172/102 (11/26/18 0600)  SpO2: 98 % (11/26/18 0726) Vital Signs (24h Range):  Temp:  [97.9 °F (36.6 °C)-98.3 °F (36.8 °C)] 97.9 °F (36.6 °C)  Pulse:  [55-90] 55  Resp:  [15-41] 15  SpO2:  [87 %-99 %] 98 %  BP: (153-184)/() 172/102          Physical Exam   Constitutional: She appears well-developed and well-nourished. She appears lethargic.   HENT:   Head: Normocephalic and atraumatic.   Eyes: EOM are normal. Pupils are equal, round, and reactive to light.   Neck: Normal range of motion. Neck supple.   Cardiovascular: Normal rate and regular rhythm.   Pulmonary/Chest: Effort normal and breath sounds normal.   Abdominal: Soft. Bowel sounds are normal.   Musculoskeletal:   Exam difficult 2/2 encephalopathy and R thalamic stroke  LUE good strength  RUE 5/5, intact fine motor movement  LLE plantar flex ankle, minimal toe movement  RLE knee flexion, and moves toes   Neurological: She appears lethargic. She is disoriented.   Skin: Skin is warm.       Significant Labs:  CBC:   Recent Labs   Lab 11/26/18  0400   WBC 9.70   RBC 3.00*   HGB 9.3*   HCT 29.9*   PLT 74*   *   MCH 31.0   MCHC 31.1*     CMP:   Recent Labs   Lab 11/25/18  0330 11/26/18  0531   GLU 59* 114*   CALCIUM 9.3 9.2   ALBUMIN 2.7*  2.7* 2.6*   PROT 7.0  --    * 144   K 4.5 3.8   CO2 20* 23   * 112*   BUN 73* 69*   CREATININE 1.3 1.3   ALKPHOS 114  --    ALT 43  --    AST 99*  --    BILITOT 8.1*  --       Coagulation:   Recent Labs   Lab 11/26/18  0448   LABPROT 18.1*   INR 1.9*

## 2018-11-26 NOTE — ASSESSMENT & PLAN NOTE
53 F with Hep C cirrhosis (MELD 15 on admission), HTN, crack cocaine abuse, who presented with acute tearing chest/back pain to OSH, Found to have retrograde type B dissection. Despite aggressive anti-impulse therapy, persistent pain requiring intervention. Preoperative CSF drain placement by Neuro IR. Successful TEVAR. POD 1 left groin hematoma and coagulopathy requiring manual pressure and product resuscitation. POD 2 new onset left upper arm weakness, found to have right thalamic hemorrhage 2/2 HTN. F/u CT head stable findings. Significant reactive left pleural effusion with surrounding atelectasis noted also.     Neuro: Neuro exam stable with slight improvement  - CT and MRI spine without evidence of spinal cord hematoma or infarct  - MRI head with mass effect; stroke was hemorrhagic 2/2 HTN not embolic event from TEVAR   right thalamic bleed  - Q4hr neurovascular checks    CV:   - SBP goal normotensive    Resp:  - encourage IS  - O2 supp prn  - Moderate Left pleural effusion and small right pleural effusion on CTA stoke multiphase noted. Left pleural effusion likely reactive following TEVAR. Con't pulmonary toilet.     Renal/Endocrine:  - con't monitoring UOP  - BUN/Cr improved 69/1.3    GI: Hep C cirrhosis with MELD 15 on admission  - protonix  - Puree diet  - Having BMs  - T bili elevated 8.1 (yesterday)    Heme/ID:  - ABLA stable.   - con't correct coagulopathy prn  - monitor thrombocytopenia     Dispo:  - Care per MICU   - Please call with any change in neurovascular exam

## 2018-11-26 NOTE — PLAN OF CARE
Patient returned to BiPAP this shift.  Vascular Neurology following, patient mentation improving.  Vascular Surgery following.  PT/OT following, recs for IP Rehab.  CM to inquire if appropriate to initiate this disposition plan per [facility] physician team during IDT rounds.  CM notes formal consult to Palliative Medicine also placed.  CM will continue to follow.       11/26/18 1230   Right Care Assessment   Can the patient answer the patient profile reliably? Yes, cognitively intact   How often would a person be available to care for the patient? Occasionally   Describe the patient's ability to walk at the present time. Major restrictions/daily assistance from another person   How does the patient rate their overall health at the present time? Fair   Number of comorbid conditions (as recorded on the chart) Two   During the past month, has the patient often been bothered by feeling down, depressed or hopeless? No   During the past month, has the patient often been bothered by little interest or pleasure in doing things? No   Ana Soto RN, BSN, CCM  Case Management  [facility]  Ext. [phone]

## 2018-11-26 NOTE — PROGRESS NOTES
[facility]  Vascular Neurology  Comprehensive Stroke Center  Progress Note    Assessment/Plan:     Nontraumatic thalamic hemorrhage      Patient is a 53 y.o. female with a h/o HTN, Hep C, cirrhosis (MELD 15),Hx of pancreatitis, GERD, and illicit substance abuse (crack cocaine/THC) admitted for thoracic aortic dissection treated with TEVAR and Lumbar drain, had Acute right thalamic hemorrhage on CTH.   Etiology - Hemorrhagic transformation of Right thalamic stroke v/s Primary right thalamic hemorrhagic stroke v/s End Stage live disease v/s 2/2 Lumbar drain with increased SFP / MAP gradient.   11/20/18: Sudden LOF of both lower extremities with no response to painful stimuli. Repeat CT head w/o new pathology or worsening hemorrhage.  CT head without any worsening of previous hemorrhage or new pathology. CT spine without obvious cause for new deficits. MRI head and spine showing known stable pathology, severe spinal stenosis at L4/L5 that is too low to account for presentation, and no signs of intraspinal or extraspinal hematoma that would be impinging.  11/23/18: Patient regained much use of her LUE. Very alert today. Appears to have significantly more scleral icterus and abdominal distension. Spinal drain removed.  11/24/18: Regained some use of LLE, very alert today.   11/26/18: Increased strength in LUE, able to do basic math, and spell words. Concentration improving.      Recommend:   -Continue current treatment  -SBP goal <160   -Neuro checks q4h  -PT/OT/SLP now that drain is removed      Antithrombotics for secondary stroke prevention:  Hold Antiplatelets    Statins for secondary stroke prevention and hyperlipidemia, if present: rec getting  LDL and start Lipitor if LDL > 130      Aggressive risk factor modification: HTN, Illicit drug use     Rehab efforts: PT/OT/SLP to evaluate and treat    Diagnostics ordered/pending: None     VTE prophylaxis: dvt ppx and scd's    BP parameters:  SBP  <160         Essential hypertension    - rec SBP of ~ 160          No notes on file    STROKE DOCUMENTATION   Acute Stroke Times   Last Known Normal Date: 11/17/18  Last Known Normal Time: 0700  Symptom Onset Date: 11/17/18  Stroke Team Called Date: 11/17/18  Stroke Team Called Time: 0800  Stroke Team Arrival Date: 11/17/18  Stroke Team Arrival Time: 0805  CT Interpretation Time: 0834    NIH Scale:      NIH Scale:  1a. Level Of Consciousness: 0-->Alert: keenly responsive  1b. LOC Questions: 0-->Answers both questions correctly  1c. LOC Commands: 0-->Performs both tasks correctly  2. Best Gaze: 0-->Normal  3. Visual: 0-->No visual loss  4. Facial Palsy: 1-->Minor paralysis (flattened nasolabial fold, asymmetry on smiling)  5a. Motor Arm, Left: 3-->   5b. Motor Arm, Right: 0-->No drift: limb holds 90 (or 45) degrees for full 10 secs  6a. Motor Leg, Left: 3-->  6b. Motor Leg, Right: 2-->  7. Limb Ataxia: 2-->Present in two limbs  8. Sensory: 1-->Mild-to-moderate sensory loss: patient feels pinprick is less sharp or is dull on the affected side: or there is a loss of superficial pain with pinprick, but patient is aware of being touched  9. Best Language: 1-->Mild-to-moderate aphasia: some obvious loss of fluency or facility of comprehension, without significant limitation on ideas expressed or form of expression. Reduction of speech and/or comprehension, however, makes conversation. . . (see row details)  10. Dysarthria: 1-->Mild-to-moderate dysarthria: patient slurs at least some words and, at worst, can be understood with some difficulty  11. Extinction and Inattention (formerly Neglect): 0-->No abnormality  Total (NIH Stroke Scale): 14         Modified Cuney Score: 0  University Coma Scale:    ABCD2 Score:    [id number] Score:   HAS -BLED Score:   ICH Score:   Hunt & Farnsworth Classification:      Hemorrhagic change of an Ischemic Stroke: Does this patient have an ischemic stroke with hemorrhagic changes? No      Neurologic Chief Complaint: dysarthria, poor attention span, LUE weakness, LLE paralgia, RLE weakness  Right Thalamic hemorrhage     Subjective:     Interval History: Patient is seen for follow-up neurological assessment and treatment recommendations: Increased movement in LUE hand. Spinal drain removed, aggressive PT/OT/SLP.       HPI, Past Medical, Family, and Social History remains the same as documented in the initial encounter.     Review of Systems   Constitutional: Negative for chills and fever.   Respiratory: Negative for cough and shortness of breath.    Cardiovascular: Negative for chest pain and palpitations.   Gastrointestinal: Negative for nausea and vomiting. Abdominal pain: RUQ    Musculoskeletal: Positive for neck pain (discomfort at RIJ site). Negative for back pain and neck stiffness.   Neurological: Negative for tremors, seizures, syncope, weakness, numbness and headaches.   Hematological: Bruises/bleeds easily.   Psychiatric/Behavioral: Positive for confusion and decreased concentration. Negative for agitation and behavioral problems.     Scheduled Meds:   atropine        calcium gluconate IVPB  1 g Intravenous Once    famotidine  20 mg Oral Daily    lactated ringers  500 mL Intravenous Once    lactulose  30 g Oral Q6H    lisinopril  20 mg Oral Daily    norepinephrine bitartrate-D5W        phenylephrine HCl in 0.9% NaCl        rifAXImin  550 mg Oral BID     Continuous Infusions:    PRN Meds:sodium chloride, sodium chloride, dextrose 50%, dextrose 50%, glucagon (human recombinant), glucose, glucose, hydrALAZINE, ondansetron, oxyCODONE    Objective:     Vital Signs (Most Recent):  Temp: (!) 92.5 °F (33.6 °C) (11/26/18 0800)  Pulse: 61 (11/26/18 1045)  Resp: (!) 42 (11/26/18 1045)  BP: (!) 100/55 (11/26/18 1030)  SpO2: (!) 91 % (11/26/18 1045)  BP Location: Right arm    Vital Signs Range (Last 24H):  Temp:  [92.5 °F (33.6 °C)-98.3 °F (36.8 °C)]   Pulse:  [52-81]   Resp:  [13-42]   BP:  (100-187)/()   SpO2:  [88 %-98 %]   BP Location: Right arm    Physical Exam   Constitutional: She appears well-developed and well-nourished.   HENT:   Head: Normocephalic and atraumatic.   Eyes: EOM are normal. Pupils are equal, round, and reactive to light. Scleral icterus is present.   Neck: Normal range of motion. Neck supple.   Cardiovascular: Normal rate and regular rhythm.   Pulmonary/Chest: Effort normal and breath sounds normal.   Abdominal: Soft. Bowel sounds are normal. She exhibits distension. There is no guarding.   Genitourinary:   Genitourinary Comments: Perry in place   Musculoskeletal: She exhibits no edema or deformity.   Neurological: She is alert.   LUE weakness   Skin: Skin is warm and dry. Capillary refill takes less than 2 seconds.   Nursing note and vitals reviewed.      Neurological Exam:   LOC: Alert  Attention Span: fair  Language: mild aphasia  Articulation: mild dysarthria  Orientation: AOx1, not time or place  Visual Fields: Full  EOM (CN III, IV, VI): Full/intact  Pupils (CN II, III): PERRL  Facial Sensation (CN V): Normal  Facial Movement (CN VII): mild L facial droop   Motor: Arm left  Normal 2/5  Leg left  Normal 1/5  Arm right  Normal 5/5  Leg right Normal 2/5  Sensation: Able to feel light touch on proximal legs and R lower leg.   Tone: Decreased tone throughout LEs and LUE.   Reflexes +2 throughout       Laboratory:    Recent Results (from the past 24 hour(s))   POCT glucose    Collection Time: 11/25/18  1:02 PM   Result Value Ref Range    POCT Glucose 122 (H) 70 - 110 mg/dL   Prepare Cryoprecipitate 1 Dose    Collection Time: 11/25/18  1:40 PM   Result Value Ref Range    UNIT NUMBER [id number]     PRODUCT CODE T7118C79     DISPENSE STATUS TRANSFUSED     CODING SYSTEM [id number]     Unit Blood Type Code 5100     Unit Blood Type O POS     Unit Expiration [id number]    CBC auto differential    Collection Time: 11/25/18  2:20 PM   Result Value Ref Range    WBC 9.59 3.90 -  12.70 K/uL    RBC 2.96 (L) 4.00 - 5.40 M/uL    Hemoglobin 9.2 (L) 12.0 - 16.0 g/dL    Hematocrit 28.4 (L) 37.0 - 48.5 %    MCV 96 82 - 98 fL    MCH 31.1 (H) 27.0 - 31.0 pg    MCHC 32.4 32.0 - 36.0 g/dL    RDW 21.3 (H) 11.5 - 14.5 %    Platelets 37 (LL) 150 - 350 K/uL    MPV SEE COMMENT 9.2 - 12.9 fL    Immature Granulocytes 2.2 (H) 0.0 - 0.5 %    Gran # (ANC) 7.9 (H) 1.8 - 7.7 K/uL    Immature Grans (Abs) 0.21 (H) 0.00 - 0.04 K/uL    Lymph # 0.9 (L) 1.0 - 4.8 K/uL    Mono # 0.6 0.3 - 1.0 K/uL    Eos # 0.0 0.0 - 0.5 K/uL    Baso # 0.01 0.00 - 0.20 K/uL    nRBC 2 (A) 0 /100 WBC    Gran% 82.4 (H) 38.0 - 73.0 %    Lymph% 9.1 (L) 18.0 - 48.0 %    Mono% 5.9 4.0 - 15.0 %    Eosinophil% 0.3 0.0 - 8.0 %    Basophil% 0.1 0.0 - 1.9 %    Differential Method Automated    Prepare Platelets 1 Dose    Collection Time: 11/25/18  3:01 PM   Result Value Ref Range    UNIT NUMBER [id number]     PRODUCT CODE [id number]     DISPENSE STATUS TRANSFUSED     CODING SYSTEM [id number]     Unit Blood Type Code 5100     Unit Blood Type O POS     Unit Expiration [id number]    POCT glucose    Collection Time: 11/25/18  6:21 PM   Result Value Ref Range    POCT Glucose 88 70 - 110 mg/dL   POCT glucose    Collection Time: 11/25/18  8:44 PM   Result Value Ref Range    POCT Glucose 97 70 - 110 mg/dL   CBC auto differential    Collection Time: 11/26/18  4:00 AM   Result Value Ref Range    WBC 9.70 3.90 - 12.70 K/uL    RBC 3.00 (L) 4.00 - 5.40 M/uL    Hemoglobin 9.3 (L) 12.0 - 16.0 g/dL    Hematocrit 29.9 (L) 37.0 - 48.5 %     (H) 82 - 98 fL    MCH 31.0 27.0 - 31.0 pg    MCHC 31.1 (L) 32.0 - 36.0 g/dL    RDW 22.9 (H) 11.5 - 14.5 %    Platelets 74 (L) 150 - 350 K/uL    MPV 13.7 (H) 9.2 - 12.9 fL    Immature Granulocytes 1.8 (H) 0.0 - 0.5 %    Gran # (ANC) 8.1 (H) 1.8 - 7.7 K/uL    Immature Grans (Abs) 0.17 (H) 0.00 - 0.04 K/uL    Lymph # 0.7 (L) 1.0 - 4.8 K/uL    Mono # 0.7 0.3 - 1.0 K/uL    Eos # 0.0 0.0 - 0.5 K/uL    Baso # 0.01 0.00 - 0.20 K/uL     nRBC 1 (A) 0 /100 WBC    Gran% 83.4 (H) 38.0 - 73.0 %    Lymph% 7.2 (L) 18.0 - 48.0 %    Mono% 7.1 4.0 - 15.0 %    Eosinophil% 0.4 0.0 - 8.0 %    Basophil% 0.1 0.0 - 1.9 %    Differential Method Automated    POCT glucose    Collection Time: 11/26/18  4:06 AM   Result Value Ref Range    POCT Glucose 114 (H) 70 - 110 mg/dL   Fibrinogen    Collection Time: 11/26/18  4:48 AM   Result Value Ref Range    Fibrinogen 144 (L) 182 - 366 mg/dL   Protime-INR    Collection Time: 11/26/18  4:48 AM   Result Value Ref Range    Prothrombin Time 18.1 (H) 9.0 - 12.5 sec    INR 1.9 (H) 0.8 - 1.2   Magnesium    Collection Time: 11/26/18  5:31 AM   Result Value Ref Range    Magnesium 1.6 1.6 - 2.6 mg/dL   Phosphorus    Collection Time: 11/26/18  5:31 AM   Result Value Ref Range    Phosphorus 2.5 (L) 2.7 - 4.5 mg/dL   Renal function panel    Collection Time: 11/26/18  5:31 AM   Result Value Ref Range    Glucose 114 (H) 70 - 110 mg/dL    Sodium 144 136 - 145 mmol/L    Potassium 3.8 3.5 - 5.1 mmol/L    Chloride 112 (H) 95 - 110 mmol/L    CO2 23 23 - 29 mmol/L    BUN, Bld 69 (H) 6 - 20 mg/dL    Calcium 9.2 8.7 - 10.5 mg/dL    Creatinine 1.3 0.5 - 1.4 mg/dL    Albumin 2.6 (L) 3.5 - 5.2 g/dL    Phosphorus 2.5 (L) 2.7 - 4.5 mg/dL    eGFR if  54.1 (A) >60 mL/min/1.73 m^2    eGFR if non  47.0 (A) >60 mL/min/1.73 m^2    Anion Gap 9 8 - 16 mmol/L   POCT glucose    Collection Time: 11/26/18  9:06 AM   Result Value Ref Range    POCT Glucose 115 (H) 70 - 110 mg/dL   POCT glucose    Collection Time: 11/26/18 11:26 AM   Result Value Ref Range    POCT Glucose 129 (H) 70 - 110 mg/dL         Diagnostic Results     Brain imaging:     MRI DWI brain  11/21/18:   Stable acute right thalamic hemorrhage with surrounding edema and mass effect.  Small punctate foci of diffusion signal abnormality in the posterior temporal/parietal regions with low ADC signal and subtle FLAIR signal abnormality suggesting punctate infarcts. No  evidence major vascular territory infarct.    MRI total spine 11/21/18:   Mildly increased central T2 signal of the spinal cord at T4 through T6 may reflect prominence of the central canal versus a small syrinx.  No cord expansion to suggest edema or acute infarction in this region.  Otherwise, no abnormal cord signal to suggest infarct.  No evidence of epidural of subdural hematoma.  Multilevel spondylosis most severe at L5-S1 with severe spinal canal stenosis and L4-L5 with moderate to severe neural foraminal narrowing.    CT abdomen pelvis 11/20/18:   Did not show any compression of spinal cord by known hematoma    CT Cervical and thoracic spine 11/21/18:  No acute abnormality of the cervical or thoracic spine.     CTH 11/20/18  Stable right thalamic hemorrhage.  No new hemorrhage identified.      CTH - Right Thalamic hemorrhage           Vessel Imaging:     CTA head and neck - unremarkable           Cardiac Evaluation:   JORDAN 11/15/18  · Normal left ventricular systolic function. The estimated ejection fraction is 70%  · Concentric left ventricular remodeling.  · No wall motion abnormalities.  · Normal LV diastolic function.  · Normal right ventricular systolic function.  · Trace mitral regurgitation.  · Mild aortic regurgitation.  · Trace tricuspid regurgitation.  · Trace pulmonic regurgitation.  · Normal central venous pressure (3 mm Hg).  · The estimated PA systolic pressure is 24.72 mm Hg  · No pericardial effusion.          Jose Pan MD  Comprehensive Stroke Center  Department of Vascular Neurology   [facility]

## 2018-11-26 NOTE — PLAN OF CARE
Problem: Patient Care Overview  Goal: Plan of Care Review  Pt VSS at this time. 57-70 bpm on cardiac monitoring, BP goal of sys <160 maintained, O2 sats >92 on 6 L and 44% FiO2 high flow cannula, afebrile. Pt oriented to self but confused, following commands and moving extremities w left sided weakness. Pt updated on current condition and POC for am shift. All questions answered and emotional support provided. Void x2 since reese removal; BM x2 this pm. Complete bed bath given; linens changed. Pt assisted with weight shift, and encouraged to cough/deep breathe. Remains free of falls and injury for am shift. CCT resident, MD updated on current condition, vitals, labs, and gtts. No new orders received, will continue to monitor.

## 2018-11-26 NOTE — SUBJECTIVE & OBJECTIVE
Interval History:     Past Medical History:   Diagnosis Date    Arthritis     Bell's palsy 2012    Cirrhosis     GERD (gastroesophageal reflux disease)     Hepatitis C     Hypertension     Sciatica        Past Surgical History:   Procedure Laterality Date    CHOLECYSTECTOMY      ENDOVASCULAR GRAFT REPAIR OF ANEURYSM OF THORACIC AORTA N/A 11/15/2018    Procedure: REPAIR, ANEURYSM, ENDOVASCULAR GRAFT, AORTA, THORACIC;  Surgeon: Hermila Storm MD;  Location: [facility] OR [address] [place] [address];  Service: Peripheral Vascular;  Laterality: N/A;  Contrast: 94  ml  mGy: 1169.79  flouro time: 9.7 minutes    REPAIR, ANEURYSM, ENDOVASCULAR GRAFT, AORTA, THORACIC N/A 11/15/2018    Performed by Hermila Storm MD at [facility] OR [address] [place] [address]       Review of patient's allergies indicates:  No Known Allergies    Medications:  Continuous Infusions:   norepinephrine bitartrate-D5W 0.1 mcg/kg/min (11/26/18 1300)     Scheduled Meds:   calcium chloride IVPB  1 g Intravenous Once    calcium gluconate IVPB  1 g Intravenous Once    famotidine  20 mg Oral Daily    lactated ringers  500 mL Intravenous Once    lactulose  30 g Oral Q6H    rifAXImin  550 mg Oral BID     PRN Meds:dextrose 50%, dextrose 50%, glucagon (human recombinant), glucose, glucose, ondansetron    Family History     None        Tobacco Use    Smoking status: Current Every Day Smoker     Packs/day: 0.25     Years: 30.00     Pack years: 7.50    Smokeless tobacco: Never Used   Substance and Sexual Activity    Alcohol use: No     Frequency: Never    Drug use: Yes     Types: Cocaine, Marijuana     Comment: cocaine weekly; marijuana daily    Sexual activity: Not on file       Review of Systems   Cardiovascular: Positive for chest pain.   Gastrointestinal: Positive for abdominal distention and abdominal pain.   Neurological: Positive for weakness.   Psychiatric/Behavioral: Positive for confusion. The patient is nervous/anxious.      Objective:     Vital Signs (Most Recent):  Temp:  (!) 93.1 °F (33.9 °C) (11/26/18 1100)  Pulse: 67 (11/26/18 1315)  Resp: (!) 27 (11/26/18 1315)  BP: 93/61 (11/26/18 1315)  SpO2: 99 % (11/26/18 1315) Vital Signs (24h Range):  Temp:  [92.5 °F (33.6 °C)-98.3 °F (36.8 °C)] 93.1 °F (33.9 °C)  Pulse:  [48-81] 67  Resp:  [13-42] 27  SpO2:  [88 %-99 %] 99 %  BP: ()/() 93/61     Weight: 69.3 kg (152 lb 12.5 oz)  Body mass index is 24.66 kg/m².    Review of Symptoms  Symptom Assessment (ESAS 0-10 scale)   ESAS 0 1 2 3 4 5 6 7 8 9 10   Pain      X        Dyspnea     X         Anxiety X             Nausea X             Depression  X             Anorexia X             Fatigue X             Insomnia X             Restlessness  X             Agitation X             CAM / Delirium _waxing and waning mental status    Constipation     __ --  ___+   Diarrhea           __ --  ___+  Bowel Management Plan (BMP): Yes    Comments: shortness of breath and difficulty breathing associated with abd. distension    Pain Assessment:complains of sharp left upper quadrant abd pain 5-7/10  that radiates to back, agrevated with movement and slightly relieved with pain medications         OME in 24 hours: 5    Performance Status: 50    ECOG Performance Status Grade: 3 - Confined to bed or chair 50% of waking hours    Physical Exam   Constitutional: She is oriented to person, place, and time. She appears well-developed and well-nourished. No distress.   Cardiovascular: Normal rate, regular rhythm and normal heart sounds.   Pulmonary/Chest: Effort normal and breath sounds normal. No respiratory distress.   Abdominal: She exhibits distension. There is tenderness.   Decreased bowel sounds, pain to left upper quad.    Neurological: She is alert and oriented to person, place, and time.   Waxing and waning mental status   Skin: Skin is warm and dry.   Psychiatric: She has a normal mood and affect. Her behavior is normal.   Nursing note and vitals reviewed.      Significant Labs: All pertinent  labs within the past 24 hours have been reviewed.  CBC:   Recent Labs   Lab 11/26/18  0400   WBC 9.70   HGB 9.3*   HCT 29.9*   *   PLT 74*     BMP:  Recent Labs   Lab 11/26/18  0531   *      K 3.8   *   CO2 23   BUN 69*   CREATININE 1.3   CALCIUM 9.2   MG 1.6     LFT:  Lab Results   Component Value Date    AST 99 (H) 11/25/2018    ALKPHOS 114 11/25/2018    BILITOT 8.1 (H) 11/25/2018     Albumin:   Albumin   Date Value Ref Range Status   11/26/2018 2.6 (L) 3.5 - 5.2 g/dL Final     Protein:   Total Protein   Date Value Ref Range Status   11/25/2018 7.0 6.0 - 8.4 g/dL Final     Lactic acid:   Lab Results   Component Value Date    LACTATE 1.8 11/20/2018    LACTATE 1.9 11/18/2018       Significant Imaging: I have reviewed all pertinent imaging results/findings within the past 24 hours.    Advanced Directives::  Living Will: No  LaPOST: No  Do Not Resuscitate Status: Yes  Medical Power of : No, next of kin is   Awais Mays [phone] (patient's , legally still  by  States she would want her sister Ck Burton [phone] to make medical decisions for her, lives in [place]    Decision-Making Capacity: Patient answered questions    Living Arrangements: Lives alone    Psychosocial/Cultural:  ,  from  Awais Mays.  No children, has two brothers and two sisters.   Spiritual:     F- Deena and Belief: Worship     I - Importance: .  C - Community:  A - Address in Care:  services offered

## 2018-11-26 NOTE — PLAN OF CARE
Problem: Patient Care Overview  Goal: Plan of Care Review  Outcome: Ongoing (interventions implemented as appropriate)  Pt oriented to self, place, time, but not situation. Pt moves all extremities and follows commands. LLE still very weak and only plantarflexion. VSS at this time. Pt weaned down to 6L, sats goal >95% maintained. HR NSR 60s-80s throughout shift. SBP 140s-170s, PRN hydralazine given x2 this shift. 1 FFP, 1 cryo, and 1 unit platelets given this shift. Mag citrate/glycerin enema administered this shift, produced a large bowel movement. Adequate urine output and reese removed per order. OT evaluated pt today. Restraints in place, will remove when appropriate. Plan of care reviewed. Questions and concerns addressed. Will continue to monitor.

## 2018-11-26 NOTE — PROGRESS NOTES
RRT called to be at bedside due to chiara and low O2. Patient bagged for about 4 minutes and then a NRB was placed on. Per Dr. Roberts get an ABG and bipap. Settings for bipap are 10/5 60%. Patient tolerating well. Will continue to monitor.

## 2018-11-26 NOTE — ASSESSMENT & PLAN NOTE
Patient is a 53 y.o. female with a h/o HTN, Hep C, cirrhosis (MELD 15),Hx of pancreatitis, GERD, and illicit substance abuse (crack cocaine/THC) admitted for thoracic aortic dissection treated with TEVAR and Lumbar drain, had Acute right thalamic hemorrhage on CTH.   Etiology - Hemorrhagic transformation of Right thalamic stroke v/s Primary right thalamic hemorrhagic stroke v/s End Stage live disease v/s 2/2 Lumbar drain with increased SFP / MAP gradient.   11/20/18: Sudden LOF of both lower extremities with no response to painful stimuli. Repeat CT head w/o new pathology or worsening hemorrhage.  CT head without any worsening of previous hemorrhage or new pathology. CT spine without obvious cause for new deficits. MRI head and spine showing known stable pathology, severe spinal stenosis at L4/L5 that is too low to account for presentation, and no signs of intraspinal or extraspinal hematoma that would be impinging.  11/23/18: Patient regained much use of her LUE. Very alert today. Appears to have significantly more scleral icterus and abdominal distension. Spinal drain removed.  11/24/18: Regained some use of LLE, very alert today.   11/26/18: Increased strength in LUE, able to do basic math, and spell words. Concentration improving.      Recommend:   -Continue current treatment  -SBP goal <160   -Neuro checks q4h  -PT/OT/SLP now that drain is removed      Antithrombotics for secondary stroke prevention:  Hold Antiplatelets    Statins for secondary stroke prevention and hyperlipidemia, if present: rec getting  LDL and start Lipitor if LDL > 130      Aggressive risk factor modification: HTN, Illicit drug use     Rehab efforts: PT/OT/SLP to evaluate and treat    Diagnostics ordered/pending: None     VTE prophylaxis: dvt ppx and scd's    BP parameters:  SBP <160

## 2018-11-26 NOTE — CONSULTS
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      Consults  Assessment/Plan:     Palliative care encounter    Palliative care consulted for goals of care and advanced care planning.  Palliative care APRN at bedside, palliative care introduced.  No family is present.     Impression:  Ms. Cleaning is a 54 yo lady admitted with Aortic dissection, s/p Acute right thalamic hemorrhage. She is awake alert and oriented x4, answers most  questions appropriately, follows simple commands.  Complains of  Left upper quadrant abdominal pain radiating to back- abdomen distended, hypoactive bowel sounds. .  Hypothermic 93.1, warming blanket in use.  No acute distress.     Goals of Care:    Advance Care Planning   -No advanced directives have been received.  - Legal next of kin is  Ahsan Mays [phone]. Patient reports they are  but still legally .   - Ms. Cleaning states it is her wish that her sister,  Nancy Burton [phone]  make medical decisions for her,  If she is unable to.   - Resuscitation status - DNR per primary team.   - Current clinical condition: Unable to determine what Ms. Cleaning understands about her condition.  Ms. Cleaning states she believes she is dying. This has not been documented in the EMR and Ms. Cleaning states the doctors have not given her this information.         Transition of care   When discussing her thoughts about dying in more detail Ms. 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    - She states her mother received hospice services.  Ms. Cleaning   "Indicates she would want to have nurses taking care of her.  She does not have any children "It would be sad for her children to watch their momma die."  - Although she is oriented x4  It appears Ms. Cleaning may have some confusion.  Nursing staff reports she has had waxing and waning mental status.    - No family is present to confirm this information.  Palliative care will contact family for additional information.     Plan    - Advanced directives have not been received.  Palliative care will return and if Ms. Cleaning is able - will complete advanced directives.  - Patient may benefit from family meeting.  Palliative care able to facilitate as needed.   - DNR as per primary team - recommend LaPOST on discharge  - Palliative care will continue to follow and assist with development of realistic goals of care.   - emotional support.     Addendum   Primary team NP notified of above goals of care discussion.  As per primary team NP, patient experienced significant event. She is unable at this time to complete advanced directives.  Primary team staff has contacted patient's sister for goals of care conversation.  Primary team will contact  palliative care if further assistance is needed.                  Thank you for your consult. I will follow-up with patient. Please contact us if you have any additional questions.    Subjective:     HPI:   HPI:  Mrs. Cleaning is a 52 yo female with a history significant for Hep C cirrhosis, HTN, cocaine and marijuana use who presented to [facility] on the early morning of 11/15 as a transfer from [facility] for Type B aortic dissection. She initially presented to [facility] on the evening of 11/14 for sharp epigastric pain that radiated to her back which started initially on the night of 11/13. CT at OSH noted aortic dissection from left subclavian to just proximal of celiac artery. She was transferred to [facility] for vascular surgery consult on a " nicardipine gtt. She was transitioned from nicardipine gtt to esmolol gtt. CTA upon arrival confirmed dissection and she was admitted to MICU for medical management of the dissection with vascular surgery following.      Hospital/ICU Course:  Mrs. Cleaning was admitted to MICU for Type B aortic dissection on esmolol gtt to maintain HR of 60 and SBP <120. Vascular surgery following, no surgical intervention warranted at this time. CTA read concerning for possible aortic rupture in the descending aorta with high-density fluid noted in the posterior mediastinum; additionally, there was a suspected penetrating ulcer in the upper abdominal aorta adjacent to the true lumen.  Vascular consulted. Decision to place lumbar drain and proceed to TEVAR.    Palliative care consulted for goals of care and advanced care planning.            Hospital Course:  No notes on file    Interval History:     Past Medical History:   Diagnosis Date    Arthritis     Bell's palsy 2012    Cirrhosis     GERD (gastroesophageal reflux disease)     Hepatitis C     Hypertension     Sciatica        Past Surgical History:   Procedure Laterality Date    CHOLECYSTECTOMY      ENDOVASCULAR GRAFT REPAIR OF ANEURYSM OF THORACIC AORTA N/A 11/15/2018    Procedure: REPAIR, ANEURYSM, ENDOVASCULAR GRAFT, AORTA, THORACIC;  Surgeon: Hermila Storm MD;  Location: [facility] OR [address] [place] [address];  Service: Peripheral Vascular;  Laterality: N/A;  Contrast: 94  ml  mGy: 1169.79  flouro time: 9.7 minutes    REPAIR, ANEURYSM, ENDOVASCULAR GRAFT, AORTA, THORACIC N/A 11/15/2018    Performed by Hermila Storm MD at [facility] OR [address] [place] [address]       Review of patient's allergies indicates:  No Known Allergies    Medications:  Continuous Infusions:   norepinephrine bitartrate-D5W 0.1 mcg/kg/min (11/26/18 1300)     Scheduled Meds:   calcium chloride IVPB  1 g Intravenous Once    calcium gluconate IVPB  1 g Intravenous Once    famotidine  20 mg Oral Daily    lactated ringers  500 mL  Intravenous Once    lactulose  30 g Oral Q6H    rifAXImin  550 mg Oral BID     PRN Meds:dextrose 50%, dextrose 50%, glucagon (human recombinant), glucose, glucose, ondansetron    Family History     None        Tobacco Use    Smoking status: Current Every Day Smoker     Packs/day: 0.25     Years: 30.00     Pack years: 7.50    Smokeless tobacco: Never Used   Substance and Sexual Activity    Alcohol use: No     Frequency: Never    Drug use: Yes     Types: Cocaine, Marijuana     Comment: cocaine weekly; marijuana daily    Sexual activity: Not on file       Review of Systems   Cardiovascular: Positive for chest pain.   Gastrointestinal: Positive for abdominal distention and abdominal pain.   Neurological: Positive for weakness.   Psychiatric/Behavioral: Positive for confusion. The patient is nervous/anxious.      Objective:     Vital Signs (Most Recent):  Temp: (!) 93.1 °F (33.9 °C) (11/26/18 1100)  Pulse: 67 (11/26/18 1315)  Resp: (!) 27 (11/26/18 1315)  BP: 93/61 (11/26/18 1315)  SpO2: 99 % (11/26/18 1315) Vital Signs (24h Range):  Temp:  [92.5 °F (33.6 °C)-98.3 °F (36.8 °C)] 93.1 °F (33.9 °C)  Pulse:  [48-81] 67  Resp:  [13-42] 27  SpO2:  [88 %-99 %] 99 %  BP: ()/() 93/61     Weight: 69.3 kg (152 lb 12.5 oz)  Body mass index is 24.66 kg/m².    Review of Symptoms  Symptom Assessment (ESAS 0-10 scale)   ESAS 0 1 2 3 4 5 6 7 8 9 10   Pain      X        Dyspnea     X         Anxiety X             Nausea X             Depression  X             Anorexia X             Fatigue X             Insomnia X             Restlessness  X             Agitation X             CAM / Delirium _waxing and waning mental status    Constipation     __ --  ___+   Diarrhea           __ --  ___+  Bowel Management Plan (BMP): Yes    Comments: shortness of breath and difficulty breathing associated with abd. distension    Pain Assessment:complains of sharp left upper quadrant abd pain 5-7/10  that radiates to back, agrevated with  movement and slightly relieved with pain medications         OME in 24 hours: 5    Performance Status: 50    ECOG Performance Status Grade: 3 - Confined to bed or chair 50% of waking hours    Physical Exam   Constitutional: She is oriented to person, place, and time. She appears well-developed and well-nourished. No distress.   Cardiovascular: Normal rate, regular rhythm and normal heart sounds.   Pulmonary/Chest: Effort normal and breath sounds normal. No respiratory distress.   Abdominal: She exhibits distension. There is tenderness.   Decreased bowel sounds, pain to left upper quad.    Neurological: She is alert and oriented to person, place, and time.   Waxing and waning mental status   Skin: Skin is warm and dry.   Psychiatric: She has a normal mood and affect. Her behavior is normal.   Nursing note and vitals reviewed.      Significant Labs: All pertinent labs within the past 24 hours have been reviewed.  CBC:   Recent Labs   Lab 11/26/18  0400   WBC 9.70   HGB 9.3*   HCT 29.9*   *   PLT 74*     BMP:  Recent Labs   Lab 11/26/18  0531   *      K 3.8   *   CO2 23   BUN 69*   CREATININE 1.3   CALCIUM 9.2   MG 1.6     LFT:  Lab Results   Component Value Date    AST 99 (H) 11/25/2018    ALKPHOS 114 11/25/2018    BILITOT 8.1 (H) 11/25/2018     Albumin:   Albumin   Date Value Ref Range Status   11/26/2018 2.6 (L) 3.5 - 5.2 g/dL Final     Protein:   Total Protein   Date Value Ref Range Status   11/25/2018 7.0 6.0 - 8.4 g/dL Final     Lactic acid:   Lab Results   Component Value Date    LACTATE 1.8 11/20/2018    LACTATE 1.9 11/18/2018       Significant Imaging: I have reviewed all pertinent imaging results/findings within the past 24 hours.    Advanced Directives::  Living Will: No  LaPOST: No  Do Not Resuscitate Status: Yes  Medical Power of : No, next of kin is   Awais Mays [phone] (patient's , legally still  by  States she would want her sister  Ck Burton [phone] to make medical decisions for her, lives in [place]    Decision-Making Capacity: Patient answered questions    Living Arrangements: Lives alone    Psychosocial/Cultural:  ,  from  Awais Mays.  No children, has two brothers and two sisters.   Spiritual:     F- Deena and Belief: Confucianism     I - Importance: .  C - Community:  A - Address in Care:  services offered      > 50% of 70  min visit spent in chart review, face to face discussion of goals of care,  symptom assessment, coordination of care and emotional support.    ANTONY James, ACNS-BC, OCN   Palliative Medicine  [facility]

## 2018-11-26 NOTE — HPI
HPI:  Mrs. Cleaning is a 54 yo female with a history significant for Hep C cirrhosis, HTN, cocaine and marijuana use who presented to [facility] on the early morning of 11/15 as a transfer from [facility] for Type B aortic dissection. She initially presented to [facility] on the evening of 11/14 for sharp epigastric pain that radiated to her back which started initially on the night of 11/13. CT at OSH noted aortic dissection from left subclavian to just proximal of celiac artery. She was transferred to [facility] for vascular surgery consult on a nicardipine gtt. She was transitioned from nicardipine gtt to esmolol gtt. CTA upon arrival confirmed dissection and she was admitted to MICU for medical management of the dissection with vascular surgery following.      Hospital/ICU Course:  Mrs. Cleaning was admitted to MICU for Type B aortic dissection on esmolol gtt to maintain HR of 60 and SBP <120. Vascular surgery following, no surgical intervention warranted at this time. CTA read concerning for possible aortic rupture in the descending aorta with high-density fluid noted in the posterior mediastinum; additionally, there was a suspected penetrating ulcer in the upper abdominal aorta adjacent to the true lumen. Vascular consulted. Decision to place lumbar drain and proceed to TEVAR. S/p tevar and lumbar drain removed.     Palliative care consulted for goals of care and advanced care planning.

## 2018-11-26 NOTE — ASSESSMENT & PLAN NOTE
"Palliative care consulted for goals of care and advanced care planning.  Palliative care APRN at bedside, palliative care introduced.  No family is present.     Impression:  Ms. Cleaning is a 52 yo lady admitted with Aortic dissection, s/p Acute right thalamic hemorrhage. She is awake alert and oriented x4, answers most  questions appropriately, follows simple commands.  Complains of  Left upper quadrant abdominal pain radiating to back- abdomen distended, hypoactive bowel sounds. .  Hypothermic 93.1, warming blanket in use.  No acute distress.     Goals of Care:    Advance Care Planning   -No advanced directives have been received.  - Legal next of kin is  Ahsan Mays [phone]. Patient reports they are  but still legally .   - Ms. Cleaning states it is her wish that her sister,  Nancy Burton [phone]  make medical decisions for her,  If she is unable to.   - Resuscitation status - DNR per primary team.   - Current clinical condition: Unable to determine what Ms. Cleaning understands about her condition.  Ms. Cleaning states she believes she is dying. This has not been documented in the EMR and Ms. Cleaning states the doctors have not given her this information.         Transition of care   When discussing her thoughts about dying in more detail Ms. 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    - She states her mother received hospice services.  Ms. Cleaning  Indicates she would want to have nurses taking care of her.  She does not have any children "It would be sad for her children to watch their momma die."  - Although she is oriented x4  It appears Ms. Cleaning may have some confusion.  Nursing staff reports she has had waxing and waning mental status.    - No family is present to confirm this information.  Palliative care will contact family for additional information.     Plan    - Advanced directives have not been received.  Palliative care will return and if Ms. Cleaning " is able - will complete advanced directives.  - Patient may benefit from family meeting.  Palliative care able to facilitate as needed.   - DNR as per primary team - recommend LaPOST on discharge  - Palliative care will continue to follow and assist with development of realistic goals of care.   - emotional support.

## 2018-11-26 NOTE — PROGRESS NOTES
Per Dr. Roberts make bipap prn. Leave it in the room for the night and can pull in the morning. Patient on  4L NC tolerating well. Will continue to monitor.

## 2018-11-26 NOTE — NURSING
At 11:45 pt started becoming increasingly confused and hypotensive, BP 79/51 with heart rate slowly decreasing into the 50s. MD notified and called to bedside. Dr. Roberts arrived to bedside to assess pt, BP/HR continued to decrease into the 30s. 500cc LR bolus given. 1mg Atropine, 0.4mg narcan, 1g calcium gluconate, 400mcg phenylephrine given per MD order. Levo gtt started to maintain SBP > 100. Patient remains lethargic and on BIPAP. HR improved since meds given. Nurse practitioner Rosa Maria Johnston spoke with family and updated pt sister on plan of care. VSS at this time. Will continue to monitor.

## 2018-11-26 NOTE — PROGRESS NOTES
[facility]  Vascular Surgery  Progress Note    Patient Name: Leah Cleaning  MRN: [id number]  Admission Date: 11/15/2018  Primary Care Provider: Mary Colorado MD    Subjective:     Interval History: NAEON. Neuro exam stable to slight improvement. Continued encephalopathy.    Post-Op Info:  Procedure(s) (LRB):  REPAIR, ANEURYSM, ENDOVASCULAR GRAFT, AORTA, THORACIC (N/A)   11 Days Post-Op       Medications:  Continuous Infusions:  Scheduled Meds:   amLODIPine  10 mg Oral Daily    carvedilol  6.25 mg Oral BID    famotidine  20 mg Oral Daily    lactulose  30 g Oral Q6H    magnesium sulfate IVPB  2 g Intravenous Once    potassium chloride 10%  20 mEq Oral Once    rifAXImin  550 mg Oral BID     PRN Meds:sodium chloride, sodium chloride, hydrALAZINE, ondansetron     Objective:     Vital Signs (Most Recent):  Temp: 97.9 °F (36.6 °C) (11/26/18 0300)  Pulse: (!) 55 (11/26/18 0600)  Resp: 15 (11/26/18 0600)  BP: (!) 172/102 (11/26/18 0600)  SpO2: 98 % (11/26/18 0726) Vital Signs (24h Range):  Temp:  [97.9 °F (36.6 °C)-98.3 °F (36.8 °C)] 97.9 °F (36.6 °C)  Pulse:  [55-90] 55  Resp:  [15-41] 15  SpO2:  [87 %-99 %] 98 %  BP: (153-184)/() 172/102          Physical Exam   Constitutional: She appears well-developed and well-nourished. She appears lethargic.   HENT:   Head: Normocephalic and atraumatic.   Eyes: EOM are normal. Pupils are equal, round, and reactive to light.   Neck: Normal range of motion. Neck supple.   Cardiovascular: Normal rate and regular rhythm.   Pulmonary/Chest: Effort normal and breath sounds normal.   Abdominal: Soft. Bowel sounds are normal.   Musculoskeletal:   Exam difficult 2/2 encephalopathy and R thalamic stroke  LUE good strength  RUE 5/5, intact fine motor movement  LLE plantar flex ankle, minimal toe movement  RLE knee flexion, and moves toes   Neurological: She appears lethargic. She is disoriented.   Skin: Skin is warm.       Significant Labs:  CBC:   Recent Labs    Lab 11/26/18  0400   WBC 9.70   RBC 3.00*   HGB 9.3*   HCT 29.9*   PLT 74*   *   MCH 31.0   MCHC 31.1*     CMP:   Recent Labs   Lab 11/25/18  0330 11/26/18  0531   GLU 59* 114*   CALCIUM 9.3 9.2   ALBUMIN 2.7*  2.7* 2.6*   PROT 7.0  --    * 144   K 4.5 3.8   CO2 20* 23   * 112*   BUN 73* 69*   CREATININE 1.3 1.3   ALKPHOS 114  --    ALT 43  --    AST 99*  --    BILITOT 8.1*  --      Coagulation:   Recent Labs   Lab 11/26/18  0448   LABPROT 18.1*   INR 1.9*     Assessment/Plan:     * Aortic dissection distal to left subclavian    53 F with Hep C cirrhosis (MELD 15 on admission), HTN, crack cocaine abuse, who presented with acute tearing chest/back pain to OSH, Found to have retrograde type B dissection. Despite aggressive anti-impulse therapy, persistent pain requiring intervention. Preoperative CSF drain placement by Neuro IR. Successful TEVAR. POD 1 left groin hematoma and coagulopathy requiring manual pressure and product resuscitation. POD 2 new onset left upper arm weakness, found to have right thalamic hemorrhage 2/2 HTN. F/u CT head stable findings. Significant reactive left pleural effusion with surrounding atelectasis noted also.     Neuro: Neuro exam stable with slight improvement  - CT and MRI spine without evidence of spinal cord hematoma or infarct  - MRI head with mass effect; stroke was hemorrhagic 2/2 HTN not embolic event from TEVAR   right thalamic bleed  - Q4hr neurovascular checks    CV:   - SBP goal normotensive    Resp:  - encourage IS  - O2 supp prn  - Moderate Left pleural effusion and small right pleural effusion on CTA stoke multiphase noted. Left pleural effusion likely reactive following TEVAR. Con't pulmonary toilet.     Renal/Endocrine:  - con't monitoring UOP  - BUN/Cr improved 69/1.3    GI: Hep C cirrhosis with MELD 15 on admission  - protonix  - Puree diet  - Having BMs  - T bili elevated 8.1 (yesterday)    Heme/ID:  - ABLA stable.   - con't correct coagulopathy  prn  - monitor thrombocytopenia     Dispo:  - Care per MICU   - Please call with any change in neurovascular exam         Perla Castro MD  Vascular Surgery  [facility]

## 2018-11-26 NOTE — SUBJECTIVE & OBJECTIVE
Neurologic Chief Complaint: dysarthria, poor attention span, LUE weakness, LLE paralgia, RLE weakness  Right Thalamic hemorrhage     Subjective:     Interval History: Patient is seen for follow-up neurological assessment and treatment recommendations: Increased movement in LUE hand. Spinal drain removed, aggressive PT/OT/SLP.       HPI, Past Medical, Family, and Social History remains the same as documented in the initial encounter.     Review of Systems   Constitutional: Negative for chills and fever.   Respiratory: Negative for cough and shortness of breath.    Cardiovascular: Negative for chest pain and palpitations.   Gastrointestinal: Negative for nausea and vomiting. Abdominal pain: RUQ    Musculoskeletal: Positive for neck pain (discomfort at RIJ site). Negative for back pain and neck stiffness.   Neurological: Negative for tremors, seizures, syncope, weakness, numbness and headaches.   Hematological: Bruises/bleeds easily.   Psychiatric/Behavioral: Positive for confusion and decreased concentration. Negative for agitation and behavioral problems.     Scheduled Meds:   atropine        calcium gluconate IVPB  1 g Intravenous Once    famotidine  20 mg Oral Daily    lactated ringers  500 mL Intravenous Once    lactulose  30 g Oral Q6H    lisinopril  20 mg Oral Daily    norepinephrine bitartrate-D5W        phenylephrine HCl in 0.9% NaCl        rifAXImin  550 mg Oral BID     Continuous Infusions:    PRN Meds:sodium chloride, sodium chloride, dextrose 50%, dextrose 50%, glucagon (human recombinant), glucose, glucose, hydrALAZINE, ondansetron, oxyCODONE    Objective:     Vital Signs (Most Recent):  Temp: (!) 92.5 °F (33.6 °C) (11/26/18 0800)  Pulse: 61 (11/26/18 1045)  Resp: (!) 42 (11/26/18 1045)  BP: (!) 100/55 (11/26/18 1030)  SpO2: (!) 91 % (11/26/18 1045)  BP Location: Right arm    Vital Signs Range (Last 24H):  Temp:  [92.5 °F (33.6 °C)-98.3 °F (36.8 °C)]   Pulse:  [52-81]   Resp:  [13-42]   BP:  (100-187)/()   SpO2:  [88 %-98 %]   BP Location: Right arm    Physical Exam   Constitutional: She appears well-developed and well-nourished.   HENT:   Head: Normocephalic and atraumatic.   Eyes: EOM are normal. Pupils are equal, round, and reactive to light. Scleral icterus is present.   Neck: Normal range of motion. Neck supple.   Cardiovascular: Normal rate and regular rhythm.   Pulmonary/Chest: Effort normal and breath sounds normal.   Abdominal: Soft. Bowel sounds are normal. She exhibits distension. There is no guarding.   Genitourinary:   Genitourinary Comments: Perry in place   Musculoskeletal: She exhibits no edema or deformity.   Neurological: She is alert.   LUE weakness   Skin: Skin is warm and dry. Capillary refill takes less than 2 seconds.   Nursing note and vitals reviewed.      Neurological Exam:   LOC: Alert  Attention Span: fair  Language: mild aphasia  Articulation: mild dysarthria  Orientation: AOx1, not time or place  Visual Fields: Full  EOM (CN III, IV, VI): Full/intact  Pupils (CN II, III): PERRL  Facial Sensation (CN V): Normal  Facial Movement (CN VII): mild L facial droop   Motor: Arm left  Normal 2/5  Leg left  Normal 1/5  Arm right  Normal 5/5  Leg right Normal 2/5  Sensation: Able to feel light touch on proximal legs and R lower leg.   Tone: Decreased tone throughout LEs and LUE.   Reflexes +2 throughout       Laboratory:    Recent Results (from the past 24 hour(s))   POCT glucose    Collection Time: 11/25/18  1:02 PM   Result Value Ref Range    POCT Glucose 122 (H) 70 - 110 mg/dL   Prepare Cryoprecipitate 1 Dose    Collection Time: 11/25/18  1:40 PM   Result Value Ref Range    UNIT NUMBER [id number]     PRODUCT CODE M9023N00     DISPENSE STATUS TRANSFUSED     CODING SYSTEM [id number]     Unit Blood Type Code 5100     Unit Blood Type O POS     Unit Expiration [id number]    CBC auto differential    Collection Time: 11/25/18  2:20 PM   Result Value Ref Range    WBC 9.59 3.90 -  12.70 K/uL    RBC 2.96 (L) 4.00 - 5.40 M/uL    Hemoglobin 9.2 (L) 12.0 - 16.0 g/dL    Hematocrit 28.4 (L) 37.0 - 48.5 %    MCV 96 82 - 98 fL    MCH 31.1 (H) 27.0 - 31.0 pg    MCHC 32.4 32.0 - 36.0 g/dL    RDW 21.3 (H) 11.5 - 14.5 %    Platelets 37 (LL) 150 - 350 K/uL    MPV SEE COMMENT 9.2 - 12.9 fL    Immature Granulocytes 2.2 (H) 0.0 - 0.5 %    Gran # (ANC) 7.9 (H) 1.8 - 7.7 K/uL    Immature Grans (Abs) 0.21 (H) 0.00 - 0.04 K/uL    Lymph # 0.9 (L) 1.0 - 4.8 K/uL    Mono # 0.6 0.3 - 1.0 K/uL    Eos # 0.0 0.0 - 0.5 K/uL    Baso # 0.01 0.00 - 0.20 K/uL    nRBC 2 (A) 0 /100 WBC    Gran% 82.4 (H) 38.0 - 73.0 %    Lymph% 9.1 (L) 18.0 - 48.0 %    Mono% 5.9 4.0 - 15.0 %    Eosinophil% 0.3 0.0 - 8.0 %    Basophil% 0.1 0.0 - 1.9 %    Differential Method Automated    Prepare Platelets 1 Dose    Collection Time: 11/25/18  3:01 PM   Result Value Ref Range    UNIT NUMBER [id number]     PRODUCT CODE S8570F46     DISPENSE STATUS TRANSFUSED     CODING SYSTEM [id number]     Unit Blood Type Code 5100     Unit Blood Type O POS     Unit Expiration [id number]    POCT glucose    Collection Time: 11/25/18  6:21 PM   Result Value Ref Range    POCT Glucose 88 70 - 110 mg/dL   POCT glucose    Collection Time: 11/25/18  8:44 PM   Result Value Ref Range    POCT Glucose 97 70 - 110 mg/dL   CBC auto differential    Collection Time: 11/26/18  4:00 AM   Result Value Ref Range    WBC 9.70 3.90 - 12.70 K/uL    RBC 3.00 (L) 4.00 - 5.40 M/uL    Hemoglobin 9.3 (L) 12.0 - 16.0 g/dL    Hematocrit 29.9 (L) 37.0 - 48.5 %     (H) 82 - 98 fL    MCH 31.0 27.0 - 31.0 pg    MCHC 31.1 (L) 32.0 - 36.0 g/dL    RDW 22.9 (H) 11.5 - 14.5 %    Platelets 74 (L) 150 - 350 K/uL    MPV 13.7 (H) 9.2 - 12.9 fL    Immature Granulocytes 1.8 (H) 0.0 - 0.5 %    Gran # (ANC) 8.1 (H) 1.8 - 7.7 K/uL    Immature Grans (Abs) 0.17 (H) 0.00 - 0.04 K/uL    Lymph # 0.7 (L) 1.0 - 4.8 K/uL    Mono # 0.7 0.3 - 1.0 K/uL    Eos # 0.0 0.0 - 0.5 K/uL    Baso # 0.01 0.00 - 0.20 K/uL     nRBC 1 (A) 0 /100 WBC    Gran% 83.4 (H) 38.0 - 73.0 %    Lymph% 7.2 (L) 18.0 - 48.0 %    Mono% 7.1 4.0 - 15.0 %    Eosinophil% 0.4 0.0 - 8.0 %    Basophil% 0.1 0.0 - 1.9 %    Differential Method Automated    POCT glucose    Collection Time: 11/26/18  4:06 AM   Result Value Ref Range    POCT Glucose 114 (H) 70 - 110 mg/dL   Fibrinogen    Collection Time: 11/26/18  4:48 AM   Result Value Ref Range    Fibrinogen 144 (L) 182 - 366 mg/dL   Protime-INR    Collection Time: 11/26/18  4:48 AM   Result Value Ref Range    Prothrombin Time 18.1 (H) 9.0 - 12.5 sec    INR 1.9 (H) 0.8 - 1.2   Magnesium    Collection Time: 11/26/18  5:31 AM   Result Value Ref Range    Magnesium 1.6 1.6 - 2.6 mg/dL   Phosphorus    Collection Time: 11/26/18  5:31 AM   Result Value Ref Range    Phosphorus 2.5 (L) 2.7 - 4.5 mg/dL   Renal function panel    Collection Time: 11/26/18  5:31 AM   Result Value Ref Range    Glucose 114 (H) 70 - 110 mg/dL    Sodium 144 136 - 145 mmol/L    Potassium 3.8 3.5 - 5.1 mmol/L    Chloride 112 (H) 95 - 110 mmol/L    CO2 23 23 - 29 mmol/L    BUN, Bld 69 (H) 6 - 20 mg/dL    Calcium 9.2 8.7 - 10.5 mg/dL    Creatinine 1.3 0.5 - 1.4 mg/dL    Albumin 2.6 (L) 3.5 - 5.2 g/dL    Phosphorus 2.5 (L) 2.7 - 4.5 mg/dL    eGFR if  54.1 (A) >60 mL/min/1.73 m^2    eGFR if non  47.0 (A) >60 mL/min/1.73 m^2    Anion Gap 9 8 - 16 mmol/L   POCT glucose    Collection Time: 11/26/18  9:06 AM   Result Value Ref Range    POCT Glucose 115 (H) 70 - 110 mg/dL   POCT glucose    Collection Time: 11/26/18 11:26 AM   Result Value Ref Range    POCT Glucose 129 (H) 70 - 110 mg/dL         Diagnostic Results     Brain imaging:     MRI [facility]I brain  11/21/18:   Stable acute right thalamic hemorrhage with surrounding edema and mass effect.  Small punctate foci of diffusion signal abnormality in the posterior temporal/parietal regions with low ADC signal and subtle FLAIR signal abnormality suggesting punctate infarcts. No  evidence major vascular territory infarct.    MRI total spine 11/21/18:   Mildly increased central T2 signal of the spinal cord at T4 through T6 may reflect prominence of the central canal versus a small syrinx.  No cord expansion to suggest edema or acute infarction in this region.  Otherwise, no abnormal cord signal to suggest infarct.  No evidence of epidural of subdural hematoma.  Multilevel spondylosis most severe at L5-S1 with severe spinal canal stenosis and L4-L5 with moderate to severe neural foraminal narrowing.    CT abdomen pelvis 11/20/18:   Did not show any compression of spinal cord by known hematoma    CT Cervical and thoracic spine 11/21/18:  No acute abnormality of the cervical or thoracic spine.     CTH 11/20/18  Stable right thalamic hemorrhage.  No new hemorrhage identified.      CTH - Right Thalamic hemorrhage           Vessel Imaging:     CTA head and neck - unremarkable           Cardiac Evaluation:   JORDAN 11/15/18  · Normal left ventricular systolic function. The estimated ejection fraction is 70%  · Concentric left ventricular remodeling.  · No wall motion abnormalities.  · Normal LV diastolic function.  · Normal right ventricular systolic function.  · Trace mitral regurgitation.  · Mild aortic regurgitation.  · Trace tricuspid regurgitation.  · Trace pulmonic regurgitation.  · Normal central venous pressure (3 mm Hg).  · The estimated PA systolic pressure is 24.72 mm Hg  · No pericardial effusion.

## 2018-11-27 ENCOUNTER — HOSPITAL ENCOUNTER (INPATIENT)
Facility: HOSPITAL | Age: 53
LOS: 2 days | Discharge: HOSPICE | DRG: 951 | End: 2018-11-29
Attending: INTERNAL MEDICINE | Admitting: INTERNAL MEDICINE
Payer: OTHER MISCELLANEOUS

## 2018-11-27 VITALS
SYSTOLIC BLOOD PRESSURE: 97 MMHG | WEIGHT: 130.31 LBS | DIASTOLIC BLOOD PRESSURE: 53 MMHG | OXYGEN SATURATION: 99 % | BODY MASS INDEX: 20.94 KG/M2 | TEMPERATURE: 99 F | HEART RATE: 64 BPM | RESPIRATION RATE: 20 BRPM | HEIGHT: 66 IN

## 2018-11-27 DIAGNOSIS — K74.60 CIRRHOSIS OF LIVER: Primary | ICD-10-CM

## 2018-11-27 LAB
ALBUMIN SERPL BCP-MCNC: 2.4 G/DL
ALBUMIN SERPL BCP-MCNC: 2.4 G/DL
ALP SERPL-CCNC: 111 U/L
ALT SERPL W/O P-5'-P-CCNC: 37 U/L
ANION GAP SERPL CALC-SCNC: 10 MMOL/L
AST SERPL-CCNC: 89 U/L
BASOPHILS # BLD AUTO: 0.01 K/UL
BASOPHILS NFR BLD: 0.1 %
BILIRUB DIRECT SERPL-MCNC: 5.1 MG/DL
BILIRUB SERPL-MCNC: 8.8 MG/DL
BUN SERPL-MCNC: 79 MG/DL
CALCIUM SERPL-MCNC: 9.4 MG/DL
CHLORIDE SERPL-SCNC: 108 MMOL/L
CO2 SERPL-SCNC: 22 MMOL/L
CREAT SERPL-MCNC: 1.8 MG/DL
DIFFERENTIAL METHOD: ABNORMAL
EOSINOPHIL # BLD AUTO: 0.1 K/UL
EOSINOPHIL NFR BLD: 0.6 %
ERYTHROCYTE [DISTWIDTH] IN BLOOD BY AUTOMATED COUNT: 21.1 %
EST. GFR  (AFRICAN AMERICAN): 36.5 ML/MIN/1.73 M^2
EST. GFR  (NON AFRICAN AMERICAN): 31.7 ML/MIN/1.73 M^2
FIBRINOGEN PPP-MCNC: 124 MG/DL
GLUCOSE SERPL-MCNC: 107 MG/DL
HCT VFR BLD AUTO: 28.3 %
HGB BLD-MCNC: 9.3 G/DL
IMM GRANULOCYTES # BLD AUTO: 0.15 K/UL
IMM GRANULOCYTES NFR BLD AUTO: 1.2 %
INR PPP: 2.3
LACTATE SERPL-SCNC: 3.3 MMOL/L
LYMPHOCYTES # BLD AUTO: 0.9 K/UL
LYMPHOCYTES NFR BLD: 7 %
MAGNESIUM SERPL-MCNC: 2.2 MG/DL
MCH RBC QN AUTO: 31.8 PG
MCHC RBC AUTO-ENTMCNC: 32.9 G/DL
MCV RBC AUTO: 97 FL
MONOCYTES # BLD AUTO: 1.1 K/UL
MONOCYTES NFR BLD: 9.4 %
NEUTROPHILS # BLD AUTO: 10 K/UL
NEUTROPHILS NFR BLD: 81.7 %
NRBC BLD-RTO: 1 /100 WBC
PHOSPHATE SERPL-MCNC: 3.3 MG/DL
PHOSPHATE SERPL-MCNC: 3.3 MG/DL
PLATELET # BLD AUTO: 56 K/UL
PMV BLD AUTO: 12.3 FL
POCT GLUCOSE: 105 MG/DL (ref 70–110)
POCT GLUCOSE: 119 MG/DL (ref 70–110)
POCT GLUCOSE: 129 MG/DL (ref 70–110)
POCT GLUCOSE: 134 MG/DL (ref 70–110)
POTASSIUM SERPL-SCNC: 4.3 MMOL/L
PROT SERPL-MCNC: 6.6 G/DL
PROTHROMBIN TIME: 22 SEC
RBC # BLD AUTO: 2.92 M/UL
SODIUM SERPL-SCNC: 140 MMOL/L
WBC # BLD AUTO: 12.19 K/UL

## 2018-11-27 PROCEDURE — 84155 ASSAY OF PROTEIN SERUM: CPT

## 2018-11-27 PROCEDURE — 85384 FIBRINOGEN ACTIVITY: CPT

## 2018-11-27 PROCEDURE — 63600175 PHARM REV CODE 636 W HCPCS: Performed by: NURSE PRACTITIONER

## 2018-11-27 PROCEDURE — 25500020 PHARM REV CODE 255: Performed by: STUDENT IN AN ORGANIZED HEALTH CARE EDUCATION/TRAINING PROGRAM

## 2018-11-27 PROCEDURE — 25000003 PHARM REV CODE 250: Performed by: INTERNAL MEDICINE

## 2018-11-27 PROCEDURE — 85025 COMPLETE CBC W/AUTO DIFF WBC: CPT

## 2018-11-27 PROCEDURE — 25000003 PHARM REV CODE 250: Performed by: NURSE PRACTITIONER

## 2018-11-27 PROCEDURE — 85610 PROTHROMBIN TIME: CPT

## 2018-11-27 PROCEDURE — 20600001 HC STEP DOWN PRIVATE ROOM

## 2018-11-27 PROCEDURE — S0028 INJECTION, FAMOTIDINE, 20 MG: HCPCS | Performed by: INTERNAL MEDICINE

## 2018-11-27 PROCEDURE — 92526 ORAL FUNCTION THERAPY: CPT

## 2018-11-27 PROCEDURE — 82247 BILIRUBIN TOTAL: CPT

## 2018-11-27 PROCEDURE — 99233 SBSQ HOSP IP/OBS HIGH 50: CPT | Mod: ,,, | Performed by: PSYCHIATRY & NEUROLOGY

## 2018-11-27 PROCEDURE — 83605 ASSAY OF LACTIC ACID: CPT

## 2018-11-27 PROCEDURE — 63600175 PHARM REV CODE 636 W HCPCS: Performed by: PHYSICIAN ASSISTANT

## 2018-11-27 PROCEDURE — 80069 RENAL FUNCTION PANEL: CPT

## 2018-11-27 PROCEDURE — 99291 CRITICAL CARE FIRST HOUR: CPT | Mod: ,,, | Performed by: PHYSICIAN ASSISTANT

## 2018-11-27 PROCEDURE — 94761 N-INVAS EAR/PLS OXIMETRY MLT: CPT

## 2018-11-27 PROCEDURE — 83735 ASSAY OF MAGNESIUM: CPT

## 2018-11-27 PROCEDURE — 99233 SBSQ HOSP IP/OBS HIGH 50: CPT | Mod: ,,, | Performed by: CLINICAL NURSE SPECIALIST

## 2018-11-27 RX ORDER — LORAZEPAM 2 MG/ML
1 INJECTION INTRAMUSCULAR EVERY 30 MIN PRN
Status: CANCELLED | OUTPATIENT
Start: 2018-11-27

## 2018-11-27 RX ORDER — MORPHINE SULFATE 4 MG/ML
2 INJECTION, SOLUTION INTRAMUSCULAR; INTRAVENOUS EVERY 30 MIN PRN
Status: DISCONTINUED | OUTPATIENT
Start: 2018-11-27 | End: 2018-11-29 | Stop reason: HOSPADM

## 2018-11-27 RX ORDER — SCOLOPAMINE TRANSDERMAL SYSTEM 1 MG/1
1 PATCH, EXTENDED RELEASE TRANSDERMAL
Status: CANCELLED | OUTPATIENT
Start: 2018-11-27

## 2018-11-27 RX ORDER — LORAZEPAM 2 MG/ML
1 INJECTION INTRAMUSCULAR EVERY 30 MIN PRN
Status: DISCONTINUED | OUTPATIENT
Start: 2018-11-27 | End: 2018-11-29 | Stop reason: HOSPADM

## 2018-11-27 RX ORDER — ONDANSETRON 2 MG/ML
8 INJECTION INTRAMUSCULAR; INTRAVENOUS EVERY 8 HOURS PRN
Status: DISCONTINUED | OUTPATIENT
Start: 2018-11-27 | End: 2018-11-27 | Stop reason: HOSPADM

## 2018-11-27 RX ORDER — MORPHINE SULFATE 4 MG/ML
2 INJECTION, SOLUTION INTRAMUSCULAR; INTRAVENOUS EVERY 30 MIN PRN
Status: CANCELLED | OUTPATIENT
Start: 2018-11-27

## 2018-11-27 RX ORDER — MORPHINE SULFATE 4 MG/ML
4 INJECTION, SOLUTION INTRAMUSCULAR; INTRAVENOUS
Status: DISCONTINUED | OUTPATIENT
Start: 2018-11-27 | End: 2018-11-27 | Stop reason: HOSPADM

## 2018-11-27 RX ORDER — HALOPERIDOL 5 MG/ML
1 INJECTION INTRAMUSCULAR EVERY 4 HOURS PRN
Status: CANCELLED | OUTPATIENT
Start: 2018-11-27

## 2018-11-27 RX ORDER — LORAZEPAM 2 MG/ML
1 INJECTION INTRAMUSCULAR
Status: DISCONTINUED | OUTPATIENT
Start: 2018-11-27 | End: 2018-11-27 | Stop reason: HOSPADM

## 2018-11-27 RX ORDER — VANCOMYCIN HCL IN 5 % DEXTROSE 1G/250ML
15 PLASTIC BAG, INJECTION (ML) INTRAVENOUS
Status: DISCONTINUED | OUTPATIENT
Start: 2018-11-28 | End: 2018-11-27

## 2018-11-27 RX ORDER — SCOLOPAMINE TRANSDERMAL SYSTEM 1 MG/1
1 PATCH, EXTENDED RELEASE TRANSDERMAL
Status: DISCONTINUED | OUTPATIENT
Start: 2018-11-27 | End: 2018-11-29 | Stop reason: HOSPADM

## 2018-11-27 RX ORDER — HALOPERIDOL 5 MG/ML
1 INJECTION INTRAMUSCULAR EVERY 4 HOURS PRN
Status: DISCONTINUED | OUTPATIENT
Start: 2018-11-27 | End: 2018-11-29 | Stop reason: HOSPADM

## 2018-11-27 RX ADMIN — VANCOMYCIN 1000 MG: 1 INJECTION, SOLUTION INTRAVENOUS at 06:11

## 2018-11-27 RX ADMIN — PIPERACILLIN AND TAZOBACTAM 4.5 G: 4; .5 INJECTION, POWDER, LYOPHILIZED, FOR SOLUTION INTRAVENOUS; PARENTERAL at 12:11

## 2018-11-27 RX ADMIN — NOREPINEPHRINE BITARTRATE 0.16 MCG/KG/MIN: 1 INJECTION, SOLUTION, CONCENTRATE INTRAVENOUS at 12:11

## 2018-11-27 RX ADMIN — FAMOTIDINE 20 MG: 10 INJECTION, SOLUTION INTRAVENOUS at 08:11

## 2018-11-27 RX ADMIN — PIPERACILLIN AND TAZOBACTAM 4.5 G: 4; .5 INJECTION, POWDER, LYOPHILIZED, FOR SOLUTION INTRAVENOUS; PARENTERAL at 08:11

## 2018-11-27 RX ADMIN — MORPHINE SULFATE 4 MG: 4 INJECTION, SOLUTION INTRAMUSCULAR; INTRAVENOUS at 03:11

## 2018-11-27 RX ADMIN — IOHEXOL 15 ML: 350 INJECTION, SOLUTION INTRAVENOUS at 12:11

## 2018-11-27 RX ADMIN — LORAZEPAM 1 MG: 2 INJECTION INTRAMUSCULAR; INTRAVENOUS at 09:11

## 2018-11-27 RX ADMIN — SCOPALAMINE 1 PATCH: 1 PATCH, EXTENDED RELEASE TRANSDERMAL at 11:11

## 2018-11-27 NOTE — ASSESSMENT & PLAN NOTE
Requiring HiFlo, High FiO2 Oxygen. Improving PE vs Volume Overload vs Hepato-Pulmonary shunting.   --No fevers or leukocytosis. Not hypotensive. Pneumonia less likely  --Bilateral LE negative for DVT lowers suspicion of PE. Would not be a candidate for anticoagulation  --Diuresed for the past 3 days with improving oxygen requirements  --Placed on BiPAP for short period of time during hypotensive, bradycardic episode. Back on 4 liters. BiPAP PRN  --New RUL consolidation. Started on Vanc/Zosyn on 11/26

## 2018-11-27 NOTE — SUBJECTIVE & OBJECTIVE
Medications:  Continuous Infusions:   norepinephrine bitartrate-D5W 0.18 mcg/kg/min (11/27/18 0800)     Scheduled Meds:   famotidine (PF)  20 mg Intravenous Daily    piperacillin-tazobactam (ZOSYN) IVPB  4.5 g Intravenous Q8H    vancomycin (VANCOCIN) IVPB  15 mg/kg Intravenous Q12H     PRN Meds:dextrose 50%, dextrose 50%, glucagon (human recombinant), glucose, glucose, ondansetron     Objective:     Vital Signs (Most Recent):  Temp: (!) 90.6 °F (32.6 °C)(Room temp elevated. Forced air warming blanket applied.) (11/27/18 0700)  Pulse: (!) 57 (11/27/18 0700)  Resp: (!) 28 (11/27/18 0700)  BP: (!) 121/58 (11/27/18 0700)  SpO2: 96 % (11/27/18 0700) Vital Signs (24h Range):  Temp:  [90.6 °F (32.6 °C)-98.1 °F (36.7 °C)] 90.6 °F (32.6 °C)  Pulse:  [48-68] 57  Resp:  [16-42] 28  SpO2:  [88 %-100 %] 96 %  BP: ()/() 121/58          Physical Exam   Constitutional: She appears well-developed and well-nourished. She appears lethargic.   HENT:   Head: Normocephalic and atraumatic.   Eyes: EOM are normal. Pupils are equal, round, and reactive to light.   Neck: Normal range of motion. Neck supple.   Cardiovascular: Normal rate and regular rhythm.   Pulmonary/Chest: Effort normal and breath sounds normal.   Abdominal: Soft. She exhibits distension.   Musculoskeletal:   Exam difficult 2/2 encephalopathy and R thalamic stroke  LUE good strength  RUE 5/5, intact fine motor movement  LLE plantar flex ankle, minimal toe movement  RLE knee flexion, and moves toes   Neurological: She appears lethargic. She is disoriented.   Skin: Skin is warm.       Significant Labs:  CBC:   Recent Labs   Lab 11/27/18  0403   WBC 12.19   RBC 2.92*   HGB 9.3*   HCT 28.3*   PLT 56*   MCV 97   MCH 31.8*   MCHC 32.9     CMP:   Recent Labs   Lab 11/27/18  0323      CALCIUM 9.4   ALBUMIN 2.4*  2.4*   PROT 6.6      K 4.3   CO2 22*      BUN 79*   CREATININE 1.8*   ALKPHOS 111   ALT 37   AST 89*   BILITOT 8.8*     Lactic Acid:    Recent Labs   Lab 11/27/18  0323   LACTATE 3.3*       Significant Diagnostics:  CT: Multiple distended contrast filled small bowel loops with diffuse wall/fold thickening.  Pneumatosis involving the walls of the descending colon and splenic flexure to the left transverse colon.  Findings may represent bowel ischemia/shocked bowel.  No portal venous gas or pneumoperitoneum identified.    Diffuse bilateral pulmonary ground-glass and patchy opacification with dense consolidation in the lower aspect of right upper lobe and posterior aspect of left lower lobe, possibly pneumonia or aspiration.    Liver cirrhosis.    Moderate volume ascites.    Postsurgical changes of thoracic aorta aneurysm repair with aortic stent graft in place.

## 2018-11-27 NOTE — PLAN OF CARE
Problem: SLP Goal  Goal: SLP Goal  Speech Language Pathology Goals - DISCONTINUE GOALS   Goals expected to be met by 11/27:  1.Pt will tolerate diet of  thin liqiuds and purees without overt clinical signs of aspiration   2.Pt will participate in trials of soft solids within speech therapy sessions to help determine least restrictive diet   3. Pt will complete OMEs x10 w/ SLP model to enhance oral motor function   4. Pt will utilize compensatory strategies independently at the phrase level  to enhance speech intelligibility   5. Pt will demonstrate orientation to self, place, situation , family members, date w/ min cues   6. Pt will complete problem solving skills w/ 75 % acc to enhance safety awareness   7. Pt will generate 10 items per category to enhance organizations skills   8. Pt will demonstrate sustained attention to task across 5 consecutive minutes w/ min cues to enhance attention skills  9. Pt will participate in ongoing assessment of speech language and cognitive linguistic skills to help rule out deficits and determine therapeutic plan of care            pT WAS SEEN FOR st SESSION.

## 2018-11-27 NOTE — PROGRESS NOTES
[facility]  Vascular Surgery  Progress Note    Patient Name: Leah Cleaning  MRN: [id number]  Admission Date: 11/15/2018  Primary Care Provider: Mary Colorado MD    Subjective:     Interval History: Overnight found to have pneumatosis of the left colon. Gen surg discussed with sister and elect to forgo surgery.     Post-Op Info:  Procedure(s) (LRB):  REPAIR, ANEURYSM, ENDOVASCULAR GRAFT, AORTA, THORACIC (N/A)   12 Days Post-Op       Medications:  Continuous Infusions:   norepinephrine bitartrate-D5W 0.18 mcg/kg/min (11/27/18 0800)     Scheduled Meds:   famotidine (PF)  20 mg Intravenous Daily    piperacillin-tazobactam (ZOSYN) IVPB  4.5 g Intravenous Q8H    vancomycin (VANCOCIN) IVPB  15 mg/kg Intravenous Q12H     PRN Meds:dextrose 50%, dextrose 50%, glucagon (human recombinant), glucose, glucose, ondansetron     Objective:     Vital Signs (Most Recent):  Temp: (!) 90.6 °F (32.6 °C)(Room temp elevated. Forced air warming blanket applied.) (11/27/18 0700)  Pulse: (!) 57 (11/27/18 0700)  Resp: (!) 28 (11/27/18 0700)  BP: (!) 121/58 (11/27/18 0700)  SpO2: 96 % (11/27/18 0700) Vital Signs (24h Range):  Temp:  [90.6 °F (32.6 °C)-98.1 °F (36.7 °C)] 90.6 °F (32.6 °C)  Pulse:  [48-68] 57  Resp:  [16-42] 28  SpO2:  [88 %-100 %] 96 %  BP: ()/() 121/58          Physical Exam   Constitutional: She appears well-developed and well-nourished. She appears lethargic.   HENT:   Head: Normocephalic and atraumatic.   Eyes: EOM are normal. Pupils are equal, round, and reactive to light.   Neck: Normal range of motion. Neck supple.   Cardiovascular: Normal rate and regular rhythm.   Pulmonary/Chest: Effort normal and breath sounds normal.   Abdominal: Soft. She exhibits distension.   Musculoskeletal:   Exam difficult 2/2 encephalopathy and R thalamic stroke  LUE good strength  RUE 5/5, intact fine motor movement  LLE plantar flex ankle, minimal toe movement  RLE knee flexion, and moves toes    Neurological: She appears lethargic. She is disoriented.   Skin: Skin is warm.       Significant Labs:  CBC:   Recent Labs   Lab 11/27/18  0403   WBC 12.19   RBC 2.92*   HGB 9.3*   HCT 28.3*   PLT 56*   MCV 97   MCH 31.8*   MCHC 32.9     CMP:   Recent Labs   Lab 11/27/18  0323      CALCIUM 9.4   ALBUMIN 2.4*  2.4*   PROT 6.6      K 4.3   CO2 22*      BUN 79*   CREATININE 1.8*   ALKPHOS 111   ALT 37   AST 89*   BILITOT 8.8*     Lactic Acid:   Recent Labs   Lab 11/27/18  0323   LACTATE 3.3*       Significant Diagnostics:  CT: Multiple distended contrast filled small bowel loops with diffuse wall/fold thickening.  Pneumatosis involving the walls of the descending colon and splenic flexure to the left transverse colon.  Findings may represent bowel ischemia/shocked bowel.  No portal venous gas or pneumoperitoneum identified.    Diffuse bilateral pulmonary ground-glass and patchy opacification with dense consolidation in the lower aspect of right upper lobe and posterior aspect of left lower lobe, possibly pneumonia or aspiration.    Liver cirrhosis.    Moderate volume ascites.    Postsurgical changes of thoracic aorta aneurysm repair with aortic stent graft in place.    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 Despite aggressive anti-impulse therapy, persistent pain requiring intervention. Preoperative CSF drain placement by Neuro IR. Successful TEVAR. POD 1 left groin hematoma and coagulopathy requiring manual pressure and product resuscitation. POD 2 new onset left upper arm weakness, found to have right thalamic hemorrhage 2/2 HTN. F/u CT head stable findings. Significant reactive left pleural effusion with surrounding atelectasis noted also.     Neuro: Neuro exam stable with slight  improvement  - CT and MRI spine without evidence of spinal cord hematoma or infarct  - MRI head with mass effect; stroke was hemorrhagic 2/2 HTN not embolic event from TEVAR   right thalamic bleed  - Q4hr neurovascular checks    CV:   - SBP goal normotensive    Resp:  - encourage IS  - O2 supp prn  - Moderate Left pleural effusion and small right pleural effusion on CTA stoke multiphase noted. Left pleural effusion likely reactive following TEVAR. Con't pulmonary toilet.     Renal/Endocrine:  - con't monitoring UOP  - BUN/Cr improved 79/1.8    GI: Hep C cirrhosis with MELD 15 on admission; CT abd/pelvis showed extensive pneumatosis of the left colon, small bowel wall thickening  - Gen surg discussed with sister; no surgical intervention, pt to be made comfortable  - protonix  - NPO  - NGT  - T bili elevated 8.8    Heme/ID:  - ABLA stable.   - con't correct coagulopathy prn  - monitor thrombocytopenia     Dispo:  - Care per MICU          Perla Castro MD  Vascular Surgery  [facility]

## 2018-11-27 NOTE — PLAN OF CARE
Problem: Patient Care Overview  Goal: Plan of Care Review  Outcome: Ongoing (interventions implemented as appropriate)  No issues with BP/HR since episode earlier this afternoon. O2 sats % on 5LNC. Gtts: levo @ 0.18mcg/kg/min. Levo titrated to maintain SBP >90 per Dr. Roberts. 500cc bolus of LR given this afternoon for lactate of 6.3. Oriented to person and place, disoriented to time and situation. Pt was able to move all extremities and wiggle toes on L foot on command this morning, but is now unable to wiggle toes on L side. Responds to voice. Pupils equal and reactive. Bear hugger in place for hypothermia management. NP Rosa Maria Johnston notified throughout the shift of low temperatures. Pt given lasix this morning but did not respond to treatment. Minimal UOP throughout the shift. 1 BM this morning. Abdomen firm, round, distended throughout the shift, MD aware. BG and labs closely monitored throughout the shift. Central line placed at bedside by critical care team this evening. Waiting on MD approval for use of central line. Plan to take pt to CT head/abdomen. Repositioned Q2 to prevent skin breakdown. Plan of care reviewed with pt/family, all questions and concerns addressed. Will continue to monitor. See flow sheet for full assessment details.

## 2018-11-27 NOTE — PROGRESS NOTES
[facility]  Vascular Neurology  Comprehensive Stroke Center  Progress Note     Assessment/Plan:          Nontraumatic thalamic hemorrhage        Patient is a 53 y.o. female with a h/o HTN, Hep C, cirrhosis (MELD 15),Hx of pancreatitis, GERD, and illicit substance abuse (crack cocaine/THC) admitted for thoracic aortic dissection treated with TEVAR and Lumbar drain, had Acute right thalamic hemorrhage on CTH.   Etiology - Hemorrhagic transformation of Right thalamic stroke v/s Primary right thalamic hemorrhagic stroke v/s End Stage live disease v/s 2/2 Lumbar drain with increased SFP / MAP gradient.   11/20/18: Sudden LOF of both lower extremities with no response to painful stimuli. Repeat CT head w/o new pathology or worsening hemorrhage.  CT head without any worsening of previous hemorrhage or new pathology. CT spine without obvious cause for new deficits. MRI head and spine showing known stable pathology, severe spinal stenosis at L4/L5 that is too low to account for presentation, and no signs of intraspinal or extraspinal hematoma that would be impinging.  11/23/18: Patient regained much use of her LUE. Very alert today. Appears to have significantly more scleral icterus and abdominal distension. Spinal drain removed.  11/24/18: Regained some use of LLE, very alert today.   11/26/18: Increased strength in LUE, able to do basic math, and spell words. Concentration improving.    11/27/18: Patient now with LA 3.5 with probable bowel ischemia, worsening renal function, cirrhosis with history of drug abuse, significant residual deficits from stroke- unable to regulate temperature (90.2F today), requiring 5L O2, increasing INR and Tbili, in shock on pressors, worsening attention span today. Poor prognosis.       Recommend:   -Continue current treatment  -SBP goal <160   -Neuro checks q4h  -PT/OT/SLP now that drain is removed        Antithrombotics for secondary stroke prevention:  Hold  Antiplatelets     Statins for secondary stroke prevention and hyperlipidemia, if present: rec getting  LDL and start Lipitor if LDL > 130        Aggressive risk factor modification: HTN, Illicit drug use     Rehab efforts: PT/OT/SLP to evaluate and treat     Diagnostics ordered/pending: None      VTE prophylaxis: dvt ppx and scd's     BP parameters:  SBP <160            Essential hypertension     - rec SBP of ~ 160            No notes on file     STROKE DOCUMENTATION   Acute Stroke Times   Last Known Normal Date: 11/17/18  Last Known Normal Time: 0700  Symptom Onset Date: 11/17/18  Stroke Team Called Date: 11/17/18  Stroke Team Called Time: 0800  Stroke Team Arrival Date: 11/17/18  Stroke Team Arrival Time: 0805  CT Interpretation Time: 0834     NIH Scale:      NIH Scale:  1a. Level Of Consciousness: 1--> alert with stimuli  1b. LOC Questions: 1-->Answers one   1c. LOC Commands: 0-->Performs both tasks correctly  2. Best Gaze: 0-->Normal  3. Visual: 0-->No visual loss  4. Facial Palsy: 1-->Minor paralysis (flattened nasolabial fold, asymmetry on smiling)  5a. Motor Arm, Left: 3-->   5b. Motor Arm, Right: 0-->  6a. Motor Leg, Left: 3-->  6b. Motor Leg, Right: 2-->  7. Limb Ataxia: 2-->Present in two limbs  8. Sensory: 1-->Mild-to-moderate sensory loss: patient feels pinprick is less sharp or is dull on the affected side: or there is a loss of superficial pain with pinprick, but patient is aware of being touched  9. Best Language: 1-->Mild-to-moderate aphasia: some obvious loss of fluency or facility of comprehension, without significant limitation on ideas expressed or form of expression. Reduction of speech and/or comprehension, however, makes conversation. . . (see row details)  10. Dysarthria: 1-->Mild-to-moderate dysarthria: patient slurs at least some words and, at worst, can be understood with some difficulty  11. Extinction and Inattention (formerly Neglect): 0-->No abnormality  Total (NIH Stroke Scale):  16           Modified [place] Score: 0  [place] Coma Scale:    ABCD2 Score:    [id number] Score:   HAS -BLED Score:   ICH Score:   Hunt & Farnsworth Classification:       Hemorrhagic change of an Ischemic Stroke: Does this patient have an ischemic stroke with hemorrhagic changes? No          Neurologic Chief Complaint: dysarthria, poor attention span, LUE weakness, LLE paralgia, RLE weakness  Right Thalamic hemorrhage     Subjective:     Interval History: Patient is seen for follow-up neurological assessment and treatment recommendations: Increased movement in LUE hand. Spinal drain removed, aggressive PT/OT/SLP.   Patient now with LA 3.5 with probable bowel ischemia, worsening renal function, cirrhosis with history of drug abuse, significant residual deficits from stroke- unable to regulate temperature (90.2F today), requiring 5L O2, increasing INR and Tbili, in shock on pressors, worsening attention span today. Poor prognosis. Palliative care initiation today.       HPI, Past Medical, Family, and Social History remains the same as documented in the initial encounter.     Review of Systems   Constitutional: Negative for chills and fever.   Respiratory: Negative for cough and shortness of breath.    Cardiovascular: Negative for chest pain and palpitations.   Gastrointestinal: Negative for nausea and vomiting. Abdominal pain: RUQ    Musculoskeletal: Positive for neck pain (discomfort at RIJ site). Negative for back pain and neck stiffness.   Neurological: Negative for tremors, seizures, syncope, weakness, numbness and headaches.   Hematological: Bruises/bleeds easily.   Psychiatric/Behavioral: Positive for confusion and decreased concentration. Negative for agitation and behavioral problems.     Scheduled Meds:   famotidine (PF)  20 mg Intravenous Daily    piperacillin-tazobactam (ZOSYN) IVPB  4.5 g Intravenous Q8H    [START ON 11/28/2018] vancomycin (VANCOCIN) IVPB  15 mg/kg Intravenous Q24H     Continuous  Infusions:   norepinephrine bitartrate-D5W 0.18 mcg/kg/min (11/27/18 0800)     PRN Meds:dextrose 50%, dextrose 50%, glucagon (human recombinant), glucose, glucose, lorazepam, morphine, ondansetron    Objective:     Vital Signs (Most Recent):  Temp: (!) 90.6 °F (32.6 °C)(Room temp elevated. Forced air warming blanket applied.) (11/27/18 0700)  Pulse: 62 (11/27/18 1100)  Resp: 18 (11/27/18 1100)  BP: 129/73 (11/27/18 1100)  SpO2: 96 % (11/27/18 1100)  BP Location: Right arm    Vital Signs Range (Last 24H):  Temp:  [90.6 °F (32.6 °C)-98.1 °F (36.7 °C)]   Pulse:  [48-68]   Resp:  [16-31]   BP: ()/()   SpO2:  [92 %-100 %]   BP Location: Right arm    Physical Exam   Constitutional: She appears well-developed and well-nourished.   HENT:   Head: Normocephalic and atraumatic.   Eyes: EOM are normal. Pupils are equal, round, and reactive to light. Scleral icterus is present.   Neck: Normal range of motion. Neck supple.   Cardiovascular: Normal rate and regular rhythm.   Pulmonary/Chest: Effort normal and breath sounds normal.   Abdominal: Soft. Bowel sounds are normal. She exhibits distension. There is no guarding.   Genitourinary:   Genitourinary Comments: Perry in place   Musculoskeletal: She exhibits no edema or deformity.   Neurological: She is alert.   LUE weakness   Skin: Skin is warm and dry. Capillary refill takes less than 2 seconds.   Nursing note and vitals reviewed.      Neurological Exam:   LOC: Alert  Attention Span: fair  Language: mild aphasia  Articulation: mild dysarthria  Orientation: AOx3  Visual Fields: Full  EOM (CN III, IV, VI): Full/intact  Pupils (CN II, III): PERRL  Facial Sensation (CN V): Normal  Facial Movement (CN VII): mild L facial droop   Motor: Arm left  Normal 2/5  Leg left  Normal 1/5  Arm right  Normal 5/5  Leg right Normal 2/5  Sensation: Able to feel light touch on proximal legs and R lower leg.   Tone: Decreased tone throughout LEs and LUE.   Reflexes +2 throughout        Laboratory:    Recent Results (from the past 24 hour(s))   POCT glucose    Collection Time: 11/26/18 11:26 AM   Result Value Ref Range    POCT Glucose 129 (H) 70 - 110 mg/dL   Blood culture    Collection Time: 11/26/18 11:32 AM   Result Value Ref Range    Blood Culture, Routine No Growth to date    Blood culture    Collection Time: 11/26/18 11:42 AM   Result Value Ref Range    Blood Culture, Routine No Growth to date    ISTAT PROCEDURE    Collection Time: 11/26/18 12:03 PM   Result Value Ref Range    POC PH 7.354 7.35 - 7.45    POC PCO2 34.5 (L) 35 - 45 mmHg    POC PO2 76 (L) 80 - 100 mmHg    POC HCO3 19.2 (L) 24 - 28 mmol/L    POC BE -6 -2 to 2 mmol/L    POC SATURATED O2 95 95 - 100 %    POC TCO2 20 (L) 23 - 27 mmol/L    Sample ARTERIAL     Site RB     Allens Test Pass     DelSys NRB     Mode SPONT     Flow 15     Sp02 98    Ammonia    Collection Time: 11/26/18 12:13 PM   Result Value Ref Range    Ammonia 36 10 - 50 umol/L   TSH    Collection Time: 11/26/18  3:33 PM   Result Value Ref Range    TSH 0.710 0.400 - 4.000 uIU/mL   T4, free    Collection Time: 11/26/18  3:33 PM   Result Value Ref Range    Free T4 0.99 0.71 - 1.51 ng/dL   Comprehensive metabolic panel    Collection Time: 11/26/18  3:33 PM   Result Value Ref Range    Sodium 142 136 - 145 mmol/L    Potassium 4.5 3.5 - 5.1 mmol/L    Chloride 111 (H) 95 - 110 mmol/L    CO2 18 (L) 23 - 29 mmol/L    Glucose 142 (H) 70 - 110 mg/dL    BUN, Bld 72 (H) 6 - 20 mg/dL    Creatinine 1.7 (H) 0.5 - 1.4 mg/dL    Calcium 9.7 8.7 - 10.5 mg/dL    Total Protein 6.5 6.0 - 8.4 g/dL    Albumin 2.4 (L) 3.5 - 5.2 g/dL    Total Bilirubin 8.0 (H) 0.1 - 1.0 mg/dL    Alkaline Phosphatase 106 55 - 135 U/L    AST 68 (H) 10 - 40 U/L    ALT 34 10 - 44 U/L    Anion Gap 13 8 - 16 mmol/L    eGFR if African American 39.1 (A) >60 mL/min/1.73 m^2    eGFR if non  33.9 (A) >60 mL/min/1.73 m^2   Lactic acid, plasma    Collection Time: 11/26/18  3:33 PM   Result Value Ref  Range    Lactate (Lactic Acid) 6.3 (HH) 0.5 - 2.2 mmol/L   POCT glucose    Collection Time: 11/26/18  5:21 PM   Result Value Ref Range    POCT Glucose 134 (H) 70 - 110 mg/dL   Lactic acid, plasma    Collection Time: 11/26/18  7:46 PM   Result Value Ref Range    Lactate (Lactic Acid) 4.6 (HH) 0.5 - 2.2 mmol/L   Lactic acid, plasma    Collection Time: 11/26/18 11:28 PM   Result Value Ref Range    Lactate (Lactic Acid) 3.2 (H) 0.5 - 2.2 mmol/L   POCT glucose    Collection Time: 11/26/18 11:37 PM   Result Value Ref Range    POCT Glucose 105 70 - 110 mg/dL   Hepatic function panel    Collection Time: 11/27/18  3:23 AM   Result Value Ref Range    Total Protein 6.6 6.0 - 8.4 g/dL    Albumin 2.4 (L) 3.5 - 5.2 g/dL    Total Bilirubin 8.8 (H) 0.1 - 1.0 mg/dL    Bilirubin, Direct 5.1 (H) 0.1 - 0.3 mg/dL    AST 89 (H) 10 - 40 U/L    ALT 37 10 - 44 U/L    Alkaline Phosphatase 111 55 - 135 U/L   Magnesium    Collection Time: 11/27/18  3:23 AM   Result Value Ref Range    Magnesium 2.2 1.6 - 2.6 mg/dL   Phosphorus    Collection Time: 11/27/18  3:23 AM   Result Value Ref Range    Phosphorus 3.3 2.7 - 4.5 mg/dL   Protime-INR    Collection Time: 11/27/18  3:23 AM   Result Value Ref Range    Prothrombin Time 22.0 (H) 9.0 - 12.5 sec    INR 2.3 (H) 0.8 - 1.2   Renal function panel    Collection Time: 11/27/18  3:23 AM   Result Value Ref Range    Glucose 107 70 - 110 mg/dL    Sodium 140 136 - 145 mmol/L    Potassium 4.3 3.5 - 5.1 mmol/L    Chloride 108 95 - 110 mmol/L    CO2 22 (L) 23 - 29 mmol/L    BUN, Bld 79 (H) 6 - 20 mg/dL    Calcium 9.4 8.7 - 10.5 mg/dL    Creatinine 1.8 (H) 0.5 - 1.4 mg/dL    Albumin 2.4 (L) 3.5 - 5.2 g/dL    Phosphorus 3.3 2.7 - 4.5 mg/dL    eGFR if  36.5 (A) >60 mL/min/1.73 m^2    eGFR if non  31.7 (A) >60 mL/min/1.73 m^2    Anion Gap 10 8 - 16 mmol/L   Lactic acid, plasma    Collection Time: 11/27/18  3:23 AM   Result Value Ref Range    Lactate (Lactic Acid) 3.3 (H) 0.5 - 2.2  mmol/L   POCT glucose    Collection Time: 11/27/18  3:33 AM   Result Value Ref Range    POCT Glucose 119 (H) 70 - 110 mg/dL   CBC auto differential    Collection Time: 11/27/18  4:03 AM   Result Value Ref Range    WBC 12.19 3.90 - 12.70 K/uL    RBC 2.92 (L) 4.00 - 5.40 M/uL    Hemoglobin 9.3 (L) 12.0 - 16.0 g/dL    Hematocrit 28.3 (L) 37.0 - 48.5 %    MCV 97 82 - 98 fL    MCH 31.8 (H) 27.0 - 31.0 pg    MCHC 32.9 32.0 - 36.0 g/dL    RDW 21.1 (H) 11.5 - 14.5 %    Platelets 56 (L) 150 - 350 K/uL    MPV 12.3 9.2 - 12.9 fL    Immature Granulocytes 1.2 (H) 0.0 - 0.5 %    Gran # (ANC) 10.0 (H) 1.8 - 7.7 K/uL    Immature Grans (Abs) 0.15 (H) 0.00 - 0.04 K/uL    Lymph # 0.9 (L) 1.0 - 4.8 K/uL    Mono # 1.1 (H) 0.3 - 1.0 K/uL    Eos # 0.1 0.0 - 0.5 K/uL    Baso # 0.01 0.00 - 0.20 K/uL    nRBC 1 (A) 0 /100 WBC    Gran% 81.7 (H) 38.0 - 73.0 %    Lymph% 7.0 (L) 18.0 - 48.0 %    Mono% 9.4 4.0 - 15.0 %    Eosinophil% 0.6 0.0 - 8.0 %    Basophil% 0.1 0.0 - 1.9 %    Differential Method Automated    Fibrinogen    Collection Time: 11/27/18  4:03 AM   Result Value Ref Range    Fibrinogen 124 (L) 182 - 366 mg/dL   POCT glucose    Collection Time: 11/27/18  8:05 AM   Result Value Ref Range    POCT Glucose 134 (H) 70 - 110 mg/dL         Diagnostic Results     Brain imaging:     CT brain  11/27/18:   Evolving right thalamic hemorrhage with stable surrounding edema and localized mass effect upon the 3rd ventricle.  No new hemorrhage or hydrocephalus.    MRI DWI brain  11/21/18:   Stable acute right thalamic hemorrhage with surrounding edema and mass effect.  Small punctate foci of diffusion signal abnormality in the posterior temporal/parietal regions with low ADC signal and subtle FLAIR signal abnormality suggesting punctate infarcts. No evidence major vascular territory infarct.    MRI total spine 11/21/18:   Mildly increased central T2 signal of the spinal cord at T4 through T6 may reflect prominence of the central canal versus a small  syrinx.  No cord expansion to suggest edema or acute infarction in this region.  Otherwise, no abnormal cord signal to suggest infarct.  No evidence of epidural of subdural hematoma.  Multilevel spondylosis most severe at L5-S1 with severe spinal canal stenosis and L4-L5 with moderate to severe neural foraminal narrowing.    CT abdomen pelvis 11/20/18:   Did not show any compression of spinal cord by known hematoma    CT Cervical and thoracic spine 11/21/18:  No acute abnormality of the cervical or thoracic spine.     CTH 11/20/18  Stable right thalamic hemorrhage.  No new hemorrhage identified.      CTH - Right Thalamic hemorrhage           Vessel Imaging:     CTA head and neck - unremarkable           Cardiac Evaluation:   JORDAN 11/15/18  · Normal left ventricular systolic function. The estimated ejection fraction is 70%  · Concentric left ventricular remodeling.  · No wall motion abnormalities.  · Normal LV diastolic function.  · Normal right ventricular systolic function.  · Trace mitral regurgitation.  · Mild aortic regurgitation.  · Trace tricuspid regurgitation.  · Trace pulmonic regurgitation.  · Normal central venous pressure (3 mm Hg).  · The estimated PA systolic pressure is 24.72 mm Hg  · No pericardial effusion.

## 2018-11-27 NOTE — ASSESSMENT & PLAN NOTE
--per patient, has known about cirrhosis for >10 years. Has never received treatment for Hep C  --continue supportive care for coagulopathy, thrombocytopenia in setting of Type B aortic dissection with hemorrhagic shock and new thalamic bleed   --Holding Lactulose and Rifaximin in setting of abdominal pain, lactic acidosis, and normal ammonia

## 2018-11-27 NOTE — ASSESSMENT & PLAN NOTE
--Became hypothermic, bradycardic, hypotensive,and had elevated lactic acid today  --Likely sepsis due bowel ischemia versus pneumonia with new RUL infiltrate. Reaction to oral BB/CCB less likely, but treated with atropine and IV Calcium  --CT Chest/Abd/Pelvis WWO contrast to look for ischemia  --Norepinephrine drip. Titrate to keep SBP > 90  --Trend Lactic Acid. IV Fluids as needed  --Vanc/Zosyn Empirically  --Blood Cultures drawn today. Follow

## 2018-11-27 NOTE — PROCEDURES
"Leah Cleaning is a 53 y.o. female patient.    Temp: 97.3 °F (36.3 °C) (11/26/18 1500)  Pulse: 67 (11/26/18 1745)  Resp: (!) 27 (11/26/18 1745)  BP: (!) 104/57 (11/26/18 1745)  SpO2: 95 % (11/26/18 1745)  Weight: 69.3 kg (152 lb 12.5 oz) (11/26/18 0400)  Height: 5' 6" (167.6 cm) (11/16/18 0130)       Central Line  Date/Time: 11/26/2018 6:00 PM  Performed by: Rosa Maria Johnston NP  Supervising provider: Tai Martin MD  Consent Done: Yes  Time out: Immediately prior to procedure a "time out" was called to verify the correct patient, procedure, equipment, support staff and site/side marked as required.  Indications: med administration and vascular access  Anesthesia: local infiltration    Anesthesia:  Local Anesthetic: lidocaine 1% without epinephrine  Preparation: skin prepped with ChloraPrep  Skin prep agent dried: skin prep agent completely dried prior to procedure  Sterile barriers: all five maximum sterile barriers used - cap, mask, sterile gown, sterile gloves, and large sterile sheet  Hand hygiene: hand hygiene performed prior to central venous catheter insertion  Location details: right internal jugular  Catheter type: triple lumen  Catheter size: 8.5 Fr  Catheter Length: 16cm    Ultrasound guidance: yes  Vessel Caliber: large, compressibility normal  Needle advanced into vessel with real time Ultrasound guidance.  Guidewire confirmed in vessel.  Sterile sheath used.  Manometry: Yes  Number of attempts: 1  Assessment: successful placement  Complications: none  Estimated blood loss (mL): 5  Specimens: No  Implants: No  Post-procedure: line sutured,  chlorhexidine patch,  sterile dressing applied and blood return through all ports  Complications: Yes  Comments: Patient tolerated procedure will with any evidence of respiratory compromise post procedure.  Awaiting chest X-ray for placement verification.            Rosa Maria Johnston  11/26/2018  "

## 2018-11-27 NOTE — ASSESSMENT & PLAN NOTE
--appreciate vascular surgery assistance  --s/p lumbar drain placement and TEVAR on 11/5  --lumbar drain removed 11/23/18  --Worsening abdominal pain and went into shock today with elevated Lactate  --See shock

## 2018-11-27 NOTE — SUBJECTIVE & OBJECTIVE
Neurologic Chief Complaint: dysarthria, poor attention span, LUE weakness, LLE paralgia, RLE weakness  Right Thalamic hemorrhage     Subjective:     Interval History: Patient is seen for follow-up neurological assessment and treatment recommendations: Increased movement in LUE hand. Spinal drain removed, aggressive PT/OT/SLP.   Patient now with LA 3.5 with probable bowel ischemia, worsening renal function, cirrhosis with history of drug abuse, significant residual deficits from stroke- unable to regulate temperature (90.2F today), requiring 5L O2, increasing INR and Tbili, in shock on pressors, worsening attention span today. Poor prognosis. Palliative care initiation today.       HPI, Past Medical, Family, and Social History remains the same as documented in the initial encounter.     Review of Systems   Constitutional: Negative for chills and fever.   Respiratory: Negative for cough and shortness of breath.    Cardiovascular: Negative for chest pain and palpitations.   Gastrointestinal: Negative for nausea and vomiting. Abdominal pain: RUQ    Musculoskeletal: Positive for neck pain (discomfort at RIJ site). Negative for back pain and neck stiffness.   Neurological: Negative for tremors, seizures, syncope, weakness, numbness and headaches.   Hematological: Bruises/bleeds easily.   Psychiatric/Behavioral: Positive for confusion and decreased concentration. Negative for agitation and behavioral problems.     Scheduled Meds:   famotidine (PF)  20 mg Intravenous Daily    piperacillin-tazobactam (ZOSYN) IVPB  4.5 g Intravenous Q8H    [START ON 11/28/2018] vancomycin (VANCOCIN) IVPB  15 mg/kg Intravenous Q24H     Continuous Infusions:   norepinephrine bitartrate-D5W 0.18 mcg/kg/min (11/27/18 0800)     PRN Meds:dextrose 50%, dextrose 50%, glucagon (human recombinant), glucose, glucose, lorazepam, morphine, ondansetron    Objective:     Vital Signs (Most Recent):  Temp: (!) 90.6 °F (32.6 °C)(Room temp elevated. Forced  air warming blanket applied.) (11/27/18 0700)  Pulse: 62 (11/27/18 1100)  Resp: 18 (11/27/18 1100)  BP: 129/73 (11/27/18 1100)  SpO2: 96 % (11/27/18 1100)  BP Location: Right arm    Vital Signs Range (Last 24H):  Temp:  [90.6 °F (32.6 °C)-98.1 °F (36.7 °C)]   Pulse:  [48-68]   Resp:  [16-31]   BP: ()/()   SpO2:  [92 %-100 %]   BP Location: Right arm    Physical Exam   Constitutional: She appears well-developed and well-nourished.   HENT:   Head: Normocephalic and atraumatic.   Eyes: EOM are normal. Pupils are equal, round, and reactive to light. Scleral icterus is present.   Neck: Normal range of motion. Neck supple.   Cardiovascular: Normal rate and regular rhythm.   Pulmonary/Chest: Effort normal and breath sounds normal.   Abdominal: Soft. Bowel sounds are normal. She exhibits distension. There is no guarding.   Genitourinary:   Genitourinary Comments: Perry in place   Musculoskeletal: She exhibits no edema or deformity.   Neurological: She is alert.   LUE weakness   Skin: Skin is warm and dry. Capillary refill takes less than 2 seconds.   Nursing note and vitals reviewed.      Neurological Exam:   LOC: Alert  Attention Span: fair  Language: mild aphasia  Articulation: mild dysarthria  Orientation: AOx3  Visual Fields: Full  EOM (CN III, IV, VI): Full/intact  Pupils (CN II, III): PERRL  Facial Sensation (CN V): Normal  Facial Movement (CN VII): mild L facial droop   Motor: Arm left  Normal 2/5  Leg left  Normal 1/5  Arm right  Normal 5/5  Leg right Normal 2/5  Sensation: Able to feel light touch on proximal legs and R lower leg.   Tone: Decreased tone throughout LEs and LUE.   Reflexes +2 throughout       Laboratory:    Recent Results (from the past 24 hour(s))   POCT glucose    Collection Time: 11/26/18 11:26 AM   Result Value Ref Range    POCT Glucose 129 (H) 70 - 110 mg/dL   Blood culture    Collection Time: 11/26/18 11:32 AM   Result Value Ref Range    Blood Culture, Routine No Growth to date     Blood culture    Collection Time: 11/26/18 11:42 AM   Result Value Ref Range    Blood Culture, Routine No Growth to date    ISTAT PROCEDURE    Collection Time: 11/26/18 12:03 PM   Result Value Ref Range    POC PH 7.354 7.35 - 7.45    POC PCO2 34.5 (L) 35 - 45 mmHg    POC PO2 76 (L) 80 - 100 mmHg    POC HCO3 19.2 (L) 24 - 28 mmol/L    POC BE -6 -2 to 2 mmol/L    POC SATURATED O2 95 95 - 100 %    POC TCO2 20 (L) 23 - 27 mmol/L    Sample ARTERIAL     Site RB     Allens Test Pass     DelSys NRB     Mode SPONT     Flow 15     Sp02 98    Ammonia    Collection Time: 11/26/18 12:13 PM   Result Value Ref Range    Ammonia 36 10 - 50 umol/L   TSH    Collection Time: 11/26/18  3:33 PM   Result Value Ref Range    TSH 0.710 0.400 - 4.000 uIU/mL   T4, free    Collection Time: 11/26/18  3:33 PM   Result Value Ref Range    Free T4 0.99 0.71 - 1.51 ng/dL   Comprehensive metabolic panel    Collection Time: 11/26/18  3:33 PM   Result Value Ref Range    Sodium 142 136 - 145 mmol/L    Potassium 4.5 3.5 - 5.1 mmol/L    Chloride 111 (H) 95 - 110 mmol/L    CO2 18 (L) 23 - 29 mmol/L    Glucose 142 (H) 70 - 110 mg/dL    BUN, Bld 72 (H) 6 - 20 mg/dL    Creatinine 1.7 (H) 0.5 - 1.4 mg/dL    Calcium 9.7 8.7 - 10.5 mg/dL    Total Protein 6.5 6.0 - 8.4 g/dL    Albumin 2.4 (L) 3.5 - 5.2 g/dL    Total Bilirubin 8.0 (H) 0.1 - 1.0 mg/dL    Alkaline Phosphatase 106 55 - 135 U/L    AST 68 (H) 10 - 40 U/L    ALT 34 10 - 44 U/L    Anion Gap 13 8 - 16 mmol/L    eGFR if African American 39.1 (A) >60 mL/min/1.73 m^2    eGFR if non  33.9 (A) >60 mL/min/1.73 m^2   Lactic acid, plasma    Collection Time: 11/26/18  3:33 PM   Result Value Ref Range    Lactate (Lactic Acid) 6.3 (HH) 0.5 - 2.2 mmol/L   POCT glucose    Collection Time: 11/26/18  5:21 PM   Result Value Ref Range    POCT Glucose 134 (H) 70 - 110 mg/dL   Lactic acid, plasma    Collection Time: 11/26/18  7:46 PM   Result Value Ref Range    Lactate (Lactic Acid) 4.6 (HH) 0.5 - 2.2  mmol/L   Lactic acid, plasma    Collection Time: 11/26/18 11:28 PM   Result Value Ref Range    Lactate (Lactic Acid) 3.2 (H) 0.5 - 2.2 mmol/L   POCT glucose    Collection Time: 11/26/18 11:37 PM   Result Value Ref Range    POCT Glucose 105 70 - 110 mg/dL   Hepatic function panel    Collection Time: 11/27/18  3:23 AM   Result Value Ref Range    Total Protein 6.6 6.0 - 8.4 g/dL    Albumin 2.4 (L) 3.5 - 5.2 g/dL    Total Bilirubin 8.8 (H) 0.1 - 1.0 mg/dL    Bilirubin, Direct 5.1 (H) 0.1 - 0.3 mg/dL    AST 89 (H) 10 - 40 U/L    ALT 37 10 - 44 U/L    Alkaline Phosphatase 111 55 - 135 U/L   Magnesium    Collection Time: 11/27/18  3:23 AM   Result Value Ref Range    Magnesium 2.2 1.6 - 2.6 mg/dL   Phosphorus    Collection Time: 11/27/18  3:23 AM   Result Value Ref Range    Phosphorus 3.3 2.7 - 4.5 mg/dL   Protime-INR    Collection Time: 11/27/18  3:23 AM   Result Value Ref Range    Prothrombin Time 22.0 (H) 9.0 - 12.5 sec    INR 2.3 (H) 0.8 - 1.2   Renal function panel    Collection Time: 11/27/18  3:23 AM   Result Value Ref Range    Glucose 107 70 - 110 mg/dL    Sodium 140 136 - 145 mmol/L    Potassium 4.3 3.5 - 5.1 mmol/L    Chloride 108 95 - 110 mmol/L    CO2 22 (L) 23 - 29 mmol/L    BUN, Bld 79 (H) 6 - 20 mg/dL    Calcium 9.4 8.7 - 10.5 mg/dL    Creatinine 1.8 (H) 0.5 - 1.4 mg/dL    Albumin 2.4 (L) 3.5 - 5.2 g/dL    Phosphorus 3.3 2.7 - 4.5 mg/dL    eGFR if  36.5 (A) >60 mL/min/1.73 m^2    eGFR if non  31.7 (A) >60 mL/min/1.73 m^2    Anion Gap 10 8 - 16 mmol/L   Lactic acid, plasma    Collection Time: 11/27/18  3:23 AM   Result Value Ref Range    Lactate (Lactic Acid) 3.3 (H) 0.5 - 2.2 mmol/L   POCT glucose    Collection Time: 11/27/18  3:33 AM   Result Value Ref Range    POCT Glucose 119 (H) 70 - 110 mg/dL   CBC auto differential    Collection Time: 11/27/18  4:03 AM   Result Value Ref Range    WBC 12.19 3.90 - 12.70 K/uL    RBC 2.92 (L) 4.00 - 5.40 M/uL    Hemoglobin 9.3 (L) 12.0 -  16.0 g/dL    Hematocrit 28.3 (L) 37.0 - 48.5 %    MCV 97 82 - 98 fL    MCH 31.8 (H) 27.0 - 31.0 pg    MCHC 32.9 32.0 - 36.0 g/dL    RDW 21.1 (H) 11.5 - 14.5 %    Platelets 56 (L) 150 - 350 K/uL    MPV 12.3 9.2 - 12.9 fL    Immature Granulocytes 1.2 (H) 0.0 - 0.5 %    Gran # (ANC) 10.0 (H) 1.8 - 7.7 K/uL    Immature Grans (Abs) 0.15 (H) 0.00 - 0.04 K/uL    Lymph # 0.9 (L) 1.0 - 4.8 K/uL    Mono # 1.1 (H) 0.3 - 1.0 K/uL    Eos # 0.1 0.0 - 0.5 K/uL    Baso # 0.01 0.00 - 0.20 K/uL    nRBC 1 (A) 0 /100 WBC    Gran% 81.7 (H) 38.0 - 73.0 %    Lymph% 7.0 (L) 18.0 - 48.0 %    Mono% 9.4 4.0 - 15.0 %    Eosinophil% 0.6 0.0 - 8.0 %    Basophil% 0.1 0.0 - 1.9 %    Differential Method Automated    Fibrinogen    Collection Time: 11/27/18  4:03 AM   Result Value Ref Range    Fibrinogen 124 (L) 182 - 366 mg/dL   POCT glucose    Collection Time: 11/27/18  8:05 AM   Result Value Ref Range    POCT Glucose 134 (H) 70 - 110 mg/dL         Diagnostic Results     Brain imaging:     CT brain  11/27/18:   Evolving right thalamic hemorrhage with stable surrounding edema and localized mass effect upon the 3rd ventricle.  No new hemorrhage or hydrocephalus.    MRI DWI brain  11/21/18:   Stable acute right thalamic hemorrhage with surrounding edema and mass effect.  Small punctate foci of diffusion signal abnormality in the posterior temporal/parietal regions with low ADC signal and subtle FLAIR signal abnormality suggesting punctate infarcts. No evidence major vascular territory infarct.    MRI total spine 11/21/18:   Mildly increased central T2 signal of the spinal cord at T4 through T6 may reflect prominence of the central canal versus a small syrinx.  No cord expansion to suggest edema or acute infarction in this region.  Otherwise, no abnormal cord signal to suggest infarct.  No evidence of epidural of subdural hematoma.  Multilevel spondylosis most severe at L5-S1 with severe spinal canal stenosis and L4-L5 with moderate to severe neural  foraminal narrowing.    CT abdomen pelvis 11/20/18:   Did not show any compression of spinal cord by known hematoma    CT Cervical and thoracic spine 11/21/18:  No acute abnormality of the cervical or thoracic spine.     CTH 11/20/18  Stable right thalamic hemorrhage.  No new hemorrhage identified.      CTH - Right Thalamic hemorrhage           Vessel Imaging:     CTA head and neck - unremarkable           Cardiac Evaluation:   JORDAN 11/15/18  · Normal left ventricular systolic function. The estimated ejection fraction is 70%  · Concentric left ventricular remodeling.  · No wall motion abnormalities.  · Normal LV diastolic function.  · Normal right ventricular systolic function.  · Trace mitral regurgitation.  · Mild aortic regurgitation.  · Trace tricuspid regurgitation.  · Trace pulmonic regurgitation.  · Normal central venous pressure (3 mm Hg).  · The estimated PA systolic pressure is 24.72 mm Hg  · No pericardial effusion.

## 2018-11-27 NOTE — CONSULTS
Consult Note   General Surgery    CC:   Chief Complaint   Patient presents with    Transfer     Dissecting AAA transfer from [place]. On Cardene for HTN     HPI: 54 yo female with decompensated hepatic C cirrhosis with ascites, pancreatitis, GERD, crack cocaine/marijuana use who presented as a transfer on 11/15 from [place] with contained rupture of penetrating atherosclerotic ulcer and retrograde thoracic dissection. Vascular surgery was consulted, was taken urgently for TEVAR with coverage from left subclavian artery to Celiac on 11/15. Her coarse was complicated with hemorraghic shock requiring multiple transfusions and vasopressors. She then developed a right sided hemorrhagic thalamic stroke on 11/17. No intervention was done by vascular neurology.  She began having RUQ abdominal pain on 11/19, found to have right portal vein thrombosis on US abdomen with doppler. Yesterday, she became lethargic, hypothermic, and bradycardia and developed shock with lactic acidosis. Her HR dropped to 0 and she was given atropine which she responded to. Primary care called us after receiving CT abdomen/pelvis 2/2 to diffuse abdominal pain which showed diffuse wall thickening of her small bowel and pneumatosis present throughout the left colon, possibly extending to the sigmoid colon . She was started on Zosyn today. She is currently on pressors - Levophed at .16mcg/kg/min, lactic 3.2.    MELD-Na score: 27 at 11/26/2018  3:33 PM  MELD score: 27 at 11/26/2018  3:33 PM  Calculated from:  Serum Creatinine: 1.7 mg/dL at 11/26/2018  3:33 PM  Serum Sodium: 142 mmol/L (Rounded to 137 mmol/L) at 11/26/2018  3:33 PM  Total Bilirubin: 8 mg/dL at 11/26/2018  3:33 PM  INR(ratio): 1.9 at 11/26/2018  4:48 AM  Age: 53 years        PMH:   Past Medical History:   Diagnosis Date    Arthritis     Bell's palsy 2012    Cirrhosis     GERD (gastroesophageal reflux disease)     Hepatitis C     Hypertension     Sciatica        PSH:   Past  Surgical History:   Procedure Laterality Date    CHOLECYSTECTOMY      ENDOVASCULAR GRAFT REPAIR OF ANEURYSM OF THORACIC AORTA N/A 11/15/2018    Procedure: REPAIR, ANEURYSM, ENDOVASCULAR GRAFT, AORTA, THORACIC;  Surgeon: Hermila Storm MD;  Location: [facility] OR [address] [place] [address];  Service: Peripheral Vascular;  Laterality: N/A;  Contrast: 94  ml  mGy: 1169.79  flouro time: 9.7 minutes    REPAIR, ANEURYSM, ENDOVASCULAR GRAFT, AORTA, THORACIC N/A 11/15/2018    Performed by Hermila Storm MD at [facility] OR [address] [place] [address]       Allergies: Review of patient's allergies indicates:  No Known Allergies  Meds:      Medication List      ASK your doctor about these medications    cloNIDine 0.2 MG tablet  Commonly known as:  CATAPRES     furosemide 40 MG tablet  Commonly known as:  LASIX     hydrALAZINE 50 MG tablet  Commonly known as:  APRESOLINE     levoFLOXacin 500 MG tablet  Commonly known as:  LEVAQUIN     lisinopril 20 MG tablet  Commonly known as:  PRINIVIL,ZESTRIL     NIFEdipine 60 MG Tbsr  Commonly known as:  ADALAT CC     pantoprazole 40 MG tablet  Commonly known as:  PROTONIX     spironolactone 25 MG tablet  Commonly known as:  ALDACTONE            History reviewed. No pertinent family history.    Review of Systems - For pertinent positives please see HPI   Unable to obtain due to patient factors - altered mental status     OBJECTIVE:     Vital Signs (Most Recent)  Temp: 97.6 °F (36.4 °C) (11/26/18 2300)  Pulse: 63 (11/27/18 0230)  Resp: (!) 25 (11/27/18 0230)  BP: 107/64 (11/27/18 0145)  SpO2: 96 % (11/27/18 0230)    Vital Signs Range (Last 24H):  Temp:  [92.5 °F (33.6 °C)-98.1 °F (36.7 °C)]   Pulse:  [48-68]   Resp:  [13-42]   BP: ()/()   SpO2:  [88 %-100 %]     I & O (Last 24H):    Intake/Output Summary (Last 24 hours) at 11/27/2018 0400  Last data filed at 11/26/2018 2300  Gross per 24 hour   Intake 1450 ml   Output 950 ml   Net 500 ml       Physical Exam:  General: well developed, well nourished. Delirious. NG in  place, clamped.   Moving all extremities spontaneously. Is weaker on the left.   Neuro: oriented to person only. Not to date/place.   Lungs: coarse breath sounds bilaterally.   Heart: RRR.   Abdomen: Distended, tympanic, taught. Abdominal pain present in RLQ and and RUQ. No peritonitic. Decreased bowel sounds.   Groin: bilateral groin hematomas present.   Ext: 2+ pitting edema present throughout lower extremities. 2+ DPs.       Laboratory:  CBC:   Recent Labs   Lab 11/27/18  0403   WBC 12.19   RBC 2.92*   HGB 9.3*   HCT 28.3*   PLT 56*   MCV 97   MCH 31.8*   MCHC 32.9     CMP:   Recent Labs   Lab 11/27/18  0323      CALCIUM 9.4   ALBUMIN 2.4*  2.4*   PROT 6.6      K 4.3   CO2 22*      BUN 79*   CREATININE 1.8*   ALKPHOS 111   ALT 37   AST 89*   BILITOT 8.8*     Coagulation:   Recent Labs   Lab 11/27/18  0323   INR 2.3*     ABGs:   Recent Labs   Lab 11/26/18  1203   PH 7.354   PCO2 34.5*   HCO3 19.2*   POCSATURATED 95   BE -6     Lactate 3.2     Labs within the past 24 hours have been reviewed.    CT abdomen/pelvis without contrast 11/27:   FINDINGS:  Limited examination secondary to streaks artifact created by patient hands on the side.  Neck base: Unremarkable.  Chest wall/axilla: Body wall anasarca.  Lungs/pleura: Trace bilateral pleural effusion with associated compressive atelectasis.  Diffuse bilateral ground-glass and patchy opacification.  Dense consolidation with air bronchogram involving the lower aspect of right upper lobe as well as the posterior aspect of left lower lobe.  Heart/pericardium/mediastinum: Heart is normal in size.  No pericardial effusion.  No lymphadenopathy, allowing for limitation.  Abdominal wall: Body wall anasarca.  Liver: Small in size with nodule contour consistent with cirrhosis.  No focal lesion, allowing for limitation in the absence of IV contrast.  Gallbladder/bile ducts: Status post cholecystectomy.  No intra or extrahepatic biliary ductal  dilatation.  Spleen, pancreas and adrenal glands are unremarkable.  Kidneys/ureters: Normal in size and location.  No nephrolithiasis.  No hydronephrosis or renal dilatation.  Urinary bladder: Not definitely visualize, secondary to free fluid in the pelvic region.  Reproductive: Calcified fibroid in the uterine fundus.  Bowel/mesentery: No evidence of bowel obstruction.  Enteric feeding tube with tip in the stomach.  Multiple distended contrast filled small bowel loops with diffuse wall/fold thickening.  Pneumatosis involving the walls of the descending colon and splenic flexure to the left transverse colon.  Peritoneal/retroperitoneum: Moderate amount of free intraperitoneal fluid.  No free air.  Vasculature: Postsurgical changes of thoracic aorta aneurysm repair with aortic stent graft in place.  Atherosclerotic calcification of the aorta and iliac arteries.  The right IJ central venous catheter with tip in the SVC.  No portal air.  Bones: Degenerative changes of the spine.  Listhesis at L5-S1.  No suspicious lytic or blastic lesion.    CT head 11/27: Evolving right thalamic hemorrhage with stable surrounding edema and localized mass effect upon the 3rd ventricle.  No new hemorrhage or hydrocephalus.    ASSESSMENT/PLAN:     52 yo female with decompensated hep C cirrhosis (MELD 27), recent type B aortic dissection/rupture s/p TEVAR on 11/15, complicated by hemorrhagic stroke and now pneumatosis present throughout left colon concerning for ischemic bowel      - due to patients comorbidities - decompensated cirrhosis, coagulopathy, recent stroke, patient has very poor prognosis both with and without surgical intervention. Most likely has mesenteric ischemia from propagating dissection vs. Low flow state   - after discussion with sister, they have elected to not undergo surgical management   - recommend NPO. NG to LIWS, correct low flow state, minimize vasopressors   - appreciate vascular recs - could consider  mesenteric ultrasound to evaluate vasculature     Min Almeida, PGY-4  General Surgery  [phone]    Discussed with Dr Almeida  I agree with her assessment and the family's plan not to pursue surgical therapy

## 2018-11-27 NOTE — ASSESSMENT & PLAN NOTE
Code status revisited today with patient's sister.  --Code status  DNR  --OK for vasopressors, atropine

## 2018-11-27 NOTE — ASSESSMENT & PLAN NOTE
Code status revisited today with patient's sister.  --Code status  DNR  --Initiating comfort care and transferring to inpatient hospice.

## 2018-11-27 NOTE — ASSESSMENT & PLAN NOTE
--appreciate vascular surgery assistance  --s/p lumbar drain placement and TEVAR on 11/5  --lumbar drain removed 11/23/18  --Worsening abdominal pain and went into shock today with elevated Lactate  --Chest/Abd/Pelvis CT WWO contrast to f/u on  --continue neuro/ neurovascular exams

## 2018-11-27 NOTE — ASSESSMENT & PLAN NOTE
--Suspect a component of iATN given recent hemorrhagic shock.   --no hydronephrosis on CT imaging.   --BUN/Cr improved with diuresis the past 3 days   --Decreased urine output and up-trending BUN/Cr today in shock state  --avoid nephrotoxic agents if possible.    --see shock

## 2018-11-27 NOTE — SUBJECTIVE & OBJECTIVE
Interval History/Significant Events:   She was more lethargic this morning and complaining of worsening abdominal pain. She suddenly became hypotensive and bradycardic.  She was given 1 mg of atropine and IV calcium to reverse oral CCB she was receiving for hypertension.  Central line was inserted and she was started on norepinephrine drip.  Her lactic acid elevated to 6.3 and she was giving IV fluids.  She is being sent for Head CT and chest/abd/pelvis CT.      Review of Systems   Unable to perform ROS: Mental status change     Objective:     Vital Signs (Most Recent):  Temp: 97.3 °F (36.3 °C) (11/26/18 1500)  Pulse: 67 (11/26/18 1745)  Resp: (!) 27 (11/26/18 1745)  BP: (!) 104/57 (11/26/18 1745)  SpO2: 95 % (11/26/18 1745) Vital Signs (24h Range):  Temp:  [92.5 °F (33.6 °C)-98.1 °F (36.7 °C)] 97.3 °F (36.3 °C)  Pulse:  [48-81] 67  Resp:  [13-42] 27  SpO2:  [88 %-100 %] 95 %  BP: ()/() 104/57   Weight: 69.3 kg (152 lb 12.5 oz)  Body mass index is 24.66 kg/m².      Intake/Output Summary (Last 24 hours) at 11/26/2018 1820  Last data filed at 11/26/2018 1718  Gross per 24 hour   Intake 2000 ml   Output --   Net 2000 ml       Physical Exam   Constitutional: She appears lethargic. Nasal cannula in place.   HENT:   Head: Normocephalic and atraumatic.   Eyes: Pupils are equal, round, and reactive to light. Scleral icterus is present.   Cardiovascular: Regular rhythm, normal heart sounds and intact distal pulses. Bradycardia present.   No murmur heard.  Pulmonary/Chest: Effort normal and breath sounds normal. No respiratory distress. She has no wheezes. She has no rales.   Abdominal: Soft. She exhibits distension. Bowel sounds are decreased. There is no tenderness. There is guarding.   Neurological: She appears lethargic.   Confused at times, oriented at other times. Much more lethargic.   4/5 right arm and right leg strength  2/5 left arm strength. 1/5 left leg strength   Skin: Skin is warm and dry. Capillary  refill takes less than 2 seconds. She is not diaphoretic.   Psychiatric: She has a normal mood and affect. Her speech is delayed. Cognition and memory are impaired.   Nursing note and vitals reviewed.      Vents:  Oxygen Concentration (%): 60 (11/26/18 1216)  Lines/Drains/Airways     Central Venous Catheter Line                 Percutaneous Central Line Insertion/Assessment - triple lumen  11/26/18 1802 right internal jugular less than 1 day          Drain            Female External Urinary Catheter 11/23/18 1730 3 days          Peripheral Intravenous Line                 Peripheral IV - Single Lumen 11/23/18 1352 Anterior;Left Upper Arm 3 days         Peripheral IV - Single Lumen 11/23/18 1353 Anterior;Left Forearm 3 days              Significant Labs:    CBC/Anemia Profile:  Recent Labs   Lab 11/25/18  0330 11/25/18  1420 11/26/18  0400   WBC 8.91 9.59 9.70   HGB 8.6* 9.2* 9.3*   HCT 26.7* 28.4* 29.9*   PLT 42* 37* 74*   MCV 96 96 100*   RDW 21.0* 21.3* 22.9*        Chemistries:  Recent Labs   Lab 11/25/18  0330 11/26/18  0531 11/26/18  1533   * 144 142   K 4.5 3.8 4.5   * 112* 111*   CO2 20* 23 18*   BUN 73* 69* 72*   CREATININE 1.3 1.3 1.7*   CALCIUM 9.3 9.2 9.7   ALBUMIN 2.7*  2.7* 2.6* 2.4*   PROT 7.0  --  6.5   BILITOT 8.1*  --  8.0*   ALKPHOS 114  --  106   ALT 43  --  34   AST 99*  --  68*   MG 2.0 1.6  --    PHOS 1.5*  1.5* 2.5*  2.5*  --        Coagulation:   Recent Labs   Lab 11/26/18  0448   INR 1.9*     Lactic Acid:   Recent Labs   Lab 11/26/18  1533   LACTATE 6.3*     All pertinent labs within the past 24 hours have been reviewed.    Significant Imaging:  I have reviewed all pertinent imaging results/findings within the past 24 hours.     Chest X-Ray  FINDINGS:  Postoperative changes and the cardiomegaly as before.  Ill-defined patchy airspace consolidation in the right upper lobe.  Mild perihilar of alveolar filling..  No pleural effusion.      Impression

## 2018-11-27 NOTE — ASSESSMENT & PLAN NOTE
--per patient, has known about cirrhosis for >10 years. Has never received treatment for Hep C  --continue supportive care for coagulopathy, thrombocytopenia in setting of Type B aortic dissection with hemorrhagic shock and new thalamic bleed   --Holding Lactulose and Rifaximin in setting of abdominal pain, lactic acidosis, and normal ammonia  --see shock

## 2018-11-27 NOTE — PLAN OF CARE
11/27/18 1429   Post-Acute Status   Post-Acute Authorization Placement/GIP Hospice   Post-Acute Placement Status Referrals Sent - [facility] contracted GIP bed    CM requested inpatient hospice orders from [facility] physician team.  CM left message for CHASE Fall LMSW to initiate referral to [facility].  CM will follow up on referral.    1551 - CM initiated referral through [facility] to [facility]osp[facility] for [facility] bed.  CM spoke with Abhilash, Hospice Liaison for [facility], to communicate referral.  Abhilash to contact patient's sister, Nancy, to initiate admission to hospice.    Ana Soto, RN, BSN, CCM  Case Management  [facility]  Ext. [phone]

## 2018-11-27 NOTE — PROGRESS NOTES
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      Assessment/Plan:     Palliative care encounter    Palliative care consulted for goals of care and advanced care planning.  Palliative care APRN at bedside, palliative care introduced.  No family is present.     Impression:  Ms. Cleaning is a 52 yo lady admitted with Aortic dissection, s/p Acute right thalamic hemorrhage. Opens eyes to verbal and tactile stimuli, non- conversant at this time. Soft restraints in use. Abdomen distended, no bowel sounds, CT concerning for mesenteric ischemia. Ng tube to suction, dark red brown drainage. Appears comfortable at this time, prn morphine and ativan available.  Levophed continued. Hypothermic, warming blanket in use.  No acute distress.         Goals of Care:    Advance Care Planning   -No advanced directives have been received.  - Legal next of kin is  Ahsan Mays [phone]. Patient reports they are  but still legally . Palliative care APRN attempted to talk with  by telephone - number listed above,  No answer, message left with no return call x2  - Ms. Cleaning states it is her wish that her sister,  Nancy Withaurora [phone]  make medical decisions for her,  If she is unable to.   - Resuscitation status -  Partial DNR ( medications only) per primary team.   - Current clinical condition: Ms Cleaning unable to participate in conversation this AM. Palliative care APRN met with Sister Nancy at bedside.  Patients brother in route.  Semoina states the four siblings are aware  "of her condition. She and one brother only are available to come to hospital.  Nancy has complete understanding of current clinical condition as discussed with Dr. Roberts.  She understands and is accepting that her sister is dying.        Transition of care   When discussing her thoughts about dying in more detail Ms. 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    - She states her mother received hospice services.  Ms. Cleaning  Indicates she would want to have nurses taking care of her.  She does not have any children "It would be sad for her children to watch their momma die."  - Sister Nancy  States Ms. Cleaning took care of their mother when she was dying.  Her mother had hospice services and it was a good experience.  - Nancy states Dr. Roberts  Discussed stopping the levophed and making her comfortable.  Nancy is waiting for her brother to arrive before making this decision.  - Palliative care APRN provided family with education regarding comfort care.   - When asked about how much time, palliative care APRN explained it is hard to know - it could be hours to days.  Nancy states this is what she understood from Dr. Roberts.    - Family with many questions about how does she get home to [place],  what do they do if she dies in the hospital.  Palliative care encouraged family to stay in this moment and we will consult  for assistance.    Addendum:  Palliative care and primary team met with patient and family (brother and sister David) at bedside. Ms. Cleaning unable to participate in this conversation.      Dr. Roberts provided clinical updates and discussed poor prognosis.  Sister states Ms. Cleaning had made it clear she did not want to be kept alive on machines and when it was her time her wish was to be comfortable.   Recommendations given by primary team to discontinue the levophed and make transition of care to comfort.    Family in agreement and will proceed with discontinuing " levophed.   Intense emotional support given.     Plan    - If levophed is discontinued and patient remains stable recommend consult to [facility].   -Consult  for inpatient hospice - [facility]    - consult   - Chaplain Coto notified.   - Emotional support.   - - emotional support.     Primary team aware of above goals of care conversation.                   I will follow-up with patient. Please contact us if you have any additional questions.    Subjective:     Chief Complaint:   Chief Complaint   Patient presents with    Transfer     Dissecting AAA transfer from [place]. On Cardene for HTN       HPI:   HPI:  Mrs. Cleaning is a 52 yo female with a history significant for Hep C cirrhosis, HTN, cocaine and marijuana use who presented to [facility] on the early morning of 11/15 as a transfer from [facility] for Type B aortic dissection. She initially presented to [facility] on the evening of 11/14 for sharp epigastric pain that radiated to her back which started initially on the night of 11/13. CT at OSH noted aortic dissection from left subclavian to just proximal of celiac artery. She was transferred to [facility] for vascular surgery consult on a nicardipine gtt. She was transitioned from nicardipine gtt to esmolol gtt. CTA upon arrival confirmed dissection and she was admitted to MICU for medical management of the dissection with vascular surgery following.      Hospital/ICU Course:  Mrs. Cleaning was admitted to MICU for Type B aortic dissection on esmolol gtt to maintain HR of 60 and SBP <120. Vascular surgery following, no surgical intervention warranted at this time. CTA read concerning for possible aortic rupture in the descending aorta with high-density fluid noted in the posterior mediastinum; additionally, there was a suspected penetrating ulcer in the upper abdominal aorta adjacent to the true lumen.  Vascular consulted.  Decision to place lumbar drain and proceed to TEVAR. S/p tevar and lumbar drain removed.     Palliative care consulted for goals of care and advanced care planning.            Hospital Course:  No notes on file    No new subjective & objective note has been filed under this hospital service since the last note was generated.      > 50% of 35 min visit spent in chart review, face to face discussion of goals of care,  symptom assessment, coordination of care and emotional support.    Ana Cristina Rodrigues, CNS  Palliative Medicine  [facility]

## 2018-11-27 NOTE — ASSESSMENT & PLAN NOTE
53 F with Hep C cirrhosis (MELD 15 on admission), HTN, crack cocaine abuse, who presented with acute tearing chest/back pain to OSH, Found to have retrograde type B dissection. Despite aggressive anti-impulse therapy, persistent pain requiring intervention. Preoperative CSF drain placement by Neuro IR. Successful TEVAR. POD 1 left groin hematoma and coagulopathy requiring manual pressure and product resuscitation. POD 2 new onset left upper arm weakness, found to have right thalamic hemorrhage 2/2 HTN. F/u CT head stable findings. Significant reactive left pleural effusion with surrounding atelectasis noted also.     Neuro: Neuro exam stable with slight improvement  - CT and MRI spine without evidence of spinal cord hematoma or infarct  - MRI head with mass effect; stroke was hemorrhagic 2/2 HTN not embolic event from TEVAR   right thalamic bleed  - Q4hr neurovascular checks    CV:   - SBP goal normotensive    Resp:  - encourage IS  - O2 supp prn  - Moderate Left pleural effusion and small right pleural effusion on CTA stoke multiphase noted. Left pleural effusion likely reactive following TEVAR. Con't pulmonary toilet.     Renal/Endocrine:  - con't monitoring UOP  - BUN/Cr improved 79/1.8    GI: Hep C cirrhosis with MELD 15 on admission; CT abd/pelvis showed extensive pneumatosis of the left colon, small bowel wall thickening  - Gen surg discussed with sister; no surgical intervention, pt to be made comfortable  - protonix  - NPO  - NGT  - T bili elevated 8.8    Heme/ID:  - ABLA stable.   - con't correct coagulopathy prn  - monitor thrombocytopenia     Dispo:  - Care per MICU

## 2018-11-27 NOTE — ASSESSMENT & PLAN NOTE
--multifactorial: thalamic CVA, hepatic encephalopathy, delirium   --Stopped lactulose, rifaxamin today due to abdominal pain and elevated lactic acid  --normal ammonia 11/26  --minimize deliriogenic medications.   --See shock

## 2018-11-27 NOTE — PROGRESS NOTES
[facility]  Critical Care Medicine  Progress Note    Patient Name: Leah Cleaning  MRN: [id number]  Admission Date: 11/15/2018  Hospital Length of Stay: 11 days  Code Status: DNR  Attending Provider: Bryan Roberts MD  Primary Care Provider: Mary Colorado MD   Principal Problem: Aortic dissection distal to left subclavian    Subjective:     HPI:  Mrs. Cleaning is a 54 yo female with a history significant for Hep C cirrhosis, HTN, cocaine and marijuana use who presented to [facility] on the early morning of 11/15 as a transfer from [facility] for Type B aortic dissection. She initially presented to [facility] on the evening of 11/14 for sharp epigastric pain that radiated to her back which started initially on the night of 11/13. CT at OSH noted aortic dissection from left subclavian to just proximal of celiac artery. She was transferred to [facility] for vascular surgery consult on a nicardipine gtt. She was transitioned from nicardipine gtt to esmolol gtt. CTA upon arrival confirmed dissection and she was admitted to MICU for medical management of the dissection with vascular surgery following.     Hospital/ICU Course:  Mrs. Cleaning was admitted to MICU for Type B aortic dissection on esmolol gtt to maintain HR of 60 and SBP <120. Vascular surgery following, no surgical intervention warranted initially. CTA read concerning for possible aortic rupture in the descending aorta with high-density fluid noted in the posterior mediastinum; additionally, there was a suspected penetrating ulcer in the upper abdominal aorta adjacent to the true lumen. Due to high risk/ possible rupture, Lumbar drain was placed by interventional radiology and TEVAR completed by vascular surgery. Early 11/16, pt noted to be in hemorragic shock complicated by liver disease as vasopressor requirements increased significantly, rising lactic acidosis, and patient was found to be bleeding  from bilateral groin sites with significant hematoma formation to left groin. Pressure was held for an extended period and patient received 4 U PRBC, 3 U FFP, 1 U platelets, 1 U cryo, and vitamin K. With improvement in coagulopathy and volume resuscitation, she was able to be weaned from vasopressors. However, on 11/17, a stroke code was called around 0800 due to acute left sided weakness; right sided thalamic bleed noted on CT. No intervention per vascular neurology, however have had to give multiple units of platelets, FFP, and cyro in order to keep platelets > 50,000, INR at 1.5, and fibrinogen >100. Repeat CT showed that hemorrhage had not changed. Neuro exam much improved with lessening left sided hemiparesis. New RUQ abdominal pain reported on 11/19 and, US with doppler noted probable right portal vein thrombosis; anticoagulation contraindicated.      On the early AM of 11/20, it was noted that Mrs. Cleaning was no longer able to move bilateral lower extremities and left upper extremity (this is a change as she was having improved strength in left upper/lower extremity previously and able to move RLE). Additionally, she was more somnolent with expressive aphasia noted. Discussions with vascular surgery, vascular neurology and [facility] team held with plans for stat CT head. Spinal drain opened up to drain 30 ml's in first hour and then 15 ml/hr subsequent to that. Additionally, levophed started to maintain MAP of 100 mm Hg but aim to keep SBP <160 given concern for spinal ischemia, but with subsequent hemorrhagic CVA. MRI Brain and spine performed on 11/21 with stable acute right thalamic hemorrhage, small punctate foci in posterior temporal/parietal regions, no cord edema or signs of cord infarction.  Episode of emesis evening of 11/21, imaging suggesting ileus.  NG placed with 800 ml output.  Lactulose enemas given with increased stool output.  LP drain clamped and removed 11/23.  Oxygen requirements increased and  was placed on high flow nasal cannula. LE US negative for DVT. Began diuresing patient patient on 11/23/18. Passed swallow assessment and started on a diet on 11/23/18.   On 11/26/18, she was more lethargic, complaining of abdominal pain and suddenly became hypotensive and bradycardic.  She was given 1 mg of atropine and IV calcium to reverse oral CCB she was receiving for hypertension.  Central line was inserted and she was started on norepinephrine drip.  Her lactic acid elevated to 6.3 and she was giving IV fluids.  Chest X-Ray with new right upper lobe opacity.  She was started empirically on vancomycin and zosyn.     Interval History/Significant Events:   She was more lethargic this morning and complaining of worsening abdominal pain. She suddenly became hypotensive and bradycardic.  She was given 1 mg of atropine and IV calcium to reverse oral CCB she was receiving for hypertension.  Central line was inserted and she was started on norepinephrine drip.  Her lactic acid elevated to 6.3 and she was giving IV fluids.  She is being sent for Head CT and chest/abd/pelvis CT.      Review of Systems   Unable to perform ROS: Mental status change     Objective:     Vital Signs (Most Recent):  Temp: 97.3 °F (36.3 °C) (11/26/18 1500)  Pulse: 67 (11/26/18 1745)  Resp: (!) 27 (11/26/18 1745)  BP: (!) 104/57 (11/26/18 1745)  SpO2: 95 % (11/26/18 1745) Vital Signs (24h Range):  Temp:  [92.5 °F (33.6 °C)-98.1 °F (36.7 °C)] 97.3 °F (36.3 °C)  Pulse:  [48-81] 67  Resp:  [13-42] 27  SpO2:  [88 %-100 %] 95 %  BP: ()/() 104/57   Weight: 69.3 kg (152 lb 12.5 oz)  Body mass index is 24.66 kg/m².      Intake/Output Summary (Last 24 hours) at 11/26/2018 1820  Last data filed at 11/26/2018 1718  Gross per 24 hour   Intake 2000 ml   Output --   Net 2000 ml       Physical Exam   Constitutional: She appears lethargic. Nasal cannula in place.   HENT:   Head: Normocephalic and atraumatic.   Eyes: Pupils are equal, round, and  reactive to light. Scleral icterus is present.   Cardiovascular: Regular rhythm, normal heart sounds and intact distal pulses. Bradycardia present.   No murmur heard.  Pulmonary/Chest: Effort normal and breath sounds normal. No respiratory distress. She has no wheezes. She has no rales.   Abdominal: Soft. She exhibits distension. Bowel sounds are decreased. There is no tenderness. There is guarding.   Neurological: She appears lethargic.   Confused at times, oriented at other times. Much more lethargic.   4/5 right arm and right leg strength  2/5 left arm strength. 1/5 left leg strength   Skin: Skin is warm and dry. Capillary refill takes less than 2 seconds. She is not diaphoretic.   Psychiatric: She has a normal mood and affect. Her speech is delayed. Cognition and memory are impaired.   Nursing note and vitals reviewed.      Vents:  Oxygen Concentration (%): 60 (11/26/18 1216)  Lines/Drains/Airways     Central Venous Catheter Line                 Percutaneous Central Line Insertion/Assessment - triple lumen  11/26/18 1802 right internal jugular less than 1 day          Drain            Female External Urinary Catheter 11/23/18 1730 3 days          Peripheral Intravenous Line                 Peripheral IV - Single Lumen 11/23/18 1352 Anterior;Left Upper Arm 3 days         Peripheral IV - Single Lumen 11/23/18 1353 Anterior;Left Forearm 3 days              Significant Labs:    CBC/Anemia Profile:  Recent Labs   Lab 11/25/18  0330 11/25/18  1420 11/26/18  0400   WBC 8.91 9.59 9.70   HGB 8.6* 9.2* 9.3*   HCT 26.7* 28.4* 29.9*   PLT 42* 37* 74*   MCV 96 96 100*   RDW 21.0* 21.3* 22.9*        Chemistries:  Recent Labs   Lab 11/25/18  0330 11/26/18  0531 11/26/18  1533   * 144 142   K 4.5 3.8 4.5   * 112* 111*   CO2 20* 23 18*   BUN 73* 69* 72*   CREATININE 1.3 1.3 1.7*   CALCIUM 9.3 9.2 9.7   ALBUMIN 2.7*  2.7* 2.6* 2.4*   PROT 7.0  --  6.5   BILITOT 8.1*  --  8.0*   ALKPHOS 114  --  106   ALT 43  --  34    AST 99*  --  68*   MG 2.0 1.6  --    PHOS 1.5*  1.5* 2.5*  2.5*  --        Coagulation:   Recent Labs   Lab 11/26/18  0448   INR 1.9*     Lactic Acid:   Recent Labs   Lab 11/26/18  1533   LACTATE 6.3*     All pertinent labs within the past 24 hours have been reviewed.    Significant Imaging:  I have reviewed all pertinent imaging results/findings within the past 24 hours.     Chest X-Ray  FINDINGS:  Postoperative changes and the cardiomegaly as before.  Ill-defined patchy airspace consolidation in the right upper lobe.  Mild perihilar of alveolar filling..  No pleural effusion.      Impression         Assessment/Plan:     Neuro   Encephalopathy, metabolic    --multifactorial: thalamic CVA, hepatic encephalopathy, delirium   --Stopped lactulose, rifaxamin today due to abdominal pain and elevated lactic acid  --normal ammonia 11/26  --minimize deliriogenic medications.      Nontraumatic thalamic hemorrhage    --Noted on 11/17 with left hemiparesis, dysarthria, left sided facial droop. These symptoms improved through 11/19 with no intervention per vascular neurology other than maintaining SBP <160 and correcting coagulopathy for goal INR <1.5, platelets >50,000 and fibrinogen >100  --worsening of neurologic status again on 11/20 with worsening dysarthria, LUE, LLE, RLE paralysis.   --Repeat stat CT head 11/20 with stable thalamic hemorrhage; no acute abnormalities  --CT cervical and CT thoracic without contrast 11/21 no detection of spinal cord hematoma however sub optimal evaluation of spinal cord without contrast  --Lumbar spinal drain removed 11/23/18  --Maintain SBP <160. High MAP goals no longer indicated  --Started on oral BP medications but went into shock again on 11/26  --Head CT today for decreased alertness  --neuro checks q 4 hrs          Psychiatric   Marijuana abuse    --see above     Cocaine abuse    --toxicology screen positive for cocaine, THC, opiates at OSH       Pulmonary   Acute hypoxemic  respiratory failure    Requiring HiFlo, High FiO2 Oxygen. Improving PE vs Volume Overload vs Hepato-Pulmonary shunting.   --No fevers or leukocytosis. Not hypotensive. Pneumonia less likely  --Bilateral LE negative for DVT lowers suspicion of PE. Would not be a candidate for anticoagulation  --Diuresed for the past 3 days with improving oxygen requirements  --Placed on BiPAP for short period of time during hypotensive, bradycardic episode. Back on 4 liters. BiPAP PRN  --New RUL consolidation. 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 Hematoma appears to be improving per physical exam.   --continue to monitor      Essential hypertension    --goal SBP <160 per vascular surgery and neurology recommendations.  --Now in shock on norepinephrine.      Renal/   KARL (acute kidney injury)    --Suspect a component of iATN given recent hemorrhagic shock.   --no hydronephrosis on CT imaging.   --BUN/Cr improved with diuresis the past 3 days   --Decreased urine output and up-trending BUN/Cr today in shock state  --avoid nephrotoxic agents if possible.       Hematology   Portal vein thrombosis    --noted on US liver with doppler on 11/19. Holding anticoagulation given above bleeding/coagulopathy       Thrombocytopenia    --2/2 cirrhosis  -- Per Neuro/Surgery plts goal >50,000 no longer necessary     Coagulopathy    --2/2 cirrhosis  --Per Neuro surgery  goal INR 1.5- 1.8 no longer necessary      Oncology   Acute blood loss anemia    --Now stable  --transfuse to maintain Hgb 8-9        GI   Transaminitis    --suspect shock  liver       Chronic hepatitis C with cirrhosis    --per patient, has known about cirrhosis for >10 years. Has never received treatment for Hep C  --continue supportive care for coagulopathy, thrombocytopenia in setting of Type B aortic dissection with hemorrhagic shock and new thalamic bleed   --Holding Lactulose and Rifaximin in setting of abdominal pain, lactic acidosis, and normal ammonia     Orthopedic   Acute midline thoracic back pain    --resolved      Other   Shock    --Became hypothermic, bradycardic, hypotensive,and had elevated lactic acid today  --Likely sepsis due bowel ischemia versus pneumonia with new RUL infiltrate. Reaction to oral BB/CCB less likely, but treated with atropine and IV Calcium  --CT Chest/Abd/Pelvis WWO contrast to look for ischemia  --Norepinephrine drip. Titrate to keep SBP > 90  --Trend Lactic Acid. IV Fluids as needed  --Vanc/Zosyn Empirically  --Blood Cultures drawn today. Follow     Goals of care, counseling/discussion    Code status revisited today with patient's sister.  --Code status  DNR  --OK for vasopressors, atropine       Critical Care Time: 65 minutes  Critical secondary to Patient has a condition that poses threat to life and bodily function: Shock requiring vasopressors      Critical care was time spent personally by me on the following activities: development of treatment plan with patient or surrogate and bedside caregivers, discussions with consultants, evaluation of patient's response to treatment, examination of patient, ordering and performing treatments and interventions, ordering and review of laboratory studies, ordering and review of radiographic studies, pulse oximetry, re-evaluation of patient's condition. This critical care time did not overlap with that of any other provider or involve time for any procedures.     Rosa Maria Johnston NP  Critical Care Medicine  [facility]

## 2018-11-27 NOTE — PLAN OF CARE
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 [I71.01]  Yes    Palliative care encounter [Z51.5]  Not Applicable    Shock [R57.9]  No    Acute hypoxemic respiratory failure [J96.01]  Yes    Encephalopathy, metabolic [G93.41]  Yes    Pseudoaneurysm of left femoral artery [I72.4]  No    Transaminitis [R74.0]  No    Portal vein thrombosis [I81]  Yes    KARL (acute kidney injury) [N17.9]  Yes    Nontraumatic thalamic hemorrhage [I61.9]  No    Goals of care, counseling/discussion [Z71.89]  Not Applicable    Acute blood loss anemia [D62]  No    Essential hypertension [I10]  Yes    Cocaine abuse [F14.10]  Yes    Marijuana abuse [F12.10]  Yes    Chronic hepatitis C with cirrhosis [B18.2, K74.60]  Yes    Coagulopathy [D68.9]  Yes    Thrombocytopenia [D69.6]  Yes      Resolved Hospital Problems    Diagnosis Date Resolved POA    Hypernatremia [E87.0] 11/26/2018 No    Ileus [K56.7] 11/24/2018 No    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    Vital Signs: Routine per Hospice Protocol.    Allergies:Review of patient's allergies indicates:  No Known Allergies    Diet: as tolerated    Activities: As tolerated    Nursing: Per Hospice Routine    Future Orders:  Hospice Medical Director may dictate new orders for comfortable care measures & sign death certificate.    Medications:         Comfort Care Medications Only         _________________________________  Any Hoover PA-C  11/27/2018

## 2018-11-27 NOTE — ASSESSMENT & PLAN NOTE
--multifactorial: thalamic CVA, hepatic encephalopathy, delirium   --Stopped lactulose, rifaxamin today due to abdominal pain and elevated lactic acid  --normal ammonia 11/26  --minimize deliriogenic medications.

## 2018-11-27 NOTE — ASSESSMENT & PLAN NOTE
--goal SBP <160 per vascular surgery and neurology recommendations.  --Now in shock on norepinephrine.

## 2018-11-27 NOTE — NURSING
Dr. Roberts, Palliative Care and [facility]  present to bedside for family conference. Pt's family's questions answered, and decision for palliative care and comfort care voiced. Full DNR order obtained. Plan of care reviewed with pt's family, and verbalization of understanding obtained.  Emotional support offered.

## 2018-11-27 NOTE — PROGRESS NOTES
[facility]  Critical Care Medicine  Progress Note    Patient Name: Leah Cleaning  MRN: [id number]  Admission Date: 11/15/2018  Hospital Length of Stay: 12 days  Code Status: DNR  Attending Provider: Bryan Roberts MD  Primary Care Provider: Mary Colorado MD   Principal Problem: Shock    Subjective:     HPI:  Mrs. Cleaning is a 52 yo female with a history significant for Hep C cirrhosis, HTN, cocaine and marijuana use who presented to [facility] on the early morning of 11/15 as a transfer from [facility] for Type B aortic dissection. She initially presented to [facility] on the evening of 11/14 for sharp epigastric pain that radiated to her back which started initially on the night of 11/13. CT at OSH noted aortic dissection from left subclavian to just proximal of celiac artery. She was transferred to [facility] for vascular surgery consult on a nicardipine gtt. She was transitioned from nicardipine gtt to esmolol gtt. CTA upon arrival confirmed dissection and she was admitted to MICU for medical management of the dissection with vascular surgery following.     Hospital/ICU Course:  Mrs. Cleaning was admitted to MICU for Type B aortic dissection on esmolol gtt to maintain HR of 60 and SBP <120. Vascular surgery following, no surgical intervention warranted initially. CTA read concerning for possible aortic rupture in the descending aorta with high-density fluid noted in the posterior mediastinum; additionally, there was a suspected penetrating ulcer in the upper abdominal aorta adjacent to the true lumen. Due to high risk/ possible rupture, Lumbar drain was placed by interventional radiology and TEVAR completed by vascular surgery. Early 11/16, pt noted to be in hemorragic shock complicated by liver disease as vasopressor requirements increased significantly, rising lactic acidosis, and patient was found to be bleeding from bilateral groin sites with  significant hematoma formation to left groin. Pressure was held for an extended period and patient received 4 U PRBC, 3 U FFP, 1 U platelets, 1 U cryo, and vitamin K. With improvement in coagulopathy and volume resuscitation, she was able to be weaned from vasopressors. However, on 11/17, a stroke code was called around 0800 due to acute left sided weakness; right sided thalamic bleed noted on CT. No intervention per vascular neurology, however have had to give multiple units of platelets, FFP, and cyro in order to keep platelets > 50,000, INR at 1.5, and fibrinogen >100. Repeat CT showed that hemorrhage had not changed. Neuro exam much improved with lessening left sided hemiparesis. New RUQ abdominal pain reported on 11/19 and, US with doppler noted probable right portal vein thrombosis; anticoagulation contraindicated.      On the early AM of 11/20, it was noted that Mrs. Cleaning was no longer able to move bilateral lower extremities and left upper extremity (this is a change as she was having improved strength in left upper/lower extremity previously and able to move RLE). Additionally, she was more somnolent with expressive aphasia noted. Discussions with vascular surgery, vascular neurology and [facility] team held with plans for stat CT head. Spinal drain opened up to drain 30 ml's in first hour and then 15 ml/hr subsequent to that. Additionally, levophed started to maintain MAP of 100 mm Hg but aim to keep SBP <160 given concern for spinal ischemia, but with subsequent hemorrhagic CVA. MRI Brain and spine performed on 11/21 with stable acute right thalamic hemorrhage, small punctate foci in posterior temporal/parietal regions, no cord edema or signs of cord infarction.  Episode of emesis evening of 11/21, imaging suggesting ileus.  NG placed with 800 ml output.  Lactulose enemas given with increased stool output.  LP drain clamped and removed 11/23.  Oxygen requirements increased and was placed on high flow nasal  cannula. LE US negative for DVT. Began diuresing patient patient on 11/23/18. Passed swallow assessment and started on a diet on 11/23/18.   On 11/26/18, she was more lethargic, complaining of abdominal pain and suddenly became hypotensive and bradycardic.  She was given 1 mg of atropine and IV calcium to reverse oral CCB she was receiving for hypertension.  Central line was inserted and she was started on norepinephrine drip.  Her lactic acid elevated to 6.3 and she was giving IV fluids.  Chest X-Ray with new right upper lobe opacity.  She was started empirically on vancomycin and zosyn. CT abd/pevis 11/26 concerning for mesenteric ischemia. General surgery to forego surgery at this time. Palliative consulted. Goals of care discussion with family who wish to proceed with comfort measures.     Interval History/Significant Events: No acute events overnight. Patient requiring straight cath as not urinating on her own. 300 cc dark gastric output from NG on LIS. Patient complaining of lower back pain. On room air, O2 sats 94%.     Review of Systems   Unable to perform ROS: Mental status change   Respiratory: Negative for shortness of breath.    Cardiovascular: Negative for chest pain.   Gastrointestinal: Negative for abdominal pain.   Genitourinary: Negative for dysuria.   Musculoskeletal: Positive for back pain (lower).     Objective:     Vital Signs (Most Recent):  Temp: (!) 90.6 °F (32.6 °C)(Room temp elevated. Forced air warming blanket applied.) (11/27/18 0700)  Pulse: (!) 57 (11/27/18 0700)  Resp: (!) 28 (11/27/18 0700)  BP: (!) 121/58 (11/27/18 0700)  SpO2: 96 % (11/27/18 0700) Vital Signs (24h Range):  Temp:  [90.6 °F (32.6 °C)-98.1 °F (36.7 °C)] 90.6 °F (32.6 °C)  Pulse:  [48-68] 57  Resp:  [16-42] 28  SpO2:  [88 %-100 %] 96 %  BP: ()/() 121/58   Weight: 59.1 kg (130 lb 4.7 oz)  Body mass index is 21.03 kg/m².      Intake/Output Summary (Last 24 hours) at 11/27/2018 0831  Last data filed at  11/27/2018 0600  Gross per 24 hour   Intake 2120 ml   Output 1395 ml   Net 725 ml       Physical Exam   Constitutional: She is oriented to person, place, and time. She appears well-developed and well-nourished. She has a sickly appearance. She is restrained.   HENT:   Head: Normocephalic and atraumatic.   Eyes: EOM are normal. Pupils are equal, round, and reactive to light. Scleral icterus is present.   Neck: No tracheal deviation present. No thyromegaly present.   Central venous catheter in [facility]   Cardiovascular: Regular rhythm. Bradycardia present. Exam reveals no gallop and no friction rub.   No murmur heard.  Pulses:       Dorsalis pedis pulses are 2+ on the right side, and 2+ on the left side.   Pulmonary/Chest: Effort normal. No accessory muscle usage or stridor. No respiratory distress. She has no decreased breath sounds. She has no wheezes. She has no rhonchi. She has no rales.   Abdominal: Soft. Bowel sounds are normal. She exhibits distension. There is no tenderness.   Musculoskeletal: Normal range of motion.   Neurological: She is alert and oriented to person, place, and time. No sensory deficit.   Waxing and waning mental status. Follows commands. Moves all extremities spontaneously. Weakness noted LUE>RUE. Recovery in LLE with ability to move on command. PERRLA.    Skin: Skin is warm and dry.       Vents:  Oxygen Concentration (%): 40 (11/26/18 2128)  Lines/Drains/Airways     Central Venous Catheter Line                 Percutaneous Central Line Insertion/Assessment - triple lumen  11/26/18 1802 right internal jugular less than 1 day          Drain                 NG/OG Tube 11/26/18 2210 nasogastric 18 Fr. Left nostril less than 1 day          Peripheral Intravenous Line                 Peripheral IV - Single Lumen 11/23/18 1352 Anterior;Left Upper Arm 3 days         Peripheral IV - Single Lumen 11/23/18 1353 Anterior;Left Forearm 3 days         Peripheral IV - Single Lumen 11/26/18 1200 Anterior;Right  Forearm less than 1 day              Significant Labs:    CBC/Anemia Profile:  Recent Labs   Lab 11/25/18  1420 11/26/18  0400 11/27/18  0403   WBC 9.59 9.70 12.19   HGB 9.2* 9.3* 9.3*   HCT 28.4* 29.9* 28.3*   PLT 37* 74* 56*   MCV 96 100* 97   RDW 21.3* 22.9* 21.1*        Chemistries:  Recent Labs   Lab 11/26/18  0531 11/26/18  1533 11/27/18  0323    142 140   K 3.8 4.5 4.3   * 111* 108   CO2 23 18* 22*   BUN 69* 72* 79*   CREATININE 1.3 1.7* 1.8*   CALCIUM 9.2 9.7 9.4   ALBUMIN 2.6* 2.4* 2.4*  2.4*   PROT  --  6.5 6.6   BILITOT  --  8.0* 8.8*   ALKPHOS  --  106 111   ALT  --  34 37   AST  --  68* 89*   MG 1.6  --  2.2   PHOS 2.5*  2.5*  --  3.3  3.3       All pertinent labs within the past 24 hours have been reviewed.    Significant Imaging:  I have reviewed and interpreted all pertinent imaging results/findings within the past 24 hours.    Assessment/Plan:     Neuro   Encephalopathy, metabolic    --multifactorial: thalamic CVA, hepatic encephalopathy, delirium   --Stopped lactulose, rifaxamin today due to abdominal pain and elevated lactic acid  --normal ammonia 11/26  --minimize deliriogenic medications.   --See shock     Nontraumatic thalamic hemorrhage    --Noted on 11/17 with left hemiparesis, dysarthria, left sided facial droop. These symptoms improved through 11/19 with no intervention per vascular neurology other than maintaining SBP <160 and correcting coagulopathy for goal INR <1.5, platelets >50,000 and fibrinogen >100  --worsening of neurologic status again on 11/20 with worsening dysarthria, LUE, LLE, RLE paralysis.   --Repeat stat CT head 11/20 with stable thalamic hemorrhage; no acute abnormalities  --CT cervical and CT thoracic without contrast 11/21 no detection of spinal cord hematoma however sub optimal evaluation of spinal cord without contrast  --Lumbar spinal drain removed 11/23/18  --See shock            Psychiatric   Marijuana abuse    --see above     Cocaine abuse     --toxicology screen positive for cocaine, THC, opiates at OSH       Pulmonary   Acute hypoxemic respiratory failure    Requiring HiFlo, High FiO2 Oxygen. Improving PE vs Volume Overload vs Hepato-Pulmonary shunting.   --No fevers or leukocytosis. Not hypotensive. Pneumonia less likely  --Bilateral LE negative for DVT lowers suspicion of PE. Would not be a candidate for anticoagulation  --Diuresed for the past 3 days with improving oxygen requirements  --Placed on BiPAP for short period of time during hypotensive, bradycardic episode. Back on 4 liters. BiPAP PRN  --New RUL consolidation.   --See shock     Cardiac/Vascular   Pseudoaneurysm of left femoral artery    --noted 11/16. Hematoma appears to be improving per physical exam.   --see shock     Essential hypertension    --goal SBP <160 per vascular surgery and neurology recommendations.  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   --no hydronephrosis on CT imaging.   --BUN/Cr improved with diuresis the past 3 days   --Decreased urine output and up-trending BUN/Cr today in shock state  --avoid nephrotoxic agents if possible.    --see shock     Hematology   Portal vein thrombosis    --noted on US liver with doppler on 11/19. Holding anticoagulation given above bleeding/coagulopathy       Thrombocytopenia    --2/2 cirrhosis  -- Per Neuro/Surgery plts goal >50,000 no longer necessary     Coagulopathy    --2/2 cirrhosis  --Per Neuro surgery  goal INR 1.5- 1.8 no longer necessary      Oncology   Acute blood loss anemia    --Now stable  --transfuse to maintain Hgb 8-9        GI   Transaminitis    --see shock       Chronic hepatitis C  with cirrhosis    --per patient, has known about cirrhosis for >10 years. Has never received treatment for Hep C  --continue supportive care for coagulopathy, thrombocytopenia in setting of Type B aortic dissection with hemorrhagic shock and new thalamic bleed   --Holding Lactulose and Rifaximin in setting of abdominal pain, lactic acidosis, and normal ammonia  --see shock         Orthopedic   Acute midline thoracic back pain    --resolved      Other   * Shock    --Became hypothermic, bradycardic, hypotensive,and had elevated lactic acid today  --Likely sepsis due bowel ischemia versus pneumonia with new RUL infiltrate. Reaction to oral BB/CCB less likely, but treated with atropine and IV Calcium  --CT Chest/Abd/Pelvis WWO 11/26 with mesenteric ischemia  --Continued elevated lactic acid 2/2 bowel ischemia  --Increasing vasopressor requirement  --Per Gen surgery, patient not appropriate for surgery.   --Family wishes to pursue comfort measures and requesting inpatient hospice.      Palliative care encounter    Palliative care following. Family wishes to pursue comfort measures and inpatient hospice.      Goals of care, counseling/discussion    Code status revisited today with patient's sister.  --Code status  DNR  --Initiating comfort care and transferring to inpatient hospice.          Critical Care Time: 45 minutes  Critical secondary to Patient has a condition that poses threat to life and bodily function: shock due to bowel ischemia requiring vasopressors      Critical care was time spent personally by me on the following activities: development of treatment plan with patient or surrogate and bedside caregivers, discussions with consultants, evaluation of patient's response to treatment, examination of patient, ordering and performing treatments and interventions, ordering and review of laboratory studies, ordering and review of radiographic studies, pulse oximetry, re-evaluation of patient's condition. This  critical care time did not overlap with that of any other provider or involve time for any procedures.     Any Hoover PA-C  Critical Care Medicine  [facility]

## 2018-11-27 NOTE — PT/OT/SLP PROGRESS
Physical Therapy      Patient Name:  Leah Cleaning   MRN:  [id number]    Patient not seen today secondary to (pt not seen 2nd to withdrawl of care. ).     Felicitas Santizo, PT   11/27/2018

## 2018-11-27 NOTE — SUBJECTIVE & OBJECTIVE
Interval History/Significant Events: No acute events overnight. Patient requiring straight cath as not urinating on her own. 300 cc dark gastric output from NG on LIS. Patient complaining of lower back pain. On room air, O2 sats 94%.     Review of Systems   Unable to perform ROS: Mental status change   Respiratory: Negative for shortness of breath.    Cardiovascular: Negative for chest pain.   Gastrointestinal: Negative for abdominal pain.   Genitourinary: Negative for dysuria.   Musculoskeletal: Positive for back pain (lower).     Objective:     Vital Signs (Most Recent):  Temp: (!) 90.6 °F (32.6 °C)(Room temp elevated. Forced air warming blanket applied.) (11/27/18 0700)  Pulse: (!) 57 (11/27/18 0700)  Resp: (!) 28 (11/27/18 0700)  BP: (!) 121/58 (11/27/18 0700)  SpO2: 96 % (11/27/18 0700) Vital Signs (24h Range):  Temp:  [90.6 °F (32.6 °C)-98.1 °F (36.7 °C)] 90.6 °F (32.6 °C)  Pulse:  [48-68] 57  Resp:  [16-42] 28  SpO2:  [88 %-100 %] 96 %  BP: ()/() 121/58   Weight: 59.1 kg (130 lb 4.7 oz)  Body mass index is 21.03 kg/m².      Intake/Output Summary (Last 24 hours) at 11/27/2018 0840  Last data filed at 11/27/2018 0600  Gross per 24 hour   Intake 2120 ml   Output 1395 ml   Net 725 ml       Physical Exam   Constitutional: She is oriented to person, place, and time. She appears well-developed and well-nourished. She has a sickly appearance. She is restrained.   HENT:   Head: Normocephalic and atraumatic.   Eyes: EOM are normal. Pupils are equal, round, and reactive to light. Scleral icterus is present.   Neck: No tracheal deviation present. No thyromegaly present.   Central venous catheter in [facility]   Cardiovascular: Regular rhythm. Bradycardia present. Exam reveals no gallop and no friction rub.   No murmur heard.  Pulses:       Dorsalis pedis pulses are 2+ on the right side, and 2+ on the left side.   Pulmonary/Chest: Effort normal. No accessory muscle usage or stridor. No respiratory distress. She has  no decreased breath sounds. She has no wheezes. She has no rhonchi. She has no rales.   Abdominal: Soft. Bowel sounds are normal. She exhibits distension. There is no tenderness.   Musculoskeletal: Normal range of motion.   Neurological: She is alert and oriented to person, place, and time. No sensory deficit.   Waxing and waning mental status. Follows commands. Moves all extremities spontaneously. Weakness noted LUE>RUE. Recovery in LLE with ability to move on command. PERRLA.    Skin: Skin is warm and dry.       Vents:  Oxygen Concentration (%): 40 (11/26/18 2128)  Lines/Drains/Airways     Central Venous Catheter Line                 Percutaneous Central Line Insertion/Assessment - triple lumen  11/26/18 1802 right internal jugular less than 1 day          Drain                 NG/OG Tube 11/26/18 2210 nasogastric 18 Fr. Left nostril less than 1 day          Peripheral Intravenous Line                 Peripheral IV - Single Lumen 11/23/18 1352 Anterior;Left Upper Arm 3 days         Peripheral IV - Single Lumen 11/23/18 1353 Anterior;Left Forearm 3 days         Peripheral IV - Single Lumen 11/26/18 1200 Anterior;Right Forearm less than 1 day              Significant Labs:    CBC/Anemia Profile:  Recent Labs   Lab 11/25/18  1420 11/26/18  0400 11/27/18  0403   WBC 9.59 9.70 12.19   HGB 9.2* 9.3* 9.3*   HCT 28.4* 29.9* 28.3*   PLT 37* 74* 56*   MCV 96 100* 97   RDW 21.3* 22.9* 21.1*        Chemistries:  Recent Labs   Lab 11/26/18  0531 11/26/18  1533 11/27/18  0323    142 140   K 3.8 4.5 4.3   * 111* 108   CO2 23 18* 22*   BUN 69* 72* 79*   CREATININE 1.3 1.7* 1.8*   CALCIUM 9.2 9.7 9.4   ALBUMIN 2.6* 2.4* 2.4*  2.4*   PROT  --  6.5 6.6   BILITOT  --  8.0* 8.8*   ALKPHOS  --  106 111   ALT  --  34 37   AST  --  68* 89*   MG 1.6  --  2.2   PHOS 2.5*  2.5*  --  3.3  3.3       All pertinent labs within the past 24 hours have been reviewed.    Significant Imaging:  I have reviewed and interpreted all  pertinent imaging results/findings within the past 24 hours.

## 2018-11-27 NOTE — PROGRESS NOTES
" [facility]  Adult Nutrition  Progress Note    SUMMARY       Recommendations  Recommendation/Intervention:   1. Noted plan for palliative care. Recommend nutrition per plan of care.   2. If change in status or plan and nutrition support warranted, RD to provide recommendations.   RD to monitor.    Goals: Patient to receive nutrition by RD follow-up  Nutrition Goal Status: goal not met  Communication of RD Recs: (POC)    Reason for Assessment  Reason for Assessment: RD follow-up  Diagnosis: (aortic dissection s/p TEVAR 11/15)  Relevant Medical History: hepatitis C, HTN, cocaine and marijuana abuse  General Information Comments: Patient still with AMS/disoriented. Made NPO, was ot eating well prior. NG placed yesterday. Plan for palliative care. (Physical exam unchanged, no physical signs of malnutrition at this time.)  Nutrition Discharge Planning: Unable to determine at this time.    Nutrition Risk Screen  Nutrition Risk Screen: no indicators present    Nutrition/Diet History  Do you have any cultural, spiritual, Ep[employer]opalian conflicts, given your current situation?: none stated  Factors Affecting Nutritional Intake: NPO, altered gastrointestinal function    Anthropometrics  Temp: (!) 93 °F (33.9 °C)  Height: 5' 6" (167.6 cm)  Height (inches): 66 in  Weight Method: Bed Scale  Weight: 59.1 kg (130 lb 4.7 oz)  Weight (lb): 130.29 lb  Ideal Body Weight (IBW), Female: 130 lb  % Ideal Body Weight, Female (lb): 98.53 lb  BMI (Calculated): 20.7  BMI Grade: 18.5-24.9 - normal  Usual Body Weight (UBW), k.4 kg(admit weight - 11/15)  % Usual Body Weight: 125.81  % Weight Change From Usual Weight: 25.55 %    Lab/Procedures/Meds  Pertinent Labs Reviewed: reviewed  Pertinent Labs Comments: BUN 79, Creat 1.8, Alb 2.4, T. bili 8.8  Pertinent Medications Reviewed: reviewed  Pertinent Medications Comments: famotidine, levophed    Physical Findings/Assessment  Overall Physical Appearance: on oxygen therapy, " nourished  Tubes: nasogastric tube(LIWS)  Oral/Mouth Cavity: tooth/teeth missing  Skin: edema, incision(s)    Estimated/Assessed Needs  Weight Used For Calorie Calculations: 54.4 kg (119 lb 14.9 oz)(admit weight)  Energy Calorie Requirements (kcal): 1458 kcal/day  Energy Need Method: [place]-St Jeor(x 1.25)  Protein Requirements: 68-82 g/day(1.0-1.2 g/kg)  Weight Used For Protein Calculations: 68.3 kg (150 lb 9.2 oz)  Fluid Requirements (mL): 1 mL/kcal or per MD  Fluid Need Method: RDA Method  RDA Method (mL): 1458    Nutrition Prescription Ordered  Current Diet Order: NPO    Evaluation of Received Nutrient/Fluid Intake  I/O: +8.7L since admit  Comments: LBM 11/26  % Intake of Estimated Energy Needs: 0 - 25 %  % Meal Intake: NPO    Nutrition Risk  Level of Risk/Frequency of Follow-up: low(1x/week)     Assessment and Plan  Nutrition Problem  Inadequate energy intake     Related to (etiology):   Decreased ability to consume sufficient energy     Signs and Symptoms (as evidenced by):   NPO with no alternative means of nutrition at this time      Interventions/Recommendations (treatment strategy):  Initiate general/healthful diet or enteral/parenteral nutrition pending medical status     Nutrition Diagnosis Status:   Continues    Monitor and Evaluation  Food and Nutrient Intake: energy intake, food and beverage intake, enteral nutrition intake, parenteral nutrition intake  Food and Nutrient Adminstration: diet order, enteral and parenteral nutrition administration  Anthropometric Measurements: weight, weight change  Biochemical Data, Medical Tests and Procedures: electrolyte and renal panel, gastrointestinal profile, inflammatory profile  Nutrition-Focused Physical Findings: overall appearance     Nutrition Follow-Up  RD Follow-up?: Yes

## 2018-11-27 NOTE — ASSESSMENT & PLAN NOTE
--Noted on 11/17 with left hemiparesis, dysarthria, left sided facial droop. These symptoms improved through 11/19 with no intervention per vascular neurology other than maintaining SBP <160 and correcting coagulopathy for goal INR <1.5, platelets >50,000 and fibrinogen >100  --worsening of neurologic status again on 11/20 with worsening dysarthria, LUE, LLE, RLE paralysis.   --Repeat stat CT head 11/20 with stable thalamic hemorrhage; no acute abnormalities  --CT cervical and CT thoracic without contrast 11/21 no detection of spinal cord hematoma however sub optimal evaluation of spinal cord without contrast  --Lumbar spinal drain removed 11/23/18  --Maintain SBP <160. High MAP goals no longer indicated  --Started on oral BP medications but went into shock again on 11/26  --Head CT today for decreased alertness  --neuro checks q 4 hrs

## 2018-11-27 NOTE — ASSESSMENT & PLAN NOTE
--Suspect a component of iATN given recent hemorrhagic shock.   --no hydronephrosis on CT imaging.   --BUN/Cr improved with diuresis the past 3 days   --Decreased urine output and up-trending BUN/Cr today in shock state  --avoid nephrotoxic agents if possible.

## 2018-11-27 NOTE — ASSESSMENT & PLAN NOTE
Requiring HiFlo, High FiO2 Oxygen. Improving PE vs Volume Overload vs Hepato-Pulmonary shunting.   --No fevers or leukocytosis. Not hypotensive. Pneumonia less likely  --Bilateral LE negative for DVT lowers suspicion of PE. Would not be a candidate for anticoagulation  --Diuresed for the past 3 days with improving oxygen requirements  --Placed on BiPAP for short period of time during hypotensive, bradycardic episode. Back on 4 liters. BiPAP PRN  --New RUL consolidation.   --See shock

## 2018-11-27 NOTE — ASSESSMENT & PLAN NOTE
Palliative care consulted for goals of care and advanced care planning.  Palliative care APRN at bedside, palliative care introduced.  No family is present.     Impression:  Ms. Cleaning is a 54 yo lady admitted with Aortic dissection, s/p Acute right thalamic hemorrhage. Opens eyes to verbal and tactile stimuli, non- conversant at this time. Soft restraints in use. Abdomen distended, no bowel sounds, CT concerning for mesenteric ischemia. Ng tube to suction, dark red brown drainage. Appears comfortable at this time, prn morphine and ativan available.  Levophed continued. Hypothermic, warming blanket in use.  No acute distress patient has received morphine and ativan.     Goals of Care:    Advance Care Planning   -No advanced directives have been received.  - Legal next of kin is  Ahsan Mays [phone]. Patient reports they are  but still legally . Palliative care APRN attempted to talk with  by telephone - number listed above,  No answer, message left with no return call x2 .   - Ms. Cleaning states it is her wish that her sister,  Nancy Withers [phone]  make medical decisions for her,  If she is unable to.   - Resuscitation status -  Partial DNR ( medications only) per primary team.   - Current clinical condition: Ms Cleaning unable to participate in conversation this AM. Palliative care APRN met with Sister Nancy at bedside.  Patients brother in route.  Lexus states the four siblings are aware of her condition. She and one brother only are available to come to hospital.  Nancy has complete understanding of current clinical condition as discussed with Dr. Roberts.  She understands and is accepting that her sister is dying.        Transition of care   When discussing her thoughts about dying in more detail Ms. 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    - She states her mother received hospice services.  Ms. Cleaning  Indicates she would want to have nurses  "taking care of her.  She does not have any children "It would be sad for her children to watch their momma die."  - Sister Nancy  States Ms. Cleaning took care of their mother when she was dying.  Her mother had hospice services and it was a good experience.  - Nancy states Dr. Roberts  Discussed stopping the levophed and making her comfortable.  Nancy is waiting for her brother to arrive before making this decision.  - Palliative care APRN provided family with education regarding comfort care.   - When asked about how much time, palliative care APRN explained it is hard to know - it could be hours to days.  Nancy states this is what she understood from Dr. Roberts.    - Family with many questions about how does she get home to [place],  what do they do if she dies in the hospital.  Palliative care encouraged family to stay in this moment and we will consult  for assistance.      Plan    - If levophed is discontinued and patient remains stable recommend consult to [facility].   -Family meeting with primary team and palliative care 2 PM today 11/27/18. Primary team is aware.   - consult   - Chaplain Coto notified.   - Emotional support.   - Palliative care will continue to follow.   - DNR as per primary team - recommend LaPOST on discharge  - Palliative care will continue to follow and assist with development of realistic goals of care.   - emotional support.     Primary team aware of above goals of care conversation.            "

## 2018-11-27 NOTE — ASSESSMENT & PLAN NOTE
--Noted on 11/17 with left hemiparesis, dysarthria, left sided facial droop. These symptoms improved through 11/19 with no intervention per vascular neurology other than maintaining SBP <160 and correcting coagulopathy for goal INR <1.5, platelets >50,000 and fibrinogen >100  --worsening of neurologic status again on 11/20 with worsening dysarthria, LUE, LLE, RLE paralysis.   --Repeat stat CT head 11/20 with stable thalamic hemorrhage; no acute abnormalities  --CT cervical and CT thoracic without contrast 11/21 no detection of spinal cord hematoma however sub optimal evaluation of spinal cord without contrast  --Lumbar spinal drain removed 11/23/18  --See shock

## 2018-11-27 NOTE — ASSESSMENT & PLAN NOTE
--Became hypothermic, bradycardic, hypotensive,and had elevated lactic acid today  --Likely sepsis due bowel ischemia versus pneumonia with new RUL infiltrate. Reaction to oral BB/CCB less likely, but treated with atropine and IV Calcium  --CT Chest/Abd/Pelvis WWO 11/26 with mesenteric ischemia  --Continued elevated lactic acid 2/2 bowel ischemia  --Increasing vasopressor requirement  --Per Gen surgery, patient not appropriate for surgery.   --Family wishes to pursue comfort measures and requesting inpatient hospice.

## 2018-11-28 PROCEDURE — 20600001 HC STEP DOWN PRIVATE ROOM

## 2018-11-28 NOTE — H&P
[facility]  INPATIENT HOSPICE  H&P    Patient Name: Leah Cleaning  MRN: [id number]  Admission Date: 11/27/2018  Hospital Length of Stay: 1 days  Code Status: Prior   Attending Provider: Antione [place] MD      Assessment/Plan:     Hospice qualifying diagnosis:  Cirrhosis of liver    Hospice Encounter / Goals of care discussion:     Narrative:  Leah Cleaning is a 54 yo lady with Hep C cirrhosis, cocaine and marijuana abuse who presents as a transfer for chest pain. She was found to have Typo B aortic dissection. She was placed on nicardipine gtt. Vascular surgery performed TEVAR procedure.   The pt. deeloped hemorrhagic shock requiring vasoprassors and significant tissue damage evident by rising lactic acid. She received multiple units of FFP, RBC and platelets along with clotting factors. The developed altered mental status and was found to have a thalamic bleed. Following that she was seen to have portal vein thrombosis. Her condition continued to decline despite maximal measures and comfort care was decided, the pt. Was transitioned to hospice.       1: Symptom Assessment:     - Pain: denies     - Dyspnea: none apparent     - Agitation: no     - Mentation: somnolent but awakes, interacts with me and family members. Resting quitely    3: Current Treatment regiment   On Morphine infusion at 0.5 mg/hr    4: Family discussion   Family aware of situation and comfortable with care.    Sister states she is returning to [place] on Friday   Discussed possible transfer to [facility] if stable and sister agrees. If possible would plan transfer for tomorrow so sister still around.    5: Plan of care   Continue with comfort care. Plan for transfer to inpatient hospice tomorrow if stable.     6: Follow up plans:    daily      Subjective:     Hospice Qualifying Diagnosis:   Cirrhosis of the liver    HPI:   Leah Cleaning is a 54 yo lady with Hep C cirrhosis, cocaine and marijuana abuse who presents as a transfer  for chest pain. She was found to have Typo B aortic dissection. She was placed on nicardipine gtt. Vascular surgery performed TEVAR procedure.   The pt. deeloped hemorrhagic shock requiring vasoprassors and significant tissue damage evident by rising lactic acid. She received multiple units of FFP, RBC and platelets along with clotting factors. The developed altered mental status and was found to have a thalamic bleed. Following that she was seen to have portal vein thrombosis. Her condition continued to decline despite maximal measures and comfort care was decided, the pt. Was transitioned to hospice.         Past Medical History:   Diagnosis Date    Arthritis     Bell's palsy 2012    Cirrhosis     GERD (gastroesophageal reflux disease)     Hepatitis C     Hypertension     Sciatica        Past Surgical History:   Procedure Laterality Date    CHOLECYSTECTOMY      ENDOVASCULAR GRAFT REPAIR OF ANEURYSM OF THORACIC AORTA N/A 11/15/2018    Procedure: REPAIR, ANEURYSM, ENDOVASCULAR GRAFT, AORTA, THORACIC;  Surgeon: Hermila Storm MD;  Location: [facility] OR [address] [place] [address];  Service: Peripheral Vascular;  Laterality: N/A;  Contrast: 94  ml  mGy: 1169.79  flouro time: 9.7 minutes    REPAIR, ANEURYSM, ENDOVASCULAR GRAFT, AORTA, THORACIC N/A 11/15/2018    Performed by Hermila Storm MD at [facility] OR [address] [place] [address]        Family History     None          Tobacco Use    Smoking status: Current Every Day Smoker     Packs/day: 0.25     Years: 30.00     Pack years: 7.50    Smokeless tobacco: Never Used   Substance and Sexual Activity    Alcohol use: No     Frequency: Never    Drug use: Yes     Types: Cocaine, Marijuana     Comment: cocaine weekly; marijuana daily    Sexual activity: Not on file       Review of patient's allergies indicates:  No Known Allergies      Review of Symptoms    Review of Systems   Unable to perform ROS: Acuity of condition   Respiratory: Negative for chest tightness and shortness of breath.     Cardiovascular: Negative for chest pain.   Gastrointestinal: Positive for abdominal distention. Negative for abdominal pain.   Musculoskeletal: Negative for arthralgias.       Objective:     Vital Signs (Most Recent):  BP (!) 95/57 (BP Location: Right arm, Patient Position: Lying)   Pulse 62   Temp 98 °F (36.7 °C) (Axillary)   Resp 13   SpO2 98%         Physical Exam   Constitutional: She appears well-developed.   Neck: No JVD present.   Cardiovascular:   No murmur heard.  Pulmonary/Chest: Effort normal.   Abdominal: She exhibits distension. There is tenderness. There is no guarding.   Neurological:   Somnolent but arousable. Verbal. In no distress       Medications:  Scheduled Meds:    Scheduled Meds:   scopolamine  1 patch Transdermal Q3 Days     Continuous Infusions:   morphine IV infusion 0.5 mg/hr (11/27/18 2310)     PRN Meds:.haloperidol lactate, lorazepam, morphine       Significant Imaging: I have reviewed all pertinent imaging results/findings within the past 24 hours.    Advanced Directives::  Living Will: No  LaPOST: No  Do Not Resuscitate Status: Yes  Surrogate Decision maker / Medical Power of : Sister      > 50% of 45 min visit spent in chart review, face to face discussion of goals of care,  symptom assessment, coordination of care and emotional support.    Antione [place] MD  Palliative Medicine  [facility]

## 2018-11-28 NOTE — NURSING
Per family wishes and per MD order, end of life care and comfort measures implemented.  Hospital  at bedside providing emotional support.  [facility] representatives also present to bedside to discuss care and provide information to pt's family regarding plan of care.

## 2018-11-28 NOTE — NURSING
Overnight, pt appeared to be resting comfortably on morphine drip at 0.5mg/hr. Perry catheter draining dark bashir urine. Sister at bedside.

## 2018-11-28 NOTE — PLAN OF CARE
Problem: Spiritual Distress, Risk/Actual (Adult,Obstetrics,Pediatric)  Intervention: Facilitate Personal Exploration/Expression of Spirituality  Regarding decedent care:  Awais Mays is pt's legal  (). His address is [address] [place] [address]. His phone number is [phone].   On  at 1520 the author spoke to Awasi Mays via phone. He verbally affirmed yes in response to When Leah dies can Nancy (pts sister) make decisions, including about the  Home? Nancy Withers phone is [phone]. Another sibling (brother of pt and Semonia) is Mike Cleaning, phone [phone]. At this time, Nancy plans to use [employer] in [place]. Katerina, of [employer], on  at 10:10a, confirmed that she accepts NOJAZ Huff statement as sufficient to follow [facility]s wishes and that no signed affidavit is necessary to proceed with transporting the pt upon release by [facility].    This information has also been communicated to nursing and [facility] case management.

## 2018-11-28 NOTE — PLAN OF CARE
11/28/18 0927   Final Note   Assessment Type Final Discharge Note   [facility]   Hospital Follow Up  Appt(s) scheduled? No   Discharge plans and expectations educations in teach back method with documentation complete? No   Right Care Referral Info   Post Acute Recommendation Other   Referral Type [facility]   Facility Name [address] [address]   Ana Soto RN, BSN, CCM  Case Management  [facility]  Ext. [phone]

## 2018-11-29 VITALS
DIASTOLIC BLOOD PRESSURE: 57 MMHG | SYSTOLIC BLOOD PRESSURE: 95 MMHG | RESPIRATION RATE: 8 BRPM | HEART RATE: 62 BPM | OXYGEN SATURATION: 95 % | TEMPERATURE: 98 F

## 2018-11-29 RX ORDER — MORPHINE SULFATE 20 MG/ML
20 SOLUTION ORAL
Refills: 0
Start: 2018-11-29

## 2018-11-29 RX ORDER — HALOPERIDOL 5 MG/ML
1 INJECTION INTRAMUSCULAR EVERY 4 HOURS PRN
Start: 2018-11-29 | End: 2019-11-29

## 2018-11-29 NOTE — PROGRESS NOTES
[facility]  Inpatient Progress Note    Patient Name: Leah Cleaning  MRN: [id number]  Admission Date: 11/27/2018  Hospital Length of Stay: 2 days  Code Status: DNR   Attending Provider: Antione [place] MD  Hospice Diagnosis:Cirrhosis of liver        Assessment/Plan:     Hospice qualifying diagnosis:  Cirrhosis of liver    Hospice Encounter / Goals of care discussion:     Narrative: Leah Cleaning is a 54 yo lady with Hep C cirrhosis, cocaine and marijuana abuse who presents as a transfer for chest pain. She was found to have Typo B aortic dissection. She was placed on nicardipine gtt. Vascular surgery performed TEVAR procedure.   The pt. deeloped hemorrhagic shock requiring vasoprassors and significant tissue damage evident by rising lactic acid. She received multiple units of FFP, RBC and platelets along with clotting factors. The developed altered mental status and was found to have a thalamic bleed. Following that she was seen to have portal vein thrombosis. Her condition continued to decline despite maximal measures and comfort care was decided, the pt. Was transitioned to hospice.     Interval events:  Has been resting comfortably. No dyspnea, no moaning. Taking only little sips of water.   Remains on Morphine infusion      1: Symptom Assessment:     - Pain: none apparent     - Dyspnea: no     - Agitation: none     - Mentation: somnolent but arousable.     3: Current Treatment regiment   Morphine infusion at 0.5 mg/hr    4: Family discussion   Discussed transfer to [place] at [facility] today. Sister in agreement.     5: Plan of care   Discussed with Pearl RN and bedside RN    6: Follow up plans:    Will be followed by anel ESCOTO at the [place].     Subjective:       Review of Symptoms    Review of Systems    CAM / Delirium _x_ --  ___+   Constipation     _x_ --  ___+   Diarrhea           _x_ --  ___+    Objective:     Vital Signs (Most Recent):  BP (!) 95/57 (BP Location: Right arm,  Patient Position: Lying)   Pulse 62   Temp 98 °F (36.7 °C) (Axillary)   Resp (!) 7   SpO2 95%         Physical Exam   Constitutional: She appears well-developed.   Eyes: Scleral icterus is present.   Neck: No JVD present.   Cardiovascular: Normal rate.   No murmur heard.  Pulmonary/Chest: Effort normal.   Abdominal: Soft. She exhibits distension. There is no guarding.   Neurological:   Somnolent but arousable for a short time       Medications:    Scheduled Meds:   scopolamine  1 patch Transdermal Q3 Days     Continuous Infusions:   morphine IV infusion 0.5 mg/hr (11/27/18 2310)     PRN Meds:.haloperidol lactate, lorazepam, morphine         > 50% of 45 min visit spent in chart review, face to face discussion of goals of care,  symptom assessment, coordination of care and emotional support.    Antione [place] MD  Palliative Medicine  [facility]

## 2018-11-29 NOTE — PHYSICIAN QUERY
PT Name: Leah Cleaning  MR #: [id number]  Physician Query Form - Neurological Condition Clarification     CDS/: Lisbeth Shell RN              Contact information: [phone]  This form is a permanent document in the medical record.     Query Date: November 29, 2018    By submitting this query, we are merely seeking further clarification of documentation. Please utilize your independent clinical judgment when addressing the question(s) below.     The Medical record contains the following   Indicators   Supporting Clinical Findings Location in Medical Record   x Stroke documented 1.  Thalamic hemorrhagic stroke:   Symptoms of left weakness and dysathria improved.   Progress Note 11/18       Critical Care Medicine           x Radiology findings - MRI head with mass effect; stroke was hemorrhagic 2/2 HTN not embolic event from TEVAR        Medication(s)     x Treatment(s) - Q4hr neurovascular checks           x Other:  MRI DWI brain  11/21/18:   Stable acute right thalamic hemorrhage with surrounding edema and mass effect.  Small punctate foci of diffusion signal abnormality in the posterior temporal/parietal regions with low ADC signal  and subtle FLAIR signal abnormality suggesting punctate infarcts. No evidence major vascular territory infarct.    Progress Note 11/27       Vascular Neurology            Provider, please specify all the effects and manifestations associated with the diagnosis or diagnoses associated with above clinical findings.    Effects or Manifestations of Current Stroke (check all that apply)      [xxxx   ] Cerebral Edema     [   ] Other effect or manifestation (Specify)     [  ] Clinically undetermined

## 2018-11-29 NOTE — DISCHARGE SUMMARY
Discharge Summary  Critical Care    Admit Date: 11/15/2018     Discharge Date: 11/27/2018    LOS: 1    Principle Diagnosis: Cirrhosis of liver    Secondary Diagnoses:   Active Hospital Problems    Diagnosis  POA    *Cirrhosis of liver [K74.60]  Yes      Resolved Hospital Problems   No resolved problems to display.        HPI:  Mrs. Cleaning is a 54 yo female with a history significant for Hep C cirrhosis, HTN, cocaine and marijuana use who presented to [facility] on the early morning of 11/15 as a transfer from [facility] for Type B aortic dissection. She initially presented to [facility] on the evening of 11/14 for sharp epigastric pain that radiated to her back which started initially on the night of 11/13. CT at OSH noted aortic dissection from left subclavian to just proximal of celiac artery. She was transferred to [facility] for vascular surgery consult on a nicardipine gtt. She was transitioned from nicardipine gtt to esmolol gtt. CTA upon arrival confirmed dissection and she was admitted to MICU for medical management of the dissection with vascular surgery following.     Hospital/ICU Course:  Mrs. Cleaning was admitted to MICU for Type B aortic dissection on esmolol gtt to maintain HR of 60 and SBP <120. Vascular surgery following, no surgical intervention warranted initially. CTA read concerning for possible aortic rupture in the descending aorta with high-density fluid noted in the posterior mediastinum; additionally, there was a suspected penetrating ulcer in the upper abdominal aorta adjacent to the true lumen. Due to high risk/ possible rupture, Lumbar drain was placed by interventional radiology and TEVAR completed by vascular surgery. Early 11/16, pt noted to be in hemorragic shock complicated by liver disease as vasopressor requirements increased significantly, rising lactic acidosis, and patient was found to be bleeding from bilateral groin sites with significant  hematoma formation to left groin. Pressure was held for an extended period and patient received 4 U PRBC, 3 U FFP, 1 U platelets, 1 U cryo, and vitamin K. With improvement in coagulopathy and volume resuscitation, she was able to be weaned from vasopressors. However, on 11/17, a stroke code was called around 0800 due to acute left sided weakness; right sided thalamic bleed noted on CT. No intervention per vascular neurology, however have had to give multiple units of platelets, FFP, and cyro in order to keep platelets > 50,000, INR at 1.5, and fibrinogen >100. Repeat CT showed that hemorrhage had not changed. Neuro exam much improved with lessening left sided hemiparesis. New RUQ abdominal pain reported on 11/19 and, US with doppler noted probable right portal vein thrombosis; anticoagulation contraindicated.       On the early AM of 11/20, it was noted that Mrs. Cleaning was no longer able to move bilateral lower extremities and left upper extremity (this is a change as she was having improved strength in left upper/lower extremity previously and able to move RLE). Additionally, she was more somnolent with expressive aphasia noted. Discussions with vascular surgery, vascular neurology and [facility] team held with plans for stat CT head. Spinal drain opened up to drain 30 ml's in first hour and then 15 ml/hr subsequent to that. Additionally, levophed started to maintain MAP of 100 mm Hg but aim to keep SBP <160 given concern for spinal ischemia, but with subsequent hemorrhagic CVA. MRI Brain and spine performed on 11/21 with stable acute right thalamic hemorrhage, small punctate foci in posterior temporal/parietal regions, no cord edema or signs of cord infarction.  Episode of emesis evening of 11/21, imaging suggesting ileus.  NG placed with 800 ml output.  Lactulose enemas given with increased stool output.  LP drain clamped and removed 11/23.  Oxygen requirements increased and was placed on high flow nasal cannula. WYATT STEINER  negative for DVT. Began diuresing patient patient on 11/23/18. Passed swallow assessment and started on a diet on 11/23/18.   On 11/26/18, she was more lethargic, complaining of abdominal pain and suddenly became hypotensive and bradycardic.  She was given 1 mg of atropine and IV calcium to reverse oral CCB she was receiving for hypertension.  Central line was inserted and she was started on norepinephrine drip.  Her lactic acid elevated to 6.3 and she was giving IV fluids.  Chest X-Ray with new right upper lobe opacity.  She was started empirically on vancomycin and zosyn. CT abd/pevis 11/26 concerning for mesenteric ischemia. General surgery to forego surgery at this time. Palliative consulted. Goals of care discussion with family who wish to proceed with comfort measures. Patient sent to inpatient hospice.      Significant Imaging:  None    Significant Laboratory Data:  Recent Results (from the past 336 hour(s))   Basic metabolic panel    Collection Time: 11/24/18  1:43 PM   Result Value Ref Range    Sodium 146 (H) 136 - 145 mmol/L    Potassium 3.7 3.5 - 5.1 mmol/L    Chloride 114 (H) 95 - 110 mmol/L    CO2 20 (L) 23 - 29 mmol/L    BUN, Bld 76 (H) 6 - 20 mg/dL    Creatinine 1.5 (H) 0.5 - 1.4 mg/dL    Calcium 9.5 8.7 - 10.5 mg/dL    Anion Gap 12 8 - 16 mmol/L   Basic metabolic panel    Collection Time: 11/23/18  3:35 PM   Result Value Ref Range    Sodium 153 (H) 136 - 145 mmol/L    Potassium 3.4 (L) 3.5 - 5.1 mmol/L    Chloride 117 (H) 95 - 110 mmol/L    CO2 23 23 - 29 mmol/L    BUN, Bld 81 (H) 6 - 20 mg/dL    Creatinine 1.5 (H) 0.5 - 1.4 mg/dL    Calcium 9.7 8.7 - 10.5 mg/dL    Anion Gap 13 8 - 16 mmol/L   Basic metabolic panel    Collection Time: 11/16/18  3:09 AM   Result Value Ref Range    Sodium 140 136 - 145 mmol/L    Potassium 4.7 3.5 - 5.1 mmol/L    Chloride 112 (H) 95 - 110 mmol/L    CO2 14 (L) 23 - 29 mmol/L    BUN, Bld 18 6 - 20 mg/dL    Creatinine 1.3 0.5 - 1.4 mg/dL    Calcium 7.3 (L) 8.7 - 10.5 mg/dL     Anion Gap 14 8 - 16 mmol/L     Recent Results (from the past 336 hour(s))   CBC auto differential    Collection Time: 11/27/18  4:03 AM   Result Value Ref Range    WBC 12.19 3.90 - 12.70 K/uL    Hemoglobin 9.3 (L) 12.0 - 16.0 g/dL    Hematocrit 28.3 (L) 37.0 - 48.5 %    Platelets 56 (L) 150 - 350 K/uL   CBC auto differential    Collection Time: 11/26/18  4:00 AM   Result Value Ref Range    WBC 9.70 3.90 - 12.70 K/uL    Hemoglobin 9.3 (L) 12.0 - 16.0 g/dL    Hematocrit 29.9 (L) 37.0 - 48.5 %    Platelets 74 (L) 150 - 350 K/uL   CBC auto differential    Collection Time: 11/25/18  2:20 PM   Result Value Ref Range    WBC 9.59 3.90 - 12.70 K/uL    Hemoglobin 9.2 (L) 12.0 - 16.0 g/dL    Hematocrit 28.4 (L) 37.0 - 48.5 %    Platelets 37 (LL) 150 - 350 K/uL     No results found for: LABA1C, HGBA1C  Microbiology Results (last 7 days)     ** No results found for the last 168 hours. **            Consultations:  None      Procedures:  Past Surgical History:   Procedure Laterality Date    CHOLECYSTECTOMY      ENDOVASCULAR GRAFT REPAIR OF ANEURYSM OF THORACIC AORTA N/A 11/15/2018    Procedure: REPAIR, ANEURYSM, ENDOVASCULAR GRAFT, AORTA, THORACIC;  Surgeon: Hermila Storm MD;  Location: [facility] OR [address] [place] [address];  Service: Peripheral Vascular;  Laterality: N/A;  Contrast: 94  ml  mGy: 1169.79  flouro time: 9.7 minutes    REPAIR, ANEURYSM, ENDOVASCULAR GRAFT, AORTA, THORACIC N/A 11/15/2018    Performed by Hermila Storm MD at [facility] OR [address] [place] [address]         Discharge Medications:  There are no discharge medications for this patient.     Diet: as tolerated    Level of Activity: As tolerated.    Follow up Plan: None    Studies Pending: None    Discharge Disposition:  Patient was discharged to inpatient hospice      Any Hoover PA-C  Critical Care Medicine  11/28/2018   7:10 PM

## 2018-11-29 NOTE — DISCHARGE SUMMARY
This note has been moved to another encounter. If you have any questions, please contact HIM Chart Correction at [phone].

## 2018-12-01 LAB
BACTERIA BLD CULT: NORMAL
BACTERIA BLD CULT: NORMAL

## 2019-02-12 NOTE — PT/OT/SLP PROGRESS
Speech Language Pathology Treatment    Patient Name:  Leah Cleaning   MRN:  [id number]  Admitting Diagnosis: Aortic dissection distal to left subclavian    Recommendations:                 General Recommendations:  Follow-up not indicated per communication with MD team 2/2 pt transitioning to palliative care/hospice. MD team will re consult if warranted.   Diet recommendations:  Puree, Liquid Diet Level: Thin yet pt currently NPO 2/2 GI issues, NGT in place  Aspiration Precautions: Strict aspiration precautions, provide oral care, ice chips sparingly for pleasure while pt NPO  General Precautions: Standard, aspiration, fall, NPO    Subjective   Awake, confused. Sister at bedside. Per communication with NSG prior to session, NSG cleared SLP to give ice chips only, no further PO 2/2 GI issues.     Pain/Comfort:  · Pain Rating 1: 6/10  · Location 1: back  · Pain Rating Post-Intervention 2: 6/10    Objective:     Has the patient been evaluated by SLP for swallowing?   Yes  Keep patient NPO? Yes   Current Respiratory Status: room air      Pt repositioned to upright. Pt with dry cracked lips. Pt able to elicit clear yet weak voicing prior to PO trials. Volitional swallow & cough not elicited. Pt tolerated ice chips x2 with no overt s/s aspiration.     Assessment:     Leah Cleaning is a 53 y.o. female with an SLP diagnosis of Dysphagia    Goals:   Multidisciplinary Problems     SLP Goals        Problem: SLP Goal    Goal Priority Disciplines Outcome   SLP Goal     SLP Ongoing (interventions implemented as appropriate)   Description:  Speech Language Pathology Goals - DISCONTINUE GOALS   Goals expected to be met by 11/27:  1.Pt will tolerate diet of  thin liqiuds and purees without overt clinical signs of aspiration   2.Pt will participate in trials of soft solids within speech therapy sessions to help determine least restrictive diet   3. Pt will complete OMEs x10 w/ SLP model to enhance oral motor function   4. Pt  will utilize compensatory strategies independently at the phrase level  to enhance speech intelligibility   5. Pt will demonstrate orientation to self, place, situation , family members, date w/ min cues   6. Pt will complete problem solving skills w/ 75 % acc to enhance safety awareness   7. Pt will generate 10 items per category to enhance organizations skills   8. Pt will demonstrate sustained attention to task across 5 consecutive minutes w/ min cues to enhance attention skills  9. Pt will participate in ongoing assessment of speech language and cognitive linguistic skills to help rule out deficits and determine therapeutic plan of care                              Plan:     · Patient to be seen:  4 x/week   · Plan of Care expires:  12/17/18  · Plan of Care reviewed with:  patient, sibling   · SLP Follow-Up:  No       Discharge recommendations:  (TBD)     Time Tracking:     SLP Treatment Date:   11/27/18  Speech Start Time:  0854  Speech Stop Time:  0903     Speech Total Time (min):  9 min    Billable Minutes: Treatment Swallowing Dysfunction 9    Marissa Matamoros, CCC-SLP  11/27/2018   not applicable

## 2019-12-17 NOTE — ASSESSMENT & PLAN NOTE
--appreciate vascular surgery assistance  --s/p lumbar drain placement and TEVAR  --lumbar drain clamped, plan for removal tomorrow   --serial neuro/ neurovascular exams      2/2 LS Weakness R/T R MCA  PT/OT

## 2020-02-19 NOTE — ASSESSMENT & PLAN NOTE
--see above   [de-identified] : Ms. Bolton is s/p L4-5 laminectomy/discectomy on 11/21/19.  Patient reports that she still has pain R buttock and at R heel. Pain occasionally radiates to RLE. She also states that bottom of foot is numb as well, with a burning sensation. Pain is the same since last visit.

## 2024-08-20 NOTE — ASSESSMENT & PLAN NOTE
Palliative care consulted for goals of care and advanced care planning.  Palliative care APRN at bedside, palliative care introduced.  No family is present.     Impression:  Ms. Cleaning is a 52 yo lady admitted with Aortic dissection, s/p Acute right thalamic hemorrhage. Opens eyes to verbal and tactile stimuli, non- conversant at this time. Soft restraints in use. Abdomen distended, no bowel sounds, CT concerning for mesenteric ischemia. Ng tube to suction, dark red brown drainage. Appears comfortable at this time, prn morphine and ativan available.  Levophed continued. Hypothermic, warming blanket in use.  No acute distress.       due to patients comorbidities - decompensated cirrhosis, coagulopathy, recent stroke, patient has very poor prognosis both with and without surgical intervention. Most likely has mesenteric ischemia from propagating dissection vs. Low flow state   - after discussion with sister, they have elected to not undergo surgical management   - recommend NPO. NG to LIWS, correct low flow state, minimize vasopressors   - appreciate vascular recs - could consider mesenteric ultrasound to evaluate vasculature           Goals of Care:    Advance Care Planning   -No advanced directives have been received.  - Legal next of kin is  Ahsan Mays [phone]. Patient reports they are  but still legally . Palliative care APRN attempted to talk with  by telephone - number listed above,  No answer, message left with no return call.   - Ms. Cleaning states it is her wish that her sister,  Nancy Withers [phone]  make medical decisions for her,  If she is unable to.   - Resuscitation status -  Partial DNR ( medications only) per primary team.   - Current clinical condition: Ms Cleaning unable to participate in conversation this AM. Palliative care APRN met with Sister Nancy at bedside.  Patients brother in route.  Lexus states the four siblings are aware of her condition. She  "and one brother only are available to come to hospital.  Nancy has complete understanding of current clinical condition as discussed with Dr. Roberts.  She understands and is accepting that her sister is dying.        Transition of care   When discussing her thoughts about dying in more detail Ms. 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    - She states her mother received hospice services.  Ms. Cleaning  Indicates she would want to have nurses taking care of her.  She does not have any children "It would be sad for her children to watch their momma die."  - Sister Nancy  States Ms. Cleaning took care of their mother when she was dying.  Her mother had hospice services and it was a good experience.  - [employer] states Dr. Roberts  Discussed stopping the levophed and making her comfortable.  Nancy is waiting for her brother to arrive before making this decision.  - Palliative care APRN provided family with education regarding comfort care.   - When asked about how much time, palliative care APRN explained it is hard to know - it could be hours to days.  Nancy states this is what she understood from Dr. Roberts.    - Family with many questions about how does she get home to [place],  what do they do if she dies in the hospital.  Palliative care encouraged family to stay in this moment and we will consult  for assistance.      Plan    - If levophed is discontinued and patient remains stable recommend consult to [facility].   -Family meeting with primary team and palliative care 2 PM today 11/27/18. Primary team is aware.   - consult   - Chaplain Coto notified.   - Emotional support.   - Palliative care will continue to follow.   - DNR as per primary team - recommend [place] on discharge  - Palliative care will continue to follow and assist with development of realistic goals of care.   - emotional support.     Primary team aware of above goals of care " conversation.             Plan: Reviewed common triggers with patient today. \\nShe responded well to amoxicillin in the past. Can call for RFs if she has a particularly bad flare. Continue Regimen: Clindamycin lotion BID PRN flares. Call for RFs when needed. Detail Level: Zone Render In Strict Bullet Format?: No Render In Strict Bullet Format?: Yes Plan: From patients history, this is likely hormonal. She is on depo-provera shot so she doesn’t have a period but she does notice intense itching monthly x3-4 days in the past year or so. It is not constant. Applies OTC cortisone cream and avoids any douches, wipes, etc. \\n\\nNo signs of LS et A on exam. \\nRecommended discussing with her GYN. Detail Level: Simple Initiate Treatment: HC 2.5% ointment as directed

## 2025-05-10 NOTE — NURSING
Critical Care team notified that 0600 bladder scan of pt was 396ml, pt denies urge to void, abd taut and distended. Team made aware.     Orders to in and out cath x1.    177.8

## (undated) DEVICE — DRESSING GAUZE 6PLY 4X4

## (undated) DEVICE — KIT EXTERNAL DRAIN(CRAINIAL)

## (undated) DEVICE — SET DECANTER MEDICHOICE

## (undated) DEVICE — INTRODUCER VASC RADPQ 5FRX10CM

## (undated) DEVICE — INTRODUCER VASC RADPQ 6FRX10CM

## (undated) DEVICE — INTRODUCER VASC RADPQ 10FRX10C

## (undated) DEVICE — SPONGE GAUZE 16PLY 4X4

## (undated) DEVICE — DRESSING ABSRBNT ISLAND 3.6X8

## (undated) DEVICE — TUBING CNTRST INJ ADPT 72IN

## (undated) DEVICE — KIT INTRO MICRO NIT VSI 4FR

## (undated) DEVICE — CATH URETHRAL RED RUBBER 18FR

## (undated) DEVICE — SYR ONLY LUER LOCK 20CC

## (undated) DEVICE — GOWN SURG 2XL DISP TIE BACK

## (undated) DEVICE — CATH BLLN CODA 9FR 120CM

## (undated) DEVICE — STOPCOCK 1 WAY PLASTIC

## (undated) DEVICE — SEE MEDLINE ITEM 152622

## (undated) DEVICE — SYR MARK 7 ARTERION 150ML

## (undated) DEVICE — COVER LIGHT HANDLE 80/CA

## (undated) DEVICE — SUT SILK 3-0 SH 18IN BLACK

## (undated) DEVICE — Device

## (undated) DEVICE — TEGADERM IV

## (undated) DEVICE — GUIDE WIRE LUNDERQUIST X-STIFF

## (undated) DEVICE — CATH ANGLED GLIDE CATH 5FR

## (undated) DEVICE — APPLICATOR CHLORAPREP ORN 26ML

## (undated) DEVICE — DRAPE INCISE IOBAN 2 23X33IN

## (undated) DEVICE — PACK UNIVERSAL SPLIT II

## (undated) DEVICE — SEE MEDLINE ITEM 153688

## (undated) DEVICE — CATH PIGTAIL

## (undated) DEVICE — DEVICE PERCLOSE SUT CLSR 6FR

## (undated) DEVICE — SUT 2/0 36IN COATED VICRYL

## (undated) DEVICE — COVER SNAP 36IN X 30IN

## (undated) DEVICE — SYR 30CC LUER LOCK

## (undated) DEVICE — ADHESIVE DERMABOND ADVANCED

## (undated) DEVICE — TRAY FOLEY 16FR INFECTION CONT

## (undated) DEVICE — DRAPE STERI INSTRUMENT 1018

## (undated) DEVICE — SUT 3-0 12-18IN SILK

## (undated) DEVICE — SEE MEDLINE ITEM 156911

## (undated) DEVICE — GUIDEWIRE STARTER J CRV 260CM

## (undated) DEVICE — SYR MED RAD 150ML

## (undated) DEVICE — TUBING HIGH PRESSURE

## (undated) DEVICE — DEVICE CLOSURE MYNX GRIP 5FR

## (undated) DEVICE — GUIDEWIRE STD .035X260CM ANG

## (undated) DEVICE — PENCIL ROCKER SWITCH 10FT CORD

## (undated) DEVICE — STAPLER SKIN PROXIMATE WIDE

## (undated) DEVICE — LOOP VESSEL BLUE MAXI

## (undated) DEVICE — CATH VISIONS PV IVUS .035

## (undated) DEVICE — SUT MCRYL PLUS 4-0 PS2 27IN

## (undated) DEVICE — SOL NS 1000CC

## (undated) DEVICE — SEE MEDLINE ITEM 152487

## (undated) DEVICE — GUIDEWIRE STF .035X260CM ANG

## (undated) DEVICE — COVER INSTR ELASTIC BAND 40X20

## (undated) DEVICE — ELECTRODE REM PLYHSV RETURN 9

## (undated) DEVICE — SYR 10CC LUER LOCK